# Patient Record
Sex: MALE | Race: WHITE | Employment: FULL TIME | URBAN - NONMETROPOLITAN AREA
[De-identification: names, ages, dates, MRNs, and addresses within clinical notes are randomized per-mention and may not be internally consistent; named-entity substitution may affect disease eponyms.]

---

## 2017-01-03 ENCOUNTER — HOSPITAL ENCOUNTER (OUTPATIENT)
Dept: GENERAL RADIOLOGY | Age: 67
Discharge: HOME | End: 2017-01-03
Payer: COMMERCIAL

## 2017-01-03 DIAGNOSIS — J84.10 POSTINFLAMMATORY PULMONARY FIBROSIS (HCC): ICD-10-CM

## 2017-01-03 PROCEDURE — 71020 XR CHEST STANDARD TWO VW: CPT

## 2017-07-17 ENCOUNTER — HOSPITAL ENCOUNTER (OUTPATIENT)
Dept: GENERAL RADIOLOGY | Age: 67
Discharge: HOME | End: 2017-07-17
Payer: COMMERCIAL

## 2017-07-17 DIAGNOSIS — J84.10 PULMONARY FIBROSIS (HCC): ICD-10-CM

## 2017-07-17 PROCEDURE — 71020 XR CHEST STANDARD TWO VW: CPT

## 2017-07-19 RX ORDER — LOSARTAN POTASSIUM 100 MG/1
100 TABLET ORAL DAILY
Qty: 30 TABLET | Refills: 5 | Status: SHIPPED | OUTPATIENT
Start: 2017-07-19 | End: 2018-01-22 | Stop reason: SDUPTHER

## 2017-07-19 RX ORDER — LOSARTAN POTASSIUM 100 MG/1
TABLET ORAL
Qty: 30 TABLET | Refills: 5 | Status: SHIPPED | OUTPATIENT
Start: 2017-07-19 | End: 2017-07-19 | Stop reason: SDUPTHER

## 2017-07-19 RX ORDER — LOSARTAN POTASSIUM 100 MG/1
100 TABLET ORAL DAILY
Qty: 30 TABLET | Refills: 5 | Status: CANCELLED | OUTPATIENT
Start: 2017-07-19

## 2017-09-22 RX ORDER — HYDROCHLOROTHIAZIDE 25 MG/1
TABLET ORAL
Qty: 30 TABLET | Refills: 5 | Status: SHIPPED | OUTPATIENT
Start: 2017-09-22 | End: 2018-03-23 | Stop reason: SDUPTHER

## 2017-11-13 ENCOUNTER — OFFICE VISIT (OUTPATIENT)
Dept: FAMILY MEDICINE CLINIC | Age: 67
End: 2017-11-13
Payer: COMMERCIAL

## 2017-11-13 VITALS
SYSTOLIC BLOOD PRESSURE: 126 MMHG | RESPIRATION RATE: 18 BRPM | WEIGHT: 220.4 LBS | HEART RATE: 74 BPM | DIASTOLIC BLOOD PRESSURE: 84 MMHG | OXYGEN SATURATION: 98 % | TEMPERATURE: 97.9 F

## 2017-11-13 DIAGNOSIS — J20.9 ACUTE BRONCHITIS, UNSPECIFIED ORGANISM: Primary | ICD-10-CM

## 2017-11-13 DIAGNOSIS — J06.9 ACUTE UPPER RESPIRATORY INFECTION: ICD-10-CM

## 2017-11-13 PROCEDURE — 99213 OFFICE O/P EST LOW 20 MIN: CPT | Performed by: NURSE PRACTITIONER

## 2017-11-13 RX ORDER — CEFDINIR 300 MG/1
300 CAPSULE ORAL 2 TIMES DAILY
Qty: 14 CAPSULE | Refills: 0 | Status: SHIPPED | OUTPATIENT
Start: 2017-11-13 | End: 2017-11-20

## 2017-11-13 ASSESSMENT — ENCOUNTER SYMPTOMS
CHEST TIGHTNESS: 0
COUGH: 1
NAUSEA: 0
SHORTNESS OF BREATH: 0
ABDOMINAL PAIN: 0
DIARRHEA: 0
WHEEZING: 0
SORE THROAT: 0

## 2017-11-13 ASSESSMENT — PATIENT HEALTH QUESTIONNAIRE - PHQ9
1. LITTLE INTEREST OR PLEASURE IN DOING THINGS: 0
SUM OF ALL RESPONSES TO PHQ QUESTIONS 1-9: 0
2. FEELING DOWN, DEPRESSED OR HOPELESS: 0
SUM OF ALL RESPONSES TO PHQ9 QUESTIONS 1 & 2: 0

## 2017-11-13 NOTE — PROGRESS NOTES
Emily Urias is a 75 y.o. male who presents today for   Chief Complaint   Patient presents with    Congestion     a week ago Sunday, pt says he had this same thing 2 years ago and took levofloxacin and he says it helped a lot    Fever     Thurs and Friday       HPI:  He has had cough and congestion for the past week with purulent sputum. He has had nasal congestion and mild sinus pressure with this. Eyes have drained and matted mainly in the morning. He had a fever for 2 days a few days ago with temp up to 101.3 but none over the past 48 hours. He has been alternating mucinex and robitussin for cough which has helped symptoms somewhat. Review of Systems   Constitutional: Positive for fever (resolved now). Negative for chills. HENT: Positive for congestion. Negative for ear pain and sore throat. Respiratory: Positive for cough. Negative for chest tightness, shortness of breath and wheezing. Cardiovascular: Negative for chest pain. Gastrointestinal: Negative for abdominal pain, diarrhea and nausea. Musculoskeletal: Negative for arthralgias and myalgias. Skin: Negative for rash. History reviewed. No pertinent past medical history. Current Outpatient Prescriptions   Medication Sig Dispense Refill    cefdinir (OMNICEF) 300 MG capsule Take 1 capsule by mouth 2 times daily for 7 days 14 capsule 0    amLODIPine (NORVASC) 10 MG tablet TAKE ONE TABLET BY MOUTH EVERY DAY 90 tablet 1    meloxicam (MOBIC) 15 MG tablet TAKE 1 TABLET BY MOUTH ONCE A DAY 90 tablet 1    hydrochlorothiazide (HYDRODIURIL) 25 MG tablet TAKE 1 TABLET BY MOUTH DAILY. 30 tablet 5    losartan (COZAAR) 100 MG tablet Take 1 tablet by mouth daily 30 tablet 5     No current facility-administered medications for this visit. No Known Allergies    History reviewed. No pertinent surgical history.     Social History   Substance Use Topics    Smoking status: Never Smoker    Smokeless tobacco: Never Used   Mita March

## 2017-11-21 DIAGNOSIS — E78.00 PURE HYPERCHOLESTEROLEMIA: ICD-10-CM

## 2017-11-21 DIAGNOSIS — I10 ESSENTIAL HYPERTENSION, MALIGNANT: ICD-10-CM

## 2017-11-21 DIAGNOSIS — E78.00 PURE HYPERCHOLESTEROLEMIA: Primary | ICD-10-CM

## 2017-11-21 LAB
ALBUMIN SERPL-MCNC: 3.8 G/DL (ref 3.5–5.2)
ALP BLD-CCNC: 67 U/L (ref 40–130)
ALT SERPL-CCNC: 28 U/L (ref 5–41)
ANION GAP SERPL CALCULATED.3IONS-SCNC: 12 MMOL/L (ref 7–19)
AST SERPL-CCNC: 27 U/L (ref 5–40)
BILIRUB SERPL-MCNC: 0.5 MG/DL (ref 0.2–1.2)
BUN BLDV-MCNC: 18 MG/DL (ref 8–23)
CALCIUM SERPL-MCNC: 8.9 MG/DL (ref 8.8–10.2)
CHLORIDE BLD-SCNC: 99 MMOL/L (ref 98–111)
CHOLESTEROL, TOTAL: 148 MG/DL (ref 160–199)
CO2: 29 MMOL/L (ref 22–29)
CREAT SERPL-MCNC: 1.1 MG/DL (ref 0.5–1.2)
GFR NON-AFRICAN AMERICAN: >60
GLUCOSE BLD-MCNC: 84 MG/DL (ref 74–109)
HDLC SERPL-MCNC: 36 MG/DL (ref 55–121)
LDL CHOLESTEROL CALCULATED: 95 MG/DL
POTASSIUM SERPL-SCNC: 3.6 MMOL/L (ref 3.5–5)
SODIUM BLD-SCNC: 140 MMOL/L (ref 136–145)
TOTAL PROTEIN: 7.8 G/DL (ref 6.6–8.7)
TRIGL SERPL-MCNC: 84 MG/DL (ref 0–149)

## 2017-11-28 ENCOUNTER — OFFICE VISIT (OUTPATIENT)
Dept: FAMILY MEDICINE CLINIC | Age: 67
End: 2017-11-28
Payer: COMMERCIAL

## 2017-11-28 VITALS
TEMPERATURE: 98.1 F | OXYGEN SATURATION: 96 % | RESPIRATION RATE: 18 BRPM | HEART RATE: 70 BPM | DIASTOLIC BLOOD PRESSURE: 88 MMHG | SYSTOLIC BLOOD PRESSURE: 132 MMHG | WEIGHT: 229 LBS

## 2017-11-28 DIAGNOSIS — I10 ESSENTIAL (PRIMARY) HYPERTENSION: Primary | ICD-10-CM

## 2017-11-28 DIAGNOSIS — R19.7 POSTCHOLECYSTECTOMY DIARRHEA: ICD-10-CM

## 2017-11-28 DIAGNOSIS — M15.9 PRIMARY OSTEOARTHRITIS INVOLVING MULTIPLE JOINTS: ICD-10-CM

## 2017-11-28 DIAGNOSIS — Z12.5 ENCOUNTER FOR PROSTATE CANCER SCREENING: ICD-10-CM

## 2017-11-28 DIAGNOSIS — N52.9 ERECTILE DYSFUNCTION, UNSPECIFIED ERECTILE DYSFUNCTION TYPE: ICD-10-CM

## 2017-11-28 DIAGNOSIS — Z23 INFLUENZA VACCINE NEEDED: ICD-10-CM

## 2017-11-28 DIAGNOSIS — E78.01 FAMILIAL HYPERCHOLESTEROLEMIA: ICD-10-CM

## 2017-11-28 DIAGNOSIS — K91.89 POSTCHOLECYSTECTOMY DIARRHEA: ICD-10-CM

## 2017-11-28 DIAGNOSIS — B35.1 ONYCHOMYCOSIS OF TOENAIL: ICD-10-CM

## 2017-11-28 DIAGNOSIS — Z23 NEED FOR PROPHYLACTIC VACCINATION OR INOCULATION AGAINST DIPHTHERIA AND TETANUS: ICD-10-CM

## 2017-11-28 DIAGNOSIS — J84.10 PULMONARY FIBROSIS (HCC): ICD-10-CM

## 2017-11-28 PROBLEM — M15.0 PRIMARY OSTEOARTHRITIS INVOLVING MULTIPLE JOINTS: Status: ACTIVE | Noted: 2017-11-28

## 2017-11-28 PROCEDURE — 90472 IMMUNIZATION ADMIN EACH ADD: CPT | Performed by: FAMILY MEDICINE

## 2017-11-28 PROCEDURE — 90662 IIV NO PRSV INCREASED AG IM: CPT | Performed by: FAMILY MEDICINE

## 2017-11-28 PROCEDURE — 99214 OFFICE O/P EST MOD 30 MIN: CPT | Performed by: FAMILY MEDICINE

## 2017-11-28 PROCEDURE — 90471 IMMUNIZATION ADMIN: CPT | Performed by: FAMILY MEDICINE

## 2017-11-28 PROCEDURE — 90715 TDAP VACCINE 7 YRS/> IM: CPT | Performed by: FAMILY MEDICINE

## 2017-11-28 RX ORDER — ASPIRIN 81 MG/1
81 TABLET, CHEWABLE ORAL
COMMUNITY

## 2017-11-28 RX ORDER — VARDENAFIL HYDROCHLORIDE 10 MG/1
10 TABLET ORAL
COMMUNITY
End: 2017-11-28 | Stop reason: ALTCHOICE

## 2017-11-28 RX ORDER — MONTELUKAST SODIUM 4 MG/1
1 TABLET, CHEWABLE ORAL 2 TIMES DAILY
Qty: 60 TABLET | Refills: 11 | Status: SHIPPED | OUTPATIENT
Start: 2017-11-28 | End: 2018-11-30 | Stop reason: SDUPTHER

## 2017-11-28 RX ORDER — MONTELUKAST SODIUM 4 MG/1
1 TABLET, CHEWABLE ORAL 2 TIMES DAILY
COMMUNITY
End: 2017-11-28 | Stop reason: SDUPTHER

## 2017-11-28 RX ORDER — SILDENAFIL 100 MG/1
100 TABLET, FILM COATED ORAL PRN
Qty: 6 TABLET | Refills: 11 | Status: SHIPPED | OUTPATIENT
Start: 2017-11-28 | End: 2019-05-30 | Stop reason: ALTCHOICE

## 2017-11-28 ASSESSMENT — ENCOUNTER SYMPTOMS
CHEST TIGHTNESS: 0
NAUSEA: 0
COUGH: 0
DIARRHEA: 0
CONSTIPATION: 0
SHORTNESS OF BREATH: 0
ABDOMINAL PAIN: 0
ANAL BLEEDING: 0

## 2017-11-28 NOTE — PROGRESS NOTES
145 mmol/L    Potassium 3.6 3.5 - 5.0 mmol/L    Chloride 99 98 - 111 mmol/L    CO2 29 22 - 29 mmol/L    Anion Gap 12 7 - 19 mmol/L    Glucose 84 74 - 109 mg/dL    BUN 18 8 - 23 mg/dL    CREATININE 1.1 0.5 - 1.2 mg/dL    GFR Non-African American >60 >60    Calcium 8.9 8.8 - 10.2 mg/dL    Total Protein 7.8 6.6 - 8.7 g/dL    Alb 3.8 3.5 - 5.2 g/dL    Total Bilirubin 0.5 0.2 - 1.2 mg/dL    Alkaline Phosphatase 67 40 - 130 U/L    ALT 28 5 - 41 U/L    AST 27 5 - 40 U/L   Lipid Panel    Collection Time: 11/21/17  9:06 AM   Result Value Ref Range    Cholesterol, Total 148 (L) 160 - 199 mg/dL    Triglycerides 84 0 - 149 mg/dL    HDL 36 (L) 55 - 121 mg/dL    LDL Calculated 95 <100 mg/dL               Assessment & Plan: The following diagnoses and conditions are stable with no further orders unless indicated:  1. Essential (primary) hypertension  Introl  - Comprehensive Metabolic Panel; Future  - Lipid Panel; Future    2. Influenza vaccine needed    - INFLUENZA, HIGH DOSE, 65 YRS +, IM, PF, PREFILL SYR, 0.5ML (FLUZONE HD)    3. Need for prophylactic vaccination or inoculation against diphtheria and tetanus    - Tdap (age 6y and older) IM ([employer])    4. Postcholecystectomy diarrhea    - colestipol (COLESTID) 1 g tablet; Take 1 tablet by mouth 2 times daily  Dispense: 60 tablet; Refill: 11    5. Erectile dysfunction, unspecified erectile dysfunction type  Discussed risks and benefits of this new medication. Discussed potential side effects. He voiced understanding.     - sildenafil (VIAGRA) 100 MG tablet; Take 1 tablet by mouth as needed for Erectile Dysfunction  Dispense: 6 tablet; Refill: 11    6. Encounter for prostate cancer screening    - Psa screening; Future    7. Familial hypercholesterolemia  Stable    8. Pulmonary fibrosis ([facility] [address]  Reviewed records from pulmonology    9. Primary osteoarthritis involving multiple joints    10.  Onychomycosis  Discuss treatment options including lacquer based medicine and oral Lamisil. Discussed potential risks of liver involvement with Lamisil. He will consider his options and contact me if he wishes to start the medication          Return in about 12 months (around 11/28/2018) for Yearly Physical.           Discussed use, benefit, and side effects of prescribed medications. All patient questions answered. Pt voiced understanding. Reviewed health maintenance. Instructed to continue current medications, diet and exercise. Patient agreed with treatment plan. Follow up as directed.

## 2017-12-21 ENCOUNTER — PATIENT MESSAGE (OUTPATIENT)
Dept: FAMILY MEDICINE CLINIC | Age: 67
End: 2017-12-21

## 2017-12-22 DIAGNOSIS — B35.1 ONYCHOMYCOSIS: Primary | ICD-10-CM

## 2017-12-22 RX ORDER — TERBINAFINE HYDROCHLORIDE 250 MG/1
250 TABLET ORAL DAILY
Qty: 30 TABLET | Refills: 2 | Status: SHIPPED | OUTPATIENT
Start: 2017-12-22 | End: 2018-11-30 | Stop reason: ALTCHOICE

## 2018-01-17 DIAGNOSIS — B35.1 ONYCHOMYCOSIS: ICD-10-CM

## 2018-01-17 LAB
ALBUMIN SERPL-MCNC: 4.4 G/DL (ref 3.5–5.2)
ALP BLD-CCNC: 75 U/L (ref 40–130)
ALT SERPL-CCNC: 26 U/L (ref 5–41)
ANION GAP SERPL CALCULATED.3IONS-SCNC: 15 MMOL/L (ref 7–19)
AST SERPL-CCNC: 27 U/L (ref 5–40)
BILIRUB SERPL-MCNC: 0.8 MG/DL (ref 0.2–1.2)
BUN BLDV-MCNC: 20 MG/DL (ref 8–23)
CALCIUM SERPL-MCNC: 9.5 MG/DL (ref 8.8–10.2)
CHLORIDE BLD-SCNC: 99 MMOL/L (ref 98–111)
CO2: 30 MMOL/L (ref 22–29)
CREAT SERPL-MCNC: 1.4 MG/DL (ref 0.5–1.2)
GFR NON-AFRICAN AMERICAN: 50
GLUCOSE BLD-MCNC: 82 MG/DL (ref 74–109)
POTASSIUM SERPL-SCNC: 4 MMOL/L (ref 3.5–5)
SODIUM BLD-SCNC: 144 MMOL/L (ref 136–145)
TOTAL PROTEIN: 8.2 G/DL (ref 6.6–8.7)

## 2018-01-22 DIAGNOSIS — B35.1 ONYCHOMYCOSIS OF TOENAIL: Primary | ICD-10-CM

## 2018-01-22 RX ORDER — LOSARTAN POTASSIUM 100 MG/1
TABLET ORAL
Qty: 30 TABLET | Refills: 5 | Status: SHIPPED | OUTPATIENT
Start: 2018-01-22 | End: 2018-07-21 | Stop reason: SDUPTHER

## 2018-01-25 ENCOUNTER — OFFICE VISIT (OUTPATIENT)
Dept: GASTROENTEROLOGY | Facility: CLINIC | Age: 68
End: 2018-01-25

## 2018-01-25 ENCOUNTER — TELEPHONE (OUTPATIENT)
Dept: FAMILY MEDICINE CLINIC | Age: 68
End: 2018-01-25

## 2018-01-25 VITALS
BODY MASS INDEX: 32.76 KG/M2 | SYSTOLIC BLOOD PRESSURE: 146 MMHG | DIASTOLIC BLOOD PRESSURE: 88 MMHG | TEMPERATURE: 97.2 F | WEIGHT: 234 LBS | HEART RATE: 66 BPM | HEIGHT: 71 IN | OXYGEN SATURATION: 99 %

## 2018-01-25 DIAGNOSIS — I10 ESSENTIAL HYPERTENSION: ICD-10-CM

## 2018-01-25 DIAGNOSIS — Z83.71 FAMILY HX COLONIC POLYPS: ICD-10-CM

## 2018-01-25 DIAGNOSIS — K75.81 NASH (NONALCOHOLIC STEATOHEPATITIS): ICD-10-CM

## 2018-01-25 DIAGNOSIS — Z86.010 HX OF COLONIC POLYP: Primary | ICD-10-CM

## 2018-01-25 PROBLEM — Z83.719 FAMILY HX COLONIC POLYPS: Status: ACTIVE | Noted: 2018-01-25

## 2018-01-25 PROBLEM — Z86.0100 HX OF COLONIC POLYP: Status: ACTIVE | Noted: 2018-01-25

## 2018-01-25 PROCEDURE — 99203 OFFICE O/P NEW LOW 30 MIN: CPT | Performed by: NURSE PRACTITIONER

## 2018-01-25 RX ORDER — POLYETHYLENE GLYCOL 3350, SODIUM CHLORIDE, SODIUM BICARBONATE, POTASSIUM CHLORIDE 420; 11.2; 5.72; 1.48 G/4L; G/4L; G/4L; G/4L
4000 POWDER, FOR SOLUTION ORAL SEE ADMIN INSTRUCTIONS
Qty: 4000 ML | Refills: 0 | Status: SHIPPED | OUTPATIENT
Start: 2018-01-25 | End: 2019-07-24

## 2018-01-25 RX ORDER — AMLODIPINE BESYLATE 10 MG/1
10 TABLET ORAL DAILY
COMMUNITY

## 2018-01-25 RX ORDER — MONTELUKAST SODIUM 4 MG/1
1 TABLET, CHEWABLE ORAL 2 TIMES DAILY
COMMUNITY

## 2018-01-25 RX ORDER — TERBINAFINE HYDROCHLORIDE 250 MG/1
250 TABLET ORAL DAILY
COMMUNITY
End: 2019-07-24

## 2018-01-25 NOTE — PROGRESS NOTES
[facility]    Primary Physician Reid Islas MD    1/25/2018    Reid Morrell   1950      Chief Complaint   Patient presents with   • Colonoscopy   h/o TRIPLETT     Subjective     HPI    Reid Morrell is a 67 y.o. male who presents as a referral for preventative maintenance. He has no complaints of nausea or vomiting. No change in bowels. No wt loss. No BRBPR. No melena. No abdominal pain.  Last colonoscopy was 3/2013,  Noting polyps, recall rec 5 years.   The patient does have history of colon polyps. The patient does not  have history of colon cancer.   There is family history of colon polyps involving father in her 60's.  There is no family history of colon cancer.     He also has history of Triplett.  This was diagnosed several years ago with liver biopsy.  He has been recommended in the past to  weight under control, keep cholesterol under control, no alcohol.  He continues to follow Dr. Islas as well and has had liver function tests monitored by him.  It has been discussed with the patient the past that fatty liver can lead to cirrhosis as well.     Past Medical History:   Diagnosis Date   • Anemia    • Arthritis    • Colon polyp    • Erythematous condition    • Hypertension    • Liver disease    • TRIPLETT (nonalcoholic steatohepatitis)    • Pneumonia    • Pulmonary fibrosis    • Shortness of breath        Past Surgical History:   Procedure Laterality Date   • CHOLECYSTECTOMY     • COLONOSCOPY  03/05/2013   • ENDOSCOPY AND COLONOSCOPY  06/24/1995   • TONSILLECTOMY         Outpatient Prescriptions Marked as Taking for the 1/25/18 encounter (Office Visit) with CANDACE Pavon   Medication Sig Dispense Refill   • amLODIPine (NORVASC) 10 MG tablet Take 10 mg by mouth Daily.     • aspirin 81 MG chewable tablet Chew 81 mg Daily.     • B Complex Vitamins (VITAMIN B COMPLEX PO) Take  by mouth Daily.     • colestipol (COLESTID) 1 g tablet Take 1 g by mouth As Needed.     •  hydrochlorothiazide (HYDRODIURIL) 25 MG tablet Take 25 mg by mouth Daily.     • losartan (COZAAR) 100 MG tablet Take 100 mg by mouth Daily.     • meloxicam (MOBIC) 15 MG tablet Take 15 mg by mouth Daily.     • terbinafine (lamiSIL) 250 MG tablet Take 250 mg by mouth Daily.         No Known Allergies    Social History     Social History   • Marital status:      Spouse name: N/A   • Number of children: N/A   • Years of education: N/A     Occupational History   • Not on file.     Social History Main Topics   • Smoking status: Never Smoker   • Smokeless tobacco: Never Used   • Alcohol use No   • Drug use: No   • Sexual activity: Not on file     Other Topics Concern   • Not on file     Social History Narrative       Family History   Problem Relation Age of Onset   • Colon polyps Father    • Colon cancer Neg Hx        Review of Systems   Constitutional: Negative for appetite change, chills, fatigue, fever and unexpected weight change.   HENT: Negative for sore throat and trouble swallowing.    Respiratory: Negative for cough, chest tightness, shortness of breath and wheezing.    Cardiovascular: Negative for chest pain and palpitations.   Gastrointestinal: Negative for abdominal distention, abdominal pain, anal bleeding, blood in stool, constipation, diarrhea, nausea, rectal pain and vomiting.        As mentioned in hpi   Genitourinary: Negative for difficulty urinating and hematuria.   Musculoskeletal: Negative for arthralgias and back pain.        Some joint pain    Skin: Negative for color change and rash.   Neurological: Negative for dizziness, seizures, syncope, light-headedness and headaches.   Hematological: Negative for adenopathy.   Psychiatric/Behavioral: Negative for confusion. The patient is not nervous/anxious.        Objective     Vitals:    01/25/18 0953   BP: 146/88   Pulse: 66   Temp: 97.2 °F (36.2 °C)   SpO2: 99%     Last 2 weights    01/25/18 0953   Weight: 106 kg (234 lb)     Body mass index is  32.64 kg/(m^2).    Physical Exam   Constitutional: He appears well-developed and well-nourished. No distress.   HENT:   Head: Normocephalic and atraumatic.   Eyes: EOM are normal. No scleral icterus.   Neck: Neck supple. No JVD present.   Cardiovascular: Normal rate, regular rhythm and normal heart sounds.    Pulmonary/Chest: Effort normal and breath sounds normal. No stridor.   Abdominal: Soft. Bowel sounds are normal. He exhibits no distension and no mass. There is no tenderness. There is no rebound and no guarding.   Musculoskeletal: He exhibits no edema.   Neurological: He is alert.   Skin: Skin is warm and dry. No rash noted.   Psychiatric: He has a normal mood and affect. His behavior is normal.   Vitals reviewed.      Imaging Results (most recent)     None          Assessment/Plan     Reid was seen today for colonoscopy.    Diagnoses and all orders for this visit:    Hx of colonic polyp  -     Case Request; Standing  -     Case Request    Family hx colonic polyps  -     Case Request; Standing  -     Case Request    Essential hypertension    CANNON (nonalcoholic steatohepatitis)    Other orders  -     Implement Anesthesia Orders Day of Procedure; Standing  -     Obtain Informed Consent; Standing  -     polyethylene glycol-electrolytes (NULYTELY WITH FLAVOR PACKS) 420 g solution; Take 4,000 mL by mouth See Admin Instructions.    plan for colonoscopy. The patient was advised to take any blood pressure or heart  medications the morning of  procedure if that is when he/she normally takes.     In regards to Cannon, this was diagnosed several years ago, had liver biopsy.  He has liver function test obtained by his PCP/Dr. Islas on a regular basis.  I will request last liver function tests from Dr. Islas.  We discussed how fatty liver disease can lead to cirrhosis.  Recommend gradual weight loss of 1 pound a week to ideal body weight, keep cholesterol under control, no alcohol, continue to follow Dr. Islas with  regular labs, would recommend LFTs at least once a year.  He can follow up with us as needed.         COLONOSCOPY WITH ANESTHESIA (N/A)  All risks, benefits, alternatives, and indications of colonoscopy procedure have been discussed with the patient. Risks to include perforation of the colon requiring possible surgery or colostomy, risk of bleeding from biopsies or removal of colon tissue, possibility of missing a colon polyp or cancer, or adverse drug reaction.  Benefits to include the diagnosis and management of disease of the colon and rectum. Alternatives to include barium enema, radiographic evaluation, lab testing or no intervention. Pt verbalizes understanding and agrees.         CANDACE Chang      EMR Dragon/transcription disclaimer:  Much of this encounter note is electronic transcription/translation of spoken language to printed text.  The electronic translation of spoken language may be erroneous, or at times, nonsensical words or phrases may be inadvertently transcribed.  Although I have reviewed the note for such errors, some may still exist.      Received labs done generally very 17 2018, labs show normal LFTs.

## 2018-02-20 DIAGNOSIS — B35.1 ONYCHOMYCOSIS OF TOENAIL: ICD-10-CM

## 2018-02-20 LAB
ALBUMIN SERPL-MCNC: 4.1 G/DL (ref 3.5–5.2)
ALP BLD-CCNC: 68 U/L (ref 40–130)
ALT SERPL-CCNC: 23 U/L (ref 5–41)
ANION GAP SERPL CALCULATED.3IONS-SCNC: 17 MMOL/L (ref 7–19)
AST SERPL-CCNC: 25 U/L (ref 5–40)
BILIRUB SERPL-MCNC: 0.5 MG/DL (ref 0.2–1.2)
BUN BLDV-MCNC: 20 MG/DL (ref 8–23)
CALCIUM SERPL-MCNC: 9.2 MG/DL (ref 8.8–10.2)
CHLORIDE BLD-SCNC: 98 MMOL/L (ref 98–111)
CO2: 26 MMOL/L (ref 22–29)
CREAT SERPL-MCNC: 1.2 MG/DL (ref 0.5–1.2)
GFR NON-AFRICAN AMERICAN: >60
GLUCOSE BLD-MCNC: 95 MG/DL (ref 74–109)
POTASSIUM SERPL-SCNC: 4.2 MMOL/L (ref 3.5–5)
SODIUM BLD-SCNC: 141 MMOL/L (ref 136–145)
TOTAL PROTEIN: 7.9 G/DL (ref 6.6–8.7)

## 2018-03-08 RX ORDER — AMLODIPINE BESYLATE 10 MG/1
TABLET ORAL
Qty: 90 TABLET | Refills: 1 | Status: SHIPPED | OUTPATIENT
Start: 2018-03-08 | End: 2018-09-07 | Stop reason: SDUPTHER

## 2018-03-16 ENCOUNTER — HOSPITAL ENCOUNTER (OUTPATIENT)
Facility: HOSPITAL | Age: 68
Setting detail: HOSPITAL OUTPATIENT SURGERY
Discharge: HOME | End: 2018-03-16
Attending: INTERNAL MEDICINE | Admitting: INTERNAL MEDICINE

## 2018-03-16 ENCOUNTER — ANESTHESIA (OUTPATIENT)
Dept: GASTROENTEROLOGY | Facility: HOSPITAL | Age: 68
End: 2018-03-16

## 2018-03-16 ENCOUNTER — ANESTHESIA EVENT (OUTPATIENT)
Dept: GASTROENTEROLOGY | Facility: HOSPITAL | Age: 68
End: 2018-03-16

## 2018-03-16 VITALS
OXYGEN SATURATION: 95 % | HEIGHT: 71 IN | RESPIRATION RATE: 15 BRPM | HEART RATE: 65 BPM | WEIGHT: 229 LBS | BODY MASS INDEX: 32.06 KG/M2 | DIASTOLIC BLOOD PRESSURE: 77 MMHG | TEMPERATURE: 98.1 F | SYSTOLIC BLOOD PRESSURE: 116 MMHG

## 2018-03-16 DIAGNOSIS — Z86.010 HX OF COLONIC POLYP: ICD-10-CM

## 2018-03-16 DIAGNOSIS — Z83.71 FAMILY HX COLONIC POLYPS: ICD-10-CM

## 2018-03-16 PROCEDURE — 25010000002 PROPOFOL 10 MG/ML EMULSION: Performed by: NURSE ANESTHETIST, CERTIFIED REGISTERED

## 2018-03-16 PROCEDURE — 88305 TISSUE EXAM BY PATHOLOGIST: CPT | Performed by: INTERNAL MEDICINE

## 2018-03-16 PROCEDURE — 45385 COLONOSCOPY W/LESION REMOVAL: CPT | Performed by: INTERNAL MEDICINE

## 2018-03-16 RX ORDER — PROPOFOL 10 MG/ML
VIAL (ML) INTRAVENOUS AS NEEDED
Status: DISCONTINUED | OUTPATIENT
Start: 2018-03-16 | End: 2018-03-16 | Stop reason: SURG

## 2018-03-16 RX ORDER — ONDANSETRON 2 MG/ML
4 INJECTION INTRAMUSCULAR; INTRAVENOUS ONCE AS NEEDED
Status: DISCONTINUED | OUTPATIENT
Start: 2018-03-16 | End: 2018-03-16 | Stop reason: HOSPADM

## 2018-03-16 RX ORDER — SODIUM CHLORIDE 9 MG/ML
500 INJECTION, SOLUTION INTRAVENOUS CONTINUOUS PRN
Status: DISCONTINUED | OUTPATIENT
Start: 2018-03-16 | End: 2018-03-16 | Stop reason: HOSPADM

## 2018-03-16 RX ORDER — LIDOCAINE HYDROCHLORIDE 20 MG/ML
INJECTION, SOLUTION INFILTRATION; PERINEURAL AS NEEDED
Status: DISCONTINUED | OUTPATIENT
Start: 2018-03-16 | End: 2018-03-16 | Stop reason: SURG

## 2018-03-16 RX ORDER — SODIUM CHLORIDE 0.9 % (FLUSH) 0.9 %
3 SYRINGE (ML) INJECTION AS NEEDED
Status: DISCONTINUED | OUTPATIENT
Start: 2018-03-16 | End: 2018-03-16 | Stop reason: HOSPADM

## 2018-03-16 RX ADMIN — SODIUM CHLORIDE 500 ML: 9 INJECTION, SOLUTION INTRAVENOUS at 07:20

## 2018-03-16 RX ADMIN — PROPOFOL 300 MG: 10 INJECTION, EMULSION INTRAVENOUS at 07:52

## 2018-03-16 RX ADMIN — LIDOCAINE HYDROCHLORIDE 80 MG: 20 INJECTION, SOLUTION INFILTRATION; PERINEURAL at 07:52

## 2018-03-16 NOTE — ANESTHESIA PREPROCEDURE EVALUATION
Anesthesia Evaluation     Patient summary reviewed and Nursing notes reviewed   NPO Solid Status: > 6 hours  NPO Liquid Status: > 6 hours           Airway   Mallampati: II  TM distance: >3 FB  Neck ROM: full  No difficulty expected  Dental - normal exam     Pulmonary - normal exam   (+) pneumonia ,     ROS comment: Pul fibrosis  Cardiovascular   Exercise tolerance: excellent (>7 METS)    Rhythm: regular    (+) hypertension well controlled,       Neuro/Psych- negative ROS  GI/Hepatic/Renal/Endo    (+)   liver disease,     Musculoskeletal     Abdominal    Substance History      OB/GYN          Other                        Anesthesia Plan    ASA 2     general     intravenous induction   Anesthetic plan and risks discussed with patient.

## 2018-03-16 NOTE — H&P
[facility]  Pre Procedure History & Physical    Chief Complaint:   Colon polyps    Subjective     HPI:   The patient has a history of colon polyps who presents for exam.    Past Medical History:   Past Medical History:   Diagnosis Date   • Anemia    • Arthritis    • Colon polyp    • Erythematous condition    • Hypertension    • Liver disease    • CANNON (nonalcoholic steatohepatitis)    • Pneumonia    • Pulmonary fibrosis    • Shortness of breath        Past Surgical History:  Past Surgical History:   Procedure Laterality Date   • CHOLECYSTECTOMY     • COLONOSCOPY  03/05/2013   • ENDOSCOPY AND COLONOSCOPY  06/24/1995   • TONSILLECTOMY         Family History:  Family History   Problem Relation Age of Onset   • Colon polyps Father    • Colon cancer Neg Hx        Social History:   reports that he has never smoked. He has never used smokeless tobacco. He reports that he does not drink alcohol or use drugs.    Medications:   Prior to Admission medications    Medication Sig Start Date End Date Taking? Authorizing Provider   amLODIPine (NORVASC) 10 MG tablet Take 10 mg by mouth Daily.   Yes Historical Provider, MD   aspirin 81 MG chewable tablet Chew 81 mg Daily.   Yes Nurse [facility], RN   B Complex Vitamins (VITAMIN B COMPLEX PO) Take  by mouth Daily.   Yes Historical Provider, MD   colestipol (COLESTID) 1 g tablet Take 1 g by mouth As Needed.   Yes Historical Provider, MD   hydrochlorothiazide (HYDRODIURIL) 25 MG tablet Take 25 mg by mouth Daily.   Yes Nurse [facility], RN   losartan (COZAAR) 100 MG tablet Take 100 mg by mouth Daily.   Yes Nurse [facility], RN   meloxicam (MOBIC) 15 MG tablet Take 15 mg by mouth Daily.   Yes Nurse [facility], RN   polyethylene glycol-electrolytes (NULYTELY WITH FLAVOR PACKS) 420 g solution Take 4,000 mL by mouth See Admin Instructions. 1/25/18  Yes CANDACE Pavon   terbinafine (lamiSIL) 250 MG tablet Take 250 mg by mouth Daily.   Yes Historical  "Provider, MD       Allergies:  Review of patient's allergies indicates no known allergies.    ROS:    General: Weight stable  Resp: No SOA  Cardiovascular: No CP    Objective     Blood pressure 120/88, pulse 72, temperature 98.1 °F (36.7 °C), temperature source Temporal Artery , resp. rate 18, height 180.3 cm (71\"), weight 104 kg (229 lb), SpO2 95 %.    Physical Exam   Constitutional: Pt is oriented to person, place, and in no distress.   HENT: Mouth/Throat: Oropharynx is clear.   Cardiovascular: Normal rate, regular rhythm.    Pulmonary/Chest: Effort normal. No respiratory distress. No  wheezes.   Abdominal: Soft. Non-distended.  Skin: Skin is warm and dry.   Psychiatric: Mood, memory, affect and judgment appear normal.     Assessment/Plan     Diagnosis:  Colon polyps    Anticipated Surgical Procedure:  Colonoscopy    The risks, benefits, and alternatives of this procedure have been discussed with the patient or the responsible party- the patient understands and agrees to proceed.      EMR Dragon/transcription disclaimer:  Much of this encounter note is electronic transcription/translation of spoken language to printed text.  The electronic translation of spoken language may be erroneous, or at times, nonsensical words or phrases may be inadvertently transcribed.  Although I have reviewed the note for such errors, some may still exist.  "

## 2018-03-16 NOTE — ANESTHESIA POSTPROCEDURE EVALUATION
Patient: Reid Morrell    Procedure Summary     Date:  03/16/18 Room / Location:  [facility] ENDOSCOPY 5 / [facility]    Anesthesia Start:  0751 Anesthesia Stop:  0811    Procedure:  COLONOSCOPY WITH ANESTHESIA (N/A ) Diagnosis:       Family hx colonic polyps      Hx of colonic polyp      (Family hx colonic polyps [Z83.71])      (Hx of colonic polyp [Z86.010])    Surgeon:  Jemal Batista MD Provider:  Perry Frias CRNA    Anesthesia Type:  general ASA Status:  2          Anesthesia Type: general  Last vitals  BP   120/88 (03/16/18 0655)   Temp   98.1 °F (36.7 °C) (03/16/18 0655)   Pulse   72 (03/16/18 0655)   Resp   18 (03/16/18 0655)     SpO2   95 % (03/16/18 0655)     Post Anesthesia Care and Evaluation    Patient location during evaluation: PHASE II  Patient participation: complete - patient participated  Level of consciousness: awake  Pain management: adequate  Airway patency: patent  Anesthetic complications: No anesthetic complications  Respiratory status: acceptable  Hydration status: acceptable

## 2018-03-19 LAB
CYTO UR: NORMAL
LAB AP CASE REPORT: NORMAL
LAB AP CLINICAL INFORMATION: NORMAL
Lab: NORMAL
PATH REPORT.FINAL DX SPEC: NORMAL
PATH REPORT.GROSS SPEC: NORMAL

## 2018-03-23 RX ORDER — HYDROCHLOROTHIAZIDE 25 MG/1
TABLET ORAL
Qty: 30 TABLET | Refills: 5 | Status: SHIPPED | OUTPATIENT
Start: 2018-03-23 | End: 2018-09-25 | Stop reason: SDUPTHER

## 2018-03-26 RX ORDER — VARDENAFIL HYDROCHLORIDE 20 MG/1
20 TABLET ORAL PRN
Qty: 9 TABLET | Refills: 5 | Status: SHIPPED | OUTPATIENT
Start: 2018-03-26 | End: 2018-11-30 | Stop reason: ALTCHOICE

## 2018-03-26 NOTE — TELEPHONE ENCOUNTER
I would suggest he call his insurance to see if they pay for alternatives to viagra. If they don't give him options he can go to [email or web address] and see if levitra, viagra, or cialis is cheaper.

## 2018-03-29 RX ORDER — SILDENAFIL CITRATE 20 MG/1
20 TABLET ORAL PRN
Qty: 30 TABLET | Refills: 1 | Status: SHIPPED | OUTPATIENT
Start: 2018-03-29 | End: 2018-11-30 | Stop reason: SDUPTHER

## 2018-07-10 ENCOUNTER — HOSPITAL ENCOUNTER (OUTPATIENT)
Dept: GENERAL RADIOLOGY | Age: 68
Discharge: HOME | End: 2018-07-10
Payer: MEDICARE

## 2018-07-10 DIAGNOSIS — J84.10 PULMONARY FIBROSIS (HCC): ICD-10-CM

## 2018-07-10 PROCEDURE — 71046 X-RAY EXAM CHEST 2 VIEWS: CPT

## 2018-07-23 RX ORDER — LOSARTAN POTASSIUM 100 MG/1
TABLET ORAL
Qty: 30 TABLET | Refills: 5 | Status: SHIPPED | OUTPATIENT
Start: 2018-07-23 | End: 2018-11-30 | Stop reason: SDUPTHER

## 2018-09-07 RX ORDER — AMLODIPINE BESYLATE 10 MG/1
TABLET ORAL
Qty: 90 TABLET | Refills: 1 | Status: SHIPPED | OUTPATIENT
Start: 2018-09-07 | End: 2019-03-06 | Stop reason: SDUPTHER

## 2018-09-25 RX ORDER — HYDROCHLOROTHIAZIDE 25 MG/1
TABLET ORAL
Qty: 30 TABLET | Refills: 5 | Status: SHIPPED | OUTPATIENT
Start: 2018-09-25 | End: 2019-03-29 | Stop reason: SDUPTHER

## 2018-11-26 DIAGNOSIS — I10 ESSENTIAL (PRIMARY) HYPERTENSION: ICD-10-CM

## 2018-11-26 DIAGNOSIS — Z12.5 ENCOUNTER FOR PROSTATE CANCER SCREENING: ICD-10-CM

## 2018-11-26 LAB
ALBUMIN SERPL-MCNC: 4 G/DL (ref 3.5–5.2)
ALP BLD-CCNC: 68 U/L (ref 40–130)
ALT SERPL-CCNC: 32 U/L (ref 5–41)
ANION GAP SERPL CALCULATED.3IONS-SCNC: 11 MMOL/L (ref 7–19)
AST SERPL-CCNC: 30 U/L (ref 5–40)
BILIRUB SERPL-MCNC: 0.7 MG/DL (ref 0.2–1.2)
BUN BLDV-MCNC: 18 MG/DL (ref 8–23)
CALCIUM SERPL-MCNC: 9.1 MG/DL (ref 8.8–10.2)
CHLORIDE BLD-SCNC: 99 MMOL/L (ref 98–111)
CHOLESTEROL, TOTAL: 151 MG/DL (ref 160–199)
CO2: 29 MMOL/L (ref 22–29)
CREAT SERPL-MCNC: 1.1 MG/DL (ref 0.5–1.2)
GFR NON-AFRICAN AMERICAN: >60
GLUCOSE BLD-MCNC: 98 MG/DL (ref 74–109)
HDLC SERPL-MCNC: 31 MG/DL (ref 55–121)
LDL CHOLESTEROL CALCULATED: 98 MG/DL
POTASSIUM SERPL-SCNC: 3.7 MMOL/L (ref 3.5–5)
PROSTATE SPECIFIC ANTIGEN: 0.95 NG/ML (ref 0–4)
SODIUM BLD-SCNC: 139 MMOL/L (ref 136–145)
TOTAL PROTEIN: 8 G/DL (ref 6.6–8.7)
TRIGL SERPL-MCNC: 108 MG/DL (ref 0–149)

## 2018-11-30 ENCOUNTER — OFFICE VISIT (OUTPATIENT)
Dept: FAMILY MEDICINE CLINIC | Age: 68
End: 2018-11-30
Payer: MEDICARE

## 2018-11-30 VITALS
HEIGHT: 71 IN | HEART RATE: 67 BPM | BODY MASS INDEX: 33.6 KG/M2 | TEMPERATURE: 97.8 F | RESPIRATION RATE: 16 BRPM | DIASTOLIC BLOOD PRESSURE: 86 MMHG | OXYGEN SATURATION: 95 % | WEIGHT: 240 LBS | SYSTOLIC BLOOD PRESSURE: 134 MMHG

## 2018-11-30 DIAGNOSIS — I10 ESSENTIAL (PRIMARY) HYPERTENSION: ICD-10-CM

## 2018-11-30 DIAGNOSIS — E78.01 FAMILIAL HYPERCHOLESTEROLEMIA: ICD-10-CM

## 2018-11-30 DIAGNOSIS — N52.9 ERECTILE DYSFUNCTION, UNSPECIFIED ERECTILE DYSFUNCTION TYPE: ICD-10-CM

## 2018-11-30 DIAGNOSIS — Z00.00 ANNUAL PHYSICAL EXAM: Primary | ICD-10-CM

## 2018-11-30 DIAGNOSIS — M15.9 PRIMARY OSTEOARTHRITIS INVOLVING MULTIPLE JOINTS: ICD-10-CM

## 2018-11-30 DIAGNOSIS — K91.89 POSTCHOLECYSTECTOMY DIARRHEA: ICD-10-CM

## 2018-11-30 DIAGNOSIS — R19.7 POSTCHOLECYSTECTOMY DIARRHEA: ICD-10-CM

## 2018-11-30 PROCEDURE — 99214 OFFICE O/P EST MOD 30 MIN: CPT | Performed by: FAMILY MEDICINE

## 2018-11-30 PROCEDURE — G0439 PPPS, SUBSEQ VISIT: HCPCS | Performed by: FAMILY MEDICINE

## 2018-11-30 RX ORDER — LOSARTAN POTASSIUM 100 MG/1
TABLET ORAL
Qty: 30 TABLET | Refills: 11 | Status: SHIPPED | OUTPATIENT
Start: 2018-11-30 | End: 2019-05-30 | Stop reason: SDUPTHER

## 2018-11-30 RX ORDER — MELOXICAM 15 MG/1
TABLET ORAL
Qty: 90 TABLET | Refills: 3 | Status: SHIPPED | OUTPATIENT
Start: 2018-11-30 | End: 2020-09-18 | Stop reason: SDUPTHER

## 2018-11-30 RX ORDER — MONTELUKAST SODIUM 4 MG/1
1 TABLET, CHEWABLE ORAL 2 TIMES DAILY
Qty: 60 TABLET | Refills: 11 | Status: SHIPPED | OUTPATIENT
Start: 2018-11-30 | End: 2019-05-30 | Stop reason: SDUPTHER

## 2018-11-30 ASSESSMENT — PATIENT HEALTH QUESTIONNAIRE - PHQ9
SUM OF ALL RESPONSES TO PHQ QUESTIONS 1-9: 0
SUM OF ALL RESPONSES TO PHQ QUESTIONS 1-9: 0

## 2018-11-30 ASSESSMENT — ANXIETY QUESTIONNAIRES: GAD7 TOTAL SCORE: 0

## 2018-11-30 ASSESSMENT — LIFESTYLE VARIABLES: HOW OFTEN DO YOU HAVE A DRINK CONTAINING ALCOHOL: 0

## 2018-11-30 NOTE — PATIENT INSTRUCTIONS
Advance Care Planning: Care Instructions  Your Care Instructions    It can be hard to live with an illness that cannot be cured. But if your health is getting worse, you may want to make decisions about end-of-life care. Planning for the end of your life does not mean that you are giving up. It is a way to make sure that your wishes are met. Clearly stating your wishes can make it easier for your loved ones. Making plans while you are still able may also ease your mind and make your final days less stressful and more meaningful. Follow-up care is a key part of your treatment and safety. Be sure to make and go to all appointments, and call your doctor if you are having problems. It's also a good idea to know your test results and keep a list of the medicines you take. What can you do to plan for the end of life? · Visit:  https://ag.[place].gov/consumer-protection/livingwills  · You can bring these issues up with your doctor. You do not need to wait until your doctor starts the conversation. You might start with \"I would not be willing to live with . Kelsey Rubio \" When you complete this sentence it helps your doctor understand your wishes. · Talk openly and honestly with your doctor. This is the best way to understand the decisions you will need to make as your health changes. Know that you can always change your mind. · Ask your doctor about commonly used life-support measures. These include tube feedings, breathing machines, and fluids given through a vein (IV). Understanding these treatments will help you decide whether you want them. · You may choose to have these life-supporting treatments for a limited time. This allows a trial period to see whether they will help you. You may also decide that you want your doctor to take only certain measures to keep you alive. It is important to spell out these conditions so that your doctor and family understand them. · Talk to your doctor about how long you are likely to live. family updated copies of the papers. Where can you learn more? Go to https://chpepiceweb.healthWebcrunchpartners. org and sign in to your [employer] account. Enter P184 in the [employer] box to learn more about \"Advance Care Planning: Care Instructions. \"     If you do not have an account, please click on the \"Sign Up Now\" link. Current as of: September 24, 2016  Content Version: 11.5  © 2476-2377 [employer]. Care instructions adapted under license by [facility] ([facility]). If you have questions about a medical condition or this instruction, always ask your healthcare professional. [address] any warranty or liability for your use of this information. Learning About Living Perroy  What is a living will? A living will is a legal form you use to write down the kind of care you want at the end of your life. It is used by the health professionals who will treat you if you aren't able to decide for yourself. If you put your wishes in writing, your loved ones and others will know what kind of care you want. They won't need to guess. This can ease your mind and be helpful to others. A living will is not the same as an estate or property will. An estate will explains what you want to happen with your money and property after you die. Is a living will a legal document? A living will is a legal document. Each state has its own laws about living kidd. If you move to another state, make sure that your living will is legal in the state where you now live. Or you might use a universal form that has been approved by many states. This kind of form can sometimes be completed and stored online. Your electronic copy will then be available wherever you have a connection to the Internet. In most cases, doctors will respect your wishes even if you have a form from a different state. · You don't need an  to complete a living will.  But legal advice can be helpful if your state's

## 2018-11-30 NOTE — PROGRESS NOTES
Interventions:  · Discussed increasing activity.     Hearing/Vision  Do you or your family notice any trouble with your hearing?: No  Do you have difficulty driving, watching TV, or doing any of your daily activities because of your eyesight?: (!) Yes  Have you had an eye exam within the past year?: Yes  Hearing/Vision Interventions:  · Vision concerns:  patient encouraged to make appointment with his/her eye specialist    Safety  Do you have working smoke detectors?: Yes  Have all throw rugs been removed or fastened?: (!) No  Do you have non-slip mats in all bathtubs?: (!) No  Do all of your stairways have a railing or banister?: Yes  Are your doorways, halls and stairs free of clutter?: Yes  Do you always fasten your seatbelt when you are in a car?: Yes  Safety Interventions:  · Home safety tips provided    ADLs  In the past 7 days, did you need help from others to perform any of the following everyday activities?: None  In the past 7 days, did you need help from others to take care of any of the following?: None  ADL Interventions:  · Not indicated    Personalized Preventive Plan   Current Health Maintenance Status  Immunization History   Administered Date(s) Administered    Influenza Virus Vaccine 10/12/2010, 10/10/2011, 10/15/2012, 10/23/2013, 11/24/2014, 11/11/2015, 11/14/2016    Influenza, High Dose (Fluzone 65 yrs and older) 11/28/2017, 10/18/2018    Pneumococcal 13-valent Conjugate ([id number]) 11/14/2016    Pneumococcal Polysaccharide ([id number]) 11/24/2014    Tdap (Boostrix, Adacel) 11/28/2017        Health Maintenance   Topic Date Due    Hepatitis C screen  1950    Shingles Vaccine (1 of 2 - 2 Dose Series) 09/25/1999    Potassium monitoring  11/26/2019    Creatinine monitoring  11/26/2019    Colon cancer screen colonoscopy  03/16/2023    Lipid screen  11/26/2023    DTaP/Tdap/Td vaccine (2 - Td) 11/28/2027    Flu vaccine  Completed    Pneumococcal low/med risk  Completed

## 2018-11-30 NOTE — PROGRESS NOTES
Result Value Ref Range    PSA 0.95 0.00 - 4.00 ng/mL   Comprehensive Metabolic Panel    Collection Time: 11/26/18  9:15 AM   Result Value Ref Range    Sodium 139 136 - 145 mmol/L    Potassium 3.7 3.5 - 5.0 mmol/L    Chloride 99 98 - 111 mmol/L    CO2 29 22 - 29 mmol/L    Anion Gap 11 7 - 19 mmol/L    Glucose 98 74 - 109 mg/dL    BUN 18 8 - 23 mg/dL    CREATININE 1.1 0.5 - 1.2 mg/dL    GFR Non-African American >60 >60    Calcium 9.1 8.8 - 10.2 mg/dL    Total Protein 8.0 6.6 - 8.7 g/dL    Alb 4.0 3.5 - 5.2 g/dL    Total Bilirubin 0.7 0.2 - 1.2 mg/dL    Alkaline Phosphatase 68 40 - 130 U/L    ALT 32 5 - 41 U/L    AST 30 5 - 40 U/L   Lipid Panel    Collection Time: 11/26/18  9:15 AM   Result Value Ref Range    Cholesterol, Total 151 (L) 160 - 199 mg/dL    Triglycerides 108 0 - 149 mg/dL    HDL 31 (L) 55 - 121 mg/dL    LDL Calculated 98 <100 mg/dL               Assessment & Plan:      Discussed lifestyle changes such as diet and exercise. Recommended eliminate processed food from diet such as sugar and fried foods. Recommended exercising at least 150 minutes/week. Try to do full body resistance training twice a week as well. Offered suggestions for calorie counting such as phone apps and online resources such as My fitness pal and Lose it. Discussed healthy weight. The following diagnoses and conditions are stable with no further orders unlessindicated:  1. Annual physical exam  Completed annual wellness today    2. Postcholecystectomy diarrhea  Refilled the following medication  - colestipol (COLESTID) 1 g tablet; Take 1 tablet by mouth 2 times daily  Dispense: 60 tablet; Refill: 11    3. Essential (primary) hypertension  BP Readings from Last 3 Encounters:   11/30/18 134/86   11/28/17 132/88   11/13/17 126/84     Stable    4.  Familial hypercholesterolemia  Lab Results   Component Value Date    CHOL 151 (L) 11/26/2018    CHOL 148 (L) 11/21/2017     Lab Results   Component Value Date    TRIG 108 11/26/2018 TRIG 84 11/21/2017     Lab Results   Component Value Date    HDL 31 (L) 11/26/2018    HDL 36 (L) 11/21/2017     Lab Results   Component Value Date    LDLCALC 98 11/26/2018    [address] 11/21/2017     No results found for: LABVLDL, VLDL  No results found for: CHOLHDJAGDISH  Primarily been managing his cholesterol with diet and exercise. We'll continue    5. Primary osteoarthritis involving multiple joints  Continue with meloxicam.  Lab Results   Component Value Date    CREATININE 1.1 11/26/2018     Lab Results   Component Value Date    BUN 18 11/26/2018             6. Erectile dysfunction, unspecified erectile dysfunction type  Continue current medication            Return in about 6 months (around 5/30/2019) for Routine follow up - 15 minute visit. Discussed use, benefit, and side effects of prescribed medications. All patientquestions answered. Pt voiced understanding. Reviewed health maintenance. Instructedto continue current medications, diet and exercise. Patient agreed with treatmentplan. Follow up as directed.

## 2018-12-06 ASSESSMENT — ENCOUNTER SYMPTOMS
CONSTIPATION: 0
ABDOMINAL PAIN: 0
NAUSEA: 0
COUGH: 0
CHEST TIGHTNESS: 0
SHORTNESS OF BREATH: 0
DIARRHEA: 0
ANAL BLEEDING: 0

## 2019-01-14 RX ORDER — LOSARTAN POTASSIUM 100 MG/1
TABLET ORAL
Qty: 30 TABLET | Refills: 5 | Status: SHIPPED | OUTPATIENT
Start: 2019-01-14 | End: 2019-06-07

## 2019-03-06 RX ORDER — AMLODIPINE BESYLATE 10 MG/1
TABLET ORAL
Qty: 90 TABLET | Refills: 1 | Status: SHIPPED | OUTPATIENT
Start: 2019-03-06 | End: 2019-09-03 | Stop reason: SDUPTHER

## 2019-03-27 RX ORDER — SILDENAFIL CITRATE 20 MG/1
TABLET ORAL
Qty: 30 TABLET | Refills: 1 | Status: SHIPPED | OUTPATIENT
Start: 2019-03-27 | End: 2019-05-30 | Stop reason: ALTCHOICE

## 2019-03-29 RX ORDER — HYDROCHLOROTHIAZIDE 25 MG/1
TABLET ORAL
Qty: 30 TABLET | Refills: 5 | Status: SHIPPED | OUTPATIENT
Start: 2019-03-29 | End: 2019-05-30 | Stop reason: SDUPTHER

## 2019-05-30 ENCOUNTER — OFFICE VISIT (OUTPATIENT)
Dept: FAMILY MEDICINE CLINIC | Age: 69
End: 2019-05-30
Payer: MEDICARE

## 2019-05-30 VITALS
BODY MASS INDEX: 33.19 KG/M2 | HEART RATE: 65 BPM | TEMPERATURE: 98.7 F | WEIGHT: 238 LBS | OXYGEN SATURATION: 98 % | SYSTOLIC BLOOD PRESSURE: 136 MMHG | DIASTOLIC BLOOD PRESSURE: 84 MMHG

## 2019-05-30 DIAGNOSIS — K91.89 POSTCHOLECYSTECTOMY DIARRHEA: ICD-10-CM

## 2019-05-30 DIAGNOSIS — Z12.5 ENCOUNTER FOR PROSTATE CANCER SCREENING: ICD-10-CM

## 2019-05-30 DIAGNOSIS — R19.7 POSTCHOLECYSTECTOMY DIARRHEA: ICD-10-CM

## 2019-05-30 DIAGNOSIS — E78.01 FAMILIAL HYPERCHOLESTEROLEMIA: ICD-10-CM

## 2019-05-30 DIAGNOSIS — I10 ESSENTIAL (PRIMARY) HYPERTENSION: Primary | ICD-10-CM

## 2019-05-30 PROCEDURE — 99213 OFFICE O/P EST LOW 20 MIN: CPT | Performed by: FAMILY MEDICINE

## 2019-05-30 RX ORDER — MONTELUKAST SODIUM 4 MG/1
1 TABLET, CHEWABLE ORAL 2 TIMES DAILY
Qty: 180 TABLET | Refills: 3 | Status: SHIPPED | OUTPATIENT
Start: 2019-05-30 | End: 2020-09-18 | Stop reason: SDUPTHER

## 2019-05-30 RX ORDER — LOSARTAN POTASSIUM 100 MG/1
TABLET ORAL
Qty: 90 TABLET | Refills: 3 | Status: SHIPPED | OUTPATIENT
Start: 2019-05-30 | End: 2020-05-20

## 2019-05-30 RX ORDER — VARDENAFIL HYDROCHLORIDE 20 MG/1
20 TABLET ORAL PRN
Qty: 10 TABLET | Refills: 5 | Status: SHIPPED | OUTPATIENT
Start: 2019-05-30 | End: 2020-06-12 | Stop reason: SDUPTHER

## 2019-05-30 RX ORDER — HYDROCHLOROTHIAZIDE 25 MG/1
TABLET ORAL
Qty: 90 TABLET | Refills: 3 | Status: SHIPPED | OUTPATIENT
Start: 2019-05-30 | End: 2019-09-26

## 2019-05-30 NOTE — PROGRESS NOTES
Topic Date Due    Hepatitis C screen  1950    Shingles Vaccine (1 of 2) 09/25/1999    Potassium monitoring  11/26/2019    Creatinine monitoring  11/26/2019    Colon cancer screen colonoscopy  03/16/2023    Lipid screen  11/26/2023    DTaP/Tdap/Td vaccine (2 - Td) 11/28/2027    Flu vaccine  Completed    Pneumococcal 65+ years Vaccine  Completed       Subjective:      Review of Systems  Review of Systems   Constitutional: Negative for chills and fever. HENT: Negative for congestion. Respiratory: Negative for cough, chest tightness and shortness of breath. Cardiovascular: Negative for chest pain, palpitations and leg swelling. Gastrointestinal: Negative for abdominal pain, anal bleeding, constipation, diarrhea and nausea. Genitourinary: Negative for difficulty urinating. Psychiatric/Behavioral: Negative. SeeHPI for visit specific review of symptoms. All others negative      Objective:   /84   Pulse 65   Temp 98.7 °F (37.1 °C)   Wt 238 lb (108 kg)   SpO2 98%   BMI 33.19 kg/m²   Physical Exam  Physical Exam   Constitutional: He appears well-developed. Does not appear ill. Eyes: Pupils are equal, round, and reactive to light. Conjunctiva and Lids normal.  Neck: Normal range of motion. Neck supple. No masses. Neck Symmetric. Normal tracheal position. No thyroid enlargement  Cardiovascular: Normal rate and regular rhythm. Exam reveals no friction rub. Carotid arteries: no bruit observed. No murmur heard. Respiratory:  Effort normal and breath sounds normal. No respiratory distress. No wheezes. No rales. No use of accessory muscles or intercostal retractions. Abdominal: Soft. Bowel sounds are normal. exhibits no distension. There is no tenderness. There is no rebound and no guarding. Musculoskeletal: exhibits no edema. Normal gait. Neurological: alert. Psychiatric: normal mood and affect. His behavior is normal. Normal judgement and insight observed.       No results found for this or any previous visit (from the past 672 hour(s)). Assessment & Plan: The following diagnoses and conditions are stable with no further orders unless indicated:  1. Essential (primary) hypertension  BP Readings from Last 3 Encounters:   05/30/19 136/84   11/30/18 134/86   11/28/17 132/88     Blood pressure stable continue with current medication reviewed DASH diet guidelines with him today  - losartan (COZAAR) 100 MG tablet; TAKE 1 TABLET BY MOUTH ONCE DAILY. Dispense: 90 tablet; Refill: 3  - hydrochlorothiazide (HYDRODIURIL) 25 MG tablet; TAKE 1 TABLET BY MOUTH EVERY DAY  Dispense: 90 tablet; Refill: 3  - Comprehensive Metabolic Panel; Future  - CBC Auto Differential; Future    2. Postcholecystectomy diarrhea  Discussed dietary changes including eliminating greasy foods and fatty foods. We'll start him on Colestid  Discussed risks and benefits of this new medication. Discussed potential side effects. He voiced understanding.     - colestipol (COLESTID) 1 g tablet; Take 1 tablet by mouth 2 times daily  Dispense: 180 tablet; Refill: 3    3. Familial hypercholesterolemia  Lab Results   Component Value Date    CHOL 151 (L) 11/26/2018    CHOL 148 (L) 11/21/2017     Lab Results   Component Value Date    TRIG 108 11/26/2018    TRIG 84 11/21/2017     Lab Results   Component Value Date    HDL 31 (L) 11/26/2018    HDL 36 (L) 11/21/2017     Lab Results   Component Value Date    LDLCALC 98 11/26/2018    [address] 11/21/2017     No results found for: LABVLDL, VLDL  No results found for: CHOLHDLRATIO  We'll recheck a lipid panel next visit. Encouraged low-cholesterol diet  - Lipid Panel; Future    4. Encounter for prostate cancer screening    - Psa screening; Future            Return in about 6 months (around 11/30/2019) for AWV - 30 minute visit. Discussed use, benefit, and side effects of prescribed medications. All patient questions answered. Pt voiced understanding.

## 2019-05-30 NOTE — PATIENT INSTRUCTIONS
[email or web address]    Go to room 201 for labs prior to next visit. Be sure to fast for at least 12 hours prior to laboratory appointment. Would recommend getting labs about 1 week prior to the appointment time to ensure they are available at the time of the appointment. No appointment is necessary for laboratory work as the orders have already been placed.     Lab opens at 6:30 am and closes at 5:00 pm.

## 2019-07-17 ENCOUNTER — HOSPITAL ENCOUNTER (OUTPATIENT)
Dept: GENERAL RADIOLOGY | Age: 69
Discharge: HOME | End: 2019-07-17
Payer: MEDICARE

## 2019-07-17 DIAGNOSIS — J84.10 PULMONARY FIBROSIS (HCC): ICD-10-CM

## 2019-07-17 PROCEDURE — 71046 X-RAY EXAM CHEST 2 VIEWS: CPT

## 2019-07-24 ENCOUNTER — OFFICE VISIT (OUTPATIENT)
Dept: PULMONOLOGY | Facility: CLINIC | Age: 69
End: 2019-07-24

## 2019-07-24 VITALS
WEIGHT: 233.6 LBS | SYSTOLIC BLOOD PRESSURE: 120 MMHG | HEIGHT: 71 IN | DIASTOLIC BLOOD PRESSURE: 72 MMHG | BODY MASS INDEX: 32.7 KG/M2 | HEART RATE: 61 BPM | OXYGEN SATURATION: 95 %

## 2019-07-24 DIAGNOSIS — J84.10 PULMONARY FIBROSIS (HCC): Primary | ICD-10-CM

## 2019-07-24 PROCEDURE — 99213 OFFICE O/P EST LOW 20 MIN: CPT | Performed by: INTERNAL MEDICINE

## 2019-07-24 RX ORDER — VARDENAFIL HYDROCHLORIDE 20 MG/1
20 TABLET ORAL AS NEEDED
Refills: 5 | COMMUNITY
Start: 2019-05-31 | End: 2023-03-12

## 2019-07-24 RX ORDER — CHLORAL HYDRATE 500 MG
1000 CAPSULE ORAL
COMMUNITY

## 2019-07-24 NOTE — PROGRESS NOTES
Subjective   Reid Morrell is a 68 y.o. male.     Background: Pt with pulomonary fibrosis and hx moderate restriction on pft 2017, fvc 74% pred, dlco 80% pred. subsequently 76 and 79% pred 2019    Chief Complaint   Patient presents with   • F/u of Pulmonary Fibrosis        History of Present Illness   He has had no decline in exercise tolerance.  No cough.  No other acute complaints    Medical/Family/Social History   has a past medical history of Anemia, Arthritis, Colon polyp, Erythematous condition, Hypertension, Liver disease, CANNON (nonalcoholic steatohepatitis), Pneumonia, Pulmonary fibrosis (CMS/HCC), and Shortness of breath.   has a past surgical history that includes Cholecystectomy; Tonsillectomy; Colonoscopy (03/05/2013); endoscopy and colonoscopy (06/24/1995); and Colonoscopy (N/A, 3/16/2018).  family history includes Colon polyps in his father.   reports that he has never smoked. He has never used smokeless tobacco. He reports that he does not drink alcohol or use drugs.  No Known Allergies  Medications    Current Outpatient Medications:   •  amLODIPine (NORVASC) 10 MG tablet, Take 10 mg by mouth Daily., Disp: , Rfl:   •  aspirin 81 MG chewable tablet, Chew 81 mg Daily., Disp: , Rfl:   •  B Complex Vitamins (VITAMIN B COMPLEX PO), Take  by mouth Daily., Disp: , Rfl:   •  colestipol (COLESTID) 1 g tablet, Take 1 g by mouth As Needed., Disp: , Rfl:   •  hydrochlorothiazide (HYDRODIURIL) 25 MG tablet, Take 25 mg by mouth Daily., Disp: , Rfl:   •  losartan (COZAAR) 100 MG tablet, Take 100 mg by mouth Daily., Disp: , Rfl:   •  meloxicam (MOBIC) 15 MG tablet, Take 15 mg by mouth Daily., Disp: , Rfl:   •  Omega-3 Fatty Acids (FISH OIL) 1000 MG capsule capsule, Take  by mouth Daily With Breakfast., Disp: , Rfl:   •  vardenafil (LEVITRA) 20 MG tablet, Take 20 mg by mouth As Needed., Disp: , Rfl: 5    Review of Systems   Constitutional: Negative for chills and fever.   Cardiovascular: Negative for chest pain.  "  Gastrointestinal: Negative for nausea and vomiting.       Objective   /72   Pulse 61   Ht 180.3 cm (71\")   Wt 106 kg (233 lb 9.6 oz)   SpO2 95% Comment: Ra  BMI 32.58 kg/m²   Physical Exam   Constitutional: He appears well-developed and well-nourished. He does not appear ill. No distress.   HENT:   Head: Normocephalic and atraumatic.   Eyes: Conjunctivae and EOM are normal.   Neck: Neck supple.   Cardiovascular: Normal rate, regular rhythm, S1 normal and S2 normal.   Pulmonary/Chest: Effort normal. He has no wheezes. He has no rales.   Abdominal: Soft. He exhibits no distension. There is no tenderness. There is no guarding.   Musculoskeletal: He exhibits no deformity.   Lymphadenopathy:     He has no cervical adenopathy.   Neurological: He is alert.   Skin: Skin is warm and dry. No rash noted.   Psychiatric: He has a normal mood and affect.     Assessment/Plan   Problem List Items Addressed This Visit        Respiratory    Pulmonary fibrosis (CMS/HCC) - Primary        Patient's Body mass index is 32.58 kg/m². BMI is above normal parameters. Recommendations include: referral to primary care.      He is stable from pulmonary standpoint.  Continue to follow.  Repeat cxr and pft next.       Electronically signed by Nash Pisano MD, 7/24/2019, 11:41 AM    "

## 2019-07-24 NOTE — PROGRESS NOTES
Subjective   Reid Morrell is a 68 y.o. male.     Background: No My Sticky Note on file.    No chief complaint on file.       History of Present Illness   ***    Medical/Family/Social History   has a past medical history of Anemia, Arthritis, Colon polyp, Erythematous condition, Hypertension, Liver disease, CANNON (nonalcoholic steatohepatitis), Pneumonia, Pulmonary fibrosis (CMS/HCC), and Shortness of breath.   has a past surgical history that includes Cholecystectomy; Tonsillectomy; Colonoscopy (03/05/2013); endoscopy and colonoscopy (06/24/1995); and Colonoscopy (N/A, 3/16/2018).  family history includes Colon polyps in his father.   reports that he has never smoked. He has never used smokeless tobacco. He reports that he does not drink alcohol or use drugs.  No Known Allergies  Medications    Current Outpatient Medications:   •  amLODIPine (NORVASC) 10 MG tablet, Take 10 mg by mouth Daily., Disp: , Rfl:   •  aspirin 81 MG chewable tablet, Chew 81 mg Daily., Disp: , Rfl:   •  B Complex Vitamins (VITAMIN B COMPLEX PO), Take  by mouth Daily., Disp: , Rfl:   •  colestipol (COLESTID) 1 g tablet, Take 1 g by mouth As Needed., Disp: , Rfl:   •  hydrochlorothiazide (HYDRODIURIL) 25 MG tablet, Take 25 mg by mouth Daily., Disp: , Rfl:   •  losartan (COZAAR) 100 MG tablet, Take 100 mg by mouth Daily., Disp: , Rfl:   •  meloxicam (MOBIC) 15 MG tablet, Take 15 mg by mouth Daily., Disp: , Rfl:   •  polyethylene glycol-electrolytes (NULYTELY WITH FLAVOR PACKS) 420 g solution, Take 4,000 mL by mouth See Admin Instructions., Disp: 4000 mL, Rfl: 0  •  terbinafine (lamiSIL) 250 MG tablet, Take 250 mg by mouth Daily., Disp: , Rfl:     Review of Systems  ***    Objective   There were no vitals taken for this visit.  Physical Exam  ***   -----------------------------------------------------------------------------------------------  Recent Imaging:      XR CHEST (2 VW) 7/17/2019 1:48 PM  HISTORY: J84.10  COMPARISON: July 10,  2018.  FINDINGS:   Chronic changes noted in the pulmonary parenchyma.. The cardiac  silhouette is normal. Pleural surfaces are unremarkable.. The osseous  structures and surrounding soft tissues demonstrate no acute  abnormality. Old healed left rib fractures are noted.   Other Result Information   Jairo Dill Incoming Radiology Results From [facility] - 07/17/2019  2:05 PM CDT  XR CHEST (2 VW) 7/17/2019 1:48 PM  HISTORY: J84.10  COMPARISON: July 10, 2018.  FINDINGS:   Chronic changes noted in the pulmonary parenchyma.. The cardiac  silhouette is normal. Pleural surfaces are unremarkable.. The osseous  structures and surrounding soft tissues demonstrate no acute  abnormality. Old healed left rib fractures are noted.  IMPRESSION:  1. Chronic change with no acute cardiopulmonary process..  Signed by Dr Daniel Bean on 7/17/2019 3:03 PM       -----------------------------------------------------------------------------------------------  Pulmonary Functions Testing Results:  No results found for: FEV1, FVC, RVP2OAR, TLC, DLCO   My interpretation of PFT: ***  -----------------------------------------------------------------------------------------------  Assessment/Plan   Problem List Items Addressed This Visit        Respiratory    Pulmonary fibrosis (CMS/HCC) - Primary        Patient's There is no height or weight on file to calculate BMI. BMI is {BMI range:15101}.      ***       Electronically signed by Nash Pisano MD, 7/24/2019, 6:28 AM

## 2019-09-03 NOTE — TELEPHONE ENCOUNTER
Yair Mchugh called to request a refill on his medication.       Last office visit : 5/30/2019   Next office visit : 12/10/2019     Requested Prescriptions     Pending Prescriptions Disp Refills    amLODIPine (NORVASC) 10 MG tablet [Pharmacy Med Name: AMLODIPINE BESYLATE 10 MG T 10 TAB] 90 tablet 1     Sig: TAKE 1 TABLET BY MOUTH [address] Francisca Carmona

## 2019-09-04 RX ORDER — AMLODIPINE BESYLATE 10 MG/1
TABLET ORAL
Qty: 90 TABLET | Refills: 1 | Status: SHIPPED | OUTPATIENT
Start: 2019-09-04 | End: 2019-12-10 | Stop reason: SDUPTHER

## 2019-09-26 RX ORDER — HYDROCHLOROTHIAZIDE 25 MG/1
TABLET ORAL
Qty: 30 TABLET | Refills: 5 | Status: SHIPPED | OUTPATIENT
Start: 2019-09-26 | End: 2020-04-01

## 2019-12-04 DIAGNOSIS — E78.01 FAMILIAL HYPERCHOLESTEROLEMIA: ICD-10-CM

## 2019-12-04 DIAGNOSIS — I10 ESSENTIAL (PRIMARY) HYPERTENSION: ICD-10-CM

## 2019-12-04 DIAGNOSIS — Z12.5 ENCOUNTER FOR PROSTATE CANCER SCREENING: ICD-10-CM

## 2019-12-04 LAB
ALBUMIN SERPL-MCNC: 4.3 G/DL (ref 3.5–5.2)
ALP BLD-CCNC: 76 U/L (ref 40–130)
ALT SERPL-CCNC: 35 U/L (ref 5–41)
ANION GAP SERPL CALCULATED.3IONS-SCNC: 18 MMOL/L (ref 7–19)
AST SERPL-CCNC: 34 U/L (ref 5–40)
BASOPHILS ABSOLUTE: 0 K/UL (ref 0–0.2)
BASOPHILS RELATIVE PERCENT: 0.3 % (ref 0–1)
BILIRUB SERPL-MCNC: 0.9 MG/DL (ref 0.2–1.2)
BUN BLDV-MCNC: 16 MG/DL (ref 8–23)
CALCIUM SERPL-MCNC: 9.5 MG/DL (ref 8.8–10.2)
CHLORIDE BLD-SCNC: 101 MMOL/L (ref 98–111)
CHOLESTEROL, TOTAL: 141 MG/DL (ref 160–199)
CO2: 25 MMOL/L (ref 22–29)
CREAT SERPL-MCNC: 1.1 MG/DL (ref 0.5–1.2)
EOSINOPHILS ABSOLUTE: 0.1 K/UL (ref 0–0.6)
EOSINOPHILS RELATIVE PERCENT: 2.4 % (ref 0–5)
GFR NON-AFRICAN AMERICAN: >60
GLUCOSE BLD-MCNC: 95 MG/DL (ref 74–109)
HCT VFR BLD CALC: 46.9 % (ref 42–52)
HDLC SERPL-MCNC: 33 MG/DL (ref 55–121)
HEMOGLOBIN: 15.5 G/DL (ref 14–18)
IMMATURE GRANULOCYTES #: 0 K/UL
LDL CHOLESTEROL CALCULATED: 90 MG/DL
LYMPHOCYTES ABSOLUTE: 1.9 K/UL (ref 1.1–4.5)
LYMPHOCYTES RELATIVE PERCENT: 32 % (ref 20–40)
MCH RBC QN AUTO: 30.2 PG (ref 27–31)
MCHC RBC AUTO-ENTMCNC: 33 G/DL (ref 33–37)
MCV RBC AUTO: 91.2 FL (ref 80–94)
MONOCYTES ABSOLUTE: 0.5 K/UL (ref 0–0.9)
MONOCYTES RELATIVE PERCENT: 8.9 % (ref 0–10)
NEUTROPHILS ABSOLUTE: 3.3 K/UL (ref 1.5–7.5)
NEUTROPHILS RELATIVE PERCENT: 56.2 % (ref 50–65)
PDW BLD-RTO: 13.7 % (ref 11.5–14.5)
PLATELET # BLD: 288 K/UL (ref 130–400)
PMV BLD AUTO: 9.2 FL (ref 9.4–12.4)
POTASSIUM SERPL-SCNC: 3.8 MMOL/L (ref 3.5–5)
PROSTATE SPECIFIC ANTIGEN: 1.1 NG/ML (ref 0–4)
RBC # BLD: 5.14 M/UL (ref 4.7–6.1)
SODIUM BLD-SCNC: 144 MMOL/L (ref 136–145)
TOTAL PROTEIN: 8.1 G/DL (ref 6.6–8.7)
TRIGL SERPL-MCNC: 90 MG/DL (ref 0–149)
WBC # BLD: 5.9 K/UL (ref 4.8–10.8)

## 2019-12-10 ENCOUNTER — OFFICE VISIT (OUTPATIENT)
Dept: FAMILY MEDICINE CLINIC | Age: 69
End: 2019-12-10
Payer: MEDICARE

## 2019-12-10 VITALS
HEIGHT: 71 IN | DIASTOLIC BLOOD PRESSURE: 80 MMHG | SYSTOLIC BLOOD PRESSURE: 124 MMHG | WEIGHT: 238.5 LBS | BODY MASS INDEX: 33.39 KG/M2 | HEART RATE: 69 BPM | OXYGEN SATURATION: 98 % | TEMPERATURE: 97 F

## 2019-12-10 DIAGNOSIS — Z11.59 NEED FOR HEPATITIS C SCREENING TEST: ICD-10-CM

## 2019-12-10 DIAGNOSIS — K91.89 POSTCHOLECYSTECTOMY DIARRHEA: ICD-10-CM

## 2019-12-10 DIAGNOSIS — R19.7 POSTCHOLECYSTECTOMY DIARRHEA: ICD-10-CM

## 2019-12-10 DIAGNOSIS — I10 ESSENTIAL (PRIMARY) HYPERTENSION: Primary | ICD-10-CM

## 2019-12-10 PROCEDURE — 99213 OFFICE O/P EST LOW 20 MIN: CPT | Performed by: FAMILY MEDICINE

## 2019-12-10 RX ORDER — AMLODIPINE BESYLATE 10 MG/1
TABLET ORAL
Qty: 90 TABLET | Refills: 1 | Status: SHIPPED | OUTPATIENT
Start: 2019-12-10 | End: 2020-03-11

## 2019-12-10 ASSESSMENT — ENCOUNTER SYMPTOMS
CHEST TIGHTNESS: 0
ABDOMINAL PAIN: 0
SHORTNESS OF BREATH: 0
DIARRHEA: 0
ANAL BLEEDING: 0
NAUSEA: 0
COUGH: 0
CONSTIPATION: 0

## 2019-12-10 ASSESSMENT — PATIENT HEALTH QUESTIONNAIRE - PHQ9
SUM OF ALL RESPONSES TO PHQ QUESTIONS 1-9: 0
1. LITTLE INTEREST OR PLEASURE IN DOING THINGS: 0
SUM OF ALL RESPONSES TO PHQ QUESTIONS 1-9: 0
SUM OF ALL RESPONSES TO PHQ9 QUESTIONS 1 & 2: 0
2. FEELING DOWN, DEPRESSED OR HOPELESS: 0

## 2020-01-20 ENCOUNTER — HOSPITAL ENCOUNTER (OUTPATIENT)
Dept: GENERAL RADIOLOGY | Age: 70
Discharge: HOME | End: 2020-01-20
Payer: MEDICARE

## 2020-01-20 PROCEDURE — 71046 X-RAY EXAM CHEST 2 VIEWS: CPT

## 2020-01-21 DIAGNOSIS — J84.10 PULMONARY FIBROSIS (HCC): ICD-10-CM

## 2020-02-03 ENCOUNTER — OFFICE VISIT (OUTPATIENT)
Dept: PULMONOLOGY | Facility: CLINIC | Age: 70
End: 2020-02-03

## 2020-02-03 VITALS
SYSTOLIC BLOOD PRESSURE: 122 MMHG | DIASTOLIC BLOOD PRESSURE: 74 MMHG | HEART RATE: 62 BPM | WEIGHT: 224 LBS | BODY MASS INDEX: 31.36 KG/M2 | HEIGHT: 71 IN | OXYGEN SATURATION: 95 %

## 2020-02-03 DIAGNOSIS — J84.10 PULMONARY FIBROSIS (HCC): Primary | ICD-10-CM

## 2020-02-03 PROCEDURE — 99213 OFFICE O/P EST LOW 20 MIN: CPT | Performed by: INTERNAL MEDICINE

## 2020-02-03 PROCEDURE — 94729 DIFFUSING CAPACITY: CPT | Performed by: INTERNAL MEDICINE

## 2020-02-03 PROCEDURE — 94727 GAS DIL/WSHOT DETER LNG VOL: CPT | Performed by: INTERNAL MEDICINE

## 2020-02-03 PROCEDURE — 94010 BREATHING CAPACITY TEST: CPT | Performed by: INTERNAL MEDICINE

## 2020-02-03 NOTE — PROCEDURES
Pulmonary Function Test  Performed by: Nash Pisano MD  Authorized by: Nash Pisano MD      Pre Drug    FVC: 75%   FEV1: 79%   FEF 25-75%: 91%   FEV1/FVC: 82.62%   T%   RV: 71%   DLCO: 61%   D/VAsb: 83%

## 2020-02-03 NOTE — PROGRESS NOTES
Subjective   Reid Morrell is a 69 y.o. male.     Background: Pt with pulmonary fibrosis and hx moderate restriction on pft 2017, fvc 74% pred, dlco 80% pred. subsequently 76 and 79% pred 2019    Chief Complaint   Patient presents with   • pulmonary fibrosis        History of Present Illness   He has had very mild intermittent dyspnea in chest, not lately, alleviated by rest.  He says he is trying to get into shape.    Medical/Family/Social History   has a past medical history of Anemia, Arthritis, Colon polyp, Erythematous condition, Hypertension, Liver disease, CANNON (nonalcoholic steatohepatitis), Pneumonia, Pulmonary fibrosis (CMS/HCC), and Shortness of breath.   has a past surgical history that includes Cholecystectomy; Tonsillectomy; Colonoscopy (03/05/2013); endoscopy and colonoscopy (06/24/1995); Colonoscopy (N/A, 3/16/2018); and Cataract extraction, bilateral.  family history includes Colon polyps in his father.   reports that he has never smoked. He has never used smokeless tobacco. He reports that he does not drink alcohol or use drugs.  No Known Allergies  Medications    Current Outpatient Medications:   •  amLODIPine (NORVASC) 10 MG tablet, Take 10 mg by mouth Daily., Disp: , Rfl:   •  aspirin 81 MG chewable tablet, Chew 81 mg Daily., Disp: , Rfl:   •  B Complex Vitamins (VITAMIN B COMPLEX PO), Take  by mouth Daily., Disp: , Rfl:   •  colestipol (COLESTID) 1 g tablet, Take 1 g by mouth As Needed., Disp: , Rfl:   •  hydrochlorothiazide (HYDRODIURIL) 25 MG tablet, Take 25 mg by mouth Daily., Disp: , Rfl:   •  losartan (COZAAR) 100 MG tablet, Take 100 mg by mouth Daily., Disp: , Rfl:   •  meloxicam (MOBIC) 15 MG tablet, Take 15 mg by mouth Daily., Disp: , Rfl:   •  Omega-3 Fatty Acids (FISH OIL) 1000 MG capsule capsule, Take  by mouth Daily With Breakfast., Disp: , Rfl:   •  vardenafil (LEVITRA) 20 MG tablet, Take 20 mg by mouth As Needed., Disp: , Rfl: 5    Review of Systems   Constitutional: Negative  "for chills and fever.   Cardiovascular: Negative for chest pain.   Gastrointestinal: Negative for nausea and vomiting.     Objective   /74   Pulse 62   Ht 179.7 cm (70.75\")   Wt 102 kg (224 lb)   SpO2 95% Comment: RA  BMI 31.46 kg/m²   Physical Exam   Constitutional: He is oriented to person, place, and time. He appears well-developed and well-nourished. He does not appear ill. No distress.   HENT:   Head: Normocephalic and atraumatic.   Nose: Nose normal.   Eyes: Conjunctivae and EOM are normal.   Neck: Neck supple.   Cardiovascular: Normal rate, regular rhythm, S1 normal and S2 normal.   Pulmonary/Chest: Effort normal. No respiratory distress. He has no wheezes. He has rales.   Abdominal: Soft. He exhibits no distension. There is no tenderness.   Musculoskeletal: He exhibits no deformity.   Neurological: He is alert and oriented to person, place, and time. He exhibits normal muscle tone.   Skin: Skin is warm and dry. No rash noted.   Psychiatric: He has a normal mood and affect.        -----------------------------------------------------------------------------------------------  XR CHEST (2 VW) 1/20/2020 11:36 AM  Two-view chest:   History: Pulmonary fibrosis   Frontal and lateral projection chest radiograph obtained.  Comparison:  7/17/2019, 17 2018 and 7/17/2017   Findings:  Decreased volume loss appreciated compatible restricted lung disease  with interstitial prominence particularly in the lower lobes most  likely unchanged  Patient's history of pulmonary fibrosis.  The upper lung fields are clear without acute infiltrates.  The heart is normal in size. Pulmonary circulation appropriate,  without heart failure.  There are multiple left rib fractures.  Mild spondylosis changes noted diffusely in the thoracic spine with  osteophyte formation.  There are no acute osseous abnormalities.  Radiographically, the chest is unchanged..      IMPRESSION:  Impression:  1. Chronic lung changes, stable, no acute " cardiopulmonary process.  Signed by Dr Jesu Suazo on 1/20/2020 12:59 PM     -----------------------------------------------------------------------------------------------  PFT Values        Some values may be hidden. Unless noted otherwise, only the newest values recorded on each date are displayed.         Old Values PFT Results 2/3/20   No data to display.      Pre Drug PFT Results 2/3/20   FVC 75   FEV1 79   FEF 25-75% 91   FEV1/FVC 82.62      Post Drug PFT Results 2/3/20   No data to display.      Other Tests PFT Results 2/3/20   TLC 76   RV 71   DLCO 61   D/VAsb 83           My interpretation of the PFT: moderate restriction, borderline dlco     -----------------------------------------------------------------------------------------------  Assessment/Plan   Problem List Items Addressed This Visit        Pulmonary Problems    Pulmonary fibrosis (CMS/HCC) - Primary    Relevant Orders    Pulmonary Function Test (Completed)    CT Chest Hi Resolution        Patient's Body mass index is 31.46 kg/m². BMI is above normal parameters. Recommendations include: referral to primary care.    DLCO may have dropped a little although the remainder of testing is unchanged  Schedule hrct chest to better characterize the fibrosis and evaluate for UIP which may warrant further workup and treatment.  Otherwise follow up in a year with repeat pft       Electronically signed by Nash Pisano MD, 2/3/2020, 11:18 AM

## 2020-02-10 ENCOUNTER — HOSPITAL ENCOUNTER (OUTPATIENT)
Dept: CT IMAGING | Age: 70
Discharge: HOME | End: 2020-02-10
Payer: MEDICARE

## 2020-02-10 PROCEDURE — 71250 CT THORAX DX C-: CPT

## 2020-03-11 RX ORDER — AMLODIPINE BESYLATE 10 MG/1
TABLET ORAL
Qty: 90 TABLET | Refills: 1 | Status: SHIPPED | OUTPATIENT
Start: 2020-03-11 | End: 2020-06-12 | Stop reason: SDUPTHER

## 2020-04-08 DIAGNOSIS — J84.10 PULMONARY FIBROSIS (HCC): ICD-10-CM

## 2020-04-08 NOTE — PROGRESS NOTES
Please call the patient regarding his abnormal result. It did show some scarring but not in a pattern that would warrant taking any more immediate action; will need to continue to follow and get a follow up ct chest next year, or sooner if he gets increasing shortness of breath, etc.

## 2020-05-20 RX ORDER — LOSARTAN POTASSIUM 100 MG/1
TABLET ORAL
Qty: 90 TABLET | Refills: 3 | Status: SHIPPED | OUTPATIENT
Start: 2020-05-20 | End: 2020-06-12 | Stop reason: SDUPTHER

## 2020-06-12 ENCOUNTER — TELEPHONE (OUTPATIENT)
Dept: FAMILY MEDICINE CLINIC | Age: 70
End: 2020-06-12

## 2020-06-12 ENCOUNTER — TELEMEDICINE (OUTPATIENT)
Dept: FAMILY MEDICINE CLINIC | Age: 70
End: 2020-06-12
Payer: MEDICARE

## 2020-06-12 VITALS
WEIGHT: 221 LBS | SYSTOLIC BLOOD PRESSURE: 128 MMHG | BODY MASS INDEX: 30.82 KG/M2 | DIASTOLIC BLOOD PRESSURE: 83 MMHG | HEART RATE: 55 BPM

## 2020-06-12 PROCEDURE — 99214 OFFICE O/P EST MOD 30 MIN: CPT | Performed by: FAMILY MEDICINE

## 2020-06-12 PROCEDURE — G0439 PPPS, SUBSEQ VISIT: HCPCS | Performed by: FAMILY MEDICINE

## 2020-06-12 RX ORDER — AMLODIPINE BESYLATE 10 MG/1
10 TABLET ORAL DAILY
Qty: 90 TABLET | Refills: 3 | Status: SHIPPED | OUTPATIENT
Start: 2020-06-12 | End: 2020-09-18 | Stop reason: SDUPTHER

## 2020-06-12 RX ORDER — LOSARTAN POTASSIUM 100 MG/1
100 TABLET ORAL DAILY
Qty: 90 TABLET | Refills: 3 | Status: SHIPPED | OUTPATIENT
Start: 2020-06-12 | End: 2021-06-07 | Stop reason: SDUPTHER

## 2020-06-12 RX ORDER — VARDENAFIL HYDROCHLORIDE 20 MG/1
20 TABLET ORAL PRN
Qty: 10 TABLET | Refills: 5 | Status: SHIPPED | OUTPATIENT
Start: 2020-06-12 | End: 2021-12-07 | Stop reason: ALTCHOICE

## 2020-06-12 RX ORDER — SILDENAFIL 100 MG/1
TABLET, FILM COATED ORAL
Qty: 10 TABLET | Refills: 11 | Status: SHIPPED | OUTPATIENT
Start: 2020-06-12 | End: 2020-09-18 | Stop reason: SDUPTHER

## 2020-06-12 RX ORDER — HYDROCHLOROTHIAZIDE 25 MG/1
TABLET ORAL
Qty: 90 TABLET | Refills: 3 | Status: SHIPPED | OUTPATIENT
Start: 2020-06-12 | End: 2021-04-06

## 2020-06-12 ASSESSMENT — ENCOUNTER SYMPTOMS
DIARRHEA: 0
ABDOMINAL PAIN: 0
NAUSEA: 0
CONSTIPATION: 0
BACK PAIN: 1
SHORTNESS OF BREATH: 0
CHEST TIGHTNESS: 0
ANAL BLEEDING: 0
COUGH: 0

## 2020-09-02 ENCOUNTER — OFFICE VISIT (OUTPATIENT)
Dept: URGENT CARE | Age: 70
End: 2020-09-02
Payer: MEDICARE

## 2020-09-02 VITALS
DIASTOLIC BLOOD PRESSURE: 87 MMHG | RESPIRATION RATE: 18 BRPM | TEMPERATURE: 98.2 F | HEIGHT: 71 IN | OXYGEN SATURATION: 94 % | HEART RATE: 74 BPM | SYSTOLIC BLOOD PRESSURE: 146 MMHG | WEIGHT: 233.8 LBS | BODY MASS INDEX: 32.73 KG/M2

## 2020-09-02 PROCEDURE — G8427 DOCREV CUR MEDS BY ELIG CLIN: HCPCS | Performed by: NURSE PRACTITIONER

## 2020-09-02 PROCEDURE — 3017F COLORECTAL CA SCREEN DOC REV: CPT | Performed by: NURSE PRACTITIONER

## 2020-09-02 PROCEDURE — 1123F ACP DISCUSS/DSCN MKR DOCD: CPT | Performed by: NURSE PRACTITIONER

## 2020-09-02 PROCEDURE — 4040F PNEUMOC VAC/ADMIN/RCVD: CPT | Performed by: NURSE PRACTITIONER

## 2020-09-02 PROCEDURE — 99212 OFFICE O/P EST SF 10 MIN: CPT | Performed by: NURSE PRACTITIONER

## 2020-09-02 PROCEDURE — 1036F TOBACCO NON-USER: CPT | Performed by: NURSE PRACTITIONER

## 2020-09-02 PROCEDURE — G8417 CALC BMI ABV UP PARAM F/U: HCPCS | Performed by: NURSE PRACTITIONER

## 2020-09-02 RX ORDER — CLINDAMYCIN HYDROCHLORIDE 300 MG/1
300 CAPSULE ORAL 3 TIMES DAILY
Qty: 30 CAPSULE | Refills: 0 | Status: SHIPPED | OUTPATIENT
Start: 2020-09-02 | End: 2020-09-12

## 2020-09-02 NOTE — PROGRESS NOTES
[address] [facility]  [address] [facility]  [address] [id number]  Dept: [phone]  Dept Fax: [phone]  Loc: [phone]    Shira Gerber is a 78 y.o. male who presents today for his medical conditions/complaintsas noted below. Shira Gerber is c/o of Insect Bite (RLE)        HPI:     Rash   This is a new problem. The current episode started in the past 7 days. The problem has been gradually worsening since onset. The affected locations include the right lower leg. The rash is characterized by redness. It is unknown if there was an exposure to a precipitant. Pertinent negatives include no fatigue or fever. Treatments tried: finger nail polish  The treatment provided no relief. Past Medical History:   Diagnosis Date    Hypertension     Pulmonary fibrosis ([address]      Past Surgical History:   Procedure Laterality Date    CHOLECYSTECTOMY      TONSILLECTOMY         Family History   Problem Relation Age of Onset    High Blood Pressure Mother     Diabetes Mother     High Blood Pressure Father     Heart Disease Father     Diabetes Father        Social History     Tobacco Use    Smoking status: Never Smoker    Smokeless tobacco: Never Used   Substance Use Topics    Alcohol use: No      Current Outpatient Medications   Medication Sig Dispense Refill    clindamycin (CLEOCIN) 300 MG capsule Take 1 capsule by mouth 3 times daily for 10 days 30 capsule 0    mupirocin (BACTROBAN) 2 % ointment Apply 3 times daily.  15 g 0    losartan (COZAAR) 100 MG tablet Take 1 tablet by mouth daily 90 tablet 3    hydroCHLOROthiazide (HYDRODIURIL) 25 MG tablet TAKE 1 TABLET BY MOUTH EVERY DAY 90 tablet 3    vardenafil (LEVITRA) 20 MG tablet Take 1 tablet by mouth as needed for Erectile Dysfunction 10 tablet 5    amLODIPine (NORVASC) 10 MG tablet Take 1 tablet by mouth daily 90 tablet 3    sildenafil (VIAGRA) 100 MG tablet 1/2 to 1 tablet po qday prn 10 tablet 11    colestipol (COLESTID) 1 g tablet Take 1 tablet by mouth 2 times daily 180 tablet 3    meloxicam (MOBIC) 15 MG tablet TAKE 1 TABLET BY MOUTH ONCE A DAY 90 tablet 3    aspirin 81 MG chewable tablet Take 81 mg by mouth      B Complex-C (SUPER B COMPLEX PO) Take by mouth       No current facility-administered medications for this visit. No Known Allergies    Health Maintenance   Topic Date Due    Hepatitis C screen  1950    Flu vaccine (1) 09/01/2020    Potassium monitoring  12/04/2020    Creatinine monitoring  12/04/2020    Annual Wellness Visit (AWV)  06/13/2021    Colon cancer screen colonoscopy  03/16/2023    Lipid screen  12/04/2024    DTaP/Tdap/Td vaccine (2 - Td) 11/28/2027    Shingles Vaccine  Completed    Pneumococcal 65+ years Vaccine  Completed    Hepatitis A vaccine  Aged Out    Hepatitis B vaccine  Aged Out    Hib vaccine  Aged Out    Meningococcal (ACWY) vaccine  Aged Out       Subjective:     Review of Systems   Constitutional: Negative for fatigue and fever. Skin: Positive for rash. All other systems reviewed and are negative.      :Objective      Physical Exam  Vitals signs and nursing note reviewed. Constitutional:       General: He is not in acute distress. Appearance: Normal appearance. He is well-developed. He is not ill-appearing or diaphoretic. HENT:      Head: Normocephalic. Eyes:      Conjunctiva/sclera: Conjunctivae normal.      Pupils: Pupils are equal, round, and reactive to light. Pulmonary:      Effort: Pulmonary effort is normal. No respiratory distress. Skin:     General: Skin is warm and dry. Findings: No rash. Neurological:      Mental Status: He is alert and oriented to person, place, and time.    Psychiatric:         Mood and Affect: Mood normal.         Behavior: Behavior normal.       BP (!) 146/87   Pulse 74   Temp 98.2 °F (36.8 °C) (Oral)   Resp 18   Ht 5' 11\" (1.803 m)   Wt 233 lb 12.8 oz (106.1 kg)   SpO2 94% yourself at home? · Take your antibiotics as directed. Do not stop taking them just because you feel better. You need to take the full course of antibiotics. · Prop up the infected area on pillows to reduce pain and swelling. Try to keep the area above the level of your heart as often as you can. · If your doctor told you how to care for your wound, follow your doctor's instructions. If you did not get instructions, follow this general advice:  ? Wash the wound with clean water 2 times a day. Don't use hydrogen peroxide or alcohol, which can slow healing. ? You may cover the wound with a thin layer of petroleum jelly, such as Vaseline, and a nonstick bandage. ? Apply more petroleum jelly and replace the bandage as needed. · Be safe with medicines. Take pain medicines exactly as directed. ? If the doctor gave you a prescription medicine for pain, take it as prescribed. ? If you are not taking a prescription pain medicine, ask your doctor if you can take an over-the-counter medicine. To prevent cellulitis in the future  · Try to prevent cuts, scrapes, or other injuries to your skin. Cellulitis most often occurs where there is a break in the skin. · If you get a scrape, cut, mild burn, or bite, wash the wound with clean water as soon as you can to help avoid infection. Don't use hydrogen peroxide or alcohol, which can slow healing. · If you have swelling in your legs (edema), support stockings and good skin care may help prevent leg sores and cellulitis. · Take care of your feet, especially if you have diabetes or other conditions that increase the risk of infection. Wear shoes and socks. Do not go barefoot. If you have athlete's foot or other skin problems on your feet, talk to your doctor about how to treat them. When should you call for help?    Call your doctor now or seek immediate medical care if:  · You have signs that your infection is getting worse, such as:  ? Increased pain, swelling, warmth, or

## 2020-09-18 ENCOUNTER — TELEMEDICINE (OUTPATIENT)
Dept: FAMILY MEDICINE CLINIC | Age: 70
End: 2020-09-18
Payer: MEDICARE

## 2020-09-18 PROCEDURE — 99214 OFFICE O/P EST MOD 30 MIN: CPT | Performed by: FAMILY MEDICINE

## 2020-09-18 RX ORDER — MELOXICAM 15 MG/1
TABLET ORAL
Qty: 90 TABLET | Refills: 3 | Status: SHIPPED | OUTPATIENT
Start: 2020-09-18 | End: 2021-12-07 | Stop reason: SDUPTHER

## 2020-09-18 RX ORDER — SILDENAFIL 100 MG/1
TABLET, FILM COATED ORAL
Qty: 10 TABLET | Refills: 11 | Status: SHIPPED | OUTPATIENT
Start: 2020-09-18 | End: 2021-09-22

## 2020-09-18 RX ORDER — AMLODIPINE BESYLATE 10 MG/1
10 TABLET ORAL DAILY
Qty: 90 TABLET | Refills: 3 | Status: SHIPPED | OUTPATIENT
Start: 2020-09-18 | End: 2021-09-20

## 2020-09-18 RX ORDER — MONTELUKAST SODIUM 4 MG/1
1 TABLET, CHEWABLE ORAL 2 TIMES DAILY
Qty: 180 TABLET | Refills: 3 | Status: SHIPPED | OUTPATIENT
Start: 2020-09-18 | End: 2021-12-07 | Stop reason: SDUPTHER

## 2020-09-18 ASSESSMENT — ENCOUNTER SYMPTOMS
DIARRHEA: 0
BACK PAIN: 1
ANAL BLEEDING: 0
ABDOMINAL PAIN: 0
CONSTIPATION: 0
COUGH: 0
CHEST TIGHTNESS: 0
SHORTNESS OF BREATH: 0
NAUSEA: 0

## 2020-09-18 NOTE — PROGRESS NOTES
sildenafil (VIAGRA) 100 MG tablet 1/2 to 1 tablet po qday prn Yes Angelika Murphy MD   amLODIPine (NORVASC) 10 MG tablet Take 1 tablet by mouth daily Yes Angelika Murphy MD   colestipol (COLESTID) 1 g tablet Take 1 tablet by mouth 2 times daily Yes Angelika Murphy MD   meloxicam (MOBIC) 15 MG tablet TAKE 1 TABLET BY MOUTH ONCE A DAY Yes Angelika Murphy MD   losartan (COZAAR) 100 MG tablet Take 1 tablet by mouth daily  Angelika Murphy MD   hydroCHLOROthiazide (HYDRODIURIL) 25 MG tablet TAKE 1 TABLET BY MOUTH EVERY DAY  Angelika Murphy MD   vardenafil (LEVITRA) 20 MG tablet Take 1 tablet by mouth as needed for Erectile Dysfunction  Angelika Murphy MD   aspirin 81 MG chewable tablet Take 81 mg by mouth  Historical Provider, MD   B Complex-C (SUPER B COMPLEX PO) Take by mouth  Historical Provider, MD        Social History     Tobacco Use    Smoking status: Never Smoker    Smokeless tobacco: Never Used   Substance Use Topics    Alcohol use: No    Drug use: No            PHYSICAL EXAMINATION:  [ INSTRUCTIONS:  \"[x]\" Indicates a positive item  \"[]\" Indicates a negative item  -- DELETE ALL ITEMS NOT EXAMINED]  Vital Signs: (As obtained by patient/caregiver or practitioner observation)    Blood pressure-  Heart rate-    Respiratory rate-    Temperature-  Pulse oximetry-     Constitutional: [x] Appears well-developed and well-nourished [x] No apparent distress      [] Abnormal-   Mental status  [x] Alert and awake  [] Oriented to person/place/time [x]Able to follow commands      Eyes:  EOM    [x]  Normal  [] Abnormal-  Sclera  [x]  Normal  [] Abnormal -         Discharge []  None visible  [] Abnormal -    HENT:   [x] Normocephalic, atraumatic.   [] Abnormal   [] Mouth/Throat: Mucous membranes are moist.     External Ears [x] Normal  [] Abnormal-     Neck: [x] No visualized mass     Pulmonary/Chest: [x] Respiratory effort normal.  [x] No visualized signs of difficulty breathing or respiratory distress [] Abnormal-      Musculoskeletal:   [] Normal gait with no signs of ataxia         [] Normal range of motion of neck        [] Abnormal-       Neurological:        [] No Facial Asymmetry (Cranial nerve 7 motor function) (limited exam to video visit)          [] No gaze palsy        [] Abnormal-         Skin:        [x] No significant exanthematous lesions or discoloration noted on facial skin         [] Abnormal-            Psychiatric:       [x] Normal Affect [x] No Hallucinations        [] Abnormal-     Other pertinent observable physical exam findings-     No results found for this or any previous visit (from the past 672 hour(s)). ASSESSMENT/PLAN:  1. Essential (primary) hypertension  BP Readings from Last 3 Encounters:   09/02/20 (!) 146/87   06/12/20 128/83   12/10/19 124/80     This was a 3-month follow-up to reassess his blood pressure. He had the following issues he wished to discuss as well:    2. Erectile dysfunction, unspecified erectile dysfunction type    - sildenafil (VIAGRA) 100 MG tablet; 1/2 to 1 tablet po qday prn  Dispense: 10 tablet; Refill: 11    3. Postcholecystectomy diarrhea    - colestipol (COLESTID) 1 g tablet; Take 1 tablet by mouth 2 times daily  Dispense: 180 tablet; Refill: 3    4. Chronic pain of both knees  Suggested x-rays of the bilateral knees  - meloxicam (MOBIC) 15 MG tablet; TAKE 1 TABLET BY MOUTH ONCE A DAY  Dispense: 90 tablet; Refill: 3  - XR KNEE RIGHT (3 VIEWS); Future  - XR KNEE LEFT (3 VIEWS); Future    5. Numbness of foot  We will obtain a nerve conduction study of the lower extremities  - Nerve Conduction Test with EMG; Future    6. Numbness and tingling of foot  As above    7. Chest pain, unspecified type  The last thing on his list was chest pain. I immediately redirected the visit towards addressing the chest pain.   Instructed him he was originally scheduled for a follow-up today and this is an acute complaint which should have been addressed the moment he authenticate this note.

## 2020-09-21 ENCOUNTER — HOSPITAL ENCOUNTER (OUTPATIENT)
Dept: GENERAL RADIOLOGY | Age: 70
Discharge: HOME | End: 2020-09-21
Payer: MEDICARE

## 2020-09-21 ENCOUNTER — OFFICE VISIT (OUTPATIENT)
Dept: FAMILY MEDICINE CLINIC | Age: 70
End: 2020-09-21
Payer: MEDICARE

## 2020-09-21 VITALS
HEART RATE: 66 BPM | TEMPERATURE: 97.9 F | WEIGHT: 234 LBS | BODY MASS INDEX: 32.64 KG/M2 | DIASTOLIC BLOOD PRESSURE: 86 MMHG | OXYGEN SATURATION: 98 % | SYSTOLIC BLOOD PRESSURE: 132 MMHG

## 2020-09-21 PROCEDURE — 99214 OFFICE O/P EST MOD 30 MIN: CPT | Performed by: FAMILY MEDICINE

## 2020-09-21 PROCEDURE — 73562 X-RAY EXAM OF KNEE 3: CPT

## 2020-09-21 PROCEDURE — 93000 ELECTROCARDIOGRAM COMPLETE: CPT | Performed by: FAMILY MEDICINE

## 2020-09-21 PROCEDURE — 90694 VACC AIIV4 NO PRSRV 0.5ML IM: CPT | Performed by: FAMILY MEDICINE

## 2020-09-21 PROCEDURE — G0008 ADMIN INFLUENZA VIRUS VAC: HCPCS | Performed by: FAMILY MEDICINE

## 2020-09-21 PROCEDURE — 71046 X-RAY EXAM CHEST 2 VIEWS: CPT

## 2020-09-21 ASSESSMENT — ENCOUNTER SYMPTOMS
ANAL BLEEDING: 0
CONSTIPATION: 0
CHEST TIGHTNESS: 0
DIARRHEA: 0
NAUSEA: 0
ABDOMINAL PAIN: 0
SHORTNESS OF BREATH: 1

## 2020-09-21 NOTE — PROGRESS NOTES
Vaccine Information Sheet, \"Influenza - Inactivated\" OR \"Live - Intranasal\"  given to Mirian Del Toro, or parent/legal guardian of  Mirian Del Toro and verbalized understanding. Patient responses:    Have you ever had a reaction to a flu vaccine? No  Are you able to eat eggs without adverse effects? Yes  Do you have any current illness? No  Have you ever had Guillian Kilkenny Syndrome? No    Flu vaccine given per order. Please see immunization tab.
visit specific review of symptoms. All others negative      Objective:   /86   Pulse 66   Temp 97.9 °F (36.6 °C)   Wt 234 lb (106.1 kg)   SpO2 98%   BMI 32.64 kg/m²   Physical Exam   Physical Exam   Constitutional: He appears well-developed. Does not appear ill. Eyes: Pupils are equal, round, and reactive to light. Conjunctiva and Lids normal.  Neck: Normal range of motion. Neck supple. No masses. Neck Symmetric. Normal tracheal position. No thyroid enlargement  Cardiovascular: Normal rate and regular rhythm. Exam reveals no friction rub. Carotid arteries: no bruit observed. No murmur heard. Respiratory:  Effort normal and breath sounds normal. No respiratory distress. No wheezes. No rales. No use of accessory muscles or intercostal retractions. Abdominal: Soft. Bowel sounds are normal. exhibits no distension. There is no tenderness. There is no rebound and no guarding. Musculoskeletal: exhibits no edema. Normal gait. Neurological: alert. Psychiatric: normal mood and affect. His behavior is normal. Normal judgement and insight observed. Neuro: Monofilament test normal bilateral lower extremities. Normal sensation all digits. No results found for this or any previous visit (from the past 672 hour(s)). EKG normal sinus rhythm no ST or T wave changes      Assessment & Plan: The following diagnoses and conditions are stable with no further orders unless indicated:   1. Chest pain, unspecified type  Suggested we get a stress test.  Advised to go to the emergency department if he experiences any further chest discomfort. We will obtain a chest x-ray today as well  - ECHO Stress Test; Future  - EKG 12 Lead  - XR CHEST (2 VW); Future    2. Need for influenza vaccination    - INFLUENZA, QUADV, ADJUVANTED, 65 YRS =, IM, PF, PREFILL SYR, 0.5ML (FLUAD)    3.  Essential (primary) hypertension  BP Readings from Last 3 Encounters:   09/21/20 132/86   09/02/20 (!) 146/87   06/12/20

## 2020-10-01 ENCOUNTER — HOSPITAL ENCOUNTER (OUTPATIENT)
Dept: NEUROLOGY | Age: 70
Discharge: HOME | End: 2020-10-01
Payer: MEDICARE

## 2020-10-01 PROCEDURE — 95886 MUSC TEST DONE W/N TEST COMP: CPT | Performed by: PSYCHIATRY & NEUROLOGY

## 2020-10-01 PROCEDURE — 95909 NRV CNDJ TST 5-6 STUDIES: CPT

## 2020-10-01 PROCEDURE — 95886 MUSC TEST DONE W/N TEST COMP: CPT

## 2020-10-01 PROCEDURE — 95909 NRV CNDJ TST 5-6 STUDIES: CPT | Performed by: PSYCHIATRY & NEUROLOGY

## 2020-10-30 ENCOUNTER — HOSPITAL ENCOUNTER (OUTPATIENT)
Dept: PULMONOLOGY | Age: 70
Discharge: HOME | End: 2020-10-30

## 2020-10-30 ENCOUNTER — HOSPITAL ENCOUNTER (OUTPATIENT)
Dept: NON INVASIVE DIAGNOSTICS | Age: 70
Discharge: HOME | End: 2020-10-30
Payer: MEDICARE

## 2020-10-30 LAB
LV EF: 50 %
LVEF MODALITY: NORMAL

## 2020-10-30 PROCEDURE — 93350 STRESS TTE ONLY: CPT

## 2020-10-30 PROCEDURE — 93017 CV STRESS TEST TRACING ONLY: CPT

## 2020-11-05 ENCOUNTER — OFFICE VISIT (OUTPATIENT)
Dept: FAMILY MEDICINE CLINIC | Age: 70
End: 2020-11-05
Payer: MEDICARE

## 2020-11-05 VITALS
DIASTOLIC BLOOD PRESSURE: 76 MMHG | TEMPERATURE: 97.3 F | OXYGEN SATURATION: 98 % | SYSTOLIC BLOOD PRESSURE: 130 MMHG | WEIGHT: 237 LBS | HEART RATE: 65 BPM | BODY MASS INDEX: 33.05 KG/M2

## 2020-11-05 PROCEDURE — 99214 OFFICE O/P EST MOD 30 MIN: CPT | Performed by: FAMILY MEDICINE

## 2020-11-05 NOTE — PROGRESS NOTES
[facility]  [facility]  [address] [address]  Dept: [phone]  Dept Fax: [phone]  Loc: [phone]    Aviva Mondragon is a 69 y.o. male who presents today for his medical conditions/complaints as noted below. Aviva Mondraogn is here for 1 Month Follow-Up        HPI:   CC: Here today to discuss the followin month follow-up. He been experiencing some chest discomfort. He completed a stress echocardiogram on  which showed no evidence of myocardial ischemia. He did have occasional PVCs. Chest x-ray showed evidence of prominent diffuse interstitial fibrosis but no acute lung disease. He is followed by pulmonology for this. He states the chest pain has resolved. X-rays of the knees show osteoarthritic changes greatest in the patellofemoral joint. 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 Notices the neuropathy more when he drives long distances. He feels it on the bottom of his foot and the first two toes. Gets better when he gets better when he walks.   He does not feel like he wants to pursue this any further at this time  HPI    Past Medical History:   Diagnosis Date    Hypertension     Pulmonary fibrosis ([address]       Past Surgical History:   Procedure Laterality Date    CHOLECYSTECTOMY      TONSILLECTOMY         Family History   Problem Relation Age of Onset    High Blood Pressure Mother     Diabetes Mother     High Blood Pressure Father     Heart Disease Father     Diabetes Father        Social History     Tobacco Use    Smoking status: Never Smoker    Smokeless tobacco: Never Used   Substance Use Topics    Alcohol use: No     Current Outpatient Medications   Medication Sig Dispense Refill    sildenafil (VIAGRA) 100 MG tablet 1/2 to 1 tablet po qday prn 10 tablet 11    amLODIPine (NORVASC) 10 MG tablet Take 1 tablet by mouth daily 90 tablet 3    colestipol (COLESTID) 1 g tablet Take 1 tablet by mouth 2 times daily 180 tablet 3    meloxicam (MOBIC) 15 MG tablet TAKE 1 TABLET BY MOUTH ONCE A DAY 90 tablet 3    losartan (COZAAR) 100 MG tablet Take 1 tablet by mouth daily 90 tablet 3    hydroCHLOROthiazide (HYDRODIURIL) 25 MG tablet TAKE 1 TABLET BY MOUTH EVERY DAY 90 tablet 3    vardenafil (LEVITRA) 20 MG tablet Take 1 tablet by mouth as needed for Erectile Dysfunction 10 tablet 5    aspirin 81 MG chewable tablet Take 81 mg by mouth      B Complex-C (SUPER B COMPLEX PO) Take by mouth       No current facility-administered medications for this visit. No Known Allergies    Health Maintenance   Topic Date Due    Hepatitis C screen  1950    Potassium monitoring  12/04/2020    Creatinine monitoring  12/04/2020    Annual Wellness Visit (AWV)  06/13/2021    Colon cancer screen colonoscopy  03/16/2023    Lipid screen  12/04/2024    DTaP/Tdap/Td vaccine (2 - Td) 11/28/2027    Flu vaccine  Completed    Shingles Vaccine  Completed    Pneumococcal 65+ years Vaccine  Completed    Hepatitis A vaccine  Aged Out    Hepatitis B vaccine  Aged Out    Hib vaccine  Aged Out    Meningococcal (ACWY) vaccine  Aged Out       Subjective:      Review of Systems    Review of Systems   Constitutional: Negative for chills and fever. HENT: Negative for congestion. Respiratory: Negative for cough, chest tightness and shortness of breath. Cardiovascular: Negative for chest pain, palpitations and leg swelling. Gastrointestinal: Negative for abdominal pain, anal bleeding, constipation, diarrhea and nausea. Genitourinary: Negative for difficulty urinating. Psychiatric/Behavioral: Negative. SeeHPI for visit specific review of symptoms.   All others negative      Objective:   /76   Pulse 65   Temp 97.3 °F (36.3 °C)   Wt 237 lb (107.5 kg)   SpO2 98%   BMI 33.05 kg/m²   Physical Exam  Physical Exam   Constitutional: He appears well-developed. Does not appear ill. Eyes: Pupils are equal, round, and reactive to light. Conjunctiva and Lids normal.  Neck: Normal range of motion. Neck supple. No masses. Neck Symmetric. Normal tracheal position. No thyroid enlargement  Cardiovascular: Normal rate and regular rhythm. Exam reveals no friction rub. Carotid arteries: no bruit observed. No murmur heard. Respiratory:  Effort normal and breath sounds normal. No respiratory distress. No wheezes. No rales. No use of accessory muscles or intercostal retractions. Abdominal: Soft. Bowel sounds are normal. exhibits no distension. There is no tenderness. There is no rebound and no guarding. Musculoskeletal: exhibits no edema. Normal gait. Neurological: alert. Psychiatric: normal mood and affect. His behavior is normal. Normal judgement and insight observed. No results found for this or any previous visit (from the past 672 hour(s)). Assessment & Plan: The following diagnoses and conditions are stable with no further orders unless indicated:  1. Essential (primary) hypertension  BP Readings from Last 3 Encounters:   11/05/20 130/76   09/21/20 132/86   09/02/20 (!) 146/87     Blood pressure stable  - CBC Auto Differential; Future    2. Polyneuropathy  Check following lab studies:  Offered referral to neurology he states he will consider  - Folate; Future  - Vitamin B12; Future  - Mary Titer IgG by IFA Reflex; Future  - Sedimentation Rate; Future  - TSH without Reflex; Future  - T4, Free; Future    3. Encounter for prostate cancer screening    - Psa screening; Future    4.  Familial hypercholesterolemia  Lab Results   Component Value Date    CHOL 141 (L) 12/04/2019    CHOL 151 (L) 11/26/2018    CHOL 148 (L) 11/21/2017     Lab

## 2020-12-16 DIAGNOSIS — G62.9 POLYNEUROPATHY: ICD-10-CM

## 2020-12-16 DIAGNOSIS — Z12.5 ENCOUNTER FOR PROSTATE CANCER SCREENING: ICD-10-CM

## 2020-12-16 DIAGNOSIS — I10 ESSENTIAL (PRIMARY) HYPERTENSION: ICD-10-CM

## 2020-12-16 DIAGNOSIS — R53.83 OTHER FATIGUE: ICD-10-CM

## 2020-12-16 DIAGNOSIS — E78.01 FAMILIAL HYPERCHOLESTEROLEMIA: ICD-10-CM

## 2020-12-16 LAB
ALBUMIN SERPL-MCNC: 4.2 G/DL (ref 3.5–5.2)
ALP BLD-CCNC: 84 U/L (ref 40–130)
ALT SERPL-CCNC: 51 U/L (ref 5–41)
ANION GAP SERPL CALCULATED.3IONS-SCNC: 12 MMOL/L (ref 7–19)
AST SERPL-CCNC: 42 U/L (ref 5–40)
BASOPHILS ABSOLUTE: 0 K/UL (ref 0–0.2)
BASOPHILS RELATIVE PERCENT: 0.3 % (ref 0–1)
BILIRUB SERPL-MCNC: 0.8 MG/DL (ref 0.2–1.2)
BUN BLDV-MCNC: 16 MG/DL (ref 8–23)
CALCIUM SERPL-MCNC: 9.8 MG/DL (ref 8.8–10.2)
CHLORIDE BLD-SCNC: 97 MMOL/L (ref 98–111)
CHOLESTEROL, TOTAL: 160 MG/DL (ref 160–199)
CO2: 30 MMOL/L (ref 22–29)
CREAT SERPL-MCNC: 1 MG/DL (ref 0.5–1.2)
EOSINOPHILS ABSOLUTE: 0.2 K/UL (ref 0–0.6)
EOSINOPHILS RELATIVE PERCENT: 2.9 % (ref 0–5)
FOLATE: >20 NG/ML (ref 4.5–32.2)
GFR AFRICAN AMERICAN: >59
GFR NON-AFRICAN AMERICAN: >60
GLUCOSE BLD-MCNC: 95 MG/DL (ref 74–109)
HCT VFR BLD CALC: 49.6 % (ref 42–52)
HDLC SERPL-MCNC: 37 MG/DL (ref 55–121)
HEMOGLOBIN: 16.3 G/DL (ref 14–18)
IMMATURE GRANULOCYTES #: 0 K/UL
LDL CHOLESTEROL CALCULATED: 105 MG/DL
LYMPHOCYTES ABSOLUTE: 2 K/UL (ref 1.1–4.5)
LYMPHOCYTES RELATIVE PERCENT: 31.8 % (ref 20–40)
MCH RBC QN AUTO: 30.6 PG (ref 27–31)
MCHC RBC AUTO-ENTMCNC: 32.9 G/DL (ref 33–37)
MCV RBC AUTO: 93.1 FL (ref 80–94)
MONOCYTES ABSOLUTE: 0.6 K/UL (ref 0–0.9)
MONOCYTES RELATIVE PERCENT: 8.9 % (ref 0–10)
NEUTROPHILS ABSOLUTE: 3.5 K/UL (ref 1.5–7.5)
NEUTROPHILS RELATIVE PERCENT: 55.9 % (ref 50–65)
PDW BLD-RTO: 13.4 % (ref 11.5–14.5)
PLATELET # BLD: 286 K/UL (ref 130–400)
PMV BLD AUTO: 9.4 FL (ref 9.4–12.4)
POTASSIUM SERPL-SCNC: 3.9 MMOL/L (ref 3.5–5)
PROSTATE SPECIFIC ANTIGEN: 1.09 NG/ML (ref 0–4)
RBC # BLD: 5.33 M/UL (ref 4.7–6.1)
SEDIMENTATION RATE, ERYTHROCYTE: 9 MM/HR (ref 0–15)
SODIUM BLD-SCNC: 139 MMOL/L (ref 136–145)
T4 FREE: 1.22 NG/DL (ref 0.93–1.7)
TOTAL PROTEIN: 8.4 G/DL (ref 6.6–8.7)
TRIGL SERPL-MCNC: 89 MG/DL (ref 0–149)
TSH SERPL DL<=0.05 MIU/L-ACNC: 2.51 UIU/ML (ref 0.27–4.2)
VITAMIN B-12: 423 PG/ML (ref 211–946)
WBC # BLD: 6.3 K/UL (ref 4.8–10.8)

## 2020-12-18 LAB
ANA PATTERN: ABNORMAL
ANA TITER: ABNORMAL
ANTINUCLEAR AB INTERPRETIVE COMMENT: ABNORMAL
ANTINUCLEAR ANTIBODY, HEP-2, IGG: DETECTED

## 2021-02-08 ENCOUNTER — OFFICE VISIT (OUTPATIENT)
Dept: PULMONOLOGY | Facility: CLINIC | Age: 71
End: 2021-02-08

## 2021-02-08 VITALS
BODY MASS INDEX: 32.76 KG/M2 | TEMPERATURE: 97.5 F | HEIGHT: 71 IN | HEART RATE: 69 BPM | OXYGEN SATURATION: 98 % | DIASTOLIC BLOOD PRESSURE: 80 MMHG | SYSTOLIC BLOOD PRESSURE: 132 MMHG | WEIGHT: 234 LBS

## 2021-02-08 DIAGNOSIS — J84.10 PULMONARY FIBROSIS (HCC): Primary | ICD-10-CM

## 2021-02-08 DIAGNOSIS — K75.81 NASH (NONALCOHOLIC STEATOHEPATITIS): ICD-10-CM

## 2021-02-08 PROCEDURE — 99213 OFFICE O/P EST LOW 20 MIN: CPT | Performed by: INTERNAL MEDICINE
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
## 2021-04-06 RX ORDER — HYDROCHLOROTHIAZIDE 25 MG/1
TABLET ORAL
Qty: 90 TABLET | Refills: 3 | Status: SHIPPED | OUTPATIENT
Start: 2021-04-06 | End: 2022-04-11

## 2021-04-06 NOTE — TELEPHONE ENCOUNTER
Mildredia Sd called to request a refill on his medication.       Last office visit : 11/5/2020   Next office visit : 6/7/2021     Requested Prescriptions     Pending Prescriptions Disp Refills    hydroCHLOROthiazide (HYDRODIURIL) 25 MG tablet [Pharmacy Med Name: HCTZ 25MG TABLET 25 Tablet] 90 tablet 3     Sig: TAKE 1 TABLET BY MOUTH 1 TIME DAILY            Phyllistrakan Ferguson, MA

## 2021-06-01 DIAGNOSIS — Z00.00 ANNUAL PHYSICAL EXAM: ICD-10-CM

## 2021-06-01 DIAGNOSIS — Z00.00 ANNUAL PHYSICAL EXAM: Primary | ICD-10-CM

## 2021-06-01 LAB
ALBUMIN SERPL-MCNC: 4.2 G/DL (ref 3.5–5.2)
ALP BLD-CCNC: 79 U/L (ref 40–130)
ALT SERPL-CCNC: 33 U/L (ref 5–41)
ANION GAP SERPL CALCULATED.3IONS-SCNC: 8 MMOL/L (ref 7–19)
AST SERPL-CCNC: 31 U/L (ref 5–40)
BASOPHILS ABSOLUTE: 0 K/UL (ref 0–0.2)
BASOPHILS RELATIVE PERCENT: 0.4 % (ref 0–1)
BILIRUB SERPL-MCNC: 0.7 MG/DL (ref 0.2–1.2)
BUN BLDV-MCNC: 14 MG/DL (ref 8–23)
CALCIUM SERPL-MCNC: 9.4 MG/DL (ref 8.8–10.2)
CHLORIDE BLD-SCNC: 101 MMOL/L (ref 98–111)
CHOLESTEROL, TOTAL: 146 MG/DL (ref 160–199)
CO2: 31 MMOL/L (ref 22–29)
CREAT SERPL-MCNC: 1.1 MG/DL (ref 0.5–1.2)
EOSINOPHILS ABSOLUTE: 0.1 K/UL (ref 0–0.6)
EOSINOPHILS RELATIVE PERCENT: 2 % (ref 0–5)
GFR AFRICAN AMERICAN: >59
GFR NON-AFRICAN AMERICAN: >60
GLUCOSE FASTING: 81 MG/DL (ref 74–109)
HCT VFR BLD CALC: 47.9 % (ref 42–52)
HDLC SERPL-MCNC: 40 MG/DL (ref 55–121)
HEMOGLOBIN: 16 G/DL (ref 14–18)
IMMATURE GRANULOCYTES #: 0 K/UL
LDL CHOLESTEROL CALCULATED: 92 MG/DL
LYMPHOCYTES ABSOLUTE: 1.8 K/UL (ref 1.1–4.5)
LYMPHOCYTES RELATIVE PERCENT: 32.5 % (ref 20–40)
MCH RBC QN AUTO: 30.5 PG (ref 27–31)
MCHC RBC AUTO-ENTMCNC: 33.4 G/DL (ref 33–37)
MCV RBC AUTO: 91.4 FL (ref 80–94)
MONOCYTES ABSOLUTE: 0.5 K/UL (ref 0–0.9)
MONOCYTES RELATIVE PERCENT: 9.6 % (ref 0–10)
NEUTROPHILS ABSOLUTE: 3.1 K/UL (ref 1.5–7.5)
NEUTROPHILS RELATIVE PERCENT: 55.3 % (ref 50–65)
PDW BLD-RTO: 13.8 % (ref 11.5–14.5)
PLATELET # BLD: 259 K/UL (ref 130–400)
PMV BLD AUTO: 9.4 FL (ref 9.4–12.4)
POTASSIUM SERPL-SCNC: 4.3 MMOL/L (ref 3.5–5)
RBC # BLD: 5.24 M/UL (ref 4.7–6.1)
SODIUM BLD-SCNC: 140 MMOL/L (ref 136–145)
TOTAL PROTEIN: 8.1 G/DL (ref 6.6–8.7)
TRIGL SERPL-MCNC: 70 MG/DL (ref 0–149)
WBC # BLD: 5.5 K/UL (ref 4.8–10.8)

## 2021-06-07 ENCOUNTER — OFFICE VISIT (OUTPATIENT)
Dept: FAMILY MEDICINE CLINIC | Age: 71
End: 2021-06-07
Payer: MEDICARE

## 2021-06-07 VITALS
BODY MASS INDEX: 32.2 KG/M2 | WEIGHT: 230 LBS | SYSTOLIC BLOOD PRESSURE: 138 MMHG | DIASTOLIC BLOOD PRESSURE: 86 MMHG | HEART RATE: 58 BPM | OXYGEN SATURATION: 98 % | HEIGHT: 71 IN | TEMPERATURE: 97.3 F

## 2021-06-07 DIAGNOSIS — G89.29 CHRONIC PAIN OF BOTH KNEES: ICD-10-CM

## 2021-06-07 DIAGNOSIS — M25.562 CHRONIC PAIN OF BOTH KNEES: ICD-10-CM

## 2021-06-07 DIAGNOSIS — M25.561 CHRONIC PAIN OF BOTH KNEES: ICD-10-CM

## 2021-06-07 DIAGNOSIS — I10 ESSENTIAL (PRIMARY) HYPERTENSION: ICD-10-CM

## 2021-06-07 DIAGNOSIS — K91.89 POSTCHOLECYSTECTOMY DIARRHEA: ICD-10-CM

## 2021-06-07 DIAGNOSIS — Z00.00 ANNUAL PHYSICAL EXAM: Primary | ICD-10-CM

## 2021-06-07 DIAGNOSIS — R19.7 POSTCHOLECYSTECTOMY DIARRHEA: ICD-10-CM

## 2021-06-07 DIAGNOSIS — Z13.220 LIPID SCREENING: ICD-10-CM

## 2021-06-07 DIAGNOSIS — Z11.59 NEED FOR HEPATITIS C SCREENING TEST: ICD-10-CM

## 2021-06-07 PROCEDURE — G0439 PPPS, SUBSEQ VISIT: HCPCS | Performed by: FAMILY MEDICINE

## 2021-06-07 PROCEDURE — 99214 OFFICE O/P EST MOD 30 MIN: CPT | Performed by: FAMILY MEDICINE

## 2021-06-07 RX ORDER — LOSARTAN POTASSIUM 100 MG/1
100 TABLET ORAL DAILY
Qty: 90 TABLET | Refills: 3 | Status: SHIPPED | OUTPATIENT
Start: 2021-06-07 | End: 2022-05-25

## 2021-06-07 RX ORDER — CHLORAL HYDRATE 500 MG
CAPSULE ORAL
COMMUNITY

## 2021-06-07 ASSESSMENT — PATIENT HEALTH QUESTIONNAIRE - PHQ9
SUM OF ALL RESPONSES TO PHQ QUESTIONS 1-9: 0
SUM OF ALL RESPONSES TO PHQ9 QUESTIONS 1 & 2: 0
SUM OF ALL RESPONSES TO PHQ QUESTIONS 1-9: 0
1. LITTLE INTEREST OR PLEASURE IN DOING THINGS: 0
SUM OF ALL RESPONSES TO PHQ QUESTIONS 1-9: 0
2. FEELING DOWN, DEPRESSED OR HOPELESS: 0

## 2021-06-07 ASSESSMENT — LIFESTYLE VARIABLES: HOW OFTEN DO YOU HAVE A DRINK CONTAINING ALCOHOL: 0

## 2021-06-07 NOTE — PROGRESS NOTES
[facility]  [facility]  [address] [facility] [address] [address]  Dept: [phone]  Dept Fax: [phone]  Loc: [phone]    Lorena Oviedo is a 69 y.o. male who presents today for his medical conditions/complaints as noted below. Lorena Oviedo is here for Medicare AWV        HPI:   CC: Here today to discuss the following:      Hypertension  Compliant with medications. No adverse effects from medication. No lightheadedness, palpitations, or chest pain. IBS is well controlled with Colestid. Primarily attributed to his history of cholecystectomy. Has a history of osteoarthritis of multiple joints. This includes his knees. Continues to take meloxicam.  No adverse effects of the medication.       HPI    Past Medical History:   Diagnosis Date    Hypertension     Pulmonary fibrosis ([address]       Past Surgical History:   Procedure Laterality Date    CHOLECYSTECTOMY      TONSILLECTOMY         Family History   Problem Relation Age of Onset    High Blood Pressure Mother     Diabetes Mother     High Blood Pressure Father     Heart Disease Father     Diabetes Father        Social History     Tobacco Use    Smoking status: Never Smoker    Smokeless tobacco: Never Used   Substance Use Topics    Alcohol use: No     Current Outpatient Medications   Medication Sig Dispense Refill    Omega-3 Fatty Acids (FISH OIL) 1000 MG CAPS Take by mouth daily (with breakfast)      Misc Natural Products (NF FORMULAS TESTOSTERONE) CAPS Take by mouth      losartan (COZAAR) 100 MG tablet Take 1 tablet by mouth daily 90 tablet 3    hydroCHLOROthiazide (HYDRODIURIL) 25 MG tablet TAKE 1 TABLET BY MOUTH 1 TIME DAILY 90 tablet 3    sildenafil (VIAGRA) 100 MG tablet 1/2 to 1 tablet po qday prn 10 tablet 11    amLODIPine (NORVASC) 10 MG tablet Take 1 tablet by mouth daily 90 tablet 3    colestipol (COLESTID) 1 g tablet Take 1 tablet by mouth 2 times daily 180 tablet 3    meloxicam (MOBIC) 15 MG tablet TAKE 1 TABLET BY MOUTH ONCE A DAY 90 tablet 3    aspirin 81 MG chewable tablet Take 81 mg by mouth      B Complex-C (SUPER B COMPLEX PO) Take by mouth      vardenafil (LEVITRA) 20 MG tablet Take 1 tablet by mouth as needed for Erectile Dysfunction (Patient not taking: Reported on 6/7/2021) 10 tablet 5     No current facility-administered medications for this visit. No Known Allergies    Health Maintenance   Topic Date Due    Hepatitis C screen  Never done    Annual Wellness Visit (AWV)  06/13/2021    Potassium monitoring  06/01/2022    Creatinine monitoring  06/01/2022    Colon cancer screen colonoscopy  03/16/2023    Lipid screen  06/01/2026    DTaP/Tdap/Td vaccine (2 - Td or Tdap) 11/28/2027    Flu vaccine  Completed    Shingles Vaccine  Completed    Pneumococcal 65+ years Vaccine  Completed    COVID-19 Vaccine  Completed    Hepatitis A vaccine  Aged Out    Hepatitis B vaccine  Aged Out    Hib vaccine  Aged Out    Meningococcal (ACWY) vaccine  Aged Out       Subjective:      Review of Systems    Review of Systems   Constitutional: Negative for chills and fever. HENT: Negative for congestion. Respiratory: Negative for cough, chest tightness and shortness of breath. Cardiovascular: Negative for chest pain, palpitations and leg swelling. Gastrointestinal: Negative for abdominal pain, anal bleeding, constipation, diarrhea and nausea. Genitourinary: Negative for difficulty urinating. Psychiatric/Behavioral: Negative. SeeHPI for visit specific review of symptoms. All others negative      Objective:   /86   Pulse 58   Temp 97.3 °F (36.3 °C)   Ht 5' 11\" (1.803 m)   Wt 230 lb (104.3 kg)   SpO2 98%   BMI 32.08 kg/m²   Physical Exam  Physical Exam   Constitutional: He appears well-developed. Does not appear ill. Neck: Normal range of motion. Neck supple. No masses. Neck Symmetric. Normal tracheal position.   No thyroid enlargement  Cardiovascular: Normal rate and regular rhythm. Exam reveals no friction rub. Carotid arteries: no bruit observed. No murmur heard. Respiratory:  Effort normal and breath sounds normal. No respiratory distress. No wheezes. No rales. No use of accessory muscles or intercostal retractions. Neurological: alert. Psychiatric: normal mood and affect. His behavior is normal. Normal judgement and insight observed.       Recent Results (from the past 672 hour(s))   CBC Auto Differential    Collection Time: 06/01/21 10:45 AM   Result Value Ref Range    WBC 5.5 4.8 - 10.8 K/uL    RBC 5.24 4.70 - 6.10 M/uL    Hemoglobin 16.0 14.0 - 18.0 g/dL    Hematocrit 47.9 42.0 - 52.0 %    MCV 91.4 80.0 - 94.0 fL    MCH 30.5 27.0 - 31.0 pg    MCHC 33.4 33.0 - 37.0 g/dL    RDW 13.8 11.5 - 14.5 %    Platelets 067 709 - 413 K/uL    MPV 9.4 9.4 - 12.4 fL    Neutrophils % 55.3 50.0 - 65.0 %    Lymphocytes % 32.5 20.0 - 40.0 %    Monocytes % 9.6 0.0 - 10.0 %    Eosinophils % 2.0 0.0 - 5.0 %    Basophils % 0.4 0.0 - 1.0 %    Neutrophils Absolute 3.1 1.5 - 7.5 K/uL    Immature Granulocytes # 0.0 K/uL    Lymphocytes Absolute 1.8 1.1 - 4.5 K/uL    Monocytes Absolute 0.50 0.00 - 0.90 K/uL    Eosinophils Absolute 0.10 0.00 - 0.60 K/uL    Basophils Absolute 0.00 0.00 - 0.20 K/uL   Comprehensive Metabolic Panel, Fasting    Collection Time: 06/01/21 10:45 AM   Result Value Ref Range    Sodium 140 136 - 145 mmol/L    Potassium 4.3 3.5 - 5.0 mmol/L    Chloride 101 98 - 111 mmol/L    CO2 31 (H) 22 - 29 mmol/L    Anion Gap 8 7 - 19 mmol/L    Glucose, Fasting 81 74 - 109 mg/dL    BUN 14 8 - 23 mg/dL    CREATININE 1.1 0.5 - 1.2 mg/dL    GFR Non-African American >60 >60    GFR African American >59 >59    Calcium 9.4 8.8 - 10.2 mg/dL    Total Protein 8.1 6.6 - 8.7 g/dL    Albumin 4.2 3.5 - 5.2 g/dL    Total Bilirubin 0.7 0.2 - 1.2 mg/dL    Alkaline Phosphatase 79 40 - 130 U/L    ALT 33 5 - 41 U/L    AST 31 5 - 40 U/L   Lipid Panel    Collection Time: 06/01/21 10:45 AM   Result Value Ref Range    Cholesterol, Total 146 (L) 160 - 199 mg/dL    Triglycerides 70 0 - 149 mg/dL    HDL 40 (L) 55 - 121 mg/dL    LDL Calculated 92 <100 mg/dL               Assessment & Plan: The following diagnoses and conditions are stable with no further orders unless indicated:  1. Annual physical exam  Discussed lifestyle changes such as diet and exercise. Recommended eliminate processed food from diet such as sugar and fried foods. Recommended exercising at least 150 minutes/week. Try to do full body resistance training twice a week as well. Offered suggestions for calorie counting such as phone apps and online resources such as [employer] fitness pal and Lose it. Discussed healthy weight. 2. Essential (primary) hypertension  BP Readings from Last 3 Encounters:   06/07/21 138/86   11/05/20 130/76   09/21/20 132/86     Stable  - losartan (COZAAR) 100 MG tablet; Take 1 tablet by mouth daily  Dispense: 90 tablet; Refill: 3  - Comprehensive Metabolic Panel; Future    3. Erectile dysfunction:  Stable with Viagra    4. Need for hepatitis C screening test    - Hepatitis C Antibody; Future    5. Lipid screening  Lab Results   Component Value Date    CHOL 146 (L) 06/01/2021    CHOL 160 12/16/2020    CHOL 141 (L) 12/04/2019     Lab Results   Component Value Date    TRIG 70 06/01/2021    TRIG 89 12/16/2020    TRIG 90 12/04/2019     Lab Results   Component Value Date    HDL 40 (L) 06/01/2021    HDL 37 (L) 12/16/2020    HDL 33 (L) 12/04/2019     Lab Results   Component Value Date    LDLCALC 92 06/01/2021    LDLCALC 105 12/16/2020    [facility] 90 12/04/2019     No results found for: LABVLDL, VLDL  No results found for: CHOLHDLRATIO  Continue with low-fat diet. Cholesterol stable    6. Postcholecystectomy diarrhea  Colestid as needed    7. Chronic pain of both knees  Continue with meloxicam            Return in about 6 months (around 12/7/2021).            Discussed use, benefit, and side effects of prescribed medications. All patient questions answered. Pt voiced understanding. Reviewed health maintenance. Instructedto continue current medications, diet and exercise. Patient agreed with treatmentplan. Follow up as directed. Note dictated using Dragon Dictation software  Sometimes this dictation software makes erroneous transcriptions.

## 2021-06-07 NOTE — PROGRESS NOTES
Medicare Annual Wellness Visit - Subsequent    Name: Jr Jorge Date: 2021   MRN: [id number] Sex: Male   Age: 69 y.o. Ethnicity: Non-/Non    : 1950 Race: Wanda Bosworth is here for   Chief Complaint   Patient presents with   [facility]        Screenings for behavioral, psychosocial and functional/safety risks, and cognitive dysfunction are all negative except as indicated below. These results, as well as other patient data from the [address] form, are documented in Flowsheets linked to this Encounter. No Known Allergies    Prior to Visit Medications    Medication Sig Taking?  Authorizing Provider   Omega-3 Fatty Acids (FISH OIL) 1000 MG CAPS Take by mouth daily (with breakfast) Yes Historical Provider, MD   Misc Natural Products (NF FORMULAS TESTOSTERONE) CAPS Take by mouth Yes Historical Provider, MD   hydroCHLOROthiazide (HYDRODIURIL) 25 MG tablet TAKE 1 TABLET BY MOUTH 1 TIME DAILY Yes Abraham Pedroza MD   sildenafil (VIAGRA) 100 MG tablet 1/2 to 1 tablet po qday prn Yes Abraham Pedroza MD   amLODIPine (NORVASC) 10 MG tablet Take 1 tablet by mouth daily Yes Abraham Pedroza MD   colestipol (COLESTID) 1 g tablet Take 1 tablet by mouth 2 times daily Yes Abraham Pedroza MD   meloxicam (MOBIC) 15 MG tablet TAKE 1 TABLET BY MOUTH ONCE A DAY Yes Abraham Pedroza MD   losartan (COZAAR) 100 MG tablet Take 1 tablet by mouth daily Yes Abraham Pedroza MD   aspirin 81 MG chewable tablet Take 81 mg by mouth Yes Historical Provider, MD   B Complex-C (SUPER B COMPLEX PO) Take by mouth Yes Historical Provider, MD   vardenafil (LEVITRA) 20 MG tablet Take 1 tablet by mouth as needed for Erectile Dysfunction  Patient not taking: Reported on 2021  Abraham Pedroza MD       Past Medical History:   Diagnosis Date    Hypertension     Pulmonary fibrosis [facility])        Past Surgical History:   Procedure Laterality Date    CHOLECYSTECTOMY      TONSILLECTOMY         Family History   Problem Relation Age of Onset    High Blood Pressure Mother     Diabetes Mother     High Blood Pressure Father     Heart Disease Father     Diabetes Father        CareTeam (Including outside providers/suppliers regularly involved in providing care):   Patient Care Team:  Neftali López MD as PCP - General (Family Medicine)  Neftali López MD as PCP - [facility] Empaneled Provider    Wt Readings from Last 3 Encounters:   06/07/21 230 lb (104.3 kg)   11/05/20 237 lb (107.5 kg)   09/21/20 234 lb (106.1 kg)     Vitals:    06/07/21 0807   BP: 138/86   Pulse: 58   Temp: 97.3 °F (36.3 °C)   SpO2: 98%   Weight: 230 lb (104.3 kg)   Height: 5' 11\" (1.803 m)           The following problems were reviewed today and where indicated follow up appointments were made and/or referrals ordered.     Risk Factor Screenings with Interventions     Fall Risk:  2 or more falls in past year?: no  Fall with injury in past year?: no  Fall Risk Interventions:    · Not indicated     Depression:  PHQ-2 Score: 0  Depression Interventions:  · Not indicated     Anxiety:     Anxiety Interventions:  · Not indicated     Cognitive:     Cognitive Impairment Interventions:  · Not indicated     Substance Abuse:  Social History     Socioeconomic History    Marital status:      Spouse name: Not on file    Number of children: Not on file    Years of education: Not on file    Highest education level: Not on file   Occupational History    Not on file   Tobacco Use    Smoking status: Never Smoker    Smokeless tobacco: Never Used   Vaping Use    Vaping Use: Never used   Substance and Sexual Activity    Alcohol use: No    Drug use: No    Sexual activity: Not on file   Other Topics Concern    Not on file   Social History Narrative    Not on file     Social Determinants of Health     Financial Resource Strain:     Difficulty of Paying Living Expenses:    Food Insecurity:     Worried About Running Out of Food in the Last Year:    [address] in the Last Year:    Transportation Needs:     Lack of Transportation (Medical):  Lack of Transportation (Non-Medical):    Physical Activity:     Days of Exercise per Week:     Minutes of Exercise per Session:    Stress:     Feeling of Stress :    Social Connections:     Frequency of Communication with Friends and Family:     Frequency of Social Gatherings with Friends and Family:     Attends Protestant Services:     Active Member of Clubs or Organizations:     Attends Club or Organization Meetings:     Marital Status:    Intimate Partner Violence:     Fear of Current or Ex-Partner:     Emotionally Abused:     Physically Abused:     Sexually Abused: Audit Questionnaire: Screen for Alcohol Misuse  How often do you have a drink containing alcohol?: Never  Substance Abuse Interventions:  · Not indicated     Health Risk Assessment:     General  In general, how would you say your health is?: Good  In the past 7 days, have you experienced any of the following? New or Increased Pain, New or Increased Fatigue, Loneliness, Social Isolation, Stress or Anger?: None of These  Do you get the social and emotional support that you need?: Yes  Do you have a Living Will?: (!) No  General Health Risk Interventions:  · Given a handout on how to complete a living will. Directed to the website Visit:  https://[place]/consumer-protection/[place]wills for assistance as well. ·     Health Habits/Nutrition  Do you exercise for at least 20 minutes 2-3 times per week?: (!) No  Have you lost any weight without trying in the past 3 months?: No  Do you eat only one meal per day?: No  Have you seen the dentist within the past year?: Yes  Body mass index is 32.08 kg/m². Health Habits/Nutrition Interventions:  Recommended at least 150 minutes of exercise per week and resistance training 2 days a week. Discussed healthy diet habits.  Offered suggestions for calorie counting. ·   ·     Hearing/Vision  Do you or your family notice any trouble with your hearing that hasn't been managed with hearing aids?: (!) Yes  Do you have difficulty driving, watching TV, or doing any of your daily activities because of your eyesight?: No  Have you had an eye exam within the past year?: Yes  Hearing/Vision Interventions:  · Offered referral to audiology. He will consider and call me back. ·     Safety  Do you have working smoke detectors?: Yes  Have all throw rugs been removed or fastened?: Yes  Do you have non-slip mats or surfaces in all bathtubs/showers?: (!) No  Do all of your stairways have a railing or banister?: Yes  Are your doorways, halls and stairs free of clutter?: Yes  Do you always fasten your seatbelt when you are in a car?: Yes  Safety Interventions:  · Provided home safety tips handout  ·     ADLs  In the past 7 days, did you need help from others to perform any of the following everyday activities? Eating, dressing, grooming, bathing, toileting, or walking/balance?: None  In the past 7 days, did you need help from others to take care of any of the following?  Laundry, housekeeping, banking/finances, shopping, telephone use, food preparation, transportation, or taking medications?: None  ADL Interventions:  · Not indicated     Personalized Preventive Plan   Current Health Maintenance Status  Immunization History   Administered Date(s) Administered    COVID-19, Moderna, PF, 100mcg/0.5mL 01/20/2021, 02/19/2021    Influenza Virus Vaccine 10/12/2010, 10/10/2011, 10/15/2012, 10/23/2013, 11/24/2014, 11/11/2015, 11/14/2016    Influenza, High Dose (Fluzone 65 yrs and older) 11/28/2017, 10/18/2018    Influenza, Quadv, adjuvanted, 65 yrs +, IM, PF (Fluad) 09/21/2020    Influenza, Triv, inactivated, subunit, adjuvanted, IM (Fluad 65 yrs and older) 10/18/2018, 11/21/2019    Pneumococcal Conjugate 13-valent ([id number]) 11/14/2016    Pneumococcal Polysaccharide ([id number]) 11/24/2014    Tdap (Boostrix, Adacel) 11/28/2017    Zoster Recombinant (Shingrix) 07/11/2019, 10/03/2019        Health Maintenance   Topic Date Due    Hepatitis C screen  Never done    Annual Wellness Visit (AWV)  06/13/2021    Potassium monitoring  06/01/2022    Creatinine monitoring  06/01/2022    Colon cancer screen colonoscopy  03/16/2023    Lipid screen  06/01/2026    DTaP/Tdap/Td vaccine (2 - Td or Tdap) 11/28/2027    Flu vaccine  Completed    Shingles Vaccine  Completed    Pneumococcal 65+ years Vaccine  Completed    COVID-19 Vaccine  Completed    Hepatitis A vaccine  Aged Out    Hepatitis B vaccine  Aged Out    Hib vaccine  Aged Out    Meningococcal (ACWY) vaccine  Aged Out       Recommendations for [employer] Due: see orders.   Recommended screening schedule for the next 5-10 years is provided to the patient in written form: see Patient Instructions/AVS.

## 2021-06-07 NOTE — PATIENT INSTRUCTIONS
honestly with your doctor. This is the best way to understand the decisions you will need to make as your health changes. Know that you can always change your mind. · Ask your doctor about commonly used life-support measures. These include tube feedings, breathing machines, and fluids given through a vein (IV). Understanding these treatments will help you decide whether you want them. · You may choose to have these life-supporting treatments for a limited time. This allows a trial period to see whether they will help you. You may also decide that you want your doctor to take only certain measures to keep you alive. It is important to spell out these conditions so that your doctor and family understand them. · Talk to your doctor about how long you are likely to live. He or she may be able to give you an idea of what usually happens with your specific illness. · Think about preparing papers that state your wishes. This way there will not be any confusion about what you want. You can change your instructions at any time. Which papers should you prepare? Advance directives are legal papers that tell doctors how you want to be cared for at the end of your life. You do not need a  to write these papers. Ask your doctor or your state health department for information on how to write your advance directives. They may have the forms for each of these types of papers. Make sure your doctor has a copy of these on file, and give a copy to a family member or close friend. · Consider a do-not-resuscitate order (DNR). This order asks that no extra treatments be done if your heart stops or you stop breathing. Extra treatments may include cardiopulmonary resuscitation (CPR), electrical shock to restart your heart, or a machine to breathe for you. If you decide to have a DNR order, ask your doctor to explain and write it. Place the order in your home where everyone can easily see it. · Consider a living will.  A living will explains your wishes about life support and other treatments at the end of your life if you become unable to speak for yourself. Living kidd tell doctors to use or not use treatments that would keep you alive. You must have one or two witnesses or a notary present when you sign this form. · Consider a durable power of  for health care. This allows you to name a person to make decisions about your care if you are not able to. Most people ask a close friend or family member. Talk to this person about the kinds of treatments you want and those that you do not want. Make sure this person understands your wishes. These legal papers are simple to change. Tell your doctor what you want to change, and ask him or her to make a note in your medical file. Give your family updated copies of the papers. Where can you learn more? Go to https://chpepiceweb.Sheology. org and sign in to your vLine account. Enter P184 in the A-Vu Media box to learn more about \"Advance Care Planning: Care Instructions. \"     If you do not have an account, please click on the \"Sign Up Now\" link. Current as of: September 24, 2016  Content Version: 11.5  © 6502-9825 Healthwise, [employer]. Care instructions adapted under license by [facility] ([facility]). If you have questions about a medical condition or this instruction, always ask your healthcare professional. [place] [address] any warranty or liability for your use of this information. Learning About Living Shellvinh Augustandre  What is a living will? A living will is a legal form you use to write down the kind of care you want at the end of your life. It is used by the health professionals who will treat you if you aren't able to decide for yourself. If you put your wishes in writing, your loved ones and others will know what kind of care you want. They won't need to guess. This can ease your mind and be helpful to others.   A living will is not the same as an estate or property will. An estate will explains what you want to happen with your money and property after you die. Is a living will a legal document? A living will is a legal document. Each state has its own laws about living kidd. If you move to another state, make sure that your living will is legal in the state where you now live. Or you might use a universal form that has been approved by many states. This kind of form can sometimes be completed and stored online. Your electronic copy will then be available wherever you have a connection to the Internet. In most cases, doctors will respect your wishes even if you have a form from a different state. · You don't need an  to complete a living will. But legal advice can be helpful if your state's laws are unclear, your health history is complicated, or your family can't agree on what should be in your living will. · You can change your living will at any time. Some people find that their wishes about end-of-life care change as their health changes. · In addition to making a living will, think about completing a medical power of  form. This form lets you name the person you want to make end-of-life treatment decisions for you (your \"health care agent\") if you're not able to. Many hospitals and nursing homes will give you the forms you need to complete a living will and a medical power of . · Your living will is used only if you can't make or communicate decisions for yourself anymore. If you become able to make decisions again, you can accept or refuse any treatment, no matter what you wrote in your living will. · Your state may offer an online registry. This is a place where you can store your living will online so the doctors and nurses who need to treat you can find it right away. What should you think about when creating a living will? Talk about your end-of-life wishes with your family members and your doctor.  Let them know what you want. That way the people making decisions for you won't be surprised by your choices. Think about these questions as you make your living will:  · Do you know enough about life support methods that might be used? If not, talk to your doctor so you know what might be done if you can't breathe on your own, your heart stops, or you're unable to swallow. · What things would you still want to be able to do after you receive life-support methods? Would you want to be able to walk? To speak? To eat on your own? To live without the help of machines? · If you have a choice, where do you want to be cared for? In your home? At a hospital or nursing home? · Do you want certain Mormon practices performed if you become very ill? · If you have a choice at the end of your life, where would you prefer to die? At home? In a hospital or nursing home? Somewhere else? · Would you prefer to be buried or cremated? · Do you want your organs to be donated after you die? What should you do with your living will? · Make sure that your family members and your health care agent have copies of your living will. · Give your doctor a copy of your living will to keep in your medical record. If you have more than one doctor, make sure that each one has a copy. · You may want to put a copy of your living will where it can be easily found. Where can you learn more? Go to https://TimeTrade SystemspeezekielewBlockAvenue.Savaree. org and sign in to your SourceThought account. Enter S431 in the Adictiz box to learn more about \"Learning About Living [place]. \"     If you do not have an account, please click on the \"Sign Up Now\" link. Current as of: September 24, 2016  Content Version: 11.5  © 2762-0892 Healthwise, Incorporated. Care instructions adapted under license by [facility] ([facility]).  If you have questions about a medical condition or this instruction, always ask your healthcare professional. Carmelina Cleveland any your medical care. And then some medical professionals who may not know you as well might have to make decisions for you. In some cases, a  makes the decisions. When you name a health care agent, it is very clear who has the power to make health decisions for you. How do you name a health care agent? You name your health care agent on a legal form. It is usually called a durable power of  for health care. Ask your hospital, state bar association, or office on aging where to find these forms. You must sign the form to make it legal. Some states require you to get the form notarized. This means that a person called a  watches you sign the form and then he or she signs the form. Some states also require that two or more witnesses sign the form. Be sure to tell your family members and doctors who your health care agent is. Keep your forms in a safe place. But make sure that your loved ones know where the forms are. This could be in your desk where you keep other important papers. Make sure your doctor has a copy of your forms. Where can you learn more? Go to https://chpepiceweb.Electro Power [email or web address]. org and sign in to your Invested.in account. Enter [id number] in the Appies box to learn more about \"Learning About Durable Power of  for Health Care. \"     If you do not have an account, please click on the \"Sign Up Now\" link. Current as of: September 24, 2016  Content Version: 11.5  © 6324-3598 Healthwise, Incorporated. Care instructions adapted under license by [facility] ([facility]). If you have questions about a medical condition or this instruction, always ask your healthcare professional. [place] [address] any warranty or liability for your use of this information. _______________________________________________________________    Home Safety Tips    Each year, thousands of older Americans fall at home. Many of them are seriously injured, and some are disabled.  In , more than 8,500 people over age 72  because of falls. Falls are often due to hazards that are easy to overlook but easy to fix. This checklist will help you find and fix those hazards in your home. The checklist asks about hazards found in each room of your home. For each hazard, the checklist tells you how to fix the problem. At the end of the checklist, you will find other tips for preventing falls. o Look at the floor in each room  o When he walks through room do you have to walk around furniture? - Ask someone to move the furniture to clear your past  o You have throw rugs on the floor? - Remove the rugs or use double-sided tape or nonslip backing on the rugs so they dont slip  o Are papers, magazines, books, shoes, boxes, blankets, towels, or other objects on the floor?  -  things that are on the floor. Always keep objects off the floor  o Do you have to walk over or around cords or wires (like cords from lamps, extension cords, or telephone cords)? - Coil or tape cords and wires next to the wall so you cant trip over them. Have an  put in another outlet. o Are papers, shoes, books, or other objects on the stairs? -  things on the stairs. Always keep objects off the stairs. o Are some steps broken or uneven?   - Fix loose or uneven steps. o Are you missing a light over the stairway?   - Have a  or an  put in an overhead light at the top and bottom of the stairs. o Has the stairway light bulb burned out?   - Have a friend or family member change the light bulb.   o Do you have only one light switch for your stairs (only at the top or at the bottom of the stairs)? - Have a  or an  put in a light switch at the top and bottom of the stairs. You can get light switches that glow  o Are the handrails loose or broken? Is there a handrail on only one side of the stairs? - Fix loose handrails or put in new ones.  Make sure handrails are on both sides of the stairs and are as long as the stairs. o Is the carpet on the steps loose or torn? - Make sure the carpet is firmly attached to every step or remove the carpet and attach non-slip rubber treads on the stairs. o Look at your kitchen and eating Look at your kitchen and eating area. Are the things you use often on high shelves? - Move items in your cabinets. Keep things you use often on the lower shelves (about waist high). - Is your step stool unsteady? - Get a new, steady step stool with a bar to hold on to. Never use a chair as a step stool.   - Is the light near the bed hard to reach?   - Place a lamp close to the bed where it is easy to reach.  o Is the path from your bed to the bathroom dark?   - Put in a night-light so you can see where youre walking. Some nightlights go on by themselves after dark  o Is the tub or shower floor slippery?   - Put a non-slip rubber mat or selfstick strips on the floor of the tub or shower. o Do you have some support when you get in and out of the tub or up from the toilet?   - Have a  or a flor put in a grab bar inside the tub and next to the toilet.  o Exercise regularly. Exercise makes you stronger and improves your balance and coordination. o Have your doctor or pharmacist look at all the medicines you take, even over-the-counter medicines. Some medicines can make you sleepy or dizzy. o Have your vision checked at least once a year by an eye doctor. Poor vision can increase your risk of falling.   o Get up slowly after you sit or lie down.  o Wear sturdy shoes with thin, non-slip soles. Avoid slippers and running shoes with thick soles. o Improve the lighting in your home. Use brighter light bulbs (at least 60 haines). Use lamp shades or frosted bulbs to reduce glare. o Use reflecting tape at the top and bottom of the stairs so you can see them better.    o Paint doorsills a different color to prevent tripping.  o Keep emergency numbers in large print near each phone.   o Put a phone near the floor in case you fall and cant get up.   o Think about wearing an alarm device that will bring help in case you fall and cant get up.    www.cdc.gov/safeusa

## 2021-08-02 ENCOUNTER — TRANSCRIBE ORDERS (OUTPATIENT)
Dept: LAB | Facility: HOSPITAL | Age: 71
End: 2021-08-02

## 2021-08-02 DIAGNOSIS — Z01.818 PREOPERATIVE TESTING: Primary | ICD-10-CM

## 2021-08-06 ENCOUNTER — LAB (OUTPATIENT)
Dept: LAB | Facility: HOSPITAL | Age: 71
End: 2021-08-06

## 2021-08-06 LAB — SARS-COV-2 ORF1AB RESP QL NAA+PROBE: NOT DETECTED

## 2021-08-06 PROCEDURE — C9803 HOPD COVID-19 SPEC COLLECT: HCPCS | Performed by: INTERNAL MEDICINE

## 2021-08-06 PROCEDURE — U0004 COV-19 TEST NON-CDC HGH THRU: HCPCS | Performed by: INTERNAL MEDICINE

## 2021-08-06 NOTE — PROGRESS NOTES
"Background:  Pt with pulmonary fibrosis and hx moderate restriction on pft 2017, fvc 74% pred, dlco 80% pred. subsequently 76 and 79% pred 2019   Chief Complaint  PULMONARY FIBROSIS    Subjective    History of Present Illness       Reid Morrell presents to [facility].  He says he is doing fine.  No decline in resp status over the past year.     Objective     Vital Signs:   /84   Pulse 83   Ht 179.1 cm (70.5\")   Wt 105 kg (231 lb 3.2 oz)   SpO2 98% Comment: RA  BMI 32.70 kg/m²   Physical Exam  Constitutional:       Appearance: Normal appearance. He is not ill-appearing or diaphoretic.   Eyes:      Extraocular Movements: Extraocular movements intact.   Pulmonary:      Effort: Pulmonary effort is normal. No respiratory distress.      Breath sounds: No wheezing, rhonchi or rales.   Skin:     Findings: No erythema or rash.   Neurological:      Mental Status: He is alert.        Result Review  Data Reviewed:{ Labs  Result Review  Imaging  Media :23}     PFT Values        Some values may be hidden. Unless noted otherwise, only the newest values recorded on each date are displayed.         Old Values PFT Results 2/3/20 8/9/21   No data to display.      Pre Drug PFT Results 2/3/20 8/9/21   FVC 75 73   FEV1 79 75   FEF 25-75% 91 86   FEV1/FVC 82.62 81.75      Post Drug PFT Results 2/3/20 8/9/21   No data to display.      Other Tests PFT Results 2/3/20 8/9/21   TLC 76    RV 71    DLCO 61 70   D/VAsb 83 94                      Assessment and Plan  {CC Problem List  Visit Diagnosis  ROS  Review (Popup)  Health Maintenance  Quality  BestPractice  Medications  SmartSets  SnapShot Encounters  Media :23}   Problem List Items Addressed This Visit        Pulmonary Problems    Pulmonary fibrosis (CMS/HCC) - Primary    Relevant Orders    Pulmonary Function Test (Completed)    XR Chest 2 View      he is stable.  Will repeat cxr and PFT next year.  Call in " meantime if symptoms worsen.    Follow Up {Instructions Charge Capture  Follow-up Communications :23}   Return in about 1 year (around 8/9/2022) for Spirometry and DLCO.  Patient was given instructions and counseling regarding his condition or for health maintenance advice. Please see specific information pulled into the AVS if appropriate.    Electronically signed by Nash Pisano MD, 8/9/2021, 13:20 CDT

## 2021-08-09 ENCOUNTER — OFFICE VISIT (OUTPATIENT)
Dept: PULMONOLOGY | Facility: CLINIC | Age: 71
End: 2021-08-09

## 2021-08-09 ENCOUNTER — PROCEDURE VISIT (OUTPATIENT)
Dept: PULMONOLOGY | Facility: CLINIC | Age: 71
End: 2021-08-09

## 2021-08-09 VITALS
OXYGEN SATURATION: 98 % | WEIGHT: 231.2 LBS | SYSTOLIC BLOOD PRESSURE: 162 MMHG | BODY MASS INDEX: 32.37 KG/M2 | HEIGHT: 71 IN | HEART RATE: 83 BPM | DIASTOLIC BLOOD PRESSURE: 84 MMHG

## 2021-08-09 DIAGNOSIS — J84.10 PULMONARY FIBROSIS (HCC): Primary | ICD-10-CM

## 2021-08-09 PROCEDURE — 94729 DIFFUSING CAPACITY: CPT | Performed by: INTERNAL MEDICINE

## 2021-08-09 PROCEDURE — 99213 OFFICE O/P EST LOW 20 MIN: CPT | Performed by: INTERNAL MEDICINE

## 2021-08-09 PROCEDURE — 94010 BREATHING CAPACITY TEST: CPT | Performed by: INTERNAL MEDICINE

## 2021-08-09 NOTE — PROCEDURES
Pulmonary Function Test  Performed by: Candi Shaw, RRT  Authorized by: Nash Pisano MD      Pre Drug % Predicted    FVC: 73%   FEV1: 75%   FEF 25-75%: 86%   FEV1/FVC: 81.75%   DLCO: 70%   D/VAsb: 94%

## 2021-09-20 RX ORDER — AMLODIPINE BESYLATE 10 MG/1
TABLET ORAL
Qty: 90 TABLET | Refills: 3 | Status: SHIPPED | OUTPATIENT
Start: 2021-09-20 | End: 2022-06-17 | Stop reason: SDUPTHER

## 2021-09-22 DIAGNOSIS — N52.9 ERECTILE DYSFUNCTION, UNSPECIFIED ERECTILE DYSFUNCTION TYPE: ICD-10-CM

## 2021-09-22 RX ORDER — SILDENAFIL 100 MG/1
TABLET, FILM COATED ORAL
Qty: 10 TABLET | Refills: 11 | Status: SHIPPED | OUTPATIENT
Start: 2021-09-22 | End: 2022-06-17 | Stop reason: SDUPTHER

## 2021-09-22 NOTE — TELEPHONE ENCOUNTER
Dinora Avery called to request a refill on his medication.       Last office visit : 6/7/2021   Next office visit : 12/7/2021     Requested Prescriptions     Signed Prescriptions Disp Refills    sildenafil (VIAGRA) 100 MG tablet 10 tablet 11     Sig: TAKE BY MOUTH 1/2 TO 1 TABLET EVERY DAY AS NEEDED     Authorizing Provider: Katie Siegel     Ordering User: Harpreet Prasad

## 2021-12-01 DIAGNOSIS — I10 ESSENTIAL (PRIMARY) HYPERTENSION: ICD-10-CM

## 2021-12-01 DIAGNOSIS — Z11.59 NEED FOR HEPATITIS C SCREENING TEST: ICD-10-CM

## 2021-12-01 LAB
ALBUMIN SERPL-MCNC: 4.3 G/DL (ref 3.5–5.2)
ALP BLD-CCNC: 91 U/L (ref 40–130)
ALT SERPL-CCNC: 33 U/L (ref 5–41)
ANION GAP SERPL CALCULATED.3IONS-SCNC: 10 MMOL/L (ref 7–19)
AST SERPL-CCNC: 29 U/L (ref 5–40)
BILIRUB SERPL-MCNC: 0.7 MG/DL (ref 0.2–1.2)
BUN BLDV-MCNC: 16 MG/DL (ref 8–23)
CALCIUM SERPL-MCNC: 9.5 MG/DL (ref 8.8–10.2)
CHLORIDE BLD-SCNC: 101 MMOL/L (ref 98–111)
CO2: 31 MMOL/L (ref 22–29)
CREAT SERPL-MCNC: 1.1 MG/DL (ref 0.5–1.2)
GFR AFRICAN AMERICAN: >59
GFR NON-AFRICAN AMERICAN: >60
GLUCOSE BLD-MCNC: 96 MG/DL (ref 74–109)
HEPATITIS C ANTIBODY INTERPRETATION: NORMAL
POTASSIUM SERPL-SCNC: 3.8 MMOL/L (ref 3.5–5)
SODIUM BLD-SCNC: 142 MMOL/L (ref 136–145)
TOTAL PROTEIN: 8.3 G/DL (ref 6.6–8.7)

## 2021-12-07 ENCOUNTER — OFFICE VISIT (OUTPATIENT)
Dept: FAMILY MEDICINE CLINIC | Age: 71
End: 2021-12-07
Payer: MEDICARE

## 2021-12-07 VITALS
TEMPERATURE: 98 F | SYSTOLIC BLOOD PRESSURE: 106 MMHG | HEART RATE: 59 BPM | OXYGEN SATURATION: 97 % | BODY MASS INDEX: 32.64 KG/M2 | WEIGHT: 234 LBS | DIASTOLIC BLOOD PRESSURE: 86 MMHG

## 2021-12-07 DIAGNOSIS — G89.29 CHRONIC PAIN OF BOTH KNEES: ICD-10-CM

## 2021-12-07 DIAGNOSIS — R19.7 POSTCHOLECYSTECTOMY DIARRHEA: ICD-10-CM

## 2021-12-07 DIAGNOSIS — K91.89 POSTCHOLECYSTECTOMY DIARRHEA: ICD-10-CM

## 2021-12-07 DIAGNOSIS — R53.83 OTHER FATIGUE: ICD-10-CM

## 2021-12-07 DIAGNOSIS — Z13.220 LIPID SCREENING: ICD-10-CM

## 2021-12-07 DIAGNOSIS — Z12.5 ENCOUNTER FOR PROSTATE CANCER SCREENING: ICD-10-CM

## 2021-12-07 DIAGNOSIS — I10 ESSENTIAL (PRIMARY) HYPERTENSION: Primary | ICD-10-CM

## 2021-12-07 DIAGNOSIS — M25.562 CHRONIC PAIN OF BOTH KNEES: ICD-10-CM

## 2021-12-07 DIAGNOSIS — M25.561 CHRONIC PAIN OF BOTH KNEES: ICD-10-CM

## 2021-12-07 PROCEDURE — 99213 OFFICE O/P EST LOW 20 MIN: CPT | Performed by: FAMILY MEDICINE

## 2021-12-07 RX ORDER — MONTELUKAST SODIUM 4 MG/1
1 TABLET, CHEWABLE ORAL 2 TIMES DAILY
Qty: 180 TABLET | Refills: 3 | Status: SHIPPED | OUTPATIENT
Start: 2021-12-07 | End: 2022-06-17 | Stop reason: SDUPTHER

## 2021-12-07 RX ORDER — MELOXICAM 15 MG/1
TABLET ORAL
Qty: 90 TABLET | Refills: 3 | Status: SHIPPED | OUTPATIENT
Start: 2021-12-07 | End: 2022-06-17 | Stop reason: SDUPTHER

## 2021-12-07 NOTE — PROGRESS NOTES
[facility] PHYSICIAN SERVICES  [facility] FAMILY MEDICINE  [address] [address] [address]  Dept: [phone]  Dept Fax: [phone]  Loc: [phone]    Jonathan Venegas is a 66 y.o. male who presents today for his medical conditions/complaints as noted below. Jonathan Venegas is here for Follow-up        HPI:   CC: Here today to discuss the followin-month follow-up. Hypertension  Compliant with medications. No adverse effects from medication. No lightheadedness, palpitations, or chest pain. 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 Chronic knee pain. Satisfied with meloxicam.   He takes the meloxicam as needed.        HPI    Past Medical History:   Diagnosis Date    Hypertension     Pulmonary fibrosis ([address]       Past Surgical History:   Procedure Laterality Date    CHOLECYSTECTOMY      TONSILLECTOMY         Family History   Problem Relation Age of Onset    High Blood Pressure Mother     Diabetes Mother     High Blood Pressure Father     Heart Disease Father     Diabetes Father        Social History     Tobacco Use    Smoking status: Never Smoker    Smokeless tobacco: Never Used   Substance Use Topics    Alcohol use: No     Current Outpatient Medications   Medication Sig Dispense Refill    meloxicam (MOBIC) 15 MG tablet TAKE 1 TABLET BY MOUTH ONCE A DAY 90 tablet 3    colestipol (COLESTID) 1 g tablet Take 1 tablet by mouth 2 times daily 180 tablet 3    sildenafil (VIAGRA) 100 MG tablet TAKE BY MOUTH 1/2 TO 1 TABLET EVERY DAY AS NEEDED 10 tablet 11    amLODIPine (NORVASC) 10 MG tablet TAKE 1 TABLET BY MOUTH 1 TIME DAILY 90 tablet 3    Omega-3 Fatty Acids (FISH OIL) 1000 MG CAPS Take by mouth daily (with breakfast)      losartan (COZAAR) 100 MG tablet Take 1 tablet by mouth daily 90 tablet 3    hydroCHLOROthiazide (HYDRODIURIL) 25 MG tablet TAKE 1 TABLET BY MOUTH 1 TIME DAILY 90 tablet 3    aspirin 81 MG chewable tablet Take 81 mg by mouth  B Complex-C (SUPER B COMPLEX PO) Take by mouth      Misc Natural Products (NF FORMULAS TESTOSTERONE) CAPS Take by mouth      vardenafil (LEVITRA) 20 MG tablet Take 1 tablet by mouth as needed for Erectile Dysfunction (Patient not taking: Reported on 6/7/2021) 10 tablet 5     No current facility-administered medications for this visit. No Known Allergies    Health Maintenance   Topic Date Due    COVID-19 Vaccine (3 - Booster for Moderna series) 08/19/2021    Flu vaccine (1) 12/07/2022 (Originally 9/1/2021)    Annual Wellness Visit (AWV)  06/08/2022    Potassium monitoring  12/01/2022    Creatinine monitoring  12/01/2022    Colon cancer screen colonoscopy  03/16/2023    Lipid screen  06/01/2026    DTaP/Tdap/Td vaccine (2 - Td or Tdap) 11/28/2027    Shingles Vaccine  Completed    Pneumococcal 65+ years Vaccine  Completed    Hepatitis C screen  Completed    Hepatitis A vaccine  Aged Out    Hepatitis B vaccine  Aged Out    Hib vaccine  Aged Out    Meningococcal (ACWY) vaccine  Aged Out       Subjective:      Review of Systems  Review of Systems   Constitutional: Negative for chills and fever. HENT: Negative for congestion. Respiratory: Negative for cough, chest tightness and shortness of breath. Cardiovascular: Negative for chest pain, palpitations and leg swelling. Gastrointestinal: Negative for abdominal pain, anal bleeding, constipation, diarrhea and nausea. Genitourinary: Negative for difficulty urinating. Psychiatric/Behavioral: Negative. SeeHPI for visit specific review of symptoms. All others negative      Objective:   /86   Pulse 59   Temp 98 °F (36.7 °C)   Wt 234 lb (106.1 kg)   SpO2 97%   BMI 32.64 kg/m²   Physical Exam  Physical Exam   Constitutional: He appears well-developed. Does not appear ill. Neck: Normal range of motion. Neck supple. No masses. Neck Symmetric. Normal tracheal position.   No thyroid enlargement  Cardiovascular: Normal rate and regular rhythm. Exam reveals no friction rub. Carotid arteries: no bruit observed. No murmur heard. Respiratory:  Effort normal and breath sounds normal. No respiratory distress. No wheezes. No rales. No use of accessory muscles or intercostal retractions. Neurological: alert. Psychiatric: normal mood and affect. His behavior is normal. Normal judgement and insight observed. Recent Results (from the past 672 hour(s))   Comprehensive Metabolic Panel    Collection Time: 12/01/21 10:07 AM   Result Value Ref Range    Sodium 142 136 - 145 mmol/L    Potassium 3.8 3.5 - 5.0 mmol/L    Chloride 101 98 - 111 mmol/L    CO2 31 (H) 22 - 29 mmol/L    Anion Gap 10 7 - 19 mmol/L    Glucose 96 74 - 109 mg/dL    BUN 16 8 - 23 mg/dL    CREATININE 1.1 0.5 - 1.2 mg/dL    GFR Non-African American >60 >60    GFR African American >59 >59    Calcium 9.5 8.8 - 10.2 mg/dL    Total Protein 8.3 6.6 - 8.7 g/dL    Albumin 4.3 3.5 - 5.2 g/dL    Total Bilirubin 0.7 0.2 - 1.2 mg/dL    Alkaline Phosphatase 91 40 - 130 U/L    ALT 33 5 - 41 U/L    AST 29 5 - 40 U/L   Hepatitis C Antibody    Collection Time: 12/01/21 10:07 AM   Result Value Ref Range    Hep C Ab Interp Non-Reactive                Assessment & Plan: The following diagnoses and conditions are stable with no further orders unless indicated:  1. Chronic pain of both knees  Lab Results   Component Value Date    CREATININE 1.1 12/01/2021     Lab Results   Component Value Date    BUN 16 12/01/2021     Reinforced goals of adequate hydration. Recommended he avoid NSAIDs such as naproxen and ibuprofen. - meloxicam (MOBIC) 15 MG tablet; TAKE 1 TABLET BY MOUTH ONCE A DAY  Dispense: 90 tablet; Refill: 3    2. Postcholecystectomy diarrhea    - colestipol (COLESTID) 1 g tablet; Take 1 tablet by mouth 2 times daily  Dispense: 180 tablet; Refill: 3    3. Encounter for prostate cancer screening    - PSA screening; Future    4.  Other fatigue    - CBC Auto Differential; Future  - TSH without Reflex; Future  - T4, Free; Future    5. Lipid screening    - Lipid Panel; Future    6. Essential (primary) hypertension  BP Readings from Last 3 Encounters:   12/07/21 106/86   06/07/21 138/86   11/05/20 130/76     Blood pressure stable  - Comprehensive Metabolic Panel; Future            Return in about 6 months (around 6/7/2022) for AWV - 30 minute visit. Discussed use, benefit, and side effects of prescribed medications. All patient questions answered. Pt voiced understanding. Reviewed health maintenance. Instructedto continue current medications, diet and exercise. Patient agreed with treatmentplan. Follow up as directed. Note dictated using Dragon Dictation software  Sometimes this dictation software makes erroneous transcriptions.

## 2022-04-11 RX ORDER — HYDROCHLOROTHIAZIDE 25 MG/1
TABLET ORAL
Qty: 90 TABLET | Refills: 3 | Status: SHIPPED | OUTPATIENT
Start: 2022-04-11 | End: 2022-06-17 | Stop reason: SDUPTHER

## 2022-04-11 RX ORDER — HYDROCHLOROTHIAZIDE 25 MG/1
TABLET ORAL
Qty: 90 TABLET | Refills: 3 | Status: SHIPPED | OUTPATIENT
Start: 2022-04-11 | End: 2022-04-11 | Stop reason: SDUPTHER

## 2022-04-11 NOTE — TELEPHONE ENCOUNTER
Glorine Duane called requesting a refill of the below medication which has been pended for you:     Requested Prescriptions     Pending Prescriptions Disp Refills    hydroCHLOROthiazide (HYDRODIURIL) 25 MG tablet 90 tablet 3     Sig: TAKE 1 TABLET BY MOUTH 1 TIME DAILY       Last Appointment Date: 12/7/2021  Next Appointment Date: 4/11/2022    No Known Allergies

## 2022-05-25 DIAGNOSIS — I10 ESSENTIAL (PRIMARY) HYPERTENSION: ICD-10-CM

## 2022-05-25 RX ORDER — LOSARTAN POTASSIUM 100 MG/1
TABLET ORAL
Qty: 90 TABLET | Refills: 3 | Status: SHIPPED | OUTPATIENT
Start: 2022-05-25 | End: 2022-06-17 | Stop reason: SDUPTHER

## 2022-06-07 DIAGNOSIS — I10 ESSENTIAL (PRIMARY) HYPERTENSION: ICD-10-CM

## 2022-06-07 DIAGNOSIS — R53.83 OTHER FATIGUE: ICD-10-CM

## 2022-06-07 DIAGNOSIS — Z13.220 LIPID SCREENING: ICD-10-CM

## 2022-06-07 DIAGNOSIS — Z12.5 ENCOUNTER FOR PROSTATE CANCER SCREENING: ICD-10-CM

## 2022-06-07 LAB
ALBUMIN SERPL-MCNC: 4.2 G/DL (ref 3.5–5.2)
ALP BLD-CCNC: 73 U/L (ref 40–130)
ALT SERPL-CCNC: 35 U/L (ref 5–41)
ANION GAP SERPL CALCULATED.3IONS-SCNC: 10 MMOL/L (ref 7–19)
AST SERPL-CCNC: 34 U/L (ref 5–40)
BASOPHILS ABSOLUTE: 0 K/UL (ref 0–0.2)
BASOPHILS RELATIVE PERCENT: 0.3 % (ref 0–1)
BILIRUB SERPL-MCNC: 0.9 MG/DL (ref 0.2–1.2)
BUN BLDV-MCNC: 20 MG/DL (ref 8–23)
CALCIUM SERPL-MCNC: 9.5 MG/DL (ref 8.8–10.2)
CHLORIDE BLD-SCNC: 97 MMOL/L (ref 98–111)
CHOLESTEROL, TOTAL: 135 MG/DL (ref 160–199)
CO2: 29 MMOL/L (ref 22–29)
CREAT SERPL-MCNC: 1.1 MG/DL (ref 0.5–1.2)
EOSINOPHILS ABSOLUTE: 0.1 K/UL (ref 0–0.6)
EOSINOPHILS RELATIVE PERCENT: 1.6 % (ref 0–5)
GFR AFRICAN AMERICAN: >59
GFR NON-AFRICAN AMERICAN: >60
GLUCOSE BLD-MCNC: 91 MG/DL (ref 74–109)
HCT VFR BLD CALC: 45.2 % (ref 42–52)
HDLC SERPL-MCNC: 38 MG/DL (ref 55–121)
HEMOGLOBIN: 15.2 G/DL (ref 14–18)
IMMATURE GRANULOCYTES #: 0 K/UL
LDL CHOLESTEROL CALCULATED: 83 MG/DL
LYMPHOCYTES ABSOLUTE: 1.7 K/UL (ref 1.1–4.5)
LYMPHOCYTES RELATIVE PERCENT: 30.3 % (ref 20–40)
MCH RBC QN AUTO: 31 PG (ref 27–31)
MCHC RBC AUTO-ENTMCNC: 33.6 G/DL (ref 33–37)
MCV RBC AUTO: 92.1 FL (ref 80–94)
MONOCYTES ABSOLUTE: 0.6 K/UL (ref 0–0.9)
MONOCYTES RELATIVE PERCENT: 9.9 % (ref 0–10)
NEUTROPHILS ABSOLUTE: 3.3 K/UL (ref 1.5–7.5)
NEUTROPHILS RELATIVE PERCENT: 57.7 % (ref 50–65)
PDW BLD-RTO: 13.7 % (ref 11.5–14.5)
PLATELET # BLD: 256 K/UL (ref 130–400)
PMV BLD AUTO: 9.7 FL (ref 9.4–12.4)
POTASSIUM SERPL-SCNC: 4.1 MMOL/L (ref 3.5–5)
PROSTATE SPECIFIC ANTIGEN: 0.88 NG/ML (ref 0–4)
RBC # BLD: 4.91 M/UL (ref 4.7–6.1)
SODIUM BLD-SCNC: 136 MMOL/L (ref 136–145)
T4 FREE: 1.27 NG/DL (ref 0.93–1.7)
TOTAL PROTEIN: 7.6 G/DL (ref 6.6–8.7)
TRIGL SERPL-MCNC: 70 MG/DL (ref 0–149)
TSH SERPL DL<=0.05 MIU/L-ACNC: 2.43 UIU/ML (ref 0.27–4.2)
WBC # BLD: 5.8 K/UL (ref 4.8–10.8)

## 2022-06-07 SDOH — HEALTH STABILITY: PHYSICAL HEALTH: ON AVERAGE, HOW MANY DAYS PER WEEK DO YOU ENGAGE IN MODERATE TO STRENUOUS EXERCISE (LIKE A BRISK WALK)?: 6 DAYS

## 2022-06-07 SDOH — HEALTH STABILITY: PHYSICAL HEALTH: ON AVERAGE, HOW MANY MINUTES DO YOU ENGAGE IN EXERCISE AT THIS LEVEL?: 60 MIN

## 2022-06-07 ASSESSMENT — PATIENT HEALTH QUESTIONNAIRE - PHQ9
SUM OF ALL RESPONSES TO PHQ QUESTIONS 1-9: 0
2. FEELING DOWN, DEPRESSED OR HOPELESS: 0
SUM OF ALL RESPONSES TO PHQ QUESTIONS 1-9: 0
SUM OF ALL RESPONSES TO PHQ QUESTIONS 1-9: 0
1. LITTLE INTEREST OR PLEASURE IN DOING THINGS: 0
SUM OF ALL RESPONSES TO PHQ QUESTIONS 1-9: 0
SUM OF ALL RESPONSES TO PHQ9 QUESTIONS 1 & 2: 0

## 2022-06-07 ASSESSMENT — LIFESTYLE VARIABLES
HOW MANY STANDARD DRINKS CONTAINING ALCOHOL DO YOU HAVE ON A TYPICAL DAY: 98
HOW OFTEN DO YOU HAVE A DRINK CONTAINING ALCOHOL: 1
HOW OFTEN DO YOU HAVE SIX OR MORE DRINKS ON ONE OCCASION: 1
HOW MANY STANDARD DRINKS CONTAINING ALCOHOL DO YOU HAVE ON A TYPICAL DAY: PATIENT DECLINED
HOW OFTEN DO YOU HAVE A DRINK CONTAINING ALCOHOL: NEVER

## 2022-06-17 ENCOUNTER — OFFICE VISIT (OUTPATIENT)
Dept: FAMILY MEDICINE CLINIC | Age: 72
End: 2022-06-17
Payer: MEDICARE

## 2022-06-17 VITALS
HEART RATE: 67 BPM | BODY MASS INDEX: 30.8 KG/M2 | HEIGHT: 71 IN | SYSTOLIC BLOOD PRESSURE: 138 MMHG | TEMPERATURE: 98.1 F | OXYGEN SATURATION: 98 % | DIASTOLIC BLOOD PRESSURE: 86 MMHG | WEIGHT: 220 LBS

## 2022-06-17 DIAGNOSIS — M25.561 CHRONIC PAIN OF BOTH KNEES: ICD-10-CM

## 2022-06-17 DIAGNOSIS — Z00.00 ANNUAL PHYSICAL EXAM: Primary | ICD-10-CM

## 2022-06-17 DIAGNOSIS — K91.89 POSTCHOLECYSTECTOMY DIARRHEA: ICD-10-CM

## 2022-06-17 DIAGNOSIS — I10 ESSENTIAL (PRIMARY) HYPERTENSION: ICD-10-CM

## 2022-06-17 DIAGNOSIS — Z12.5 ENCOUNTER FOR PROSTATE CANCER SCREENING: ICD-10-CM

## 2022-06-17 DIAGNOSIS — Z13.220 LIPID SCREENING: ICD-10-CM

## 2022-06-17 DIAGNOSIS — G89.29 CHRONIC PAIN OF BOTH KNEES: ICD-10-CM

## 2022-06-17 DIAGNOSIS — R19.7 POSTCHOLECYSTECTOMY DIARRHEA: ICD-10-CM

## 2022-06-17 DIAGNOSIS — N52.9 ERECTILE DYSFUNCTION, UNSPECIFIED ERECTILE DYSFUNCTION TYPE: ICD-10-CM

## 2022-06-17 DIAGNOSIS — M25.562 CHRONIC PAIN OF BOTH KNEES: ICD-10-CM

## 2022-06-17 PROCEDURE — 1123F ACP DISCUSS/DSCN MKR DOCD: CPT | Performed by: FAMILY MEDICINE

## 2022-06-17 PROCEDURE — 99214 OFFICE O/P EST MOD 30 MIN: CPT | Performed by: FAMILY MEDICINE

## 2022-06-17 PROCEDURE — G0439 PPPS, SUBSEQ VISIT: HCPCS | Performed by: FAMILY MEDICINE

## 2022-06-17 RX ORDER — MELOXICAM 15 MG/1
TABLET ORAL
Qty: 90 TABLET | Refills: 3 | Status: SHIPPED | OUTPATIENT
Start: 2022-06-17

## 2022-06-17 RX ORDER — SILDENAFIL 100 MG/1
TABLET, FILM COATED ORAL
Qty: 10 TABLET | Refills: 11 | Status: SHIPPED | OUTPATIENT
Start: 2022-06-17

## 2022-06-17 RX ORDER — AMLODIPINE BESYLATE 10 MG/1
TABLET ORAL
Qty: 90 TABLET | Refills: 3 | Status: SHIPPED | OUTPATIENT
Start: 2022-06-17 | End: 2022-06-20

## 2022-06-17 RX ORDER — MONTELUKAST SODIUM 4 MG/1
1 TABLET, CHEWABLE ORAL 2 TIMES DAILY
Qty: 60 TABLET | Refills: 3 | Status: SHIPPED | OUTPATIENT
Start: 2022-06-17

## 2022-06-17 RX ORDER — LOSARTAN POTASSIUM 100 MG/1
TABLET ORAL
Qty: 90 TABLET | Refills: 3 | Status: SHIPPED | OUTPATIENT
Start: 2022-06-17

## 2022-06-17 RX ORDER — HYDROCHLOROTHIAZIDE 25 MG/1
TABLET ORAL
Qty: 90 TABLET | Refills: 3 | Status: SHIPPED | OUTPATIENT
Start: 2022-06-17

## 2022-06-17 NOTE — PROGRESS NOTES
[facility] PHYSICIAN SERVICES  [facility]  [address] [facility] [address] [address]  Dept: [phone]  Dept Fax: [phone]  Loc: [phone]    Krysten Mosqueda is a 66 y.o. male who presents today for his medical conditions/complaints as noted below. Krysten Mosqueda is here for Medicare AWV        HPI:   CC: Here today to discuss the followin-year-old here for follow-up. Hypertension  Compliant with medications. No adverse effects from medication. No lightheadedness, palpitations, or chest pain. Continues to take Colestid for postcholecystectomy diarrhea. Satisfied with results        HPI    Subjective:      Review of Systems    Review of Systems   Constitutional: Negative for chills and fever. HENT: Negative for congestion. Respiratory: Negative for cough, chest tightness and shortness of breath. Cardiovascular: Negative for chest pain, palpitations and leg swelling. Gastrointestinal: Negative for abdominal pain, anal bleeding, constipation, diarrhea and nausea. Genitourinary: Negative for difficulty urinating. Psychiatric/Behavioral: Negative. SeeHPI for visit specific review of symptoms. All others negative      Objective:   /86   Pulse 67   Temp 98.1 °F (36.7 °C)   Ht 5' 11\" (1.803 m)   Wt 220 lb (99.8 kg)   SpO2 98%   BMI 30.68 kg/m²   Physical Exam Physical Exam   Constitutional: He appears well-developed. Does not appear ill. Neck: Neck supple. No masses. Neck Symmetric. Normal tracheal position. No thyroid enlargement  Cardiovascular: Normal rate and regular rhythm. Exam reveals no friction rub. No murmur heard. Respiratory:  Effort normal and breath sounds normal. No respiratory distress. No wheezes. No rales. No use of accessory muscles or intercostal retractions. Neurological: alert. Psychiatric: normal mood and affect.  His behavior is normal.     Lab Results   Component Value Date    PSA 0.88 2022    PSA 1.09 12/16/2020    PSA 1.10 12/04/2019     Lab Results   Component Value Date    CHOL 135 (L) 06/07/2022    CHOL 146 (L) 06/01/2021    CHOL 160 12/16/2020     Lab Results   Component Value Date    TRIG 70 06/07/2022    TRIG 70 06/01/2021    TRIG 89 12/16/2020     Lab Results   Component Value Date    HDL 38 (L) 06/07/2022    HDL 40 (L) 06/01/2021    HDL 37 (L) 12/16/2020     Lab Results   Component Value Date    LDLCALC 83 06/07/2022    [address] 06/01/2021    LDLCALC 105 12/16/2020     No results found for: LABVLDL, VLDL  No results found for: CHOLHDLRATIO        Recent Results (from the past 672 hour(s))   PSA screening    Collection Time: 06/07/22  9:51 AM   Result Value Ref Range    PSA 0.88 0.00 - 4.00 ng/mL   T4, Free    Collection Time: 06/07/22  9:51 AM   Result Value Ref Range    T4 Free 1.27 0.93 - 1.70 ng/dL   TSH without Reflex    Collection Time: 06/07/22  9:51 AM   Result Value Ref Range    TSH 2.430 0.270 - 4.200 uIU/mL   CBC Auto Differential    Collection Time: 06/07/22  9:51 AM   Result Value Ref Range    WBC 5.8 4.8 - 10.8 K/uL    RBC 4.91 4.70 - 6.10 M/uL    Hemoglobin 15.2 14.0 - 18.0 g/dL    Hematocrit 45.2 42.0 - 52.0 %    MCV 92.1 80.0 - 94.0 fL    MCH 31.0 27.0 - 31.0 pg    MCHC 33.6 33.0 - 37.0 g/dL    RDW 13.7 11.5 - 14.5 %    Platelets 855 943 - 304 K/uL    MPV 9.7 9.4 - 12.4 fL    Neutrophils % 57.7 50.0 - 65.0 %    Lymphocytes % 30.3 20.0 - 40.0 %    Monocytes % 9.9 0.0 - 10.0 %    Eosinophils % 1.6 0.0 - 5.0 %    Basophils % 0.3 0.0 - 1.0 %    Neutrophils Absolute 3.3 1.5 - 7.5 K/uL    Immature Granulocytes # 0.0 K/uL    Lymphocytes Absolute 1.7 1.1 - 4.5 K/uL    Monocytes Absolute 0.60 0.00 - 0.90 K/uL    Eosinophils Absolute 0.10 0.00 - 0.60 K/uL    Basophils Absolute 0.00 0.00 - 0.20 K/uL   Lipid Panel    Collection Time: 06/07/22  9:51 AM   Result Value Ref Range    Cholesterol, Total 135 (L) 160 - 199 mg/dL    Triglycerides 70 0 - 149 mg/dL    HDL 38 (L) 55 - 121 mg/dL    LDL Calculated 83 <100 mg/dL   Comprehensive Metabolic Panel    Collection Time: 06/07/22  9:51 AM   Result Value Ref Range    Sodium 136 136 - 145 mmol/L    Potassium 4.1 3.5 - 5.0 mmol/L    Chloride 97 (L) 98 - 111 mmol/L    CO2 29 22 - 29 mmol/L    Anion Gap 10 7 - 19 mmol/L    Glucose 91 74 - 109 mg/dL    BUN 20 8 - 23 mg/dL    CREATININE 1.1 0.5 - 1.2 mg/dL    GFR Non-African American >60 >60    GFR African American >59 >59    Calcium 9.5 8.8 - 10.2 mg/dL    Total Protein 7.6 6.6 - 8.7 g/dL    Albumin 4.2 3.5 - 5.2 g/dL    Total Bilirubin 0.9 0.2 - 1.2 mg/dL    Alkaline Phosphatase 73 40 - 130 U/L    ALT 35 5 - 41 U/L    AST 34 5 - 40 U/L               Assessment & Plan: The following diagnoses and conditions are stable with no further orders unless indicated:  1. Annual physical exam  Completed annual wellness    2. Postcholecystectomy diarrhea  Stable  - colestipol (COLESTID) 1 g tablet; Take 1 tablet by mouth 2 times daily  Dispense: 60 tablet; Refill: 3    3. Essential (primary) hypertension  BP Readings from Last 3 Encounters:   06/17/22 138/86   12/07/21 106/86   06/07/21 138/86     Stable  - hydroCHLOROthiazide (HYDRODIURIL) 25 MG tablet; TAKE 1 TABLET BY MOUTH 1 TIME DAILY  Dispense: 90 tablet; Refill: 3  - losartan (COZAAR) 100 MG tablet; TAKE 1 TABLET BY MOUTH 1 TIME DAILY  Dispense: 90 tablet; Refill: 3  - Comprehensive Metabolic Panel; Future  - CBC with Auto Differential; Future    4. Chronic pain of both knees  Refilled meloxicam  Lab Results   Component Value Date    CREATININE 1.1 06/07/2022     Lab Results   Component Value Date    BUN 20 06/07/2022         Renal function stable no gastrointestinal issues with the medication  - meloxicam (MOBIC) 15 MG tablet; TAKE 1 TABLET BY MOUTH ONCE A DAY  Dispense: 90 tablet; Refill: 3    5.  Erectile dysfunction, unspecified erectile dysfunction type  Refilled Viagra  Stable  - sildenafil (VIAGRA) 100 MG tablet; TAKE BY MOUTH 1/2 TO 1 TABLET EVERY DAY AS NEEDED  Dispense: 10 tablet; Refill: 11    6. Lipid screening    - Lipid Panel; Future    7. Encounter for prostate cancer screening    - PSA Screening; Future            Return in about 1 year (around 6/17/2023) for AWV - 30 minute visit. Discussed use, benefit, and side effects of prescribed medications. All patient questions answered. Pt voiced understanding. Reviewed health maintenance. Instructedto continue current medications, diet and exercise. Patient agreed with treatmentplan.  Follow up as directed.     _______________________________________________________________      Past Medical History:   Diagnosis Date    Hypertension     Pulmonary fibrosis ([address]       Past Surgical History:   Procedure Laterality Date    CHOLECYSTECTOMY      TONSILLECTOMY         Family History   Problem Relation Age of Onset    High Blood Pressure Mother     Diabetes Mother     High Blood Pressure Father     Heart Disease Father     Diabetes Father        Social History     Tobacco Use    Smoking status: Never Smoker    Smokeless tobacco: Never Used   Substance Use Topics    Alcohol use: No     Current Outpatient Medications   Medication Sig Dispense Refill    colestipol (COLESTID) 1 g tablet Take 1 tablet by mouth 2 times daily 60 tablet 3    hydroCHLOROthiazide (HYDRODIURIL) 25 MG tablet TAKE 1 TABLET BY MOUTH 1 TIME DAILY 90 tablet 3    losartan (COZAAR) 100 MG tablet TAKE 1 TABLET BY MOUTH 1 TIME DAILY 90 tablet 3    meloxicam (MOBIC) 15 MG tablet TAKE 1 TABLET BY MOUTH ONCE A DAY 90 tablet 3    sildenafil (VIAGRA) 100 MG tablet TAKE BY MOUTH 1/2 TO 1 TABLET EVERY DAY AS NEEDED 10 tablet 11    Omega-3 Fatty Acids (FISH OIL) 1000 MG CAPS Take by mouth daily (with breakfast)      Misc Natural Products (NF FORMULAS TESTOSTERONE) CAPS Take by mouth      aspirin 81 MG chewable tablet Take 81 mg by mouth      B Complex-C (SUPER B COMPLEX PO) Take by mouth      amLODIPine (NORVASC) 10 MG tablet TAKE 1 TABLET BY MOUTH EVERY DAY 90 tablet 3     No current facility-administered medications for this visit. No Known Allergies    Health Maintenance   Topic Date Due    COVID-19 Vaccine (3 - Booster for Moderna series) 07/19/2021    Annual Wellness Visit (AWV)  06/08/2022    Flu vaccine (Season Ended) 12/07/2022 (Originally 9/1/2022)    Colorectal Cancer Screen  03/16/2023    Depression Screen  06/07/2023    Lipids  06/07/2027    DTaP/Tdap/Td vaccine (2 - Td or Tdap) 11/28/2027    Shingles vaccine  Completed    Pneumococcal 65+ years Vaccine  Completed    Hepatitis C screen  Completed    Hepatitis A vaccine  Aged Out    Hepatitis B vaccine  Aged Out    Hib vaccine  Aged Out    Meningococcal (ACWY) vaccine  Aged Out       _______________________________________________________________    Note dictated using [id number]  Sometimes this dictation software makes erroneous transcriptions.

## 2022-06-17 NOTE — PROGRESS NOTES
Medicare Annual Wellness Visit - Subsequent    Name: Megan Carpenter Date: 2022   MRN: [id number] Sex: Male   Age: 66 y.o. Ethnicity: Non- / Non    : 1950 Race: White (non-)      Mikala Love is here for   Chief Complaint   Patient presents with   [facility]        Screenings for behavioral, psychosocial and functional/safety risks, and cognitive dysfunction are all negative except as indicated below. These results, as well as other patient data from the [address] form, are documented in Flowsheets linked to this Encounter. No Known Allergies    Prior to Visit Medications    Medication Sig Taking?  Authorizing Provider   colestipol (COLESTID) 1 g tablet Take 1 tablet by mouth 2 times daily Yes Noel Hoskins MD   hydroCHLOROthiazide (HYDRODIURIL) 25 MG tablet TAKE 1 TABLET BY MOUTH 1 TIME DAILY Yes Noel Hoskins MD   losartan (COZAAR) 100 MG tablet TAKE 1 TABLET BY MOUTH 1 TIME DAILY Yes Noel Hoskins MD   meloxicam (MOBIC) 15 MG tablet TAKE 1 TABLET BY MOUTH ONCE A DAY Yes Noel Hoskins MD   sildenafil (VIAGRA) 100 MG tablet TAKE BY MOUTH 1/2 TO 1 TABLET EVERY DAY AS NEEDED Yes Noel Hoskins MD   Omega-3 Fatty Acids (FISH OIL) 1000 MG CAPS Take by mouth daily (with breakfast) Yes Historical Provider, MD   Misc Natural Products (NF FORMULAS TESTOSTERONE) CAPS Take by mouth Yes Historical Provider, MD   aspirin 81 MG chewable tablet Take 81 mg by mouth Yes Historical Provider, MD   B Complex-C (SUPER B COMPLEX PO) Take by mouth Yes Historical Provider, MD   amLODIPine (NORVASC) 10 MG tablet TAKE 1 TABLET BY MOUTH EVERY DAY  Noel Hoskins MD         Past Medical History:   Diagnosis Date    Hypertension     Pulmonary fibrosis ([address]          Past Surgical History:   Procedure Laterality Date    CHOLECYSTECTOMY      TONSILLECTOMY         Family History   Problem Relation Age of Onset    High Blood Pressure Mother     Diabetes Mother     High Blood Pressure Father     Heart Disease Father     Diabetes Father        CareTeam (Including outside providers/suppliers regularly involved in providing care):   Patient Care Team:  Santy Muñoz MD as PCP - General (Family Medicine)  Santy Muñoz MD as PCP - [facility] Empaneled Provider    Wt Readings from Last 3 Encounters:   06/17/22 220 lb (99.8 kg)   12/07/21 234 lb (106.1 kg)   06/07/21 230 lb (104.3 kg)     Vitals:    06/17/22 1301   BP: 138/86   Pulse: 67   Temp: 98.1 °F (36.7 °C)   SpO2: 98%   Weight: 220 lb (99.8 kg)   Height: 5' 11\" (1.803 m)           The following problems were reviewed today and where indicated follow up appointments were made and/or referrals ordered.     Risk Factor Screenings with Interventions     Fall Risk:  2 or more falls in past year?: no  Fall with injury in past year?: no  Fall Risk Interventions:    · Not indicated     Depression:  PHQ-2 Score: 0  Depression Interventions:  · Not indicated     Anxiety:     Anxiety Interventions:  · Not indicated     Cognitive:  Clock Drawing Test (CDT): Normal  Cognitive Impairment Interventions:  · Not indicated     Substance Abuse:  Social History     Socioeconomic History    Marital status:      Spouse name: Not on file    Number of children: Not on file    Years of education: Not on file    Highest education level: Not on file   Occupational History    Not on file   Tobacco Use    Smoking status: Never Smoker    Smokeless tobacco: Never Used   Vaping Use    Vaping Use: Never used   Substance and Sexual Activity    Alcohol use: No    Drug use: No    Sexual activity: Not on file   Other Topics Concern    Not on file   Social History Narrative    Not on file     Social Determinants of Health     Financial Resource Strain:     Difficulty of Paying Living Expenses: Not on file   Food Insecurity:     Worried About Running Out of Food in the Last Year: Not on file    Santa of Food in the Last Year: Not on file   Transportation Needs:     Lack of Transportation (Medical): Not on file    Lack of Transportation (Non-Medical): Not on file   Physical Activity: Sufficiently Active    Days of Exercise per Week: 6 days    Minutes of Exercise per Session: 60 min   Stress:     Feeling of Stress : Not on file   Social Connections:     Frequency of Communication with Friends and Family: Not on file    Frequency of Social Gatherings with Friends and Family: Not on file    Attends Temple Services: Not on file    Active Member of [address] [place] [address] [employer] or Organizations: Not on file    Attends Club or Organization Meetings: Not on file    Marital Status: Not on file   Intimate Partner Violence:     Fear of Current or Ex-Partner: Not on file    Emotionally Abused: Not on file    Physically Abused: Not on file    Sexually Abused: Not on file   Housing Stability:     Unable to Pay for Housing in the Last Year: Not on file    Number of [place] in the Last Year: Not on file    Unstable Housing in the Last Year: Not on file        Substance Abuse Interventions:  · Not indicated     Health Risk Assessment:     General  In general, how would you say your health is?: Very Good  In the past 7 days, have you experienced any of the following: New or Increased Pain, New or Increased Fatigue, Loneliness, Social Isolation, Stress or Anger?: No  Do you get the social and emotional support that you need?: Yes  General Health Risk Interventions:  · Not indicated     Health Habits/Nutrition  On average, how many days per week do you engage in moderate to strenuous exercise (like a brisk walk)?: 6 days  On average, how many minutes do you engage in exercise at this level?: 60 min  Have you lost any weight without trying in the past 3 months?: No  Have you seen the dentist within the past year?: Yes  Body mass index is 30.68 kg/m².   Health Habits/Nutrition Interventions:  · Not indicated     Hearing/Vision  Do you or your family notice any trouble with your hearing that hasn't been managed with hearing aids?: No  Do you have difficulty driving, watching TV, or doing any of your daily activities because of your eyesight?: No  Have you had an eye exam within the past year?: Yes  Hearing/Vision Interventions:  · Not indicated .     Safety  Do you have working smoke detectors?: Yes  Do you have any tripping hazards - loose or unsecured carpets or rugs?: No  Do you have any tripping hazards - clutter in doorways, halls, or stairs?: No  Do you have either shower bars, grab bars, non-slip mats or non-slip surfaces in your shower or bathtub?: Yes  Do all of your stairways have a railing or banister?: Not Applicable  Do you always fasten your seatbelt when you are in a car?: Yes  Safety Interventions:  · Not indicated      ADLs  In the past 7 days, did you need help from others to perform any of the following everyday activities: Eating, dressing, grooming, bathing, toileting, or walking/balance?: No  In the past 7 days, did you need help from others to take care of any of the following: Laundry, housekeeping, banking/finances, shopping, telephone use, food preparation, transportation, or taking medications?: No  ADL Interventions:  · Not indicated     Personalized Preventive Plan   Current Health Maintenance Status  Immunization History   Administered Date(s) Administered    COVID-19, Jose Lopezadeline, Primary or Immunocompromised, PF, 100mcg/0.5mL 01/20/2021, 02/19/2021    Influenza Virus Vaccine 10/12/2010, 10/10/2011, 10/15/2012, 10/23/2013, 11/24/2014, 11/11/2015, 11/14/2016    Influenza, High Dose (Fluzone 65 yrs and older) 11/28/2017, 10/18/2018    Influenza, Quadv, adjuvanted, 65 yrs +, IM, PF (Fluad) 09/21/2020    Influenza, Triv, inactivated, subunit, adjuvanted, IM (Fluad 65 yrs and older) 10/18/2018, 11/21/2019    Pneumococcal Conjugate 13-valent ([id number]) 11/14/2016    Pneumococcal Polysaccharide ([id number]) 11/24/2014    Tdap (Boostrix, Adacel) 11/28/2017    Zoster Recombinant (Shingrix) 07/11/2019, 10/03/2019        Health Maintenance   Topic Date Due    COVID-19 Vaccine (3 - Booster for Moderna series) 07/19/2021    Annual Wellness Visit (AWV)  06/08/2022    Flu vaccine (Season Ended) 12/07/2022 (Originally 9/1/2022)    Colorectal Cancer Screen  03/16/2023    Depression Screen  06/07/2023    Lipids  06/07/2027    DTaP/Tdap/Td vaccine (2 - Td or Tdap) 11/28/2027    Shingles vaccine  Completed    Pneumococcal 65+ years Vaccine  Completed    Hepatitis C screen  Completed    Hepatitis A vaccine  Aged Out    Hepatitis B vaccine  Aged Out    Hib vaccine  Aged Out    Meningococcal (ACWY) vaccine  Aged Out       Recommendations for Carlotz Due: see orders.   Recommended screening schedule for the next 5-10 years is provided to the patient in written form: see Patient Instructions/AVS.

## 2022-06-20 DIAGNOSIS — I10 ESSENTIAL (PRIMARY) HYPERTENSION: ICD-10-CM

## 2022-06-20 RX ORDER — AMLODIPINE BESYLATE 10 MG/1
TABLET ORAL
Qty: 90 TABLET | Refills: 3 | Status: SHIPPED | OUTPATIENT
Start: 2022-06-20

## 2022-08-08 ENCOUNTER — LAB (OUTPATIENT)
Dept: LAB | Facility: HOSPITAL | Age: 72
End: 2022-08-08

## 2022-08-08 DIAGNOSIS — Z01.812 ENCOUNTER FOR PREOPERATIVE SCREENING LABORATORY TESTING FOR COVID-19 VIRUS: ICD-10-CM

## 2022-08-08 DIAGNOSIS — Z20.822 ENCOUNTER FOR PREOPERATIVE SCREENING LABORATORY TESTING FOR COVID-19 VIRUS: ICD-10-CM

## 2022-08-08 LAB — SARS-COV-2 ORF1AB RESP QL NAA+PROBE: NOT DETECTED

## 2022-08-08 PROCEDURE — C9803 HOPD COVID-19 SPEC COLLECT: HCPCS

## 2022-08-08 PROCEDURE — U0004 COV-19 TEST NON-CDC HGH THRU: HCPCS

## 2022-08-10 ENCOUNTER — OFFICE VISIT (OUTPATIENT)
Dept: PULMONOLOGY | Facility: CLINIC | Age: 72
End: 2022-08-10

## 2022-08-10 ENCOUNTER — PROCEDURE VISIT (OUTPATIENT)
Dept: PULMONOLOGY | Facility: CLINIC | Age: 72
End: 2022-08-10

## 2022-08-10 VITALS
SYSTOLIC BLOOD PRESSURE: 136 MMHG | HEART RATE: 72 BPM | BODY MASS INDEX: 30.35 KG/M2 | OXYGEN SATURATION: 98 % | HEIGHT: 70 IN | DIASTOLIC BLOOD PRESSURE: 80 MMHG | WEIGHT: 212 LBS

## 2022-08-10 DIAGNOSIS — Z01.812 ENCOUNTER FOR PREOPERATIVE SCREENING LABORATORY TESTING FOR COVID-19 VIRUS: ICD-10-CM

## 2022-08-10 DIAGNOSIS — J84.10 PULMONARY FIBROSIS: Primary | ICD-10-CM

## 2022-08-10 DIAGNOSIS — Z20.822 ENCOUNTER FOR PREOPERATIVE SCREENING LABORATORY TESTING FOR COVID-19 VIRUS: ICD-10-CM

## 2022-08-10 DIAGNOSIS — J98.4 RESTRICTIVE LUNG DISEASE: ICD-10-CM

## 2022-08-10 PROCEDURE — 94729 DIFFUSING CAPACITY: CPT | Performed by: INTERNAL MEDICINE

## 2022-08-10 PROCEDURE — 94010 BREATHING CAPACITY TEST: CPT | Performed by: INTERNAL MEDICINE

## 2022-08-10 PROCEDURE — 99214 OFFICE O/P EST MOD 30 MIN: CPT | Performed by: INTERNAL MEDICINE

## 2022-08-10 NOTE — PROGRESS NOTES
"Background:  Pt with pulmonary fibrosis and hx moderate restriction on pft 2017, fvc 74% pred, dlco 80% pred. subsequently 76 and 79% pred 2019   Chief Complaint  pulmonary fibrosis    Subjective    History of Present Illness       Reid Morrell presents to [facility].  He reports moderate phlegm production. He goes to the gym and walks on a treadmill 3.3 miles in an hour with 400 foot elevation gain on his program.  He does not note any decline in stamina or exercise tolerance over the past few months.     Tobacco Use: Low Risk    • Smoking Tobacco Use: Never Smoker   • Smokeless Tobacco Use: Never Used      Current Outpatient Medications   Medication Instructions   • amLODIPine (NORVASC) 10 mg, Oral, Daily   • aspirin 81 mg, Oral, Daily   • colestipol (COLESTID) 1 g, Oral, As Needed   • hydroCHLOROthiazide (HYDRODIURIL) 25 mg, Oral, Daily   • losartan (COZAAR) 100 mg, Oral, Daily   • meloxicam (MOBIC) 15 mg, Oral, Daily   • Omega-3 Fatty Acids (FISH OIL) 1000 MG capsule capsule Oral, Daily With Breakfast   • vardenafil (LEVITRA) 20 mg, Oral, As Needed      Objective     Vital Signs:   /80   Pulse 72   Ht 177.8 cm (70\")   Wt 96.2 kg (212 lb)   SpO2 98% Comment: RA  BMI 30.42 kg/m²   Physical Exam  Constitutional:       Appearance: Normal appearance. He is not ill-appearing, toxic-appearing or diaphoretic.   HENT:      Head: Normocephalic.   Eyes:      Extraocular Movements: Extraocular movements intact.   Pulmonary:      Effort: Pulmonary effort is normal. No respiratory distress.      Breath sounds: Rales (right base) present. No wheezing or rhonchi.   Skin:     Findings: No erythema or rash.   Neurological:      Mental Status: He is alert and oriented to person, place, and time.        Result Review  Data Reviewed:{ Labs  Result Review  Imaging  Media :23}        CT CHEST HIGH RESOLUTION (02/10/2020 12:53 EST)  1. Air trapping at the mid and " upper lungs. Bronchiectasis of the   bilateral lower lobes with mild diffuse small airway thickening and   some dependent groundglass opacities. There are a few peripheral   pulmonary cysts in the lung bases, without definite stacked appearance   of honeycombing at this time. Findings indeterminate for usual   interstitial pneumonia (per the American Thoracic Society   categorization).   2. Borderline mediastinal lymph nodes, may be reactive.   3. 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     PFT Values        Some values may be hidden. Unless noted otherwise, only the newest values recorded on each date are displayed.         Old Values PFT Results 8/9/21 8/10/22   No data to display.      Pre Drug PFT Results 8/9/21 8/10/22   FVC 73 67   FEV1 75 76   FEF 25-75% 86 120   FEV1/FVC 81.75 86      Post Drug PFT Results 8/9/21 8/10/22   No data to display.      Other Tests PFT Results 8/9/21 8/10/22   DLCO 70 80   D/VAsb 94 110                      Assessment and Plan  {CC Problem List  Visit Diagnosis  ROS  Review (Popup)  Cat Amania Maintenance  Quality  BestPractice  Medications  SmartSets  SnapShot Encounters  Media :23}   Diagnoses and all orders for this visit:    1. Pulmonary fibrosis (HCC) (Primary)  -     Pulmonary Function Test  -     CT Chest Hi Resolution Diagnostic; Future    2. Encounter for preoperative screening laboratory testing for COVID-19 virus  -     COVID PRE-OP / PRE-PROCEDURE SCREENING ORDER (NO ISOLATION) - Swab, Nasal Cavity; Future    3. Restrictive lung disease    Pt has apparent stable pulmonary status the past few months although fev1 is a little lower; no drop in exercise tolerance in setting of restrictive lung disease and pulmonary fibrosis.  Pt has coexisting CANNON and if liver disease is significant enough, pulmonary hypertension could be present, although pt does not have symptoms or findings of pulmonary hypertension now. Pt is  potential candidate for ofev in setting of progression.  No clear sign of that based on above data, but pt is at risk.  Will check hrct and have it compared with the prior scan at [facility].     Follow Up {Instructions Charge Capture  Follow-up Communications :23}   Return in about 6 months (around 2/10/2023) for Spirometry and DLCO.  Patient was given instructions and counseling regarding his condition or for health maintenance advice. Please see specific information pulled into the AVS if appropriate.    Electronically signed by Nash Pisano MD, 8/10/2022, 15:38 CDT

## 2022-08-10 NOTE — PROCEDURES
Pulmonary Function Test  Performed by: Candi Shaw, RRT  Authorized by: Nash Pisano MD      Pre Drug % Predicted    FVC: 67%   FEV1: 76%   FEF 25-75%: 120%   FEV1/FVC: 86%   DLCO: 80%   D/VAsb: 110%    Interpretation   Spirometry   Spirometry shows moderate restriction. midflow is normal.  Diffusion Capacity  The patient's diffusion capacity is normal.  Diffusion capacity is normal when corrected for alveolar volume.   Overall comments: svc and erv mildly reduced

## 2022-08-25 ENCOUNTER — HOSPITAL ENCOUNTER (OUTPATIENT)
Dept: CT IMAGING | Facility: HOSPITAL | Age: 72
Discharge: HOME | End: 2022-08-25
Admitting: INTERNAL MEDICINE

## 2022-08-25 DIAGNOSIS — J84.10 PULMONARY FIBROSIS: ICD-10-CM

## 2022-08-25 PROCEDURE — 71250 CT THORAX DX C-: CPT

## 2022-08-26 ENCOUNTER — TELEPHONE (OUTPATIENT)
Dept: PULMONOLOGY | Facility: CLINIC | Age: 72
End: 2022-08-26

## 2022-08-26 DIAGNOSIS — J84.10 PULMONARY FIBROSIS: Primary | ICD-10-CM

## 2022-08-26 NOTE — PROGRESS NOTES
Please call the patient regarding his abnormal result.  The scan is mostly unchanged but some areas look a little worse.  However the pulmonary tests have been stable.  Hard to conclude much with some reports showing some progression and some showing stable.  I do think we need to watch it closely for awhile so I would like to get another scan and PFT in 3 months and follow up in 3 months.  Call if symptoms worsen in meantime.

## 2022-08-26 NOTE — TELEPHONE ENCOUNTER
Patient's wife notified of the results of the CT scan.      Dr. Aliya Ellsworth is wanting to do a breathing test and appointment to follow up with him after the scan in 3 months.   The appointment and breathing test will be in 3 months.       Patient's wife is waiting on phone call for the appointment time and breathing test time to be called to her.

## 2022-08-26 NOTE — PROGRESS NOTES
Patient's wife notified of test results and voiced understanding and message sent to Jodee to set up appointment for visit and PFT.

## 2022-08-26 NOTE — TELEPHONE ENCOUNTER
----- Message from Nash Pisano MD sent at 8/26/2022  4:31 PM CDT -----  Please call the patient regarding his abnormal result.  The scan is mostly unchanged but some areas look a little worse.  However the pulmonary tests have been stable.  Hard to conclude much with some reports showing some progression and some showing stable.  I do think we need to watch it closely for awhile so I would like to get another scan and PFT in 3 months and follow up in 3 months.  Call if symptoms worsen in meantime.

## 2022-12-16 ENCOUNTER — HOSPITAL ENCOUNTER (OUTPATIENT)
Dept: CT IMAGING | Facility: HOSPITAL | Age: 72
Discharge: HOME | End: 2022-12-16
Admitting: INTERNAL MEDICINE

## 2022-12-16 DIAGNOSIS — J84.10 PULMONARY FIBROSIS: ICD-10-CM

## 2022-12-16 PROCEDURE — 71250 CT THORAX DX C-: CPT

## 2022-12-20 ENCOUNTER — PROCEDURE VISIT (OUTPATIENT)
Dept: PULMONOLOGY | Facility: CLINIC | Age: 72
End: 2022-12-20

## 2022-12-20 ENCOUNTER — OFFICE VISIT (OUTPATIENT)
Dept: PULMONOLOGY | Facility: CLINIC | Age: 72
End: 2022-12-20

## 2022-12-20 VITALS
WEIGHT: 218 LBS | DIASTOLIC BLOOD PRESSURE: 84 MMHG | HEIGHT: 70 IN | OXYGEN SATURATION: 95 % | SYSTOLIC BLOOD PRESSURE: 136 MMHG | BODY MASS INDEX: 31.21 KG/M2 | HEART RATE: 90 BPM

## 2022-12-20 DIAGNOSIS — J84.10 PULMONARY FIBROSIS: Primary | ICD-10-CM

## 2022-12-20 DIAGNOSIS — J98.4 RESTRICTIVE LUNG DISEASE: ICD-10-CM

## 2022-12-20 DIAGNOSIS — J84.10 PULMONARY FIBROSIS: ICD-10-CM

## 2022-12-20 PROCEDURE — 94729 DIFFUSING CAPACITY: CPT | Performed by: INTERNAL MEDICINE

## 2022-12-20 PROCEDURE — 94375 RESPIRATORY FLOW VOLUME LOOP: CPT | Performed by: INTERNAL MEDICINE

## 2022-12-20 PROCEDURE — 99214 OFFICE O/P EST MOD 30 MIN: CPT | Performed by: INTERNAL MEDICINE

## 2022-12-20 NOTE — PROGRESS NOTES
"Background:  Pt with pulmonary fibrosis and hx moderate restriction on pft 2017, fvc 74% pred, dlco 80% pred. subsequently 76 and 79% pred 2019   Chief Complaint  pulmonary fibrosis    Subjective    History of Present Illness       Reid Morrell presents to [facility] PULMONARY [facility]ITICAL CARE MEDICINE.  History of Present Illness   Tobacco Use: Low Risk    • Smoking Tobacco Use: Never   • Smokeless Tobacco Use: Never   • Passive Exposure: Not on file      Current Outpatient Medications   Medication Instructions   • amLODIPine (NORVASC) 10 mg, Oral, Daily   • colestipol (COLESTID) 1 g, Oral, As Needed   • hydroCHLOROthiazide (HYDRODIURIL) 25 mg, Oral, Daily   • losartan (COZAAR) 100 mg, Oral, Daily   • meloxicam (MOBIC) 15 mg, Oral, Daily   • Omega-3 Fatty Acids (FISH OIL) 1000 MG capsule capsule Oral, Daily With Breakfast   • vardenafil (LEVITRA) 20 mg, Oral, As Needed      Objective     Vital Signs:   /84   Pulse 90   Ht 177.8 cm (70\")   Wt 98.9 kg (218 lb)   SpO2 95% Comment: RA  BMI 31.28 kg/m²   Physical Exam  Constitutional:       Appearance: Normal appearance. He is not ill-appearing or diaphoretic.   Eyes:      Extraocular Movements: Extraocular movements intact.   Pulmonary:      Effort: Pulmonary effort is normal. No respiratory distress.      Breath sounds: Rales present. No wheezing or rhonchi.   Skin:     Findings: No erythema or rash.      Nails: There is no clubbing.   Neurological:      Mental Status: He is alert.        Result Review  Data Reviewed:{ Labs  Result Review  Imaging  Media :23}     CT Chest Hi Resolution Diagnostic    Result Date: 12/16/2022  Impression:  No significant change in advanced pulmonary fibrosis. Findings are most consistent with UIP pattern interstitial lung disease, although marked mosaic attenuation/air trapping prompts consideration of alternative diagnoses such as hypersensitivity pneumonitis. This report was finalized on 12/16/2022 " 12:10 by Dr. Herbert Hernandez MD.      PFT Values        Some values may be hidden. Unless noted otherwise, only the newest values recorded on each date are displayed.         Old Values PFT Results 8/9/21 8/10/22 12/20/22   No data to display.      Pre Drug PFT Results 8/9/21 8/10/22 12/20/22   FVC 73 67 67   FEV1 75 76 76   FEF 25-75% 86 120 130   FEV1/FVC 81.75 86 85      Post Drug PFT Results 8/9/21 8/10/22 12/20/22   No data to display.      Other Tests PFT Results 8/9/21 8/10/22 12/20/22   DLCO 70 80 83   D/VAsb 94 110 119                      Assessment and Plan  {CC Problem List  Visit Diagnosis  ROS  Review (Popup)  Health Maintenance  Quality  BestPractice  Medications  SmartSets  SnapShot Encounters  Media :23}   Diagnoses and all orders for this visit:    1. Pulmonary fibrosis (HCC) (Primary)  -     Pulmonary Function Test  -     CT Chest Hi Resolution Diagnostic; Future    2. Restrictive lung disease    we discussed stability of PFT which is good, and also concern with underlying problem being uip.  At this time no indication for progression.  Ground glass has resolved.   Recommend against steroid or other antiinflammatory tx at this time.  Continue surveillance with follow up imaging and PFT.  If progression identified on follow up, will treat as IPF but I am not convinced of that dx currently.    Follow Up {Instructions Charge Capture  Follow-up Communications :23}   Return in about 6 months (around 6/20/2023) for Spirometry and DLCO.  Patient was given instructions and counseling regarding his condition or for health maintenance advice. Please see specific information pulled into the AVS if appropriate.    Electronically signed by Nash Pisano MD, 12/20/2022, 21:17 CST

## 2022-12-20 NOTE — PROCEDURES
Pulmonary Function Test  Performed by: Chriss Faust CMA  Authorized by: Nash Pisano MD      Pre Drug % Predicted    FVC: 67%   FEV1: 76%   FEF 25-75%: 130%   FEV1/FVC: 85%   DLCO: 83%   D/VAsb: 119%    Interpretation   Spirometry   Spirometry shows moderate restriction. midflow is normal.  Diffusion Capacity  The patient's diffusion capacity is normal.  Diffusion capacity is normal when corrected for alveolar volume.     stable compared with most recent study  Electronically signed by Nash Pisano MD, 12/20/2022, 18:25 CST

## 2022-12-21 ENCOUNTER — TELEPHONE (OUTPATIENT)
Dept: PULMONOLOGY | Facility: CLINIC | Age: 72
End: 2022-12-21

## 2022-12-21 DIAGNOSIS — J01.00 ACUTE NON-RECURRENT MAXILLARY SINUSITIS: Primary | ICD-10-CM

## 2022-12-21 RX ORDER — CEFDINIR 300 MG/1
300 CAPSULE ORAL 2 TIMES DAILY
Qty: 14 CAPSULE | Refills: 0 | Status: SHIPPED | OUTPATIENT
Start: 2022-12-21 | End: 2022-12-28

## 2022-12-21 RX ORDER — AMOXICILLIN AND CLAVULANATE POTASSIUM 875; 125 MG/1; MG/1
1 TABLET, FILM COATED ORAL 2 TIMES DAILY
Qty: 14 TABLET | Refills: 0 | Status: SHIPPED | OUTPATIENT
Start: 2022-12-21 | End: 2022-12-21

## 2022-12-21 NOTE — TELEPHONE ENCOUNTER
He is having yellow coming out of his nose as well.   He is wanting something called in to [facility].

## 2022-12-21 NOTE — TELEPHONE ENCOUNTER
Pharmacy called stating that the antibiotic that was called in is on back order.   Do you want to change it to something else?

## 2022-12-21 NOTE — TELEPHONE ENCOUNTER
----- Message from Reid Morrell sent at 12/21/2022 11:39 AM CST -----  Regarding: Appointment   Contact: [phone]  I was in your office yesterday. When I got home shortly after. I had 100.4 temperature. If Candi had checked it, she would have caught it. I’ve tried numerous times to try talk to someone to get some medication. My sinuses are really bad. She called me once or twice. I got a voicemail but the way the system is set up I got nowhere. Very frustrating and disappointing. All I want is a prescription. The wall in around here is a 4 hour wait.

## 2023-02-04 DIAGNOSIS — I10 ESSENTIAL (PRIMARY) HYPERTENSION: ICD-10-CM

## 2023-02-06 RX ORDER — HYDROCHLOROTHIAZIDE 25 MG/1
TABLET ORAL
Qty: 90 TABLET | Refills: 3 | OUTPATIENT
Start: 2023-02-06

## 2023-03-12 ENCOUNTER — HOSPITAL ENCOUNTER (INPATIENT)
Facility: HOSPITAL | Age: 73
LOS: 3 days | Discharge: HOME | DRG: 871 | End: 2023-03-15
Attending: EMERGENCY MEDICINE | Admitting: INTERNAL MEDICINE
Payer: MEDICARE

## 2023-03-12 ENCOUNTER — APPOINTMENT (OUTPATIENT)
Dept: CT IMAGING | Facility: HOSPITAL | Age: 73
DRG: 871 | End: 2023-03-12
Payer: MEDICARE

## 2023-03-12 ENCOUNTER — APPOINTMENT (OUTPATIENT)
Dept: GENERAL RADIOLOGY | Facility: HOSPITAL | Age: 73
DRG: 871 | End: 2023-03-12
Payer: MEDICARE

## 2023-03-12 DIAGNOSIS — J18.9 PNEUMONIA DUE TO INFECTIOUS ORGANISM, UNSPECIFIED LATERALITY, UNSPECIFIED PART OF LUNG: Primary | ICD-10-CM

## 2023-03-12 DIAGNOSIS — E87.6 HYPOKALEMIA: ICD-10-CM

## 2023-03-12 DIAGNOSIS — R09.02 HYPOXIC: ICD-10-CM

## 2023-03-12 DIAGNOSIS — J84.10 PULMONARY FIBROSIS: ICD-10-CM

## 2023-03-12 DIAGNOSIS — J18.9 PNEUMONIA OF BOTH LUNGS DUE TO INFECTIOUS ORGANISM, UNSPECIFIED PART OF LUNG: ICD-10-CM

## 2023-03-12 DIAGNOSIS — Z74.09 IMPAIRED MOBILITY: ICD-10-CM

## 2023-03-12 DIAGNOSIS — R77.8 TROPONIN I ABOVE REFERENCE RANGE: ICD-10-CM

## 2023-03-12 PROBLEM — A41.9 SEPSIS DUE TO PNEUMONIA (HCC): Status: ACTIVE | Noted: 2023-03-12

## 2023-03-12 PROBLEM — I10 ESSENTIAL HYPERTENSION: Status: ACTIVE | Noted: 2023-03-12

## 2023-03-12 PROBLEM — E66.9 OBESITY (BMI 30-39.9): Status: ACTIVE | Noted: 2023-03-12

## 2023-03-12 PROBLEM — R79.89 ELEVATED TROPONIN LEVEL NOT DUE TO ACUTE CORONARY SYNDROME: Status: ACTIVE | Noted: 2023-03-12

## 2023-03-12 PROBLEM — E87.20 HYPERLACTATEMIA: Status: ACTIVE | Noted: 2023-03-12

## 2023-03-12 LAB
ANION GAP SERPL CALCULATED.3IONS-SCNC: 13 MMOL/L (ref 5–15)
ARTERIAL PATENCY WRIST A: POSITIVE
ATMOSPHERIC PRESS: 747 MMHG
BASE EXCESS BLDA CALC-SCNC: 6.5 MMOL/L (ref 0–2)
BASOPHILS # BLD AUTO: 0.07 10*3/MM3 (ref 0–0.2)
BASOPHILS NFR BLD AUTO: 0.3 % (ref 0–1.5)
BDY SITE: ABNORMAL
BODY TEMPERATURE: 37 C
BUN SERPL-MCNC: 33 MG/DL (ref 8–23)
BUN/CREAT SERPL: 26.4 (ref 7–25)
CALCIUM SPEC-SCNC: 8.9 MG/DL (ref 8.6–10.5)
CHLORIDE SERPL-SCNC: 95 MMOL/L (ref 98–107)
CO2 SERPL-SCNC: 27 MMOL/L (ref 22–29)
CREAT SERPL-MCNC: 1.25 MG/DL (ref 0.76–1.27)
CRP SERPL-MCNC: 34.76 MG/DL (ref 0–0.5)
D DIMER PPP FEU-MCNC: 1.46 MCGFEU/ML (ref 0–0.73)
D-LACTATE SERPL-SCNC: 1.6 MMOL/L (ref 0.5–2)
D-LACTATE SERPL-SCNC: 2.2 MMOL/L (ref 0.5–2)
D-LACTATE SERPL-SCNC: 2.8 MMOL/L (ref 0.5–2)
DEPRECATED RDW RBC AUTO: 46.2 FL (ref 37–54)
EGFRCR SERPLBLD CKD-EPI 2021: 60.8 ML/MIN/1.73
EOSINOPHIL # BLD AUTO: 0 10*3/MM3 (ref 0–0.4)
EOSINOPHIL NFR BLD AUTO: 0 % (ref 0.3–6.2)
ERYTHROCYTE [DISTWIDTH] IN BLOOD BY AUTOMATED COUNT: 14 % (ref 12.3–15.4)
FLUAV RNA RESP QL NAA+PROBE: NOT DETECTED
FLUBV RNA RESP QL NAA+PROBE: NOT DETECTED
GAS FLOW AIRWAY: 2 LPM
GEN 5 2HR TROPONIN T REFLEX: 14 NG/L
GLUCOSE BLDC GLUCOMTR-MCNC: 238 MG/DL (ref 70–130)
GLUCOSE SERPL-MCNC: 143 MG/DL (ref 65–99)
HCO3 BLDA-SCNC: 28.9 MMOL/L (ref 20–26)
HCT VFR BLD AUTO: 45.3 % (ref 37.5–51)
HGB BLD-MCNC: 15.1 G/DL (ref 13–17.7)
IMM GRANULOCYTES # BLD AUTO: 0.29 10*3/MM3 (ref 0–0.05)
IMM GRANULOCYTES NFR BLD AUTO: 1.3 % (ref 0–0.5)
L PNEUMO1 AG UR QL IA: NEGATIVE
LYMPHOCYTES # BLD AUTO: 0.54 10*3/MM3 (ref 0.7–3.1)
LYMPHOCYTES NFR BLD AUTO: 2.5 % (ref 19.6–45.3)
Lab: ABNORMAL
MAGNESIUM SERPL-MCNC: 1.9 MG/DL (ref 1.6–2.4)
MCH RBC QN AUTO: 30 PG (ref 26.6–33)
MCHC RBC AUTO-ENTMCNC: 33.3 G/DL (ref 31.5–35.7)
MCV RBC AUTO: 89.9 FL (ref 79–97)
MODALITY: ABNORMAL
MONOCYTES # BLD AUTO: 0.82 10*3/MM3 (ref 0.1–0.9)
MONOCYTES NFR BLD AUTO: 3.8 % (ref 5–12)
NEUTROPHILS NFR BLD AUTO: 19.94 10*3/MM3 (ref 1.7–7)
NEUTROPHILS NFR BLD AUTO: 92.1 % (ref 42.7–76)
NRBC BLD AUTO-RTO: 0 /100 WBC (ref 0–0.2)
NT-PROBNP SERPL-MCNC: 1748 PG/ML (ref 0–900)
PCO2 BLDA: 34 MM HG (ref 35–45)
PCO2 TEMP ADJ BLD: 34 MM HG (ref 35–45)
PH BLDA: 7.54 PH UNITS (ref 7.35–7.45)
PH, TEMP CORRECTED: 7.54 PH UNITS (ref 7.35–7.45)
PLATELET # BLD AUTO: 187 10*3/MM3 (ref 140–450)
PMV BLD AUTO: 9.1 FL (ref 6–12)
PO2 BLDA: 62.8 MM HG (ref 83–108)
PO2 TEMP ADJ BLD: 62.8 MM HG (ref 83–108)
POTASSIUM SERPL-SCNC: 3.1 MMOL/L (ref 3.5–5.2)
PROCALCITONIN SERPL-MCNC: 4.43 NG/ML (ref 0–0.25)
RBC # BLD AUTO: 5.04 10*6/MM3 (ref 4.14–5.8)
S PNEUM AG SPEC QL LA: NEGATIVE
SAO2 % BLDCOA: 94.9 % (ref 94–99)
SARS-COV-2 RNA RESP QL NAA+PROBE: NOT DETECTED
SODIUM SERPL-SCNC: 135 MMOL/L (ref 136–145)
TROPONIN T DELTA: -3 NG/L
TROPONIN T SERPL HS-MCNC: 17 NG/L
TROPONIN T SERPL HS-MCNC: 23 NG/L
VENTILATOR MODE: ABNORMAL
WBC NRBC COR # BLD: 21.66 10*3/MM3 (ref 3.4–10.8)

## 2023-03-12 PROCEDURE — 71275 CT ANGIOGRAPHY CHEST: CPT

## 2023-03-12 PROCEDURE — 85379 FIBRIN DEGRADATION QUANT: CPT | Performed by: EMERGENCY MEDICINE

## 2023-03-12 PROCEDURE — 93005 ELECTROCARDIOGRAM TRACING: CPT

## 2023-03-12 PROCEDURE — 25010000002 SODIUM CHLORIDE 0.9 % WITH KCL 20 MEQ 20-0.9 MEQ/L-% SOLUTION: Performed by: INTERNAL MEDICINE

## 2023-03-12 PROCEDURE — 25010000002 PIPERACILLIN SOD-TAZOBACTAM PER 1 G: Performed by: EMERGENCY MEDICINE

## 2023-03-12 PROCEDURE — 25510000001 IOPAMIDOL PER 1 ML: Performed by: EMERGENCY MEDICINE

## 2023-03-12 PROCEDURE — 25010000002 ENOXAPARIN PER 10 MG: Performed by: INTERNAL MEDICINE

## 2023-03-12 PROCEDURE — 82803 BLOOD GASES ANY COMBINATION: CPT

## 2023-03-12 PROCEDURE — 85025 COMPLETE CBC W/AUTO DIFF WBC: CPT | Performed by: EMERGENCY MEDICINE

## 2023-03-12 PROCEDURE — 87040 BLOOD CULTURE FOR BACTERIA: CPT | Performed by: EMERGENCY MEDICINE

## 2023-03-12 PROCEDURE — 25010000002 CEFEPIME PER 500 MG: Performed by: INTERNAL MEDICINE

## 2023-03-12 PROCEDURE — 86140 C-REACTIVE PROTEIN: CPT | Performed by: EMERGENCY MEDICINE

## 2023-03-12 PROCEDURE — 87449 NOS EACH ORGANISM AG IA: CPT | Performed by: INTERNAL MEDICINE

## 2023-03-12 PROCEDURE — 87636 SARSCOV2 & INF A&B AMP PRB: CPT | Performed by: EMERGENCY MEDICINE

## 2023-03-12 PROCEDURE — 94799 UNLISTED PULMONARY SVC/PX: CPT

## 2023-03-12 PROCEDURE — 71045 X-RAY EXAM CHEST 1 VIEW: CPT

## 2023-03-12 PROCEDURE — 36600 WITHDRAWAL OF ARTERIAL BLOOD: CPT

## 2023-03-12 PROCEDURE — 25010000002 METHYLPREDNISOLONE PER 125 MG: Performed by: EMERGENCY MEDICINE

## 2023-03-12 PROCEDURE — 25010000002 POTASSIUM CHLORIDE PER 2 MEQ: Performed by: EMERGENCY MEDICINE

## 2023-03-12 PROCEDURE — 99285 EMERGENCY DEPT VISIT HI MDM: CPT

## 2023-03-12 PROCEDURE — 94761 N-INVAS EAR/PLS OXIMETRY MLT: CPT

## 2023-03-12 PROCEDURE — 83605 ASSAY OF LACTIC ACID: CPT | Performed by: EMERGENCY MEDICINE

## 2023-03-12 PROCEDURE — 25010000002 VANCOMYCIN 10 G RECONSTITUTED SOLUTION: Performed by: INTERNAL MEDICINE

## 2023-03-12 PROCEDURE — 83880 ASSAY OF NATRIURETIC PEPTIDE: CPT | Performed by: EMERGENCY MEDICINE

## 2023-03-12 PROCEDURE — 87899 AGENT NOS ASSAY W/OPTIC: CPT | Performed by: INTERNAL MEDICINE

## 2023-03-12 PROCEDURE — 83735 ASSAY OF MAGNESIUM: CPT | Performed by: EMERGENCY MEDICINE

## 2023-03-12 PROCEDURE — 93010 ELECTROCARDIOGRAM REPORT: CPT | Performed by: EMERGENCY MEDICINE

## 2023-03-12 PROCEDURE — 84484 ASSAY OF TROPONIN QUANT: CPT | Performed by: EMERGENCY MEDICINE

## 2023-03-12 PROCEDURE — 82962 GLUCOSE BLOOD TEST: CPT

## 2023-03-12 PROCEDURE — 84484 ASSAY OF TROPONIN QUANT: CPT | Performed by: INTERNAL MEDICINE

## 2023-03-12 PROCEDURE — 84145 PROCALCITONIN (PCT): CPT | Performed by: EMERGENCY MEDICINE

## 2023-03-12 PROCEDURE — 80048 BASIC METABOLIC PNL TOTAL CA: CPT | Performed by: EMERGENCY MEDICINE

## 2023-03-12 PROCEDURE — 94640 AIRWAY INHALATION TREATMENT: CPT

## 2023-03-12 PROCEDURE — 36415 COLL VENOUS BLD VENIPUNCTURE: CPT | Performed by: EMERGENCY MEDICINE

## 2023-03-12 RX ORDER — GUAIFENESIN 600 MG/1
1200 TABLET, EXTENDED RELEASE ORAL EVERY 12 HOURS SCHEDULED
Status: DISCONTINUED | OUTPATIENT
Start: 2023-03-12 | End: 2023-03-15 | Stop reason: HOSPADM

## 2023-03-12 RX ORDER — METHYLPREDNISOLONE SODIUM SUCCINATE 125 MG/2ML
125 INJECTION, POWDER, LYOPHILIZED, FOR SOLUTION INTRAMUSCULAR; INTRAVENOUS ONCE
Status: COMPLETED | OUTPATIENT
Start: 2023-03-12 | End: 2023-03-12

## 2023-03-12 RX ORDER — ACETAMINOPHEN 650 MG/1
650 SUPPOSITORY RECTAL EVERY 4 HOURS PRN
Status: DISCONTINUED | OUTPATIENT
Start: 2023-03-12 | End: 2023-03-15 | Stop reason: HOSPADM

## 2023-03-12 RX ORDER — ACETAMINOPHEN 160 MG/5ML
650 SOLUTION ORAL EVERY 4 HOURS PRN
Status: DISCONTINUED | OUTPATIENT
Start: 2023-03-12 | End: 2023-03-15 | Stop reason: HOSPADM

## 2023-03-12 RX ORDER — ENOXAPARIN SODIUM 100 MG/ML
40 INJECTION SUBCUTANEOUS DAILY
Status: DISCONTINUED | OUTPATIENT
Start: 2023-03-12 | End: 2023-03-15 | Stop reason: HOSPADM

## 2023-03-12 RX ORDER — IPRATROPIUM BROMIDE AND ALBUTEROL SULFATE 2.5; .5 MG/3ML; MG/3ML
3 SOLUTION RESPIRATORY (INHALATION)
Status: DISCONTINUED | OUTPATIENT
Start: 2023-03-12 | End: 2023-03-15 | Stop reason: HOSPADM

## 2023-03-12 RX ORDER — IPRATROPIUM BROMIDE AND ALBUTEROL SULFATE 2.5; .5 MG/3ML; MG/3ML
3 SOLUTION RESPIRATORY (INHALATION) ONCE
Status: COMPLETED | OUTPATIENT
Start: 2023-03-12 | End: 2023-03-12

## 2023-03-12 RX ORDER — SODIUM CHLORIDE 0.9 % (FLUSH) 0.9 %
10 SYRINGE (ML) INJECTION AS NEEDED
Status: DISCONTINUED | OUTPATIENT
Start: 2023-03-12 | End: 2023-03-15 | Stop reason: HOSPADM

## 2023-03-12 RX ORDER — POTASSIUM CHLORIDE 14.9 MG/ML
20 INJECTION INTRAVENOUS ONCE
Status: COMPLETED | OUTPATIENT
Start: 2023-03-12 | End: 2023-03-12

## 2023-03-12 RX ORDER — AMLODIPINE BESYLATE 10 MG/1
10 TABLET ORAL DAILY
Status: DISCONTINUED | OUTPATIENT
Start: 2023-03-12 | End: 2023-03-15 | Stop reason: HOSPADM

## 2023-03-12 RX ORDER — SODIUM CHLORIDE 0.9 % (FLUSH) 0.9 %
10 SYRINGE (ML) INJECTION EVERY 12 HOURS SCHEDULED
Status: DISCONTINUED | OUTPATIENT
Start: 2023-03-12 | End: 2023-03-15 | Stop reason: HOSPADM

## 2023-03-12 RX ORDER — SODIUM CHLORIDE 9 MG/ML
40 INJECTION, SOLUTION INTRAVENOUS AS NEEDED
Status: DISCONTINUED | OUTPATIENT
Start: 2023-03-12 | End: 2023-03-15 | Stop reason: HOSPADM

## 2023-03-12 RX ORDER — SILDENAFIL 100 MG/1
100 TABLET, FILM COATED ORAL DAILY PRN
COMMUNITY

## 2023-03-12 RX ORDER — ACETAMINOPHEN 500 MG
1000 TABLET ORAL ONCE
Status: COMPLETED | OUTPATIENT
Start: 2023-03-12 | End: 2023-03-12

## 2023-03-12 RX ORDER — SODIUM CHLORIDE AND POTASSIUM CHLORIDE 150; 900 MG/100ML; MG/100ML
50 INJECTION, SOLUTION INTRAVENOUS CONTINUOUS
Status: DISCONTINUED | OUTPATIENT
Start: 2023-03-12 | End: 2023-03-14

## 2023-03-12 RX ORDER — ONDANSETRON 2 MG/ML
4 INJECTION INTRAMUSCULAR; INTRAVENOUS EVERY 6 HOURS PRN
Status: DISCONTINUED | OUTPATIENT
Start: 2023-03-12 | End: 2023-03-15 | Stop reason: HOSPADM

## 2023-03-12 RX ORDER — ACETAMINOPHEN 325 MG/1
650 TABLET ORAL EVERY 4 HOURS PRN
Status: DISCONTINUED | OUTPATIENT
Start: 2023-03-12 | End: 2023-03-15 | Stop reason: HOSPADM

## 2023-03-12 RX ADMIN — SODIUM CHLORIDE, POTASSIUM CHLORIDE, SODIUM LACTATE AND CALCIUM CHLORIDE 1000 ML: 600; 310; 30; 20 INJECTION, SOLUTION INTRAVENOUS at 14:57

## 2023-03-12 RX ADMIN — POTASSIUM CHLORIDE AND SODIUM CHLORIDE 100 ML/HR: 900; 150 INJECTION, SOLUTION INTRAVENOUS at 20:51

## 2023-03-12 RX ADMIN — AMLODIPINE BESYLATE 10 MG: 10 TABLET ORAL at 21:13

## 2023-03-12 RX ADMIN — TAZOBACTAM SODIUM AND PIPERACILLIN SODIUM 4.5 G: 500; 4 INJECTION, SOLUTION INTRAVENOUS at 13:46

## 2023-03-12 RX ADMIN — IOPAMIDOL 100 ML: 755 INJECTION, SOLUTION INTRAVENOUS at 16:30

## 2023-03-12 RX ADMIN — ACETAMINOPHEN 1000 MG: 500 TABLET, FILM COATED ORAL at 13:47

## 2023-03-12 RX ADMIN — Medication 10 ML: at 20:52

## 2023-03-12 RX ADMIN — ENOXAPARIN SODIUM 40 MG: 100 INJECTION SUBCUTANEOUS at 18:30

## 2023-03-12 RX ADMIN — IPRATROPIUM BROMIDE AND ALBUTEROL SULFATE 3 ML: 2.5; .5 SOLUTION RESPIRATORY (INHALATION) at 21:07

## 2023-03-12 RX ADMIN — CEFEPIME 2 G: 2 INJECTION, POWDER, FOR SOLUTION INTRAVENOUS at 18:27

## 2023-03-12 RX ADMIN — METHYLPREDNISOLONE SODIUM SUCCINATE 125 MG: 125 INJECTION, POWDER, FOR SOLUTION INTRAMUSCULAR; INTRAVENOUS at 13:47

## 2023-03-12 RX ADMIN — Medication 1750 MG: at 18:24

## 2023-03-12 RX ADMIN — POTASSIUM CHLORIDE AND SODIUM CHLORIDE 100 ML/HR: 900; 150 INJECTION, SOLUTION INTRAVENOUS at 18:23

## 2023-03-12 RX ADMIN — IPRATROPIUM BROMIDE AND ALBUTEROL SULFATE 3 ML: 2.5; .5 SOLUTION RESPIRATORY (INHALATION) at 13:20

## 2023-03-12 RX ADMIN — GUAIFENESIN 1200 MG: 600 TABLET, EXTENDED RELEASE ORAL at 21:13

## 2023-03-12 RX ADMIN — POTASSIUM CHLORIDE 20 MEQ: 14.9 INJECTION, SOLUTION INTRAVENOUS at 15:45

## 2023-03-12 NOTE — ED PROVIDER NOTES
Subjective   History of Present Illness  With a history of pulmonary fibrosis came the ER with hypoxia fever and tachycardia he is not on home oxygen.  Has not been feeling well.    Fever  Temp source:  Subjective  Severity:  Moderate  Onset quality:  Gradual  Timing:  Constant  Progression:  Worsening  Chronicity:  New  Relieved by:  Nothing  Worsened by:  Nothing  Ineffective treatments:  None tried  Associated symptoms: chills, congestion and cough    Associated symptoms: no chest pain, no confusion, no dysuria, no ear pain, no headaches, no nausea, no rash, no rhinorrhea, no somnolence and no vomiting    Risk factors: no contaminated food, no contaminated water, no immunosuppression and no sick contacts    Dizziness  Associated symptoms: no chest pain, no headaches, no nausea, no vomiting and no weakness        Review of Systems   Constitutional: Positive for chills and fever.   HENT: Positive for congestion. Negative for ear pain and rhinorrhea.    Respiratory: Positive for cough.    Cardiovascular: Negative for chest pain.   Gastrointestinal: Negative.  Negative for abdominal distention, abdominal pain, nausea and vomiting.   Endocrine: Negative.    Genitourinary: Negative.  Negative for dysuria.   Musculoskeletal: Negative.  Negative for back pain and neck pain.   Skin: Negative for color change, pallor and rash.   Neurological: Positive for dizziness. Negative for syncope, weakness, light-headedness, numbness and headaches.   Hematological: Negative.  Does not bruise/bleed easily.   Psychiatric/Behavioral: Negative for confusion.   All other systems reviewed and are negative.      Past Medical History:   Diagnosis Date   • Anemia    • Arthritis    • Colon polyp    • Erythematous condition    • Hypertension    • Liver disease    • CANNON (nonalcoholic steatohepatitis)    • Pneumonia    • Pulmonary fibrosis (HCC)    • Shortness of breath        No Known Allergies    Past Surgical History:   Procedure Laterality  Date   • CATARACT EXTRACTION, BILATERAL      Summer of 2019   • CHOLECYSTECTOMY     • COLONOSCOPY  03/05/2013   • COLONOSCOPY N/A 3/16/2018    Procedure: COLONOSCOPY WITH ANESTHESIA;  Surgeon: Jemal Batista MD;  Location: [facility] ENDOSCOPY;  Service: Gastroenterology   • ENDOSCOPY AND COLONOSCOPY  06/24/1995   • TONSILLECTOMY         Family History   Problem Relation Age of Onset   • Colon polyps Father    • Colon cancer Neg Hx        Social History     Socioeconomic History   • Marital status:    Tobacco Use   • Smoking status: Never   • Smokeless tobacco: Never   Substance and Sexual Activity   • Alcohol use: No   • Drug use: No   • Sexual activity: Defer           Objective   Physical Exam  Vitals and nursing note reviewed. Exam conducted with a chaperone present.   Constitutional:       General: He is not in acute distress.     Appearance: Normal appearance. He is well-developed. He is ill-appearing. He is not toxic-appearing.   HENT:      Head: Normocephalic and atraumatic.      Nose: Nose normal.      Mouth/Throat:      Mouth: Mucous membranes are moist.      Pharynx: Uvula midline.   Eyes:      General: Lids are normal. Lids are everted, no foreign bodies appreciated.      Conjunctiva/sclera: Conjunctivae normal.      Pupils: Pupils are equal, round, and reactive to light.   Neck:      Vascular: Normal carotid pulses. No carotid bruit or JVD.      Trachea: Trachea and phonation normal. No tracheal deviation.   Cardiovascular:      Rate and Rhythm: Regular rhythm. Tachycardia present.      Chest Wall: PMI is not displaced.      Pulses: Normal pulses.      Heart sounds: Normal heart sounds.     No gallop.   Pulmonary:      Effort: Pulmonary effort is normal. Tachypnea present. No accessory muscle usage or respiratory distress.      Breath sounds: No stridor. Examination of the right-lower field reveals decreased breath sounds and rales. Examination of the left-lower field reveals decreased breath  sounds and rales. Decreased breath sounds and rales present. No wheezing or rhonchi.   Abdominal:      General: Bowel sounds are normal. There is no distension.      Palpations: Abdomen is soft.      Tenderness: There is no abdominal tenderness.   Musculoskeletal:         General: No swelling. Normal range of motion.      Cervical back: Full passive range of motion without pain, normal range of motion and neck supple. No rigidity.      Comments: Lower extremity exam bilaterally is unremarkable.  There is no right or left calf tenderness .  There is no palpable venous cord.  No obvious difference in the size of the legs.  No pitting edema.  The dorsalis pedis and posterior tibial femoral and popliteal pulses are palpable and +2 bilaterally.  Homans sign is negative   Skin:     General: Skin is warm and dry.      Capillary Refill: Capillary refill takes 2 to 3 seconds.      Coloration: Skin is not jaundiced or pale.      Nails: There is no clubbing.   Neurological:      General: No focal deficit present.      Mental Status: He is alert and oriented to person, place, and time.      GCS: GCS eye subscore is 4. GCS verbal subscore is 5. GCS motor subscore is 6.      Cranial Nerves: No cranial nerve deficit.      Motor: Motor function is intact.      Gait: Gait normal.      Deep Tendon Reflexes: Reflexes are normal and symmetric. Reflexes normal.   Psychiatric:         Speech: Speech normal.         Behavior: Behavior normal.         Procedures           ED Course  ED Course as of 03/12/23 1705   Sun Mar 12, 2023   1654 CT scan report is pending I do not see any obvious evidence of PE he is got a pneumonia and pulmonary fibrosis [TS]   1700 No evidence of pulmonary thromboembolic disease. The thoracic aorta  and great vessels are normal in appearance.  2. Rather extensive right upper lobe airspace consolidation with air  bronchograms. There is also bilateral lower lobe airspace disease with  diffuse groundglass opacity  within both lungs. FINDINGS consistent with  bilateral pneumonia. There is superimposed pulmonary fibrosis within the  lower lobes. No effusions are present.  3. Mildly enlarged right paratracheal nodes may be reactive in nature.  No axillary adenopathy. Mild coronary calcifications are present.  This CT finding was discussed with the patient [TS]      ED Course User Index  [TS] Dominic Saavedra MD                                           Medical Decision Making  Patient with fever and cough with a history of pulmonary fibrosis we will get lab work-up  Differential Diagnosis:  I considered pulmonary etiology, asthma, chronic obstructive pulmonary disease, pneumonia, pulmonary embolism, adult respiratory distress syndrome, pneumothorax, pleural effusion, pulmonary fibrosis, cardiac etiology, congestive heart failure, myocardial infarction, metabolic etiology, diabetic ketoacidosis, uremia, acidosis, sepsis, anemia, drug related etiology, hyperventilation and CNS disease as a possible cause of dyspnea in this patient. This is a partial list of diagnoses considered.         Hypokalemia: acute illness or injury     Details: Patient was given potassium for that.  Hypoxic: acute illness or injury     Details: Acute hypoxia not on home oxygen.  CT scan of the chest was obtained.  And chest x-ray reveals pneumonia.  Was given IV antibiotics and supplemental oxygen.  Oxygen saturation improved.  Patient has got acute pneumonia with a curb 65 score of 2  Pneumonia due to infectious organism, unspecified laterality, unspecified part of lung: acute illness or injury     Details: .  Patient has got pneumonia with a curb 65 score of 2.  Was given IV Zosyn  Pulmonary fibrosis (HCC): chronic illness or injury     Details: History of pulmonary fibrosis  Troponin I above reference range: acute illness or injury     Details: This probably is type II elevation.  Amount and/or Complexity of Data Reviewed  Labs: ordered.     Details: Lab  work-up was reviewed  Radiology: ordered.     Details: CT scans reviewed  ECG/medicine tests: ordered and independent interpretation performed.     Details: sinus tach lafb  Discussion of management or test interpretation with external provider(s): Discussed with the hospitalist    Risk  OTC drugs.  Prescription drug management.    Risk Details: Patient well score 4.5 CT of the chest is negative for PE curb 65 score is 2 his neutrophil to lymphocyte ratio is 12 signifying moderate stress.  With a 0 qSOFA score.  Will be admitted to the medicine service IV antibiotics have been given he feels much better oxygen saturation maintaining.        Final diagnoses:   Pneumonia due to infectious organism, unspecified laterality, unspecified part of lung   Hypoxic   Pulmonary fibrosis (HCC)   Hypokalemia   Troponin I above reference range       ED Disposition  ED Disposition     ED Disposition   Decision to Admit    Condition   --    Comment   Level of Care: Telemetry [5]   Diagnosis: Pneumonia due to infectious organism, unspecified laterality, unspecified part of lung [[id number]]   Admitting Physician: LEDY GARG [[id number]]   Attending Physician: LEDY GARG [[id number]]   Certification: I Certify That Inpatient Hospital Services Are Medically Necessary For Greater Than 2 Midnights               No follow-up provider specified.       Medication List      No changes were made to your prescriptions during this visit.          Dominic Saavedra MD  03/12/23 1313       Dominic Saavedra MD  03/12/23 1316       Dominic Saavedra MD  03/12/23 1641       Dominic Saavedra MD  03/12/23 1705

## 2023-03-12 NOTE — PROGRESS NOTES
"Pharmacy Dosing Service  Pharmacokinetics  Vancomycin Initial Evaluation    Assessment/Action/Plan:  Loading dose: 1750 mg IV once ordered for 3/12 at 18:00  Current Order: Vancomycin 750 mg IVPB every 12 hours  Current end date:3/17/23  Levels: None at this time  Additional antimicrobial agent(s): Cefepime    Vancomycin dosage initiated based on population pharmacokinetic parameters. Pharmacy will continue to follow daily and adjust dose accordingly.     AUC Model Data:  Loading dose: 1750 mg at 18:00 03/12/2023.  Regimen: 750 mg IV every 12 hours.  Start time: 17:15 on 03/12/2023  Exposure target: AUC24 (range)400-600 mg/L.hr   AUC24,ss: 481 mg/L.hr  PAUC*: 70 %  Ctrough,ss: 16.7 mg/L  Pconc*: 33 %  Tox.: 12 %    Subjective:  Reid Morrell is a 72 y.o. male with a Vancomycin \"Pharmacy to Dose\" consult for the treatment of Pneumonia , day 1 of 5 of treatment.    Objective:  Ht: 180.3 cm (71\"); Wt: 99.8 kg (220 lb)  Estimated Creatinine Clearance: 63.4 mL/min (by C-G formula based on SCr of 1.25 mg/dL).   Creatinine   Date Value Ref Range Status   03/12/2023 1.25 0.76 - 1.27 mg/dL Final   06/07/2022 1.1 0.5 - 1.2 mg/dL Final   12/01/2021 1.1 0.5 - 1.2 mg/dL Final   06/01/2021 1.1 0.5 - 1.2 mg/dL Final      Lab Results   Component Value Date    WBC 21.66 (H) 03/12/2023    WBC 5.8 06/07/2022    WBC 5.5 06/01/2021      Baseline culture results:  Microbiology Results (last 10 days)       Procedure Component Value - Date/Time    COVID PRE-OP / PRE-PROCEDURE SCREENING ORDER (NO ISOLATION) - Swab, Nasal Cavity [[id number]]  (Normal) Collected: 03/12/23 1319    Lab Status: Final result Specimen: Swab from Nasal Cavity Updated: 03/12/23 1351    Narrative:      The following orders were created for panel order COVID PRE-OP / PRE-PROCEDURE SCREENING ORDER (NO ISOLATION) - Swab, Nasal Cavity.  Procedure                               Abnormality         Status                     ---------                               " -----------         ------                     COVID-19 and FLU A/B PCR...[[id number]]  Normal              Final result                 Please view results for these tests on the individual orders.    COVID-19 and FLU A/B PCR - Swab, Nasopharynx [[id number]]  (Normal) Collected: 03/12/23 1319    Lab Status: Final result Specimen: Swab from Nasopharynx Updated: 03/12/23 1358     COVID19 Not Detected     Influenza A PCR Not Detected     Influenza B PCR Not Detected    Narrative:      Fact sheet for providers: https://www.fda.gov/media/188236/download    Fact sheet for patients: https://www.fda.gov/media/540868/download    Test performed by PCR.          Antimicrobials:  Anti-Infectives (From admission, onward)      Ordered     Dose/Rate Route Frequency Start Stop    03/12/23 1717  vancomycin 750 mg/250 mL 0.9% NS IVPB ([facility])        Ordering Provider: Han Whitt DO   See Hyperspace for full Linked Orders Report.    750 mg Intravenous Every 12 Hours 03/13/23 0400 03/17/23 1559    03/12/23 1711  cefepime (MAXIPIME) 2 g/100 mL 0.9% NS ([facility])        Ordering Provider: Han Whitt DO    2 g  over 4 Hours Intravenous Every 8 Hours 03/13/23 0100 03/18/23 0059    03/12/23 1711  cefepime (MAXIPIME) 2 g/100 mL 0.9% NS (mbp)        Ordering Provider: Han Whitt DO    2 g  200 mL/hr over 30 Minutes Intravenous Once 03/12/23 1800      03/12/23 1717  vancomycin 1750 mg/500 mL 0.9% NS IVPB ([facility])        Ordering Provider: Han Whitt DO   See Hyperspace for full Linked Orders Report.    1,750 mg Intravenous Once 03/12/23 1800      03/12/23 1711  Pharmacy to dose vancomycin        Ordering Provider: Han Whitt DO     Does not apply Continuous PRN 03/12/23 1711 03/17/23 1710    03/12/23 1313  piperacillin-tazobactam (ZOSYN) 4.5 g in iso-osmotic dextrose 100 mL IVPB (premix)        Ordering Provider: Dominic Saavedra MD    4.5 g  over 30 Minutes Intravenous Once 03/12/23 1315 03/12/23 1457            Chad BAPTISTE  William PharmD  03/12/23 17:18 CDT

## 2023-03-12 NOTE — H&P
[facility] Medicine Services  HISTORY AND PHYSICAL    Date of Admission: 3/12/2023  Primary Care Physician: Reid Islas MD    Subjective   Primary Historian: Patient.     Chief Complaint: Shortness of breath, fever.     History of Present Illness  This is a 72-year-old  gentleman who resides in [place], [place].  He sees Dr. Rich Islas for primary care.  He has a history of pulmonary fibrosis and follows with Dr. Nash Pisano with pulmonary.  He comes into the emergency department today with increasing shortness of breath, cough, and recent fever.  Work-up is consistent with bilateral pneumonia most pronounced in the right upper lobe.  He also meets criteria for sepsis.  He had a fever of 102 when he came in.  He has tested negative for COVID-19 and influenza.  We have been asked to admit him for further work-up and treatment.    He states that he for started to feel ill on Friday afternoon.  He drives a schoolbus.  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  He started to feel short of breath.  He first ran fever on Friday night according to his wife.  His temperature was nearly 102.  He had a fever of 103 last night that she treated him with ibuprofen.  He was afebrile this morning, but felt worse.  He has had a dry cough.    He does not normally wear oxygen, but came in with room air sat of 88%.  He is comfortable and has good saturations on 2 L of oxygen at present.    He denies nausea or vomiting.  No diarrhea.  No difficulty with urinating.    No chest pain.    He states that he is up-to-date with all vaccinations including COVID-19 and Pneumovax.    Review of Systems   Otherwise complete ROS reviewed and negative except as mentioned in the HPI.    Past Medical History:   Past Medical History:   Diagnosis Date   • Anemia    • Arthritis    • Colon polyp    • Erythematous condition    • Hypertension    • Liver  disease    • CANNON (nonalcoholic steatohepatitis)    • Pneumonia    • Pulmonary fibrosis (HCC)    • Shortness of breath      Past Surgical History:  Past Surgical History:   Procedure Laterality Date   • CATARACT EXTRACTION, BILATERAL      Summer of 2019   • CHOLECYSTECTOMY     • COLONOSCOPY  03/05/2013   • COLONOSCOPY N/A 3/16/2018    Procedure: COLONOSCOPY WITH ANESTHESIA;  Surgeon: Jemal Batista MD;  Location: [facility] ENDOSCOPY;  Service: Gastroenterology   • ENDOSCOPY AND COLONOSCOPY  06/24/1995   • TONSILLECTOMY       Social History:  reports that he has never smoked. He has never used smokeless tobacco. He reports that he does not drink alcohol and does not use drugs.    Family History: family history includes Colon polyps in his father.       Allergies:  No Known Allergies    Medications:  Prior to Admission medications    Medication Sig Start Date End Date Taking? Authorizing Provider   amLODIPine (NORVASC) 10 MG tablet Take 1 tablet by mouth Daily.   Yes Darinel Orourke MD   colestipol (COLESTID) 1 g tablet Take 1 tablet by mouth As Needed.   Yes Darinel Orourke MD   hydrochlorothiazide (HYDRODIURIL) 25 MG tablet Take 1 tablet by mouth Daily.   Yes Emergency, Nurse MARAL Yousif   losartan (COZAAR) 100 MG tablet Take 1 tablet by mouth Daily.   Yes Emergency, Nurse Epic, RN   meloxicam (MOBIC) 15 MG tablet Take 1 tablet by mouth Daily.   Yes Emergency, Nurse MARAL Yousif   Omega-3 Fatty Acids (FISH OIL) 1000 MG capsule capsule Take  by mouth Daily With Breakfast.   Yes Darinel Orourke MD   sildenafil (VIAGRA) 100 MG tablet Take 1 tablet by mouth Daily As Needed for Erectile Dysfunction.   Yes Darinel Orourke MD   vardenafil (LEVITRA) 20 MG tablet Take 1 tablet by mouth As Needed. 5/31/19   Darinel Orourke MD     I have utilized all available immediate resources to obtain, update, or review the patient's current medications (including all prescriptions, over-the-counter products,  "herbals, cannabis/cannabidiol products, and vitamin/mineral/dietary (nutritional) supplements).    Objective     Vital Signs: /78   Pulse 114   Temp (!) 100.6 °F (38.1 °C) (Oral)   Resp 16   Ht 180.3 cm (71\")   Wt 99.8 kg (220 lb)   SpO2 93%   BMI 30.68 kg/m²   Physical Exam  Constitutional:       Appearance: He is obese. He is ill-appearing. He is not toxic-appearing or diaphoretic.      Comments: Up in bed.  Seen and discussed with his wife.   HENT:      Head: Normocephalic and atraumatic.   Eyes:      Conjunctiva/sclera: Conjunctivae normal.      Pupils: Pupils are equal, round, and reactive to light.   Neck:      Vascular: No JVD.   Cardiovascular:      Rate and Rhythm: Regular rhythm. Tachycardia present.      Heart sounds: Normal heart sounds.      Comments: Slight sinus tachycardia.  Pulmonary:      Effort: Pulmonary effort is normal. No respiratory distress.      Breath sounds: Rhonchi (R>L) present. No wheezing or rales.      Comments: On 2 L of oxygen.  Chest:      Chest wall: No tenderness.   Abdominal:      General: Bowel sounds are normal. There is no distension.      Palpations: Abdomen is soft.      Tenderness: There is no abdominal tenderness.   Musculoskeletal:         General: No tenderness or deformity. Normal range of motion.      Cervical back: Neck supple.   Skin:     General: Skin is warm and dry.      Findings: No rash.   Neurological:      Mental Status: He is alert and oriented to person, place, and time.      Cranial Nerves: No cranial nerve deficit.      Motor: No abnormal muscle tone.      Deep Tendon Reflexes: Reflexes normal.   Psychiatric:         Mood and Affect: Mood normal.         Behavior: Behavior normal.         Thought Content: Thought content normal.         Judgment: Judgment normal.        Results Reviewed:  Lab Results (last 24 hours)     Procedure Component Value Units Date/Time    Blood Culture - Blood, Arm, Right [[id number]] Collected: 03/12/23 1314    " Specimen: Blood from Arm, Right Updated: 03/12/23 1707    STAT Lactic Acid, Reflex [[id number]] Collected: 03/12/23 1643    Specimen: Blood Updated: 03/12/23 1647    Blood Culture - Blood, Arm, Right [[id number]] Collected: 03/12/23 1342    Specimen: Blood from Arm, Right Updated: 03/12/23 1401    COVID PRE-OP / PRE-PROCEDURE SCREENING ORDER (NO ISOLATION) - Swab, Nasal Cavity [[id number]]  (Normal) Collected: 03/12/23 1319    Specimen: Swab from Nasal Cavity Updated: 03/12/23 1358    Narrative:      The following orders were created for panel order COVID PRE-OP / PRE-PROCEDURE SCREENING ORDER (NO ISOLATION) - Swab, Nasal Cavity.  Procedure                               Abnormality         Status                     ---------                               -----------         ------                     COVID-19 and FLU A/B PCR...[[id number]]  Normal              Final result                 Please view results for these tests on the individual orders.    COVID-19 and FLU A/B PCR - Swab, Nasopharynx [[id number]]  (Normal) Collected: 03/12/23 1319    Specimen: Swab from Nasopharynx Updated: 03/12/23 1358     COVID19 Not Detected     Influenza A PCR Not Detected     Influenza B PCR Not Detected    Narrative:      Fact sheet for providers: https://www.fda.gov/media/205180/download    Fact sheet for patients: https://www.fda.gov/media/335825/download    Test performed by PCR.    Procalcitonin [[id number]]  (Abnormal) Collected: 03/12/23 1314    Specimen: Blood Updated: 03/12/23 1355     Procalcitonin 4.43 ng/mL     Narrative:      As a Marker for Sepsis (Non-Neonates):    1. <0.5 ng/mL represents a low risk of severe sepsis and/or septic shock.  2. >2 ng/mL represents a high risk of severe sepsis and/or septic shock.    As a Marker for Lower Respiratory Tract Infections that require antibiotic therapy:    PCT on Admission    Antibiotic Therapy       6-12 Hrs later    >0.5                Strongly Recommended  >0.25 - <0.5         "Recommended   0.1 - 0.25          Discouraged              Remeasure/reassess PCT  <0.1                Strongly Discouraged     Remeasure/reassess PCT    As 28 day mortality risk marker: \"Change in Procalcitonin Result\" (>80% or <=80%) if Day 0 (or Day 1) and Day 4 values are available. Refer to http://[email or web address] [place]-pct-calculator.com    Change in PCT <=80%  A decrease of PCT levels below or equal to 80% defines a positive change in PCT test result representing a higher risk for 28-day all-cause mortality of patients diagnosed with severe sepsis for septic shock.    Change in PCT >80%  A decrease of PCT levels of more than 80% defines a negative change in PCT result representing a lower risk for 28-day all-cause mortality of patients diagnosed with severe sepsis or septic shock.       Lactic Acid, Plasma [[id number]]  (Abnormal) Collected: 03/12/23 1314    Specimen: Blood Updated: 03/12/23 1355     Lactate 2.8 mmol/L     C-reactive Protein [[id number]]  (Abnormal) Collected: 03/12/23 1314    Specimen: Blood Updated: 03/12/23 1353     C-Reactive Protein 34.76 mg/dL     Basic Metabolic Panel [[id number]]  (Abnormal) Collected: 03/12/23 1314    Specimen: Blood Updated: 03/12/23 1350     Glucose 143 mg/dL      BUN 33 mg/dL      Creatinine 1.25 mg/dL      Sodium 135 mmol/L      Potassium 3.1 mmol/L      Comment: Slight hemolysis detected by analyzer. Results may be affected.        Chloride 95 mmol/L      CO2 27.0 mmol/L      Calcium 8.9 mg/dL      BUN/Creatinine Ratio 26.4     Anion Gap 13.0 mmol/L      eGFR 60.8 mL/min/1.73     Narrative:      GFR Normal >60  Chronic Kidney Disease <60  Kidney Failure <15    The GFR formula is only valid for adults with stable renal function between ages 18 and 70.    BNP [[id number]]  (Abnormal) Collected: 03/12/23 1314    Specimen: Blood Updated: 03/12/23 1346     proBNP 1,748.0 pg/mL     Narrative:      Among patients with dyspnea, NT-proBNP is highly sensitive for the detection of acute " "congestive heart failure. In addition NT-proBNP of <300 pg/ml effectively rules out acute congestive heart failure with 99% negative predictive value.    Results may be falsely decreased if patient taking Biotin.      Single High Sensitivity Troponin T [[id number]]  (Abnormal) Collected: 03/12/23 1314    Specimen: Blood Updated: 03/12/23 1345     HS Troponin T 23 ng/L     Narrative:      High Sensitive Troponin T Reference Range:  <10.0 ng/L- Negative Female for AMI  <15.0 ng/L- Negative Male for AMI  >=10 - Abnormal Female indicating possible myocardial injury.  >=15 - Abnormal Male indicating possible myocardial injury.   Clinicians would have to utilize clinical acumen, EKG, Troponin, and serial changes to determine if it is an Acute Myocardial Infarction or myocardial injury due to an underlying chronic condition.         Magnesium [[id number]]  (Normal) Collected: 03/12/23 1314    Specimen: Blood Updated: 03/12/23 1344     Magnesium 1.9 mg/dL     D-dimer, Quantitative [[id number]]  (Abnormal) Collected: 03/12/23 1314    Specimen: Blood Updated: 03/12/23 1342     D-Dimer, Quantitative 1.46 MCGFEU/mL     Narrative:      According to the assay 's published package insert, a normal (<0.50 MCGFEU/mL) D-dimer result in conjunction with a non-high clinical probability assessment, excludes deep vein thrombosis (DVT) and pulmonary embolism (PE) with high sensitivity.    D-dimer values increase with age and this can make VTE exclusion of an older population difficult. To address this, the [employer] College [employer], based on best available evidence and recent guidelines, recommends that clinicians use age-adjusted D-dimer thresholds in patients greater than 50 years of age with: a) a low probability of PE who do not meet all Pulmonary Embolism Rule Out Criteria, or b) in those with intermediate probability of PE.   The formula for an age-adjusted D-dimer cut-off is \"age/100\".  For example, a 60 year old " patient would have an age-adjusted cut-off of 0.60 MCGFEU/mL and an 80 year old 0.80 MCGFEU/mL.    CBC & Differential [[id number]]  (Abnormal) Collected: 03/12/23 1314    Specimen: Blood Updated: 03/12/23 1329    Narrative:      The following orders were created for panel order CBC & Differential.  Procedure                               Abnormality         Status                     ---------                               -----------         ------                     CBC Auto Differential[[id number]]        Abnormal            Final result                 Please view results for these tests on the individual orders.    CBC Auto Differential [[id number]]  (Abnormal) Collected: 03/12/23 1314    Specimen: Blood Updated: 03/12/23 1329     WBC 21.66 10*3/mm3      RBC 5.04 10*6/mm3      Hemoglobin 15.1 g/dL      Hematocrit 45.3 %      MCV 89.9 fL      MCH 30.0 pg      MCHC 33.3 g/dL      RDW 14.0 %      RDW-SD 46.2 fl      MPV 9.1 fL      Platelets 187 10*3/mm3      Neutrophil % 92.1 %      Lymphocyte % 2.5 %      Monocyte % 3.8 %      Eosinophil % 0.0 %      Basophil % 0.3 %      Immature Grans % 1.3 %      Neutrophils, Absolute 19.94 10*3/mm3      Lymphocytes, Absolute 0.54 10*3/mm3      Monocytes, Absolute 0.82 10*3/mm3      Eosinophils, Absolute 0.00 10*3/mm3      Basophils, Absolute 0.07 10*3/mm3      Immature Grans, Absolute 0.29 10*3/mm3      nRBC 0.0 /100 WBC     Blood Gas, Arterial - [[id number]]  (Abnormal) Collected: 03/12/23 1327    Specimen: Arterial Blood Updated: 03/12/23 1326     Site Right Radial     Leo's Test Positive     pH, Arterial 7.539 pH units      Comment: 83 Value above reference range        pCO2, Arterial 34.0 mm Hg      Comment: 84 Value below reference range        pO2, Arterial 62.8 mm Hg      Comment: 84 Value below reference range        HCO3, Arterial 28.9 mmol/L      Comment: 83 Value above reference range        Base Excess, Arterial 6.5 mmol/L      Comment: 83 Value above reference  range        O2 Saturation, Arterial 94.9 %      Temperature 37.0 C      Barometric Pressure for Blood Gas 747 mmHg      Modality Nasal Cannula     Flow Rate 2.0 lpm      Ventilator Mode NA     Collected by [id number]     Comment: Meter: [id number]     :  [id number]        pCO2, Temperature Corrected 34.0 mm Hg      pH, Temp Corrected 7.539 pH Units      pO2, Temperature Corrected 62.8 mm Hg         Imaging Results (Last 24 Hours)     Procedure Component Value Units Date/Time    CT Angiogram Chest [[id number]] Collected: 03/12/23 1644     Updated: 03/12/23 1656    Narrative:      Exam: CT angiogram of the of the chest with intravenous contrast.     CLINICAL HISTORY:  Shortness of breath. Suspected pulmonary embolus.     DOSE:  250 mGycm. All CT scans are performed using dose optimization  techniques as appropriate to the performed exam and including at least  one of the following: Automated exposure control, adjustment of the mA  and/or kV according to size, and the use of the iterative reconstruction  technique.     TECHNIQUE: Contiguous axial images were obtained through the thorax  following intravenous contrast administration with reformatted images  obtained in the sagittal and coronal projections from the original data  set. Three-dimensional reconstructions are also obtained.     COMPARISON:  12/16/2022     FINDINGS:     Pulmonary arteries:  There is normal enhancement of the pulmonary  arteries with no evidence of pulmonary thromboembolic disease.     Aorta:  The thoracic aorta and proximal great vessels are normal in  appearance. There is no evidence of aortic dissection or aneurysm.     LUNGS:  There is groundglass disease throughout both lungs. There is  extensive right upper lobe as well as bilateral lower lobe pneumonia     Pleural spaces: No effusions are demonstrated. There is no pneumothorax.     HEART: There is mild cardiomegaly. No evidence of pericardial effusion  or elevated right heart  pressure. Mild coronary calcifications are  present.     Bones: Multiple old left-sided rib fractures are present. No acute or  suspicious bony abnormalities are present.     CHEST WALL: No chest wall abnormalities are demonstrated.     Lymph nodes: There are some prominent right paratracheal nodes including  a 1.4 cm short axis level 4R right paratracheal node. These may be  reactive in nature. No axillary lymphadenopathy.     Upper abdomen: The gallbladder is surgically absent. There is no biliary  dilatation present. Limited images of the upper abdomen are otherwise  unremarkable.       Impression:      1. No evidence of pulmonary thromboembolic disease. The thoracic aorta  and great vessels are normal in appearance.  2. Rather extensive right upper lobe airspace consolidation with air  bronchograms. There is also bilateral lower lobe airspace disease with  diffuse groundglass opacity within both lungs. FINDINGS consistent with  bilateral pneumonia. There is superimposed pulmonary fibrosis within the  lower lobes. No effusions are present.  3. Mildly enlarged right paratracheal nodes may be reactive in nature.  No axillary adenopathy. Mild coronary calcifications are present.  This report was finalized on 03/12/2023 16:53 by Dr. Kenneth Cox MD.    XR Chest 1 View [[id number]] Collected: 03/12/23 1402     Updated: 03/12/23 1407    Narrative:      EXAMINATION: XR CHEST 1 VW- 3/12/2023 2:02 PM CDT     HISTORY: Shortness of air     REPORT: A frontal view the chest was obtained.     COMPARISON: Chest CT 12/16/2022.     There is moderate volume loss in the lung bases as well as mild  elevation of the right hemidiaphragm. There is a focal masslike  infiltrate in the right midlung zone which is new compared with the  previous chest CTA. This is superimposed on a background of pulmonary  fibrosis, which is better demonstrated on previous high-resolution chest  CT. Heart size appears normal. No pneumothorax or pleural  effusion is  identified.       Impression:      Masslike right midlung infiltrate likely related to acute  pneumonia. This is superimposed on a background of pulmonary fibrosis as  demonstrated on previous high-resolution chest CT. Repeat chest x-rays  are recommended after appropriate antibiotic therapy.  This report was finalized on 03/12/2023 14:04 by Dr. Tello Guerrero MD.        I have personally reviewed and interpreted the radiology studies and ECG obtained at time of admission.     Assessment / Plan   Assessment:   Active Hospital Problems    Diagnosis    • **Bilateral pneumonia    • Sepsis due to pneumonia (HCC)    • Hypoxia    • Pulmonary fibrosis (HCC)    • Hyperlactatemia    • Obesity (BMI 30-39.9)    • Essential hypertension    • Hypokalemia    • Elevated troponin level not due to acute coronary syndrome      Treatment Plan  The patient will be admitted to my service here at [facility].  He has a history of pulmonary fibrosis that is followed by Dr. Pisano.  He presents to the emergency department with complaints of shortness of breath, cough, and fever.  He has been having difficulty with this since Friday.  He has been found to have bilateral pneumonia that appears to be much worse with regards to the right upper lobe.  He was given inhaled bronchodilators, steroids, and first dose antibiotics in the ER.  He is ultimately referred for admission.    He was given a dose of Zosyn in the emergency department.  I would like to continue with vancomycin and Zosyn for now.  He does not qualify for healthcare associated pneumonia, but given the degree of his pneumonic process and his pulmonary fibrosis I would like to cover more broadly for now. Check MRSA nasal screen.  Streptococcal and Legionella urinary antigens.  Sputum culture if able.  Monitor blood cultures to completion.  COVID-19 testing and influenza testing were negative.    Make efforts at pulmonary toilet.  Inhaled bronchodilators.   Incentive spirometry/Mucinex.  No further steroids are planned at present.  He was given a dose of IV Solu-Medrol by the emergency department provider.  Ask pulmonology to evaluate.    Reflex lactic acid improved.  He has an elevated CRP and procalcitonin.  He had a very slight troponin elevation that is likely secondary to his infectious process.    He was given potassium replacement in the emergency department.  Follow labs again in the morning.    Reconciled and resumed home medications as appropriate.    Lovenox for DVT prophylaxis.    Medical Decision Making  Number and Complexity of problems: 1 acute, highly complex problem in the form of his significant bilateral pneumonia causing sepsis.  Differential Diagnosis: None considered at present.    Conditions and Status        Condition is worsening.     [facility] Data  External documents reviewed: Reviewed prior [facility] records.   Cardiac tracing (EKG, telemetry) interpretation: Sinus tach at 114.   Radiology interpretation: Interpreted by radiology.  Also placed in image above.  Extensive right upper lobe pneumonia evident.  Also showed imaging to the patient and his wife.  Labs reviewed: As above.  Any tests that were considered but not ordered: None considered at present.     Decision rules/scores evaluated (example [id number], Wells, etc): CURB-65 is 2.      Discussed with: The patient.     Care Planning  Shared decision making: Consents to admission for work-up and treatment.  Code status and discussions: Full with full interventions.    Disposition  Social Determinants of Health that impact treatment or disposition: Lives with his wife.  Estimated length of stay is 2-3 days.     I confirmed that the patient's advanced care plan is present, code status is documented, and a surrogate decision maker is listed in the patient's medical record.     The patient's surrogate decision maker is his wife, Jaimie.     The patient was seen and examined by me on 03/12/23 at  1715.    Electronically signed by Han Whitt DO, 03/12/23, 17:48 CDT.

## 2023-03-13 LAB
ANION GAP SERPL CALCULATED.3IONS-SCNC: 12 MMOL/L (ref 5–15)
BASOPHILS # BLD AUTO: 0.02 10*3/MM3 (ref 0–0.2)
BASOPHILS # BLD AUTO: 0.04 10*3/MM3 (ref 0–0.2)
BASOPHILS NFR BLD AUTO: 0.2 % (ref 0–1.5)
BASOPHILS NFR BLD AUTO: 0.3 % (ref 0–1.5)
BUN SERPL-MCNC: 27 MG/DL (ref 8–23)
BUN/CREAT SERPL: 27.3 (ref 7–25)
CALCIUM SPEC-SCNC: 8.6 MG/DL (ref 8.6–10.5)
CHLORIDE SERPL-SCNC: 101 MMOL/L (ref 98–107)
CO2 SERPL-SCNC: 25 MMOL/L (ref 22–29)
CREAT SERPL-MCNC: 0.99 MG/DL (ref 0.76–1.27)
DEPRECATED RDW RBC AUTO: 44.8 FL (ref 37–54)
DEPRECATED RDW RBC AUTO: 46.5 FL (ref 37–54)
EGFRCR SERPLBLD CKD-EPI 2021: 80.4 ML/MIN/1.73
EOSINOPHIL # BLD AUTO: 0 10*3/MM3 (ref 0–0.4)
EOSINOPHIL # BLD AUTO: 0.03 10*3/MM3 (ref 0–0.4)
EOSINOPHIL NFR BLD AUTO: 0 % (ref 0.3–6.2)
EOSINOPHIL NFR BLD AUTO: 0.2 % (ref 0.3–6.2)
ERYTHROCYTE [DISTWIDTH] IN BLOOD BY AUTOMATED COUNT: 13.7 % (ref 12.3–15.4)
ERYTHROCYTE [DISTWIDTH] IN BLOOD BY AUTOMATED COUNT: 14 % (ref 12.3–15.4)
GLUCOSE SERPL-MCNC: 208 MG/DL (ref 65–99)
HCT VFR BLD AUTO: 39.6 % (ref 37.5–51)
HCT VFR BLD AUTO: 40.5 % (ref 37.5–51)
HGB BLD-MCNC: 13.4 G/DL (ref 13–17.7)
HGB BLD-MCNC: 13.6 G/DL (ref 13–17.7)
IMM GRANULOCYTES # BLD AUTO: 0.17 10*3/MM3 (ref 0–0.05)
IMM GRANULOCYTES # BLD AUTO: 0.46 10*3/MM3 (ref 0–0.05)
IMM GRANULOCYTES NFR BLD AUTO: 1.3 % (ref 0–0.5)
IMM GRANULOCYTES NFR BLD AUTO: 3.6 % (ref 0–0.5)
LYMPHOCYTES # BLD AUTO: 0.54 10*3/MM3 (ref 0.7–3.1)
LYMPHOCYTES # BLD AUTO: 0.72 10*3/MM3 (ref 0.7–3.1)
LYMPHOCYTES NFR BLD AUTO: 4.2 % (ref 19.6–45.3)
LYMPHOCYTES NFR BLD AUTO: 5.4 % (ref 19.6–45.3)
MCH RBC QN AUTO: 30 PG (ref 26.6–33)
MCH RBC QN AUTO: 30.5 PG (ref 26.6–33)
MCHC RBC AUTO-ENTMCNC: 33.1 G/DL (ref 31.5–35.7)
MCHC RBC AUTO-ENTMCNC: 34.3 G/DL (ref 31.5–35.7)
MCV RBC AUTO: 88.8 FL (ref 79–97)
MCV RBC AUTO: 90.8 FL (ref 79–97)
MONOCYTES # BLD AUTO: 0.46 10*3/MM3 (ref 0.1–0.9)
MONOCYTES # BLD AUTO: 0.67 10*3/MM3 (ref 0.1–0.9)
MONOCYTES NFR BLD AUTO: 3.6 % (ref 5–12)
MONOCYTES NFR BLD AUTO: 5 % (ref 5–12)
MRSA DNA SPEC QL NAA+PROBE: NORMAL
NEUTROPHILS NFR BLD AUTO: 11.31 10*3/MM3 (ref 1.7–7)
NEUTROPHILS NFR BLD AUTO: 11.72 10*3/MM3 (ref 1.7–7)
NEUTROPHILS NFR BLD AUTO: 87.8 % (ref 42.7–76)
NEUTROPHILS NFR BLD AUTO: 88.4 % (ref 42.7–76)
NRBC BLD AUTO-RTO: 0 /100 WBC (ref 0–0.2)
NRBC BLD AUTO-RTO: 0 /100 WBC (ref 0–0.2)
PLATELET # BLD AUTO: 166 10*3/MM3 (ref 140–450)
PLATELET # BLD AUTO: 190 10*3/MM3 (ref 140–450)
PMV BLD AUTO: 9.3 FL (ref 6–12)
PMV BLD AUTO: 9.5 FL (ref 6–12)
POTASSIUM SERPL-SCNC: 3.4 MMOL/L (ref 3.5–5.2)
RBC # BLD AUTO: 4.46 10*6/MM3 (ref 4.14–5.8)
RBC # BLD AUTO: 4.46 10*6/MM3 (ref 4.14–5.8)
SODIUM SERPL-SCNC: 138 MMOL/L (ref 136–145)
WBC NRBC COR # BLD: 12.79 10*3/MM3 (ref 3.4–10.8)
WBC NRBC COR # BLD: 13.35 10*3/MM3 (ref 3.4–10.8)

## 2023-03-13 PROCEDURE — 94664 DEMO&/EVAL PT USE INHALER: CPT

## 2023-03-13 PROCEDURE — 97161 PT EVAL LOW COMPLEX 20 MIN: CPT

## 2023-03-13 PROCEDURE — 85025 COMPLETE CBC W/AUTO DIFF WBC: CPT | Performed by: INTERNAL MEDICINE

## 2023-03-13 PROCEDURE — 25010000002 ENOXAPARIN PER 10 MG: Performed by: INTERNAL MEDICINE

## 2023-03-13 PROCEDURE — 25010000002 SODIUM CHLORIDE 0.9 % WITH KCL 20 MEQ 20-0.9 MEQ/L-% SOLUTION: Performed by: NURSE PRACTITIONER

## 2023-03-13 PROCEDURE — 85025 COMPLETE CBC W/AUTO DIFF WBC: CPT | Performed by: NURSE PRACTITIONER

## 2023-03-13 PROCEDURE — 25010000002 CEFEPIME PER 500 MG: Performed by: INTERNAL MEDICINE

## 2023-03-13 PROCEDURE — 94799 UNLISTED PULMONARY SVC/PX: CPT

## 2023-03-13 PROCEDURE — 87641 MR-STAPH DNA AMP PROBE: CPT | Performed by: INTERNAL MEDICINE

## 2023-03-13 PROCEDURE — 80048 BASIC METABOLIC PNL TOTAL CA: CPT | Performed by: INTERNAL MEDICINE

## 2023-03-13 PROCEDURE — 25010000002 VANCOMYCIN 10 G RECONSTITUTED SOLUTION: Performed by: INTERNAL MEDICINE

## 2023-03-13 PROCEDURE — 25010000002 SODIUM CHLORIDE 0.9 % WITH KCL 20 MEQ 20-0.9 MEQ/L-% SOLUTION: Performed by: INTERNAL MEDICINE

## 2023-03-13 RX ORDER — POTASSIUM CHLORIDE 750 MG/1
40 CAPSULE, EXTENDED RELEASE ORAL ONCE
Status: COMPLETED | OUTPATIENT
Start: 2023-03-13 | End: 2023-03-13

## 2023-03-13 RX ADMIN — POTASSIUM CHLORIDE AND SODIUM CHLORIDE 50 ML/HR: 900; 150 INJECTION, SOLUTION INTRAVENOUS at 20:21

## 2023-03-13 RX ADMIN — VANCOMYCIN HYDROCHLORIDE 750 MG: 10 INJECTION, POWDER, LYOPHILIZED, FOR SOLUTION INTRAVENOUS at 16:01

## 2023-03-13 RX ADMIN — IPRATROPIUM BROMIDE AND ALBUTEROL SULFATE 3 ML: 2.5; .5 SOLUTION RESPIRATORY (INHALATION) at 12:13

## 2023-03-13 RX ADMIN — IPRATROPIUM BROMIDE AND ALBUTEROL SULFATE 3 ML: 2.5; .5 SOLUTION RESPIRATORY (INHALATION) at 23:47

## 2023-03-13 RX ADMIN — GUAIFENESIN 1200 MG: 600 TABLET, EXTENDED RELEASE ORAL at 08:34

## 2023-03-13 RX ADMIN — CEFEPIME 2 G: 2 INJECTION, POWDER, FOR SOLUTION INTRAVENOUS at 00:10

## 2023-03-13 RX ADMIN — AMLODIPINE BESYLATE 10 MG: 10 TABLET ORAL at 08:34

## 2023-03-13 RX ADMIN — CEFEPIME 2 G: 2 INJECTION, POWDER, FOR SOLUTION INTRAVENOUS at 08:33

## 2023-03-13 RX ADMIN — POTASSIUM CHLORIDE 40 MEQ: 10 CAPSULE, COATED, EXTENDED RELEASE ORAL at 12:14

## 2023-03-13 RX ADMIN — Medication 10 ML: at 20:20

## 2023-03-13 RX ADMIN — Medication 10 ML: at 08:33

## 2023-03-13 RX ADMIN — VANCOMYCIN HYDROCHLORIDE 750 MG: 10 INJECTION, POWDER, LYOPHILIZED, FOR SOLUTION INTRAVENOUS at 04:20

## 2023-03-13 RX ADMIN — GUAIFENESIN 1200 MG: 600 TABLET, EXTENDED RELEASE ORAL at 20:20

## 2023-03-13 RX ADMIN — CEFEPIME 2 G: 2 INJECTION, POWDER, FOR SOLUTION INTRAVENOUS at 17:10

## 2023-03-13 RX ADMIN — ENOXAPARIN SODIUM 40 MG: 100 INJECTION SUBCUTANEOUS at 08:34

## 2023-03-13 RX ADMIN — IPRATROPIUM BROMIDE AND ALBUTEROL SULFATE 3 ML: 2.5; .5 SOLUTION RESPIRATORY (INHALATION) at 05:24

## 2023-03-13 RX ADMIN — IPRATROPIUM BROMIDE AND ALBUTEROL SULFATE 3 ML: 2.5; .5 SOLUTION RESPIRATORY (INHALATION) at 18:27

## 2023-03-13 RX ADMIN — POTASSIUM CHLORIDE AND SODIUM CHLORIDE 100 ML/HR: 900; 150 INJECTION, SOLUTION INTRAVENOUS at 04:20

## 2023-03-13 RX ADMIN — IPRATROPIUM BROMIDE AND ALBUTEROL SULFATE 3 ML: 2.5; .5 SOLUTION RESPIRATORY (INHALATION) at 00:04

## 2023-03-13 NOTE — CASE MANAGEMENT/SOCIAL WORK
Discharge Planning Assessment   Sol     Patient Name: Reid Morrell  MRN: [id number]  Today's Date: 3/13/2023    Admit Date: 3/12/2023        Discharge Needs Assessment     Row Name 03/13/23 1123       Living Environment    People in Home spouse    Current Living Arrangements home    Primary Care Provided by self    Provides Primary Care For no one    Family Caregiver if Needed spouse    Quality of Family Relationships helpful;involved;supportive    Able to Return to Prior Arrangements yes       Resource/Environmental Concerns    Resource/Environmental Concerns none       Food Insecurity    Within the past 12 months, you worried that your food would run out before you got the money to buy more. Never true    Within the past 12 months, the food you bought just didn't last and you didn't have money to get more. Never true       Transition Planning    Patient/Family Anticipates Transition to home    Patient/Family Anticipated Services at Transition none    Transportation Anticipated car, drives self;family or friend will provide       Discharge Needs Assessment    Readmission Within the Last 30 Days no previous admission in last 30 days    Equipment Currently Used at Home none    Concerns to be Addressed no discharge needs identified;denies needs/concerns at this time    Discharge Coordination/Progress Patient lives with his wife and plans to return home at time of discharge. Patient has PCP and Rx coverage. Patient says he is functionally independent and does not have any dc planning needs.                JESICA Gutierrez

## 2023-03-13 NOTE — CONSULTS
Yemi Parkinson MD      Subjective   LOS: 1 day     Thank you for this pulmonary consultation.  72-year-old male who follows with Dr. Ratliff for pulmonary fibrosis.  Patient has been diagnosed with pulmonary fibrosis for the last 10 to 15 years.  Etiology is uncertain.  Interestingly patient had severe pneumonia at age 18.  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  This past Friday he was very weak and unsteady with associated shortness of breath after finishing a schoolbus run.  The next day he developed fevers which became progressively higher.  He denies any cough or phlegm.  No chest pain.  Came into the ED yesterday and admitted after CT chest showed new dense consolidation in the right lung in addition to pulmonary fibrosis.  He was started on broad-spectrum antibiotics and now feels better.  Patient is a never smoker.  States that many years ago he had testing for lupus.  He was told that he is at risk of the same.  Does not sound like a actual diagnosis of lupus was ever made.    Reid Morrell  reports no history of alcohol use.,  reports that he has never smoked. He has never used smokeless tobacco.     Past Hx:  has a past medical history of Anemia, Arthritis, Colon polyp, Erythematous condition, Hypertension, Liver disease, CANNON (nonalcoholic steatohepatitis), Pneumonia, Pulmonary fibrosis (HCC), and Shortness of breath.  Surg Hx:  has a past surgical history that includes Cholecystectomy; Tonsillectomy; Colonoscopy (03/05/2013); endoscopy and colonoscopy (06/24/1995); Colonoscopy (N/A, 3/16/2018); and Cataract extraction, bilateral.  FH: family history includes Colon polyps in his father.  SH:  reports that he has never smoked. He has never used smokeless tobacco. He reports that he does not drink alcohol and does not use drugs.    Medications Prior to Admission   Medication Sig Dispense Refill Last Dose    • amLODIPine (NORVASC) 10 MG tablet Take 1 tablet by mouth Daily.      • colestipol (COLESTID) 1 g tablet Take 1 tablet by mouth As Needed.      • hydrochlorothiazide (HYDRODIURIL) 25 MG tablet Take 1 tablet by mouth Daily.      • losartan (COZAAR) 100 MG tablet Take 1 tablet by mouth Daily.      • meloxicam (MOBIC) 15 MG tablet Take 1 tablet by mouth Daily.      • Omega-3 Fatty Acids (FISH OIL) 1000 MG capsule capsule Take  by mouth Daily With Breakfast.      • sildenafil (VIAGRA) 100 MG tablet Take 1 tablet by mouth Daily As Needed for Erectile Dysfunction.      • B Complex-C (SUPER B COMPLEX PO) Take 1 tablet by mouth Daily.        No Known Allergies    Review of Systems   Constitutional: Positive for chills and fever.   HENT: Negative for congestion and sore throat.    Respiratory: Positive for shortness of breath. Negative for cough and wheezing.    Cardiovascular: Negative for chest pain and leg swelling.   Gastrointestinal: Negative for abdominal pain, nausea and vomiting.   Genitourinary: Negative for dysuria and hematuria.   Musculoskeletal: Negative for arthralgias and back pain.   Skin: Negative for pallor and rash.   Neurological: Negative for seizures and headaches.   Psychiatric/Behavioral: Negative for agitation and confusion.     Vital Signs past 24hrs  BP range: BP: (114-129)/(66-78) 129/71  Pulse range: Heart Rate:  [] 86  Resp rate range: Resp:  [12-18] 16  Temp range: Temp (24hrs), Av.7 °F (37.1 °C), Min:97.5 °F (36.4 °C), Max:102 °F (38.9 °C)    Oxygen range: SpO2:  [88 %-100 %] 93 %; Flow (L/min):  [0.5-2] 2;   Device (Oxygen Therapy): room air  98 kg (216 lb); Body mass index is 30.13 kg/m².  Intake & Output (last day)        0701   0700  0701   0700    P.O. 1040 300    IV Piggyback 1200     Total Intake(mL/kg) 2240 (22.9) 300 (3.1)    Urine (mL/kg/hr) 850 525 (1.2)    Stool 0     Total Output 850 525    Net +1390 -225          Stool Unmeasured Occurrence 1 x           Mechanical Ventilator:     Adult male who is sitting in a chair.  On room air.  No acute distress.  Pupils equal react light.  Oropharynx moist class II Mallampati airway.  No posterior pharyngeal discharge.  Nasopharynx without discharge septum midline.  JVP not elevated trachea midline thyroid not enlarged.  Lungs reveal bilateral air entry.  Rales noted in the lung bases more prominent right lung base than the left lung base.  Also in the right midlung zone posteriorly.  No wheezing.  Percussion note resonant chest expansion equal no chest wall deformity or tenderness.  Heart examination S1-S2 present rhythm regular no murmurs.  No edema lower extremities.  Abdomen is soft nontender bowel sounds present no liver spleen enlargement.  No peripheral cyanosis clubbing.  Moves all 4 extremities sensorimotor intact.  No cervical, axillary, inguinal adenopathy.    Results Review:    I have reviewed the laboratory and imaging data from current admission. My annotations are as noted in assessment and plan.  Result Review:  I have personally reviewed the results from the time of this admission to 3/13/2023 11:32 CDT and agree with these findings:  [x]  Laboratory list / accordion  [x]  Microbiology  [x]  Radiology  [x]  EKG/Telemetry   []  Cardiology/Vascular   []  Pathology  []  Old records  []  Other:        Medication Review:  I have reviewed the current MAR. My annotations are as noted in assessment and plan.    sodium chloride 0.9 % with KCl 20 mEq, 50 mL/hr, Last Rate: 50 mL/hr (03/13/23 1130)      Lines, Drains & Airways     Active LDAs     Name Placement date Placement time Site Days    Peripheral IV 03/12/23 1346 Right Antecubital 03/12/23  1346  Antecubital  less than 1    Peripheral IV 03/13/23 1129 Anterior;Left Forearm 03/13/23  1129  Forearm  less than 1              Diet Orders (active) (From admission, onward)     Start     Ordered    03/12/23 1719  Diet: Regular/House Diet; Texture: Regular Texture  (IDDSI 7); Fluid Consistency: Thin (IDDSI 0)  Diet Effective Now         03/12/23 0769              No active isolations    PCCM Problems  Right upper lobe dense consolidation  Bibasilar pulmonary infiltrates consistent with known pulmonary fibrosis and some progression  Positive HIRO in the past      Plan of Treatment  Agree with treatment for pneumonia with antibiotics.  Patient appears clinically better already.  Will need follow-up imaging.  Bibasilar infiltrates could be progression of known pulmonary fibrosis or due to acute pneumonia.  We will need to determine on follow-up CT.    Pulmonary fibrosis etiology uncertain.  Could be environmental as he worked in a rubber factory for many years.  Note positive HIRO in the past though only at 1:320.    Dr. Nash Pisano will see patient tomorrow.    Electronically signed by Yemi Parkinson MD, 03/13/23, 11:32 AM CDT.      Part of this note may be an electronic transcription/translation of spoken language to printed text using the Dragon Dictation System.

## 2023-03-13 NOTE — PAYOR COMM NOTE
"3/13/23. ATTENTION:::::::::::::::::::::::::::::::::[facility] INPATIENT PRE-CERT TEAM.    3/12/23. ER ADMIT TO INPATIENT ON 3/12/23. INPATIENT ORDER.    FACARMENG FOR INPATIENT REVIEW::::::::::::::::::::::::::::::::    ATTENTION:::::INKEYANT PRE-AUTHORIZATION TEAM.    INPATIENT ADMIT ON 3/12/23 ICD 10 CODE J18.9  PRIMARY INSURANCE   Payor: [employer] MEDICARE REPLACEMENT Plan: [employer]M MEDICARE ADVANTAGE   Group Number: [id number] Insurance Type: INDEMNITY   Subscriber Name: REID HEARN Subscriber : 1950   Subscriber ID: [id number] Coverage Address: [place] [address]   Pat. Rel. to Subscriber: Self Coverage Phone: [phone]   SECONDARY INSURANCE     [facility]  [id number]  [phone]    PROVIDER ALKA SOTO [phone]    [facility]/  PHONE [phone]  [phone]    PHONE [phone]      Reid Hearn (72 y.o. Male)     Date of Birth   1950    Social Security Number       Address   [address] [address]    Home Phone   [phone]    MRN   [id number]       Evangelical   Taoist    Marital Status                               Admission Date   3/12/23    Admission Type   Emergency    Admitting Provider   Mart Wick MD    Attending Provider   Mart Wick MD    Department, Room/Bed   [facility] 4B, 409/1       Discharge Date       Discharge Disposition       Discharge Destination                               Attending Provider: Mart Wick MD    Allergies: No Known Allergies    Isolation: None   Infection: None   Code Status: CPR    Ht: 180.3 cm (71\")   Wt: 98 kg (216 lb)    Admission Cmt: None   Principal Problem: Bilateral pneumonia [J18.9]                 Active Insurance as of 3/12/2023     Primary Coverage     Payor Plan Insurance Group Employer/Plan Group    ANTHEM MEDICARE REPLACEMENT [employer] MED[employer]ARE ADVANTAGE [id number]     Payor Plan Address Payor Plan Phone Number Payor Plan Fax " Number Effective Dates    [facility] [id number] [phone]  2015 - None Entered    [facility] [phone]       Subscriber Name Subscriber Birth Date Member ID       REID MORRELL 1950 [id number]                 Emergency Contacts      (Rel.) Home Phone Work Phone Mobile Phone    Jaimie Morrell (Spouse) [phone] -- [phone]           [facility] Encounter Date/Time: 3/12/2023 [facility]   Hospital Account: [id number]    MRN: [id number]   Patient:  Reid Morrell   Contact Serial #: [id number]   SSN:          ENCOUNTER             Patient Class: Inpatient   Unit: [address] Service: Emergency Medicine     Bed: 409/1   Admitting Provider: Mart Wick MD   Referring Physician: Provider, No Known   Attending Provider: Mart Wick MD   Adm Diagnosis: Pneumonia due to infecti*               PATIENT          Name: Reid Morrell : 1950 (72 yrs)   Address: [address] [place] [address] Sex: Male   City: [place] [address]   County: [facility]   Marital Status:  Ethnicity: NOT                                                                              Race: WHITE   Primary Care Provider: Reid Islas, * Patients Phone: Home Phone: [phone]  Work Phone: [phone]  Mobile Phone: [phone]   EMERGENCY CONTACT   Contact Name Legal Guardian? Relationship to Patient Home Phone Work Phone   1. Jaimie Morrell  2. *No Contact Specified*      Spouse    [phone]              GUARANTOR            Guarantor: Reid Morrell     : 1950   Address: [address] [place] [address] Sex: [place]     [place] [address]     Relation to Patient: Self       Home Phone: [phone]   Guarantor ID: [id number]       Work Phone: [phone]   GUARANTOR EMPLOYER   Employer: [employer]         Status: FULL TIME   COVERAGE          PRIMARY INSURANCE   Payor: [employer] MEDICARE REPLACEMENT Plan: [employer] MEDICARE ADVANTAGE   Group Number: [id number]  Insurance Type: INDEMNITY   Subscriber Name: REID HEARN Subscriber : 1950   Subscriber ID: [id number] Coverage Address: [facility] [address]  [place] [address]   Pat. Rel. to Subscriber: Self Coverage Phone: [phone]   SECONDARY INSURANCE   Payor: N/A Plan: N/A   Group Number:   Insurance Type:     Subscriber Name:   Subscriber :     Subscriber ID:   Coverage Address:     Pat. Rel. to Subscriber:   Coverage Phone:        Contact Serial # [id number])         2023    Chart ID ([id number])             Han Whitt DO   Physician  Medicine  H&P     Signed  Date of Service:  23  Creation Time:  23     Signed        [facility] Medicine Services  HISTORY AND PHYSICAL     Date of Admission: 3/12/2023  Primary Care Physician: Reid Islas MD     Subjective   Primary Historian: Patient.      Chief Complaint: Shortness of breath, fever.      History of Present Illness  This is a 72-year-old  gentleman who resides in [place], [place].  He sees Dr. Rich Islas for primary care.  He has a history of pulmonary fibrosis and follows with Dr. Nash Pisano with pulmonary.  He comes into the emergency department today with increasing shortness of breath, cough, and recent fever.  Work-up is consistent with bilateral pneumonia most pronounced in the right upper lobe.  He also meets criteria for sepsis.  He had a fever of 102 when he came in.  He has tested negative for COVID-19 and influenza.  We have been asked to admit him for further work-up and treatment.     He states that he for started to feel ill on Friday afternoon.  He drives a schoolbus.  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  He started to feel short of breath.  He first ran fever on Friday night according to his wife.  His temperature was nearly 102.  He had a fever  "of 103 last night that she treated him with ibuprofen.  He was afebrile this morning, but felt worse.  He has had a dry cough.     He does not normally wear oxygen, but came in with room air sat of 88%.  He is comfortable and has good saturations on 2 L of oxygen at present.     He denies nausea or vomiting.  No diarrhea.  No difficulty with urinating.     No chest pain.     He states that he is up-to-date with all vaccinations including COVID-19 and Pneumovax.     Review of Systems   Otherwise complete ROS reviewed and negative except as mentioned in the HPI.     Past Medical History:   Medical History        Past Medical History:   Diagnosis Date   • Anemia     • Arthritis     • Colon polyp     • Erythematous condition     • Hypertension     • Liver disease     • CANNON (nonalcoholic steatohepatitis)     • Pneumonia     • Pulmonary fibrosis (HCC)     • Shortness of breath                         Signs: /78   Pulse 114   Temp (!) 100.6 °F (38.1 °C) (Oral)   Resp 16   Ht 180.3 cm (71\")   Wt 99.8 kg (220 lb)   SpO2 93%   BMI 30.68 kg/m²   Physical Exam  Constitutional:       Appearance: He is obese. He is ill-appearing. He is not toxic-appearing or diaphoretic.      Comments: Up in bed.  Seen and discussed with his wife.   HENT:      Head: Normocephalic and atraumatic.   Eyes:      Conjunctiva/sclera: Conjunctivae normal.      Pupils: Pupils are equal, round, and reactive to light.   Neck:      Vascular: No JVD.   Cardiovascular:      Rate and Rhythm: Regular rhythm. Tachycardia present.      Heart sounds: Normal heart sounds.      Comments: Slight sinus tachycardia.  Pulmonary:      Effort: Pulmonary effort is normal. No respiratory distress.      Breath sounds: Rhonchi (R>L) present. No wheezing or rales.      Comments: On 2 L of oxygen.  Chest:      Chest wall: No tenderness.   Abdominal:      General: Bowel sounds are normal. There is no distension.      Palpations: Abdomen is soft.      Tenderness: " There is no abdominal tenderness.   Musculoskeletal:         General: No tenderness or deformity. Normal range of motion.      Cervical back: Neck supple.   Skin:     General: Skin is warm and dry.      Findings: No rash.   Neurological:      Mental Status: He is alert and oriented to person, place, and time.      Cranial Nerves: No cranial nerve deficit.      Motor: No abnormal muscle tone.      Deep Tendon Reflexes: Reflexes normal.   Psychiatric:         Mood and Affect: Mood normal.         Behavior: Behavior normal.         Thought Content: Thought content normal.         Judgment: Judgment normal.         Results Reviewed:          Lab Results (last 24 hours)      Procedure Component Value Units Date/Time     Blood Culture - Blood, Arm, Right [[id number]] Collected: 03/12/23 1314     Specimen: Blood from Arm, Right Updated: 03/12/23 1707     STAT Lactic Acid, Reflex [[id number]] Collected: 03/12/23 1643     Specimen: Blood Updated: 03/12/23 1647     Blood Culture - Blood, Arm, Right [[id number]] Collected: 03/12/23 1342     Specimen: Blood from Arm, Right Updated: 03/12/23 1401     COVID PRE-OP / PRE-PROCEDURE SCREENING ORDER (NO ISOLATION) - Swab, Nasal Cavity [[id number]]  (Normal) Collected: 03/12/23 1319     Specimen: Swab from Nasal Cavity Updated: 03/12/23 1358     Narrative:       The following orders were created for panel order COVID PRE-OP / PRE-PROCEDURE SCREENING ORDER (NO ISOLATION) - Swab, Nasal Cavity.  Procedure                               Abnormality         Status                     ---------                               -----------         ------                     COVID-19 and FLU A/B PCR...[[id number]]  Normal              Final result                  Please view results for these tests on the individual orders.     COVID-19 and FLU A/B PCR - Swab, Nasopharynx [[id number]]  (Normal) Collected: 03/12/23 1319     Specimen: Swab from Nasopharynx Updated: 03/12/23 1358       COVID19 Not  "Detected       Influenza A PCR Not Detected       Influenza B PCR Not Detected     Narrative:       Fact sheet for providers: https://www.fda.gov/media/127093/download     Fact sheet for patients: https://www.fda.gov/media/517960/download     Test performed by PCR.     Procalcitonin [[id number]]  (Abnormal) Collected: 03/12/23 1314     Specimen: Blood Updated: 03/12/23 1355       Procalcitonin 4.43 ng/mL       Narrative:       As a Marker for Sepsis (Non-Neonates):     1. <0.5 ng/mL represents a low risk of severe sepsis and/or septic shock.  2. >2 ng/mL represents a high risk of severe sepsis and/or septic shock.     As a Marker for Lower Respiratory Tract Infections that require antibiotic therapy:     PCT on Admission    Antibiotic Therapy       6-12 Hrs later     >0.5                Strongly Recommended  >0.25 - <0.5        Recommended   0.1 - 0.25          Discouraged              Remeasure/reassess PCT  <0.1                Strongly Discouraged     Remeasure/reassess PCT     As 28 day mortality risk marker: \"Change in Procalcitonin Result\" (>80% or <=80%) if Day 0 (or Day 1) and Day 4 values are available. Refer to http://www.[facility]-pct-calculator.com     Change in PCT <=80%  A decrease of PCT levels below or equal to 80% defines a positive change in PCT test result representing a higher risk for 28-day all-cause mortality of patients diagnosed with severe sepsis for septic shock.     Change in PCT >80%  A decrease of PCT levels of more than 80% defines a negative change in PCT result representing a lower risk for 28-day all-cause mortality of patients diagnosed with severe sepsis or septic shock.         Lactic Acid, Plasma [[id number]]  (Abnormal) Collected: 03/12/23 1314     Specimen: Blood Updated: 03/12/23 1355       Lactate 2.8 mmol/L       C-reactive Protein [[id number]]  (Abnormal) Collected: 03/12/23 1314     Specimen: Blood Updated: 03/12/23 1353       C-Reactive Protein 34.76 mg/dL       Basic Metabolic " Panel [[id number]]  (Abnormal) Collected: 03/12/23 1314     Specimen: Blood Updated: 03/12/23 1350       Glucose 143 mg/dL         BUN 33 mg/dL         Creatinine 1.25 mg/dL         Sodium 135 mmol/L         Potassium 3.1 mmol/L         Comment: Slight hemolysis detected by analyzer. Results may be affected.          Chloride 95 mmol/L         CO2 27.0 mmol/L         Calcium 8.9 mg/dL         BUN/Creatinine Ratio 26.4       Anion Gap 13.0 mmol/L         eGFR 60.8 mL/min/1.73       Narrative:       GFR Normal >60  Chronic Kidney Disease <60  Kidney Failure <15     The GFR formula is only valid for adults with stable renal function between ages 18 and 70.     BNP [[id number]]  (Abnormal) Collected: 03/12/23 1314     Specimen: Blood Updated: 03/12/23 1346       proBNP 1,748.0 pg/mL       Narrative:       Among patients with dyspnea, NT-proBNP is highly sensitive for the detection of acute congestive heart failure. In addition NT-proBNP of <300 pg/ml effectively rules out acute congestive heart failure with 99% negative predictive value.     Results may be falsely decreased if patient taking Biotin.        Single High Sensitivity Troponin T [[id number]]  (Abnormal) Collected: 03/12/23 1314     Specimen: Blood Updated: 03/12/23 1345       HS Troponin T 23 ng/L       Narrative:       High Sensitive Troponin T Reference Range:  <10.0 ng/L- Negative Female for AMI  <15.0 ng/L- Negative Male for AMI  >=10 - Abnormal Female indicating possible myocardial injury.  >=15 - Abnormal Male indicating possible myocardial injury.   Clinicians would have to utilize clinical acumen, EKG, Troponin, and serial changes to determine if it is an Acute Myocardial Infarction or myocardial injury due to an underlying chronic condition.           Magnesium [[id number]]  (Normal) Collected: 03/12/23 1314     Specimen: Blood Updated: 03/12/23 1344       Magnesium 1.9 mg/dL       D-dimer, Quantitative [[id number]]  (Abnormal) Collected: 03/12/23 1314  "    Specimen: Blood Updated: 03/12/23 1342       D-Dimer, Quantitative 1.46 MCGFEU/mL       Narrative:       According to the assay 's published package insert, a normal (<0.50 MCGFEU/mL) D-dimer result in conjunction with a non-high clinical probability assessment, excludes deep vein thrombosis (DVT) and pulmonary embolism (PE) with high sensitivity.     D-dimer values increase with age and this can make VTE exclusion of an older population difficult. To address this, the American College of Physicians, based on best available evidence and recent guidelines, recommends that clinicians use age-adjusted D-dimer thresholds in patients greater than 50 years of age with: a) a low probability of PE who do not meet all Pulmonary Embolism Rule Out Criteria, or b) in those with intermediate probability of PE.   The formula for an age-adjusted D-dimer cut-off is \"age/100\".  For example, a 60 year old patient would have an age-adjusted cut-off of 0.60 MCGFEU/mL and an 80 year old 0.80 MCGFEU/mL.     CBC & Differential [[id number]]  (Abnormal) Collected: 03/12/23 1314     Specimen: Blood Updated: 03/12/23 1329     Narrative:       The following orders were created for panel order CBC & Differential.  Procedure                               Abnormality         Status                     ---------                               -----------         ------                     CBC Auto Differential[[id number]]        Abnormal            Final result                  Please view results for these tests on the individual orders.     CBC Auto Differential [[id number]]  (Abnormal) Collected: 03/12/23 1314     Specimen: Blood Updated: 03/12/23 1329       WBC 21.66 10*3/mm3         RBC 5.04 10*6/mm3         Hemoglobin 15.1 g/dL         Hematocrit 45.3 %         MCV 89.9 fL         MCH 30.0 pg         MCHC 33.3 g/dL         RDW 14.0 %         RDW-SD 46.2 fl         MPV 9.1 fL         Platelets 187 10*3/mm3         Neutrophil % " 92.1 %         Lymphocyte % 2.5 %         Monocyte % 3.8 %         Eosinophil % 0.0 %         Basophil % 0.3 %         Immature Grans % 1.3 %         Neutrophils, Absolute 19.94 10*3/mm3         Lymphocytes, Absolute 0.54 10*3/mm3         Monocytes, Absolute 0.82 10*3/mm3         Eosinophils, Absolute 0.00 10*3/mm3         Basophils, Absolute 0.07 10*3/mm3         Immature Grans, Absolute 0.29 10*3/mm3         nRBC 0.0 /100 WBC       Blood Gas, Arterial - [[id number]]  (Abnormal) Collected: 03/12/23 1327     Specimen: Arterial Blood Updated: 03/12/23 1326       Site Right Radial       Leo's Test Positive       pH, Arterial 7.539 pH units         Comment: 83 Value above reference range          pCO2, Arterial 34.0 mm Hg         Comment: 84 Value below reference range          pO2, Arterial 62.8 mm Hg         Comment: 84 Value below reference range          HCO3, Arterial 28.9 mmol/L         Comment: 83 Value above reference range          Base Excess, Arterial 6.5 mmol/L         Comment: 83 Value above reference range          O2 Saturation, Arterial 94.9 %         Temperature 37.0 C         Barometric Pressure for Blood Gas 747 mmHg         Modality Nasal Cannula       Flow Rate 2.0 lpm         Ventilator Mode NA       Collected by [id number]       Comment: Meter: [id number]     :  [id number]          pCO2, Temperature Corrected 34.0 mm Hg         pH, Temp Corrected 7.539 pH Units         pO2, Temperature Corrected 62.8 mm Hg                    Imaging Results (Last 24 Hours)      Procedure Component Value Units Date/Time     CT Angiogram Chest [[id number]] Collected: 03/12/23 1644       Updated: 03/12/23 1656     Narrative:       Exam: CT angiogram of the of the chest with intravenous contrast.     CLINICAL HISTORY:  Shortness of breath. Suspected pulmonary embolus.     DOSE:  250 mGycm. All CT scans are performed using dose optimization  techniques as appropriate to the performed exam and including at  least  one of the following: Automated exposure control, adjustment of the mA  and/or kV according to size, and the use of the iterative reconstruction  technique.     TECHNIQUE: Contiguous axial images were obtained through the thorax  following intravenous contrast administration with reformatted images  obtained in the sagittal and coronal projections from the original data  set. Three-dimensional reconstructions are also obtained.     COMPARISON:  12/16/2022     FINDINGS:     Pulmonary arteries:  There is normal enhancement of the pulmonary  arteries with no evidence of pulmonary thromboembolic disease.     Aorta:  The thoracic aorta and proximal great vessels are normal in  appearance. There is no evidence of aortic dissection or aneurysm.     LUNGS:  There is groundglass disease throughout both lungs. There is  extensive right upper lobe as well as bilateral lower lobe pneumonia     Pleural spaces: No effusions are demonstrated. There is no pneumothorax.     HEART: There is mild cardiomegaly. No evidence of pericardial effusion  or elevated right heart pressure. Mild coronary calcifications are  present.     Bones: Multiple old left-sided rib fractures are present. No acute or  suspicious bony abnormalities are present.     CHEST WALL: No chest wall abnormalities are demonstrated.     Lymph nodes: There are some prominent right paratracheal nodes including  a 1.4 cm short axis level 4R right paratracheal node. These may be  reactive in nature. No axillary lymphadenopathy.     Upper abdomen: The gallbladder is surgically absent. There is no biliary  dilatation present. Limited images of the upper abdomen are otherwise  unremarkable.        Impression:       1. No evidence of pulmonary thromboembolic disease. The thoracic aorta  and great vessels are normal in appearance.  2. Rather extensive right upper lobe airspace consolidation with air  bronchograms. There is also bilateral lower lobe airspace disease  with  diffuse groundglass opacity within both lungs. FINDINGS consistent with  bilateral pneumonia. There is superimposed pulmonary fibrosis within the  lower lobes. No effusions are present.  3. Mildly enlarged right paratracheal nodes may be reactive in nature.  No axillary adenopathy. Mild coronary calcifications are present.  This report was finalized on 03/12/2023 16:53 by Dr. Kenneth Cox MD.     XR Chest 1 View [[id number]] Collected: 03/12/23 1402       Updated: 03/12/23 1407     Narrative:       EXAMINATION: XR CHEST 1 VW- 3/12/2023 2:02 PM CDT     HISTORY: Shortness of air     REPORT: A frontal view the chest was obtained.     COMPARISON: Chest CT 12/16/2022.     There is moderate volume loss in the lung bases as well as mild  elevation of the right hemidiaphragm. There is a focal masslike  infiltrate in the right midlung zone   which is new compared with the  previous chest CTA. This is superimposed on a background of pulmonary  fibrosis, which is better demonstrated on previous high-resolution chest  CT. Heart size appears normal. No pneumothorax or pleural effusion is  identified.         Impression:       Masslike right midlung infiltrate likely related to acute  pneumonia. This is superimposed on a background of pulmonary fibrosis as  demonstrated on previous high-resolution chest CT. Repeat chest x-rays  are recommended after appropriate antibiotic therapy.  This report was finalized on 03/12/2023 14:04 by Dr. Tello Guerrero MD.         Assessment:        Active Hospital Problems     Diagnosis     • **Bilateral pneumonia     • Sepsis due to pneumonia (HCC)     • Hypoxia     • Pulmonary fibrosis (HCC)     • Hyperlactatemia     • Obesity (BMI 30-39.9)     • Essential hypertension     • Hypokalemia     • Elevated troponin level not due to acute coronary syndrome        Treatment Plan  The patient will be admitted to [facility] here at [facility].  He has a history of pulmonary fibrosis  that is followed by Dr. Pisano.  He presents to the emergency department with complaints of shortness of breath, cough, and fever.  He has been having difficulty with this since Friday.  He has been found to have bilateral pneumonia that appears to be much worse with regards to the right upper lobe.  He was given inhaled bronchodilators, steroids, and first dose antibiotics in the ER.  He is ultimately referred for admission.     He was given a dose of Zosyn in the emergency department.  I would like to continue with vancomycin and Zosyn for now.  He does not qualify for healthcare associated pneumonia, but given the degree of his pneumonic process and his pulmonary fibrosis I would like to cover more broadly for now. Check MRSA nasal screen.  Streptococcal and Legionella urinary antigens.  Sputum culture if able.  Monitor blood cultures to completion.  COVID-19 testing and influenza testing were negative.     Make efforts at pulmonary toilet.  Inhaled bronchodilators.  Incentive spirometry/Mucinex.  No further steroids are planned at present.  He was given a dose of IV Solu-Medrol by the emergency department provider.  Ask pulmonology to evaluate.     Reflex lactic acid improved.  He has an elevated CRP and procalcitonin.  He had a very slight troponin elevation that is likely secondary to his infectious process.     He was given potassium replacement in the emergency department.  Follow labs again in the morning.  External documents reviewed: Reviewed prior Epic records.   Cardiac tracing (EKG, telemetry) interpretation: Sinus tach at 114.   Radiology interpretation: Interpreted by radiology.  Also placed in image above.  Extensive right upper lobe pneumonia evident.  Also showed imaging to the patient and his wife.  Labs reviewed: As above.  Any tests that were considered but not ordered: None considered at present.     Decision rules/scores evaluated (example [id number], Wells, etc): CURB-65 is 2.       Discussed with: The patient.     Care Planning  Shared decision making: Consents to admission for work-up and treatment.  Code status and discussions: Full with full interventions.     Disposition  Social Determinants of Health that impact treatment or disposition: Lives with his wife.  Estimated length of stay is 2-3 days.      I confirmed that the patient's advanced care plan is present, code status is documented, and a surrogate decision maker is listed in the patient's medical record.      The patient's surrogate decision maker is his wife, Jaimie.      The patient was seen and examined by me on 03/12/23 at 1715.     Electronically signed by Han Whitt DO, 03/12/23, 17:48 CDT.                   Claudia White APRN   Nurse Practitioner  Medicine  Progress Notes      Cosign Needed  Date of Service:  03/13/23 1104  Creation Time:  03/13/23 1104     Cosign Needed                [facility] Medicine Services  INPATIENT PROGRESS NOTE     Patient Name: Reid Morrell  Date of Admission: 3/12/2023  Today's Date: 03/13/23  Length of Stay: 1  Primary Care Physician: Reid Islas MD     Subjective   Chief Complaint: Short of breath, fever  HPI   Mr. Morrell presented to [facility] ER 3/12/2023 with increasing shortness of breath, cough, fever.  Patient began feeling ill on 3/10.  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  He later had temperature of 103.  He used ibuprofen.  He continued to have worsening symptoms associated with dry cough.  He has a history of pulmonary fibrosis followed by Dr. Pisano.  WBC 21.6, potassium 3.1, creatinine 1.25, sodium 135, CRP 34.76, Lactate 2.8.  Chest x-ray showed masslike right midlung infiltrate likely related to acute pneumonia.  CT angiogram chest no pulmonary thromboembolic disease.  Thoracic aorta and great vessels normal.   Extensive right upper lobe airspace consolidation with air bronchograms.  Bilateral lower lobe airspace disease with diffuse groundglass opacity both lungs.  Findings consistent with bilateral pneumonia.  There is superimposed pulmonary fibrosis in the lower lobes.  Mildly enlarged right paratracheal node may be reactive.  Zosyn, cefepime, Solu-Medrol, normal saline fluid bolus given in ER.  Patient met sepsis criteria in ER.     Sitting up in bed.  Oxygen at 2 L.  He tells me he feels tired today.  No sputum production.  Denies nausea or vomiting.  No complaints of chest pain or palpitations.  Overall, just feels weak and tired.  Decrease IV fluids.  Diet as tolerated, wean oxygen slowly.  He has not had any sputum production and has not been able to send specimen.  Strep, Legionella and MRSA PCR all negative.  Continue cefepime and vancomycin     Review of Systems   Constitutional: Positive for activity change, chills, fatigue and fever.   HENT: Negative for congestion and trouble swallowing.    Eyes: Negative for photophobia and visual disturbance.   Respiratory: Positive for cough and shortness of breath.    Cardiovascular: Negative for chest pain, palpitations and leg swelling.   Gastrointestinal: Negative for constipation, diarrhea, nausea and vomiting.   Endocrine: Negative for cold intolerance, heat intolerance and polyuria.   Genitourinary: Negative for dysuria, frequency and urgency.   Musculoskeletal: Negative for gait problem.   Skin: Negative for color change, pallor, rash and wound.   Allergic/Immunologic: Negative for immunocompromised state.   Neurological: Positive for weakness. Negative for light-headedness.   Hematological: Negative for adenopathy. Does not bruise/bleed easily.   Psychiatric/Behavioral: Negative for agitation, behavioral problems and confusion.      All pertinent negatives and positives are as above. All other systems have been reviewed and are negative unless otherwise stated.       Objective    Temp:  [97.5 °F (36.4 °C)-102 °F (38.9 °C)] 97.8 °F (36.6 °C)  Heart Rate:  [] 86  Resp:  [12-18] 16  BP: (114-129)/(66-78) 129/71  Physical Exam  Vitals and nursing note reviewed.   Constitutional:       Comments: Sitting up in bed.  Oxygen at 2 L.  No visitors in the room.     HENT:      Head: Normocephalic and atraumatic.      Nose: No congestion.      Mouth/Throat:      Pharynx: Oropharynx is clear. No oropharyngeal exudate or posterior oropharyngeal erythema.   Eyes:      Extraocular Movements: Extraocular movements intact.      Pupils: Pupils are equal, round, and reactive to light.   Cardiovascular:      Rate and Rhythm: Normal rate and regular rhythm.      Heart sounds: No murmur heard.     Comments: Normal sinus rhythm 81 on telemetry.  Pulmonary:      Breath sounds: Rales (Bilateral bases) present. No wheezing or rhonchi.      Comments: Oxygen at 2 L.  Decreased breath sounds throughout.  No sputum production.  Abdominal:      Palpations: Abdomen is soft.      Tenderness: There is no abdominal tenderness.   Genitourinary:     Comments: Voiding.  Musculoskeletal:         General: No swelling or tenderness.      Cervical back: Normal range of motion and neck supple.   Skin:     General: Skin is warm and dry.   Neurological:      General: No focal deficit present.      Mental Status: He is alert and oriented to person, place, and time.   Psychiatric:         Mood and Affect: Mood normal.         Behavior: Behavior normal.         Thought Content: Thought content normal.         Judgment: Judgment normal.         Results Review:  I have reviewed the labs, radiology results, and diagnostic studies.     Laboratory Data:         Results from last 7 days   Lab Units 03/13/23  0343 03/12/23  1314   WBC 10*3/mm3 12.79* 21.66*   HEMOGLOBIN g/dL 13.4 15.1   HEMATOCRIT % 40.5 45.3   PLATELETS 10*3/mm3 166 187               Results from last 7 days   Lab Units 03/13/23  0343 03/12/23  1314    SODIUM mmol/L 138 135*   POTASSIUM mmol/L 3.4* 3.1*   CHLORIDE mmol/L 101 95*   CO2 mmol/L 25.0 27.0   BUN mg/dL 27* 33*   CREATININE mg/dL 0.99 1.25   CALCIUM mg/dL 8.6 8.9   GLUCOSE mg/dL 208* 143*      Culture Data:   No results found for: BLOODCX, URINECX, WOUNDCX, MRSACX, RESPCX, STOOLCX     Radiology Data:           Imaging Results (Last 24 Hours)                  [97.5 °F (36.4 °C)-102 °F (38.9 °C)] 97.8 °F (36.6 °C)  Heart Rate:  [] 86  Resp:  [12-18] 16  BP: (114-129)/(66-78) 129/71  Physical Exam  Vitals and nursing note reviewed.   Constitutional:       Comments: Sitting up in bed.  Oxygen at 2 L.  No visitors in the room.     HENT:      Head: Normocephalic and atraumatic.      Nose: No congestion.      Mouth/Throat:      Pharynx: Oropharynx is clear. No oropharyngeal exudate or posterior oropharyngeal erythema.   Eyes:      Extraocular Movements: Extraocular movements intact.      Pupils: Pupils are equal, round, and reactive to light.   Cardiovascular:      Rate and Rhythm: Normal rate and regular rhythm.      Heart sounds: No murmur heard.     Comments: Normal sinus rhythm 81 on telemetry.  Pulmonary:      Breath sounds: Rales (Bilateral bases) present. No wheezing or rhonchi.      Comments: Oxygen at 2 L.  Decreased breath sounds throughout.  No sputum production.  Abdominal:      Palpations: Abdomen is soft.      Tenderness: There is no abdominal tenderness.   Genitourinary:     Comments: Voiding.  Musculoskeletal:         General: No swelling or tenderness.      Cervical back: Normal range of motion and neck supple.   Skin:     General: Skin is warm and dry.   Neurological:      General: No focal deficit present.      Mental Status: He is alert and oriented to person, place, and time.   Psychiatric:         Mood and Affect: Mood normal.         Behavior: Behavior normal.         Thought Content: Thought content normal.         Judgment: Judgment normal.         Results Review:  I have  reviewed the labs, radiology results, and diagnostic studies.     Laboratory Data:         Results from last 7 days   Lab Units 03/13/23  0343 03/12/23  1314   WBC 10*3/mm3 12.79* 21.66*   HEMOGLOBIN g/dL 13.4 15.1   HEMATOCRIT % 40.5 45.3   PLATELETS 10*3/mm3 166 187               Results from last 7 days   Lab Units 03/13/23  0343 03/12/23  1314   SODIUM mmol/L 138 135*   POTASSIUM mmol/L 3.4* 3.1*   CHLORIDE mmol/L 101 95*   CO2 mmol/L 25.0 27.0   BUN mg/dL 27* 33*   CREATININE mg/dL 0.99 1.25   CALCIUM mg/dL 8.6 8.9   GLUCOSE mg/dL 208* 143*      Culture Data:   No results found for: BLOODCX, URINECX, WOUNDCX, MRSACX, RESPCX, STOOLCX     Radiology Data:           Imaging Results (Last 24 Hours)      Procedure Component Value Units Date/Time     CT Angiogram Chest [[id number]] Collected: 03/12/23 1644       Updated: 03/12/23 1656     Narrative:     \  Medical Decision Making  Number and Complexity of problems: 8  Sepsis due to bilateral pneumonia: Acute, high complexity  Bilateral pneumonia: Acute, high complexity  Elevated troponin due to sepsis: Acute, high complexity  Hypoxia secondary to pneumonia and sepsis: Acute, high complexity  Hyperlactatemia, acute, high complexity  Hypokalemia: Acute, moderate complexity  Primary hypertension: Chronic, moderate complexity  Pulmonary fibrosis: Chronic, high complexity     Differential Diagnosis: None     Conditions and Status        Condition is worsening.     MDM Data  External documents reviewed: Prior records epic  Cardiac tracing (EKG, telemetry) interpretation: Normal sinus rhythm 80 on telemetry  Radiology interpretation: Radiology interpretation CTA  Labs reviewed:   CBC 3/13/2023  BMP 3/13/2023  Blood cultures  Pneumonia, Legionella, MRSA PCR        Any tests that were considered but not ordered: None        Hypoxia secondary to pneumonia and sepsis..  Saturation 88% on room air.  Placed on cannula at 2 L.  Wean as tolerated.           Care Planning  Shared  decision making: Dr. Wick and patient.  Patient agreeable to pulmonary consult, IV antibiotics, wean oxygen.  Code status and discussions: Full code  Patient surrogate decision maker is his wife, Jaimie.     Disposition  Social Determinants of Health that impact treatment or disposition: None  I expect the patient to be discharged to home in 3-4 days.      Electronically signed by CANDACE Miner, 03/13/23, 11:29 CDT.  Yemi Parkinson MD   Physician  Pulmonology  Consults     Signed  Date of Service:  03/13/23 1132  Creation Time:  03/13/23 1132  Consult Orders   Inpatient Pulmonology Consult [[id number]] ordered by Han Whitt DO at 03/12/23 1721          Signed        Expand Floridalma Parkinson MD        Subjective   LOS: 1 day      Thank you for this pulmonary consultation.  72-year-old male who follows with Dr. Ratliff for pulmonary fibrosis.  Patient has been diagnosed with pulmonary fibrosis for the last 10 to 15 years.  Etiology is uncertain.  Interestingly patient had severe pneumonia at age 18.  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  Since [employer] he has been a schoolbus .  This past Friday he was very weak and unsteady with associated shortness of breath after finishing a schoolbus run.  The next day he developed fevers which became progressively higher.  He denies any cough or phlegm.  No chest pain.  Came into the ED yesterday and admitted after CT chest showed new dense consolidation in the right lung in addition to pulmonary fibrosis.  He was started on broad-spectrum antibiotics and now feels better.  Patient is a never smoker.  States that many years ago he had testing for lupus.  He was told that he is at risk of the same.  Does not sound like a actual diagnosis of lupus was ever made.     Reid Morrell  reports no history of alcohol use.,  reports that he has never smoked. He has never used smokeless tobacco               PCCM Problems  Right upper lobe dense consolidation  Bibasilar pulmonary infiltrates consistent with known pulmonary fibrosis and some progression  Positive HIRO in the past        Plan of Treatment  Agree with treatment for pneumonia with antibiotics.  Patient appears clinically better already.  Will need follow-up imaging.  Bibasilar infiltrates could be progression of known pulmonary fibrosis or due to acute pneumonia.  We will need to determine on follow-up CT.     Pulmonary fibrosis etiology uncertain.  Could be environmental as he worked in a rubber factory for many years.  Note positive HIRO in the past though only at 1:320.     Dr. Nash Pisano will see patient tomorrow.     Electronically signed by eYmi Parkinson MD, 03/13/23, 11:32 AM CDT.        Part of this note may be an electronic transcription/translation of spoken language to printed text using the Dragon Dictation System.                       Current Facility-Administered Medications   Medication Dose Route Frequency Provider Last Rate Last Admin   • acetaminophen (TYLENOL) tablet 650 mg  650 mg Oral Q4H PRN Han Whitt DO        Or   • acetaminophen (TYLENOL) 160 MG/5ML solution 650 mg  650 mg Oral Q4H PRN Han Whitt DO        Or   • acetaminophen (TYLENOL) suppository 650 mg  650 mg Rectal Q4H PRN Han Whitt DO       • amLODIPine (NORVASC) tablet 10 mg  10 mg Oral Daily Han Whitt DO   10 mg at 03/13/23 0834   • cefepime (MAXIPIME) 2 g/100 mL 0.9% NS (mbp)  2 g Intravenous Q8H Han Whitt DO 0 mL/hr at 03/13/23 0428 2 g at 03/13/23 0833   • Enoxaparin Sodium (LOVENOX) syringe 40 mg  40 mg Subcutaneous Daily Han Whitt DO   40 mg at 03/13/23 0834   • guaiFENesin (MUCINEX) 12 hr tablet 1,200 mg  1,200 mg Oral Q12H Han Whitt DO   1,200 mg at 03/13/23 0834   • ipratropium-albuterol (DUO-NEB) nebulizer solution 3 mL  3 mL Nebulization Q6H - RT Han Whitt DO   3 mL at 03/13/23 1213   • ondansetron  (ZOFRAN) injection 4 mg  4 mg Intravenous Q6H PRN Han Whitt, DO       • sodium chloride 0.9 % flush 10 mL  10 mL Intravenous Q12H Han Whitt DO   10 mL at 03/13/23 0833   • sodium chloride 0.9 % flush 10 mL  10 mL Intravenous PRN Han Whitt DO       • sodium chloride 0.9 % infusion 40 mL  40 mL Intravenous PRN Han Whitt DO       • sodium chloride 0.9 % with KCl 20 mEq/L infusion  50 mL/hr Intravenous Continuous Claudia White APRN 50 mL/hr at 03/13/23 1130 50 mL/hr at 03/13/23 1130   • vancomycin 750 mg/250 mL 0.9% NS IVPB ([facility])  750 mg Intravenous Q12H Han Whitt DO   Stopped at 03/13/23 0596

## 2023-03-13 NOTE — PLAN OF CARE
Problem: Adult Inpatient Plan of Care  Goal: Plan of Care Review  Recent Flowsheet Documentation  Taken 3/13/2023 1030 by Benjamin Duffy, PT  Plan of Care Reviewed With: patient  Outcome Evaluation: PT IE complete.  A&Ox4.  No c/o pain.  C/o dizziness, fatigue, short of breath.  All subjective complaints have improved per pt.  Pt ambulated 600ft independently w/ 2L O2.  No LOB.  O2sat 95% on 2L post ambulation.  Pt encouraged to ambulate ad mirza at least 4x/day.  PT signing off.  Thank you for referral.   Goal Outcome Evaluation:  Plan of Care Reviewed With: patient           Outcome Evaluation: PT IE complete.  A&Ox4.  No c/o pain.  C/o dizziness, fatigue, short of breath.  All subjective complaints have improved per pt.  Pt ambulated 600ft independently w/ 2L O2.  No LOB.  O2sat 95% on 2L post ambulation.  Pt encouraged to ambulate ad mirza at least 4x/day.  PT signing off.  Thank you for referral.

## 2023-03-13 NOTE — PROGRESS NOTES
[facility]  INPATIENT PROGRESS NOTE    Patient Name: Reid Morrell  Date of Admission: 3/12/2023  Today's Date: 03/13/23  Length of Stay: 1  Primary Care Physician: Reid Islas MD    Subjective   Chief Complaint: Short of breath, fever  HPI   Mr. Morrell presented to [facility] ER 3/12/2023 with increasing shortness of breath, cough, fever.  Patient began feeling ill on 3/10.  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  He later had temperature of 103.  He used ibuprofen.  He continued to have worsening symptoms associated with dry cough.  He has a history of pulmonary fibrosis followed by Dr. Pisano.  WBC 21.6, potassium 3.1, creatinine 1.25, sodium 135, CRP 34.76, Lactate 2.8.  Chest x-ray showed masslike right midlung infiltrate likely related to acute pneumonia.  CT angiogram chest no pulmonary thromboembolic disease.  Thoracic aorta and great vessels normal.  Extensive right upper lobe airspace consolidation with air bronchograms.  Bilateral lower lobe airspace disease with diffuse groundglass opacity both lungs.  Findings consistent with bilateral pneumonia.  There is superimposed pulmonary fibrosis in the lower lobes.  Mildly enlarged right paratracheal node may be reactive.  Zosyn, cefepime, Solu-Medrol, normal saline fluid bolus given in ER.  Patient met sepsis criteria in ER.    Sitting up in bed.  Oxygen at 2 L.  He tells me he feels tired today.  No sputum production.  Denies nausea or vomiting.  No complaints of chest pain or palpitations.  Overall, just feels weak and tired.  Decrease IV fluids.  Diet as tolerated, wean oxygen slowly.  He has not had any sputum production and has not been able to send specimen.  Strep, Legionella and MRSA PCR all negative.  Continue cefepime and vancomycin    Review of Systems   Constitutional: Positive for  activity change, chills, fatigue and fever.   HENT: Negative for congestion and trouble swallowing.    Eyes: Negative for photophobia and visual disturbance.   Respiratory: Positive for cough and shortness of breath.    Cardiovascular: Negative for chest pain, palpitations and leg swelling.   Gastrointestinal: Negative for constipation, diarrhea, nausea and vomiting.   Endocrine: Negative for cold intolerance, heat intolerance and polyuria.   Genitourinary: Negative for dysuria, frequency and urgency.   Musculoskeletal: Negative for gait problem.   Skin: Negative for color change, pallor, rash and wound.   Allergic/Immunologic: Negative for immunocompromised state.   Neurological: Positive for weakness. Negative for light-headedness.   Hematological: Negative for adenopathy. Does not bruise/bleed easily.   Psychiatric/Behavioral: Negative for agitation, behavioral problems and confusion.      All pertinent negatives and positives are as above. All other systems have been reviewed and are negative unless otherwise stated.     Objective    Temp:  [97.5 °F (36.4 °C)-102 °F (38.9 °C)] 97.8 °F (36.6 °C)  Heart Rate:  [] 86  Resp:  [12-18] 16  BP: (114-129)/(66-78) 129/71  Physical Exam  Vitals and nursing note reviewed.   Constitutional:       Comments: Sitting up in bed.  Oxygen at 2 L.  No visitors in the room.     HENT:      Head: Normocephalic and atraumatic.      Nose: No congestion.      Mouth/Throat:      Pharynx: Oropharynx is clear. No oropharyngeal exudate or posterior oropharyngeal erythema.   Eyes:      Extraocular Movements: Extraocular movements intact.      Pupils: Pupils are equal, round, and reactive to light.   Cardiovascular:      Rate and Rhythm: Normal rate and regular rhythm.      Heart sounds: No murmur heard.     Comments: Normal sinus rhythm 81 on telemetry.  Pulmonary:      Breath sounds: Rales (Bilateral bases) present. No wheezing or rhonchi.      Comments: Oxygen at 2 L.  Decreased  breath sounds throughout.  No sputum production.  Abdominal:      Palpations: Abdomen is soft.      Tenderness: There is no abdominal tenderness.   Genitourinary:     Comments: Voiding.  Musculoskeletal:         General: No swelling or tenderness.      Cervical back: Normal range of motion and neck supple.   Skin:     General: Skin is warm and dry.   Neurological:      General: No focal deficit present.      Mental Status: He is alert and oriented to person, place, and time.   Psychiatric:         Mood and Affect: Mood normal.         Behavior: Behavior normal.         Thought Content: Thought content normal.         Judgment: Judgment normal.       Results Review:  I have reviewed the labs, radiology results, and diagnostic studies.    Laboratory Data:   Results from last 7 days   Lab Units 03/13/23  0343 03/12/23  1314   WBC 10*3/mm3 12.79* 21.66*   HEMOGLOBIN g/dL 13.4 15.1   HEMATOCRIT % 40.5 45.3   PLATELETS 10*3/mm3 166 187        Results from last 7 days   Lab Units 03/13/23  0343 03/12/23  1314   SODIUM mmol/L 138 135*   POTASSIUM mmol/L 3.4* 3.1*   CHLORIDE mmol/L 101 95*   CO2 mmol/L 25.0 27.0   BUN mg/dL 27* 33*   CREATININE mg/dL 0.99 1.25   CALCIUM mg/dL 8.6 8.9   GLUCOSE mg/dL 208* 143*     Culture Data:   No results found for: BLOODCX, URINECX, WOUNDCX, MRSACX, RESPCX, STOOLCX    Radiology Data:   Imaging Results (Last 24 Hours)     Procedure Component Value Units Date/Time    CT Angiogram Chest [[id number]] Collected: 03/12/23 1644     Updated: 03/12/23 1656    Narrative:      Exam: CT angiogram of the of the chest with intravenous contrast.     CLINICAL HISTORY:  Shortness of breath. Suspected pulmonary embolus.     DOSE:  250 mGycm. All CT scans are performed using dose optimization  techniques as appropriate to the performed exam and including at least  one of the following: Automated exposure control, adjustment of the mA  and/or kV according to size, and the use of the iterative  reconstruction  technique.     TECHNIQUE: Contiguous axial images were obtained through the thorax  following intravenous contrast administration with reformatted images  obtained in the sagittal and coronal projections from the original data  set. Three-dimensional reconstructions are also obtained.     COMPARISON:  12/16/2022     FINDINGS:     Pulmonary arteries:  There is normal enhancement of the pulmonary  arteries with no evidence of pulmonary thromboembolic disease.     Aorta:  The thoracic aorta and proximal great vessels are normal in  appearance. There is no evidence of aortic dissection or aneurysm.     LUNGS:  There is groundglass disease throughout both lungs. There is  extensive right upper lobe as well as bilateral lower lobe pneumonia     Pleural spaces: No effusions are demonstrated. There is no pneumothorax.     HEART: There is mild cardiomegaly. No evidence of pericardial effusion  or elevated right heart pressure. Mild coronary calcifications are  present.     Bones: Multiple old left-sided rib fractures are present. No acute or  suspicious bony abnormalities are present.     CHEST WALL: No chest wall abnormalities are demonstrated.     Lymph nodes: There are some prominent right paratracheal nodes including  a 1.4 cm short axis level 4R right paratracheal node. These may be  reactive in nature. No axillary lymphadenopathy.     Upper abdomen: The gallbladder is surgically absent. There is no biliary  dilatation present. Limited images of the upper abdomen are otherwise  unremarkable.       Impression:      1. No evidence of pulmonary thromboembolic disease. The thoracic aorta  and great vessels are normal in appearance.  2. Rather extensive right upper lobe airspace consolidation with air  bronchograms. There is also bilateral lower lobe airspace disease with  diffuse groundglass opacity within both lungs. FINDINGS consistent with  bilateral pneumonia. There is superimposed pulmonary fibrosis  within the  lower lobes. No effusions are present.  3. Mildly enlarged right paratracheal nodes may be reactive in nature.  No axillary adenopathy. Mild coronary calcifications are present.  This report was finalized on 03/12/2023 16:53 by Dr. Kenneth Cox MD.    XR Chest 1 View [[id number]] Collected: 03/12/23 1402     Updated: 03/12/23 1407    Narrative:      EXAMINATION: XR CHEST 1 VW- 3/12/2023 2:02 PM CDT     HISTORY: Shortness of air     REPORT: A frontal view the chest was obtained.     COMPARISON: Chest CT 12/16/2022.     There is moderate volume loss in the lung bases as well as mild  elevation of the right hemidiaphragm. There is a focal masslike  infiltrate in the right midlung zone which is new compared with the  previous chest CTA. This is superimposed on a background of pulmonary  fibrosis, which is better demonstrated on previous high-resolution chest  CT. Heart size appears normal. No pneumothorax or pleural effusion is  identified.       Impression:      Masslike right midlung infiltrate likely related to acute  pneumonia. This is superimposed on a background of pulmonary fibrosis as  demonstrated on previous high-resolution chest CT. Repeat chest x-rays  are recommended after appropriate antibiotic therapy.  This report was finalized on 03/12/2023 14:04 by Dr. Tello Guerrero MD.        Scheduled medications  amLODIPine, 10 mg, Oral, Daily  cefepime, 2 g, Intravenous, Q8H  enoxaparin, 40 mg, Subcutaneous, Daily  guaiFENesin, 1,200 mg, Oral, Q12H  ipratropium-albuterol, 3 mL, Nebulization, Q6H - RT  potassium chloride, 40 mEq, Oral, Once  sodium chloride, 10 mL, Intravenous, Q12H  vancomycin, 750 mg, Intravenous, Q12H      I have reviewed the patient's current medications.     Assessment/Plan   Assessment  Active Hospital Problems    Diagnosis    • **Bilateral pneumonia    • Obesity (BMI 30-39.9)    • Sepsis due to pneumonia (HCC)    • Hyperlactatemia    • Essential hypertension    • Hypokalemia     • Elevated troponin level not due to acute coronary syndrome    • Hypoxia    • Pulmonary fibrosis (HCC)      Treatment Plan  Sepsis due to bilateral pneumonia.  WBC 21, heart rate 115, temperature 102, lactate 2.8.  Source of infection.  Fluid bolus given in ER.  Vancomycin and Zosyn given in ER.  Antibiotics switched to cefepime and vancomycin.    Bilateral pneumonia.  Continue cefepime and vancomycin.  Neb treatments every 6 hours.  Mucinex twice daily.  Strep pneumonia negative, Legionella negative, MRSA PCR negative.  Blood cultures in progress.  WBC 21.6 on admission down to 12.79 today.  Repeat CBC in AM.  Maximum temperature 100.6 since follow-up in ER.  Continue incentive spirometry.    Primary hypertension.  Blood pressure 129/71.  Amlodipine continued.    Hypokalemia.  Potassium 3.1, replaced.  Follow-up potassium 3.4.  Will give additional 40 mEq potassium and repeat BMP in AM.    Elevated troponin not due to ACS.  Troponin elevation due to sepsis.    Hypoxia secondary to pneumonia and sepsis..  Saturation 88% on room air.  Placed on cannula at 2 L.  Wean as tolerated.    Hyperlactatemia due to sepsis.    Pulmonary fibrosis.  Pulmonary medicine consulted.    Lovenox for deep vein thrombosis prophylaxis.  Reviewed home medications.    Medical Decision Making  Number and Complexity of problems: 8  Sepsis due to bilateral pneumonia: Acute, high complexity  Bilateral pneumonia: Acute, high complexity  Elevated troponin due to sepsis: Acute, high complexity  Hypoxia secondary to pneumonia and sepsis: Acute, high complexity  Hyperlactatemia, acute, high complexity  Hypokalemia: Acute, moderate complexity  Primary hypertension: Chronic, moderate complexity  Pulmonary fibrosis: Chronic, high complexity    Differential Diagnosis: None    Conditions and Status        Condition is worsening.     [facility] Data  External documents reviewed: Prior records epic  Cardiac tracing (EKG, telemetry) interpretation: Normal  sinus rhythm 80 on telemetry  Radiology interpretation: Radiology interpretation CTA  Labs reviewed:   CBC 3/13/2023  BMP 3/13/2023  Blood cultures  Pneumonia, Legionella, MRSA PCR      Any tests that were considered but not ordered: None     Decision rules/scores evaluated (example [id number], Wells, etc): None     Discussed with: Dr. Wick and patient     Care Planning  Shared decision making: Dr. Wick and patient.  Patient agreeable to pulmonary consult, IV antibiotics, wean oxygen.  Code status and discussions: Full code  Patient surrogate decision maker is his wife, Jaimie.    Disposition  Social Determinants of Health that impact treatment or disposition: None  I expect the patient to be discharged to home in 3-4 days.     Electronically signed by CANDACE Miner, 03/13/23, 11:29 CDT.

## 2023-03-13 NOTE — PLAN OF CARE
Goal Outcome Evaluation:  Plan of Care Reviewed With: patient        Progress: improving  Outcome Evaluation: Pt was admitted from the ER with pneumonia. He c/o SOA, cough, and fever for several days. Around 0000 patient was diaphoretic. He had no complaints of chest pain. No fever. Pt stated that he felt fine. Pt did have visible sweat on his skin. I dried his arms and head with a dry wash cloth. I checked his blood sugar and it was 238. Pt is not diabetic. I checked on the patient about 30 minutes later and his skin was cool and dry to the touch. Pt still had no complaints. IV abx given. IVF infusing at 100 mL/hr. Pt is A/O, on 2 L via NC, up with stand-by assist, VSS. Tele shows SR, HR 70-80 with frequent and paired PVC's. Safety maintained.

## 2023-03-13 NOTE — PLAN OF CARE
Goal Outcome Evaluation:  Plan of Care Reviewed With: patient, spouse        Progress: improving  Outcome Evaluation: Patient VSS. Now on room air with saturation ranging from 93-96%. Sinus 81-91 with pvc per tele. Pt ambulated with wife and tolerated it well. Continue current tx and labs in AM.

## 2023-03-13 NOTE — THERAPY DISCHARGE NOTE
Patient Name: Reid Morrell  : 1950    MRN: [id number]                              Today's Date: 3/13/2023       Admit Date: 3/12/2023    Visit Dx:     ICD-10-CM ICD-9-CM   1. Pneumonia due to infectious organism, unspecified laterality, unspecified part of lung  J18.9 486   2. Hypoxic  R09.02 799.02   3. Pulmonary fibrosis (HCC)  J84.10 515   4. Hypokalemia  E87.6 276.8   5. Troponin I above reference range  R77.8 790.6   6. Impaired mobility  Z74.09 799.89     Patient Active Problem List   Diagnosis   • CANNON (nonalcoholic steatohepatitis)   • Hx of colonic polyp   • Family hx colonic polyps   • Pulmonary fibrosis (HCC)   • Bilateral pneumonia   • Obesity (BMI 30-39.9)   • Sepsis due to pneumonia (HCC)   • Hyperlactatemia   • Essential hypertension   • Hypokalemia   • Elevated troponin level not due to acute coronary syndrome   • Hypoxia     Past Medical History:   Diagnosis Date   • Anemia    • Arthritis    • Colon polyp    • Erythematous condition    • Hypertension    • Liver disease    • CANNON (nonalcoholic steatohepatitis)    • Pneumonia    • Pulmonary fibrosis (HCC)    • Shortness of breath      Past Surgical History:   Procedure Laterality Date   • CATARACT EXTRACTION, BILATERAL      Summer of 2019   • CHOLECYSTECTOMY     • COLONOSCOPY  2013   • COLONOSCOPY N/A 3/16/2018    Procedure: COLONOSCOPY WITH ANESTHESIA;  Surgeon: Jemal Batista MD;  [facility]: [facility] ENDOSCOPY;  Service: Gastroenterology   • ENDOSCOPY AND COLONOSCOPY  1995   • TONSILLECTOMY        General Information     Row Name 23 1030          Physical Therapy Time and Intention    Document Type evaluation  short of breath, fever, Dx:  B pneumonia, low potassium, low O2 level, increased BP  -     Mode of Treatment physical therapy  -     Row Name 23 1030          General Information    Patient Profile Reviewed yes  -LH     Prior Level of Function independent:;community mobility;ADL's;driving;work  -      Existing Precautions/Restrictions oxygen therapy device and L/min  -     Barriers to Rehab none identified  -[facility] Name 03/13/23 1030          Living Environment    People in Home spouse  -[facility] Name 03/13/23 1030          Home Main Entrance    Number of Stairs, Main Entrance four  -     Stair Railings, Main Entrance railing on left side (ascending)  -[facility] Name 03/13/23 1030          Cognition    Orientation Status (Cognition) oriented x 4  -[facility] Name 03/13/23 1030          Safety Issues, Functional Mobility    Safety Issues Affecting Function (Mobility) ability to follow commands  -           User Key  (r) = Recorded By, (t) = Taken By, (c) = Cosigned By    Initials Name Provider Type     Benjamin Duffy, PT Physical Therapist               Mobility     Row Name 03/13/23 1030          Bed Mobility    Bed Mobility bed mobility (all) activities  -     All Activities, Hubbard (Bed Mobility) independent  -[facility] Name 03/13/23 1030          Sit-Stand Transfer    Sit-Stand Hubbard (Transfers) independent  -LH     Row Name 03/13/23 1030          Gait/Stairs (Locomotion)    Hubbard Level (Gait) independent  -     Distance in Feet (Gait) 600  -           User Key  (r) = Recorded By, (t) = Taken By, (c) = Cosigned By    Initials Name Provider Type     Benjamin Duffy, PT Physical Therapist               Obj/Interventions     [facility] Name 03/13/23 1030          Range of Motion Comprehensive    General Range of Motion bilateral upper extremity ROM WFL;bilateral lower extremity ROM WFL  -[facility] Name 03/13/23 1030          Strength Comprehensive (MMT)    General Manual Muscle Testing (MMT) Assessment no strength deficits identified  -LH     Row Name 03/13/23 1030          Sensory Assessment (Somatosensory)    Sensory Assessment (Somatosensory) sensation intact  -           User Key  (r) = Recorded By, (t) = Taken By, (c) = Cosigned By    Initials Name Provider Type      Benjamin Duffy, PT Physical Therapist               Goals/Plan    No documentation.                Clinical Impression     Row Name 03/13/23 1030          Pain    Pretreatment Pain Rating 0/10 - no pain  -     Posttreatment Pain Rating 0/10 - no pain  -     Pain Intervention(s) Medication (See MAR)  -     Row Name 03/13/23 1030          Plan of Care Review    Plan of Care Reviewed With patient  -     Outcome Evaluation PT IE complete.  A&Ox4.  No c/o pain.  C/o dizziness, fatigue, short of breath.  All subjective complaints have improved per pt.  Pt ambulated 600ft independently w/ 2L O2.  No LOB.  O2sat 95% on 2L post ambulation.  Pt encouraged to ambulate ad mirza at least 4x/day.  PT signing off.  Thank you for referral.  -     Row Name 03/13/23 1030          Therapy Assessment/Plan (PT)    Patient/Family Therapy Goals Statement (PT) return home  -     Criteria for Skilled Interventions Met (PT) does not meet criteria for skilled intervention  -     Therapy Frequency (PT) evaluation only  -     Row Name 03/13/23 1030          Positioning and Restraints    Pre-Treatment Position in bed  -     Post Treatment Position chair  -     In Chair sitting;call light within reach;encouraged to call for assist;with ns  -           User Key  (r) = Recorded By, (t) = Taken By, (c) = Cosigned By    Initials Name Provider Type     Benjamin Duffy, PT Physical Therapist               Outcome Measures     Row Name 03/13/23 1030 03/13/23 0800       How much help from another person do you currently need...    Turning from your back to your side while in flat bed without using bedrails? 4  - 4  -JF    Moving from lying on back to sitting on the side of a flat bed without bedrails? 4  - 4  -JF    Moving to and from a bed to a chair (including a wheelchair)? 4  - 3  -JF    Standing up from a chair using your arms (e.g., wheelchair, bedside chair)? 4  - 3  -JF    Climbing 3-5 steps with a railing? 4  - 3   -JF    To walk in hospital room? 4  - 3  -JF    AM-PAC 6 Clicks Score (PT) 24  - 20  -    Highest level of mobility 8 --> Walked 250 feet or more  - 6 --> Walked 10 steps or more  -    Row Name 03/13/23 1030          Functional Assessment    Outcome Measure Options AM-PAC 6 Clicks Basic Mobility (PT)  -           User Key  (r) = Recorded By, (t) = Taken By, (c) = Cosigned By    Initials Name Provider Type     Benjamin Duffy, PT Physical Therapist    Chandrika Holly, RN Registered Nurse              Physical Therapy Education     Title: PT OT SLP Therapies (Resolved)     Topic: Physical Therapy (Resolved)     Point: Mobility training (Resolved)     Learning Progress Summary           Patient Acceptance, JOHNNY COLVIN DU by  at 3/13/2023 1126    Comment: ambulate at least 4x/day                               User Key     Initials Effective Dates Name Provider Type [facility] 02/03/23 -  Benjamin Duffy, PT Physical Therapist PT              PT Recommendation and Plan     Plan of Care Reviewed With: patient  Outcome Evaluation: PT IE complete.  A&Ox4.  No c/o pain.  C/o dizziness, fatigue, short of breath.  All subjective complaints have improved per pt.  Pt ambulated 600ft independently w/ 2L O2.  No LOB.  O2sat 95% on 2L post ambulation.  Pt encouraged to ambulate ad mirza at least 4x/day.  PT signing off.  Thank you for referral.     Time Calculation:    PT Charges     Row Name 03/13/23 1127             Time Calculation    Start Time 1030  -      Stop Time 1100  -      Time Calculation (min) 30 min  -      PT Received On 03/13/23  -         Untimed Charges    PT Eval/Re-eval Minutes 30  -         Total Minutes    Untimed Charges Total Minutes 30  -       Total Minutes 30  -            User Key  (r) = Recorded By, (t) = Taken By, (c) = Cosigned By    Initials Name Provider Type     Benjamin Duffy, PT Physical Therapist              Therapy Charges for Today     Code Description Service Date  Service Provider Modifiers Qty    38986457418 HC PT EVAL LOW COMPLEXITY 2 3/13/2023 Benjamin Duffy, PT GP 1          PT G-Codes  Outcome Measure Options: AM-PAC 6 Clicks Basic Mobility (PT)  AM-PAC 6 Clicks Score (PT): 24    PT Discharge Summary  Anticipated Discharge Disposition (PT): home    Benjamin Duffy, PT  3/13/2023

## 2023-03-14 LAB
ANION GAP SERPL CALCULATED.3IONS-SCNC: 9 MMOL/L (ref 5–15)
BASOPHILS # BLD AUTO: 0.02 10*3/MM3 (ref 0–0.2)
BASOPHILS NFR BLD AUTO: 0.2 % (ref 0–1.5)
BUN SERPL-MCNC: 25 MG/DL (ref 8–23)
BUN/CREAT SERPL: 32.1 (ref 7–25)
CALCIUM SPEC-SCNC: 8.4 MG/DL (ref 8.6–10.5)
CHLORIDE SERPL-SCNC: 108 MMOL/L (ref 98–107)
CO2 SERPL-SCNC: 25 MMOL/L (ref 22–29)
CREAT SERPL-MCNC: 0.78 MG/DL (ref 0.76–1.27)
DEPRECATED RDW RBC AUTO: 46.1 FL (ref 37–54)
EGFRCR SERPLBLD CKD-EPI 2021: 94.2 ML/MIN/1.73
EOSINOPHIL # BLD AUTO: 0 10*3/MM3 (ref 0–0.4)
EOSINOPHIL NFR BLD AUTO: 0 % (ref 0.3–6.2)
ERYTHROCYTE [DISTWIDTH] IN BLOOD BY AUTOMATED COUNT: 14.1 % (ref 12.3–15.4)
GLUCOSE SERPL-MCNC: 135 MG/DL (ref 65–99)
HCT VFR BLD AUTO: 38.2 % (ref 37.5–51)
HGB BLD-MCNC: 12.7 G/DL (ref 13–17.7)
IMM GRANULOCYTES # BLD AUTO: 0.12 10*3/MM3 (ref 0–0.05)
IMM GRANULOCYTES NFR BLD AUTO: 1.1 % (ref 0–0.5)
LYMPHOCYTES # BLD AUTO: 1.32 10*3/MM3 (ref 0.7–3.1)
LYMPHOCYTES NFR BLD AUTO: 11.8 % (ref 19.6–45.3)
MCH RBC QN AUTO: 29.9 PG (ref 26.6–33)
MCHC RBC AUTO-ENTMCNC: 33.2 G/DL (ref 31.5–35.7)
MCV RBC AUTO: 89.9 FL (ref 79–97)
MONOCYTES # BLD AUTO: 0.66 10*3/MM3 (ref 0.1–0.9)
MONOCYTES NFR BLD AUTO: 5.9 % (ref 5–12)
NEUTROPHILS NFR BLD AUTO: 81 % (ref 42.7–76)
NEUTROPHILS NFR BLD AUTO: 9.11 10*3/MM3 (ref 1.7–7)
NRBC BLD AUTO-RTO: 0 /100 WBC (ref 0–0.2)
PLATELET # BLD AUTO: 187 10*3/MM3 (ref 140–450)
PMV BLD AUTO: 9.6 FL (ref 6–12)
POTASSIUM SERPL-SCNC: 3.4 MMOL/L (ref 3.5–5.2)
QT INTERVAL: 318 MS
QTC INTERVAL: 438 MS
RBC # BLD AUTO: 4.25 10*6/MM3 (ref 4.14–5.8)
SODIUM SERPL-SCNC: 142 MMOL/L (ref 136–145)
WBC NRBC COR # BLD: 11.23 10*3/MM3 (ref 3.4–10.8)

## 2023-03-14 PROCEDURE — 87070 CULTURE OTHR SPECIMN AEROBIC: CPT | Performed by: INTERNAL MEDICINE

## 2023-03-14 PROCEDURE — 25010000002 ENOXAPARIN PER 10 MG: Performed by: INTERNAL MEDICINE

## 2023-03-14 PROCEDURE — 99232 SBSQ HOSP IP/OBS MODERATE 35: CPT | Performed by: INTERNAL MEDICINE

## 2023-03-14 PROCEDURE — 94799 UNLISTED PULMONARY SVC/PX: CPT

## 2023-03-14 PROCEDURE — 80048 BASIC METABOLIC PNL TOTAL CA: CPT | Performed by: NURSE PRACTITIONER

## 2023-03-14 PROCEDURE — 25010000002 CEFEPIME PER 500 MG: Performed by: INTERNAL MEDICINE

## 2023-03-14 PROCEDURE — 85025 COMPLETE CBC W/AUTO DIFF WBC: CPT | Performed by: NURSE PRACTITIONER

## 2023-03-14 PROCEDURE — 94664 DEMO&/EVAL PT USE INHALER: CPT

## 2023-03-14 PROCEDURE — 25010000002 VANCOMYCIN 10 G RECONSTITUTED SOLUTION: Performed by: INTERNAL MEDICINE

## 2023-03-14 PROCEDURE — 87205 SMEAR GRAM STAIN: CPT | Performed by: INTERNAL MEDICINE

## 2023-03-14 RX ORDER — FAMOTIDINE 20 MG/1
20 TABLET, FILM COATED ORAL
Status: DISCONTINUED | OUTPATIENT
Start: 2023-03-14 | End: 2023-03-15 | Stop reason: HOSPADM

## 2023-03-14 RX ORDER — POTASSIUM CHLORIDE 750 MG/1
40 CAPSULE, EXTENDED RELEASE ORAL ONCE
Status: COMPLETED | OUTPATIENT
Start: 2023-03-14 | End: 2023-03-14

## 2023-03-14 RX ADMIN — ENOXAPARIN SODIUM 40 MG: 100 INJECTION SUBCUTANEOUS at 08:15

## 2023-03-14 RX ADMIN — VANCOMYCIN HYDROCHLORIDE 750 MG: 10 INJECTION, POWDER, LYOPHILIZED, FOR SOLUTION INTRAVENOUS at 04:42

## 2023-03-14 RX ADMIN — POTASSIUM CHLORIDE 40 MEQ: 10 CAPSULE, COATED, EXTENDED RELEASE ORAL at 09:58

## 2023-03-14 RX ADMIN — IPRATROPIUM BROMIDE AND ALBUTEROL SULFATE 3 ML: 2.5; .5 SOLUTION RESPIRATORY (INHALATION) at 10:55

## 2023-03-14 RX ADMIN — Medication 10 ML: at 08:16

## 2023-03-14 RX ADMIN — CEFEPIME 2 G: 2 INJECTION, POWDER, FOR SOLUTION INTRAVENOUS at 08:15

## 2023-03-14 RX ADMIN — IPRATROPIUM BROMIDE AND ALBUTEROL SULFATE 3 ML: 2.5; .5 SOLUTION RESPIRATORY (INHALATION) at 05:49

## 2023-03-14 RX ADMIN — Medication 10 ML: at 20:57

## 2023-03-14 RX ADMIN — GUAIFENESIN 1200 MG: 600 TABLET, EXTENDED RELEASE ORAL at 20:57

## 2023-03-14 RX ADMIN — IPRATROPIUM BROMIDE AND ALBUTEROL SULFATE 3 ML: 2.5; .5 SOLUTION RESPIRATORY (INHALATION) at 18:49

## 2023-03-14 RX ADMIN — CEFEPIME 2 G: 2 INJECTION, POWDER, FOR SOLUTION INTRAVENOUS at 16:37

## 2023-03-14 RX ADMIN — GUAIFENESIN 1200 MG: 600 TABLET, EXTENDED RELEASE ORAL at 08:15

## 2023-03-14 RX ADMIN — CEFEPIME 2 G: 2 INJECTION, POWDER, FOR SOLUTION INTRAVENOUS at 00:36

## 2023-03-14 RX ADMIN — AMLODIPINE BESYLATE 10 MG: 10 TABLET ORAL at 08:15

## 2023-03-14 RX ADMIN — FAMOTIDINE 20 MG: 20 TABLET, FILM COATED ORAL at 16:37

## 2023-03-14 RX ADMIN — FAMOTIDINE 20 MG: 20 TABLET, FILM COATED ORAL at 09:58

## 2023-03-14 NOTE — PLAN OF CARE
Goal Outcome Evaluation:  Plan of Care Reviewed With: patient        Progress: improving  Outcome Evaluation: Pt is A/O, on RA, up ab mirza, VSS. Pt has had no complaints of pain, SOA, or dizziness this shift. Pt has ambulated in the maier three times this shift. IVF infusing at 50 mL/hr. IV abx given. Tele shows S/ST, HR  with frequent and multifocal PVC's with a 5 run of PVC's. Safety maintained.

## 2023-03-14 NOTE — PLAN OF CARE
Goal Outcome Evaluation:  A&Ox4, on room air, ambulates independently, no c/o discomfort. Pending sputum sample- patient states he has a non productive cough. NSR- Sinus tach on the monitor otherwise VSS. On continuous pulse ox. Receiving IV antibiotic. Replaced K+ 3.4 w/ PO supplement. Lovenox VTE. Safety maintained. Will continue to monitor & notify MD appropriately.

## 2023-03-14 NOTE — PROGRESS NOTES
[facility] Medicine Services  INPATIENT PROGRESS NOTE    Patient Name: Reid Morrell  Date of Admission: 3/12/2023  Today's Date: 03/14/23  Length of Stay: 2  Primary Care Physician: Reid Islas MD    Subjective   Chief Complaint: Short of breath, fever   HPI  Mr. Morrell presented to [facility] ER 3/12/2023 with increasing shortness of breath, cough, fever.  Patient began feeling ill on 3/10.  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  He later had temperature of 103.  He used ibuprofen.  He continued to have worsening symptoms associated with dry cough.  He has a history of pulmonary fibrosis followed by Dr. Pisano.  WBC 21.6, potassium 3.1, creatinine 1.25, sodium 135, CRP 34.76, Lactate 2.8.  Chest x-ray showed masslike right midlung infiltrate likely related to acute pneumonia.  CT angiogram chest no pulmonary thromboembolic disease.  Thoracic aorta and great vessels normal.  Extensive right upper lobe airspace consolidation with air bronchograms.  Bilateral lower lobe airspace disease with diffuse groundglass opacity both lungs.  Findings consistent with bilateral pneumonia.  There is superimposed pulmonary fibrosis in the lower lobes.  Mildly enlarged right paratracheal node may be reactive.  Zosyn, cefepime, Solu-Medrol, normal saline fluid bolus given in ER.  Patient met sepsis criteria in ER.    Sitting up in chair.  Oxygen has been weaned off with saturation 95%.  He feels tired and weak today but overall improved.  He has not had fever in the last 24 hours.  He hopes to be discharged tomorrow if he continues to improve.  He has not been able to cough up any sputum..  Discussed with CANDACE Goetz, pulmonology today.  Dr. Pisano to see today.  Pulmonary advises patient should remain off of work until 3/20.  Continue cefepime.  Discontinue vancomycin today  as MRSA PCR negative.    Review of Systems   Constitutional: Positive for activity change and fatigue.   HENT: Negative for congestion and trouble swallowing.    Eyes: Negative for photophobia and visual disturbance.   Respiratory: Positive for cough and shortness of breath.    Cardiovascular: Negative for chest pain, palpitations and leg swelling.   Gastrointestinal: Negative for constipation, diarrhea, nausea and vomiting.   Endocrine: Negative for cold intolerance, heat intolerance and polyuria.   Genitourinary: Negative for dysuria, frequency and urgency.   Musculoskeletal: Negative for gait problem.   Skin: Negative for color change, pallor, rash and wound.   Allergic/Immunologic: Negative for immunocompromised state.   Neurological: Positive for weakness. Negative for dizziness and light-headedness.   Hematological: Negative for adenopathy. Does not bruise/bleed easily.   Psychiatric/Behavioral: Negative for agitation, behavioral problems and confusion.      All pertinent negatives and positives are as above. All other systems have been reviewed and are negative unless otherwise stated.     Objective    Temp:  [97.7 °F (36.5 °C)-97.9 °F (36.6 °C)] 97.8 °F (36.6 °C)  Heart Rate:  [71-88] 73  Resp:  [16] 16  BP: (121-132)/(70-79) 128/78  Physical Exam  Vitals and nursing note reviewed.   Constitutional:       Comments: Sitting up in bed.  Oxygen off.  No visitors in the room.     HENT:      Head: Normocephalic and atraumatic.      Nose: No congestion.      Mouth/Throat:      Pharynx: Oropharynx is clear. No oropharyngeal exudate or posterior oropharyngeal erythema.   Eyes:      Extraocular Movements: Extraocular movements intact.      Pupils: Pupils are equal, round, and reactive to light.   Cardiovascular:      Rate and Rhythm: Normal rate and regular rhythm.      Heart sounds: No murmur heard.     Comments: Normal sinus rhythm 78 on telemetry.  Pulmonary:      Breath sounds: Rales (Bilateral bases) present. No  wheezing or rhonchi.      Comments: Oxygen off.  Saturation 95%.  Decreased breath sounds throughout.  No sputum production.  Abdominal:      Palpations: Abdomen is soft.      Tenderness: There is no abdominal tenderness.   Genitourinary:     Comments: Voiding.  Musculoskeletal:         General: No swelling or tenderness.      Cervical back: Normal range of motion and neck supple.   Skin:     General: Skin is warm and dry.   Neurological:      General: No focal deficit present.      Mental Status: He is alert and oriented to person, place, and time.   Psychiatric:         Mood and Affect: Mood normal.         Behavior: Behavior normal.         Thought Content: Thought content normal.         Judgment: Judgment normal.       Results Review:  I have reviewed the labs, radiology results, and diagnostic studies.    Laboratory Data:   Results from last 7 days   Lab Units 03/14/23  0412 03/13/23  1200 03/13/23  0343   WBC 10*3/mm3 11.23* 13.35* 12.79*   HEMOGLOBIN g/dL 12.7* 13.6 13.4   HEMATOCRIT % 38.2 39.6 40.5   PLATELETS 10*3/mm3 187 190 166        Results from last 7 days   Lab Units 03/14/23  0412 03/13/23  0343 03/12/23  1314   SODIUM mmol/L 142 138 135*   POTASSIUM mmol/L 3.4* 3.4* 3.1*   CHLORIDE mmol/L 108* 101 95*   CO2 mmol/L 25.0 25.0 27.0   BUN mg/dL 25* 27* 33*   CREATININE mg/dL 0.78 0.99 1.25   CALCIUM mg/dL 8.4* 8.6 8.9   GLUCOSE mg/dL 135* 208* 143*     Imaging Results (All)     Procedure Component Value Units Date/Time    CT Angiogram Chest [[id number]] Collected: 03/12/23 1644     Updated: 03/12/23 1656    Narrative:      Exam: CT angiogram of the of the chest with intravenous contrast.     CLINICAL HISTORY:  Shortness of breath. Suspected pulmonary embolus.     DOSE:  250 mGycm. All CT scans are performed using dose optimization  techniques as appropriate to the performed exam and including at least  one of the following: Automated exposure control, adjustment of the mA  and/or kV according to size,  and the use of the iterative reconstruction  technique.     TECHNIQUE: Contiguous axial images were obtained through the thorax  following intravenous contrast administration with reformatted images  obtained in the sagittal and coronal projections from the original data  set. Three-dimensional reconstructions are also obtained.     COMPARISON:  12/16/2022     FINDINGS:     Pulmonary arteries:  There is normal enhancement of the pulmonary  arteries with no evidence of pulmonary thromboembolic disease.     Aorta:  The thoracic aorta and proximal great vessels are normal in  appearance. There is no evidence of aortic dissection or aneurysm.     LUNGS:  There is groundglass disease throughout both lungs. There is  extensive right upper lobe as well as bilateral lower lobe pneumonia     Pleural spaces: No effusions are demonstrated. There is no pneumothorax.     HEART: There is mild cardiomegaly. No evidence of pericardial effusion  or elevated right heart pressure. Mild coronary calcifications are  present.     Bones: Multiple old left-sided rib fractures are present. No acute or  suspicious bony abnormalities are present.     CHEST WALL: No chest wall abnormalities are demonstrated.     Lymph nodes: There are some prominent right paratracheal nodes including  a 1.4 cm short axis level 4R right paratracheal node. These may be  reactive in nature. No axillary lymphadenopathy.     Upper abdomen: The gallbladder is surgically absent. There is no biliary  dilatation present. Limited images of the upper abdomen are otherwise  unremarkable.       Impression:      1. No evidence of pulmonary thromboembolic disease. The thoracic aorta  and great vessels are normal in appearance.  2. Rather extensive right upper lobe airspace consolidation with air  bronchograms. There is also bilateral lower lobe airspace disease with  diffuse groundglass opacity within both lungs. FINDINGS consistent with  bilateral pneumonia. There is  superimposed pulmonary fibrosis within the  lower lobes. No effusions are present.  3. Mildly enlarged right paratracheal nodes may be reactive in nature.  No axillary adenopathy. Mild coronary calcifications are present.  This report was finalized on 03/12/2023 16:53 by Dr. Kenneth Cox MD.    XR Chest 1 View [[id number]] Collected: 03/12/23 1402     Updated: 03/12/23 1407    Narrative:      EXAMINATION: XR CHEST 1 VW- 3/12/2023 2:02 PM CDT     HISTORY: Shortness of air     REPORT: A frontal view the chest was obtained.     COMPARISON: Chest CT 12/16/2022.     There is moderate volume loss in the lung bases as well as mild  elevation of the right hemidiaphragm. There is a focal masslike  infiltrate in the right midlung zone which is new compared with the  previous chest CTA. This is superimposed on a background of pulmonary  fibrosis, which is better demonstrated on previous high-resolution chest  CT. Heart size appears normal. No pneumothorax or pleural effusion is  identified.       Impression:      Masslike right midlung infiltrate likely related to acute  pneumonia. This is superimposed on a background of pulmonary fibrosis as  demonstrated on previous high-resolution chest CT. Repeat chest x-rays  are recommended after appropriate antibiotic therapy.  This report was finalized on 03/12/2023 14:04 by Dr. Tello Guerrero MD.        Scheduled medications  amLODIPine, 10 mg, Oral, Daily  cefepime, 2 g, Intravenous, Q8H  enoxaparin, 40 mg, Subcutaneous, Daily  famotidine, 20 mg, Oral, BID AC  guaiFENesin, 1,200 mg, Oral, Q12H  ipratropium-albuterol, 3 mL, Nebulization, Q6H - RT  potassium chloride, 40 mEq, Oral, Once  sodium chloride, 10 mL, Intravenous, Q12H      I have reviewed the patient's current medications.     Assessment/Plan   Assessment  Active Hospital Problems    Diagnosis    • **Bilateral pneumonia    • Obesity (BMI 30-39.9)    • Sepsis due to pneumonia (HCC)    • Hyperlactatemia    • Essential  hypertension    • Hypokalemia    • Elevated troponin level not due to acute coronary syndrome    • Hypoxia secondary to pneumonia and sepsis    • Pulmonary fibrosis (HCC)      Treatment Plan  Sepsis due to bilateral pneumonia.  WBC 21, heart rate 115, temperature 102, lactate 2.8.  Source of infection.  Fluid bolus given in ER.  Vancomycin and Zosyn given in ER.  Antibiotics switched to cefepime and vancomycin on admission.    Bilateral pneumonia.  Cefepime and vancomycin ordered on admission.  Neb treatments every 6 hours.  Mucinex twice daily.  Strep pneumonia negative, Legionella negative, MRSA PCR negative.  Blood cultures no growth at 24 hours.  WBC down to 11.23.  Discontinue vancomycin today as MRSA PCR negative.  Afebrile.  Continue incentive spirometry.  Patient has not been able to produce any sputum.    Primary hypertension.  Blood pressure 128/78.  Continue amlodipine    Hypokalemia.  Potassium 3.1, replaced.  Follow-up potassium 3.4.  Potassium 40 mEq x 1 dose today.  BMP in AM.    Elevated troponin not due to ACS.  Troponin elevation due to sepsis.    Hypoxia secondary to pneumonia and sepsis.  Saturation 88% on room air.  Placed on cannula at 2 L.  She has been weaned to room air.  Saturation 95%.    Hyperlactatemia due to sepsis.    Pulmonary fibrosis.  Pulmonary medicine consulted. Dr. Pisano following.  Recommend being off work until at least 3/20/2023.    Lovenox for deep vein thrombosis prophylaxis.     Ambulate in hallway.    Medical Decision Making  Number and Complexity of problems: 8  Sepsis due to bilateral pneumonia: Acute, high complexity  Bilateral pneumonia: Acute, high complexity  Elevated troponin due to sepsis: Acute, high complexity  Hypoxia secondary to pneumonia and sepsis: Acute, high complexity  Hyperlactatemia, acute, high complexity  Hypokalemia: Acute, moderate complexity  Primary hypertension: Chronic, moderate complexity  Pulmonary fibrosis: Chronic, high  complexity    Differential Diagnosis: None    Conditions and Status   Condition is unchanged     [facility] Data  External documents reviewed: Prior records epic  Cardiac tracing (EKG, telemetry) interpretation: Normal sinus rhythm 80 on telemetry  Radiology interpretation: Radiology interpretation CTA  Labs reviewed:   CBC 3/14/2023  BMP 3/14/2023  Blood cultures  Pneumonia, Legionella, MRSA PCR      Any tests that were considered but not ordered: None     Decision rules/scores evaluated (example [id number], [place], etc): None     Discussed with: Dr. Wick and patient     Care Planning  Shared decision making: Dr. Wick, Aliya Linda, CANDACE pulmonology and patient.  Patient agreeable to pulmonary consult, IV antibiotics, wean oxygen.  Code status and discussions: Full code  Patient surrogate decision maker is his wife, Jaimie.    Disposition  Social Determinants of Health that impact treatment or disposition: None  I expect the patient to be discharged to home in 1-2 days.     Electronically signed by CANDACE Miner, 03/14/23, 09:17 CDT.

## 2023-03-14 NOTE — PROGRESS NOTES
[facility] [place] PULMONARY AND CRITICAL CARE PROGRESS NOTE - [facility]    Patient: Reid Morrell  1950   MR# [id number]   Acct# [id number]  03/14/23   08:53 CDT  Referring Provider: Mart Wick MD    Chief Complaint:  Shortness of breath, fever  Interval history: The patient is resting in the bedside chair on room air with O2 sat 95 to 99%, heart rate 78.  The patient is asking if he can go home tomorrow and if he can go back to work.  He is a . Recommend being off work until at least 3/20/2023. No new imaging to review this AM. No other aggravating or alleviating factors.     Meds:  amLODIPine, 10 mg, Oral, Daily  cefepime, 2 g, Intravenous, Q8H  enoxaparin, 40 mg, Subcutaneous, Daily  guaiFENesin, 1,200 mg, Oral, Q12H  ipratropium-albuterol, 3 mL, Nebulization, Q6H - RT  sodium chloride, 10 mL, Intravenous, Q12H           Physical Exam:  SpO2 Percentage    03/14/23 0549 03/14/23 0554 03/14/23 0700   SpO2: 93% 95% 95%     Body mass index is 29.99 kg/m².   Temp:  [97.7 °F (36.5 °C)-97.9 °F (36.6 °C)] 97.8 °F (36.6 °C)  Heart Rate:  [71-88] 73  Resp:  [16] 16  BP: (121-132)/(70-79) 128/78    Intake/Output Summary (Last 24 hours) at 3/14/2023 0853  Last data filed at 3/14/2023 0700  Gross per 24 hour   Intake 1260 ml   Output 2525 ml   Net -1265 ml     Physical Exam  Vitals reviewed.   Constitutional:       General: He is not in acute distress.     Appearance: He is well-developed and overweight.   HENT:      Head: Normocephalic and atraumatic.   Eyes:      General: No scleral icterus.     Conjunctiva/sclera: Conjunctivae normal.      Pupils: Pupils are equal, round, and reactive to light.   Cardiovascular:      Rate and Rhythm: Normal rate.   Pulmonary:      Effort: Pulmonary effort is normal. No respiratory distress.      Breath sounds: Rhonchi (few) present. No wheezing or rales.   Musculoskeletal:         General: Normal range of motion.      Cervical back: Normal range of  motion and neck supple.   Skin:     General: Skin is warm and dry.   Neurological:      Mental Status: He is alert and oriented to person, place, and time.   Psychiatric:         Behavior: Behavior normal.         Thought Content: Thought content normal.         Judgment: Judgment normal.       Electronically signed by CANDACE Finnegan, 3/14/2023, 08:53 CDT      Physician substantive portion: medical decision making    Result Review    Laboratory Data:  Results from last 7 days   Lab Units 03/14/23  0412 03/13/23  1200 03/13/23  0343   WBC 10*3/mm3 11.23* 13.35* 12.79*   HEMOGLOBIN g/dL 12.7* 13.6 13.4   PLATELETS 10*3/mm3 187 190 166     Results from last 7 days   Lab Units 03/14/23  0412 03/13/23  0343 03/12/23 2039 03/12/23  1643 03/12/23  1314   SODIUM mmol/L 142 138  --   --  135*   POTASSIUM mmol/L 3.4* 3.4*  --   --  3.1*   CO2 mmol/L 25.0 25.0  --   --  27.0   BUN mg/dL 25* 27*  --   --  33*   CREATININE mg/dL 0.78 0.99  --   --  1.25   LACTATE mmol/L  --   --  1.6   < > 2.8*   CRP mg/dL  --   --   --   --  34.76*    < > = values in this interval not displayed.     Results from last 7 days   Lab Units 03/12/23  1327   PH, ARTERIAL pH units 7.539*   PCO2, ARTERIAL mm Hg 34.0*   PO2 ART mm Hg 62.8*     Microbiology Results (last 10 days)     Procedure Component Value - Date/Time    MRSA Screen, PCR (Inpatient) - Swab, Nares [[id number]]  (Normal) Collected: 03/13/23 0212    Lab Status: Final result Specimen: Swab from Nares Updated: 03/13/23 0332     MRSA PCR No MRSA Detected    Narrative:      The negative predictive value of this diagnostic test is high and should only be used to consider de-escalating anti-MRSA therapy. A positive result may indicate colonization with MRSA and must be correlated clinically.    S. Pneumo Ag Urine or CSF - Urine, Urine, Clean Catch [[id number]]  (Normal) Collected: 03/12/23 1838    Lab Status: Final result Specimen: Urine, Clean Catch Updated: 03/12/23 1917     Strep  Pneumo Ag Negative    Legionella Antigen, Urine - Urine, Urine, Clean Catch [[id number]]  (Normal) Collected: 03/12/23 1838    Lab Status: Final result Specimen: Urine, Clean Catch Updated: 03/12/23 1917     LEGIONELLA ANTIGEN, URINE Negative    Blood Culture - Blood, Arm, Right [[id number]]  (Normal) Collected: 03/12/23 1342    Lab Status: Preliminary result Specimen: Blood from Arm, Right Updated: 03/13/23 1415     Blood Culture No growth at 24 hours    COVID PRE-OP / PRE-PROCEDURE SCREENING ORDER (NO ISOLATION) - Swab, Nasal Cavity [[id number]]  (Normal) Collected: 03/12/23 1319    Lab Status: Final result Specimen: Swab from Nasal Cavity Updated: 03/12/23 1358    Narrative:      The following orders were created for panel order COVID PRE-OP / PRE-PROCEDURE SCREENING ORDER (NO ISOLATION) - Swab, Nasal Cavity.  Procedure                               Abnormality         Status                     ---------                               -----------         ------                     COVID-19 and FLU A/B PCR...[[id number]]  Normal              Final result                 Please view results for these tests on the individual orders.    COVID-19 and FLU A/B PCR - Swab, Nasopharynx [[id number]]  (Normal) Collected: 03/12/23 1319    Lab Status: Final result Specimen: Swab from Nasopharynx Updated: 03/12/23 1358     COVID19 Not Detected     Influenza A PCR Not Detected     Influenza B PCR Not Detected    Narrative:      Fact sheet for providers: https://www.fda.gov/media/035108/download    Fact sheet for patients: https://www.fda.gov/media/163543/download    Test performed by PCR.    Blood Culture - Blood, Arm, Right [[id number]]  (Normal) Collected: 03/12/23 1314    Lab Status: Preliminary result Specimen: Blood from Arm, Right Updated: 03/13/23 1715     Blood Culture No growth at 24 hours         Recent films:  XR Chest 1 View    Result Date: 3/12/2023  EXAMINATION: XR CHEST 1 VW- 3/12/2023 2:02 PM CDT  HISTORY: Shortness  of air  REPORT: A frontal view the chest was obtained.  COMPARISON: Chest CT 12/16/2022.  There is moderate volume loss in the lung bases as well as mild elevation of the right hemidiaphragm. There is a focal masslike infiltrate in the right midlung zone which is new compared with the previous chest CTA. This is superimposed on a background of pulmonary fibrosis, which is better demonstrated on previous high-resolution chest CT. Heart size appears normal. No pneumothorax or pleural effusion is identified.      Impression: Masslike right midlung infiltrate likely related to acute pneumonia. This is superimposed on a background of pulmonary fibrosis as demonstrated on previous high-resolution chest CT. Repeat chest x-rays are recommended after appropriate antibiotic therapy. This report was finalized on 03/12/2023 14:04 by Dr. Tello Guerrero MD.    CT Angiogram Chest    Result Date: 3/12/2023  Exam: CT angiogram of the of the chest with intravenous contrast.  CLINICAL HISTORY:  Shortness of breath. Suspected pulmonary embolus.  DOSE:  250 mGycm. All CT scans are performed using dose optimization techniques as appropriate to the performed exam and including at least one of the following: Automated exposure control, adjustment of the mA and/or kV according to size, and the use of the iterative reconstruction technique.  TECHNIQUE: Contiguous axial images were obtained through the thorax following intravenous contrast administration with reformatted images obtained in the sagittal and coronal projections from the original data set. Three-dimensional reconstructions are also obtained.  COMPARISON:  12/16/2022  FINDINGS:  Pulmonary arteries:  There is normal enhancement of the pulmonary arteries with no evidence of pulmonary thromboembolic disease.  Aorta:  The thoracic aorta and proximal great vessels are normal in appearance. There is no evidence of aortic dissection or aneurysm.  LUNGS:  There is groundglass disease  throughout both lungs. There is extensive right upper lobe as well as bilateral lower lobe pneumonia  Pleural spaces: No effusions are demonstrated. There is no pneumothorax.  HEART: There is mild cardiomegaly. No evidence of pericardial effusion or elevated right heart pressure. Mild coronary calcifications are present.  Bones: Multiple old left-sided rib fractures are present. No acute or suspicious bony abnormalities are present.  CHEST WALL: No chest wall abnormalities are demonstrated.  Lymph nodes: There are some prominent right paratracheal nodes including a 1.4 cm short axis level 4R right paratracheal node. These may be reactive in nature. No axillary lymphadenopathy.  Upper abdomen: The gallbladder is surgically absent. There is no biliary dilatation present. Limited images of the upper abdomen are otherwise unremarkable.      Impression: 1. No evidence of pulmonary thromboembolic disease. The thoracic aorta and great vessels are normal in appearance. 2. Rather extensive right upper lobe airspace consolidation with air bronchograms. There is also bilateral lower lobe airspace disease with diffuse groundglass opacity within both lungs. FINDINGS consistent with bilateral pneumonia. There is superimposed pulmonary fibrosis within the lower lobes. No effusions are present. 3. Mildly enlarged right paratracheal nodes may be reactive in nature. No axillary adenopathy. Mild coronary calcifications are present. This report was finalized on 03/12/2023 16:53 by Dr. Kenneth Cox MD.         Pulmonary Assessment:  1. rul pneumonia  2. Pulmonary fibrosis  3. Hx positive zenobia    Recommend/plan:   · Continue abx with current dosing today  · Ambulate in halls today  · Possibly home as early as tomorrow if better and ambulatory    This visit was performed by both a physician and an [facility] RN.  I personally evaluated and examined the patient.  I performed all aspects of the medical decision making as  documented.  Electronically signed by Nash Pisano MD, 3/14/2023, 11:33 CDT

## 2023-03-15 ENCOUNTER — READMISSION MANAGEMENT (OUTPATIENT)
Dept: CALL CENTER | Facility: HOSPITAL | Age: 73
End: 2023-03-15
Payer: MEDICARE

## 2023-03-15 VITALS
RESPIRATION RATE: 18 BRPM | HEIGHT: 71 IN | WEIGHT: 214.19 LBS | DIASTOLIC BLOOD PRESSURE: 83 MMHG | SYSTOLIC BLOOD PRESSURE: 151 MMHG | HEART RATE: 50 BPM | OXYGEN SATURATION: 95 % | BODY MASS INDEX: 29.98 KG/M2 | TEMPERATURE: 98.4 F

## 2023-03-15 LAB
ANION GAP SERPL CALCULATED.3IONS-SCNC: 9 MMOL/L (ref 5–15)
BASOPHILS # BLD AUTO: 0.03 10*3/MM3 (ref 0–0.2)
BASOPHILS NFR BLD AUTO: 0.3 % (ref 0–1.5)
BUN SERPL-MCNC: 17 MG/DL (ref 8–23)
BUN/CREAT SERPL: 19.1 (ref 7–25)
CALCIUM SPEC-SCNC: 8.6 MG/DL (ref 8.6–10.5)
CHLORIDE SERPL-SCNC: 101 MMOL/L (ref 98–107)
CO2 SERPL-SCNC: 28 MMOL/L (ref 22–29)
CREAT SERPL-MCNC: 0.89 MG/DL (ref 0.76–1.27)
DEPRECATED RDW RBC AUTO: 48.2 FL (ref 37–54)
EGFRCR SERPLBLD CKD-EPI 2021: 90.5 ML/MIN/1.73
EOSINOPHIL # BLD AUTO: 0.05 10*3/MM3 (ref 0–0.4)
EOSINOPHIL NFR BLD AUTO: 0.6 % (ref 0.3–6.2)
ERYTHROCYTE [DISTWIDTH] IN BLOOD BY AUTOMATED COUNT: 14.4 % (ref 12.3–15.4)
GLUCOSE SERPL-MCNC: 104 MG/DL (ref 65–99)
HCT VFR BLD AUTO: 40.1 % (ref 37.5–51)
HGB BLD-MCNC: 13.1 G/DL (ref 13–17.7)
IMM GRANULOCYTES # BLD AUTO: 0.07 10*3/MM3 (ref 0–0.05)
IMM GRANULOCYTES NFR BLD AUTO: 0.8 % (ref 0–0.5)
LYMPHOCYTES # BLD AUTO: 1.52 10*3/MM3 (ref 0.7–3.1)
LYMPHOCYTES NFR BLD AUTO: 17.2 % (ref 19.6–45.3)
MCH RBC QN AUTO: 29.8 PG (ref 26.6–33)
MCHC RBC AUTO-ENTMCNC: 32.7 G/DL (ref 31.5–35.7)
MCV RBC AUTO: 91.1 FL (ref 79–97)
MONOCYTES # BLD AUTO: 1.13 10*3/MM3 (ref 0.1–0.9)
MONOCYTES NFR BLD AUTO: 12.8 % (ref 5–12)
NEUTROPHILS NFR BLD AUTO: 6.03 10*3/MM3 (ref 1.7–7)
NEUTROPHILS NFR BLD AUTO: 68.3 % (ref 42.7–76)
NRBC BLD AUTO-RTO: 0 /100 WBC (ref 0–0.2)
PLATELET # BLD AUTO: 226 10*3/MM3 (ref 140–450)
PMV BLD AUTO: 9.4 FL (ref 6–12)
POTASSIUM SERPL-SCNC: 3.9 MMOL/L (ref 3.5–5.2)
QT INTERVAL: 408 MS
QTC INTERVAL: 461 MS
RBC # BLD AUTO: 4.4 10*6/MM3 (ref 4.14–5.8)
SODIUM SERPL-SCNC: 138 MMOL/L (ref 136–145)
WBC NRBC COR # BLD: 8.83 10*3/MM3 (ref 3.4–10.8)

## 2023-03-15 PROCEDURE — 93010 ELECTROCARDIOGRAM REPORT: CPT | Performed by: INTERNAL MEDICINE

## 2023-03-15 PROCEDURE — 93005 ELECTROCARDIOGRAM TRACING: CPT | Performed by: NURSE PRACTITIONER

## 2023-03-15 PROCEDURE — 94799 UNLISTED PULMONARY SVC/PX: CPT

## 2023-03-15 PROCEDURE — 94664 DEMO&/EVAL PT USE INHALER: CPT

## 2023-03-15 PROCEDURE — 85025 COMPLETE CBC W/AUTO DIFF WBC: CPT | Performed by: NURSE PRACTITIONER

## 2023-03-15 PROCEDURE — 25010000002 ENOXAPARIN PER 10 MG: Performed by: INTERNAL MEDICINE

## 2023-03-15 PROCEDURE — 25010000002 CEFEPIME PER 500 MG: Performed by: INTERNAL MEDICINE

## 2023-03-15 PROCEDURE — 80048 BASIC METABOLIC PNL TOTAL CA: CPT | Performed by: NURSE PRACTITIONER

## 2023-03-15 PROCEDURE — 99231 SBSQ HOSP IP/OBS SF/LOW 25: CPT | Performed by: INTERNAL MEDICINE

## 2023-03-15 RX ORDER — CEFDINIR 300 MG/1
300 CAPSULE ORAL EVERY 12 HOURS SCHEDULED
Status: DISCONTINUED | OUTPATIENT
Start: 2023-03-15 | End: 2023-03-15 | Stop reason: HOSPADM

## 2023-03-15 RX ORDER — CEFDINIR 300 MG/1
300 CAPSULE ORAL EVERY 12 HOURS SCHEDULED
Qty: 10 CAPSULE | Refills: 0 | Status: SHIPPED | OUTPATIENT
Start: 2023-03-15 | End: 2023-03-20

## 2023-03-15 RX ORDER — LOSARTAN POTASSIUM 25 MG/1
25 TABLET ORAL
Status: DISCONTINUED | OUTPATIENT
Start: 2023-03-15 | End: 2023-03-15 | Stop reason: HOSPADM

## 2023-03-15 RX ORDER — GUAIFENESIN 600 MG/1
1200 TABLET, EXTENDED RELEASE ORAL EVERY 12 HOURS SCHEDULED
Qty: 20 TABLET | Refills: 0 | Status: SHIPPED | OUTPATIENT
Start: 2023-03-15

## 2023-03-15 RX ADMIN — ENOXAPARIN SODIUM 40 MG: 100 INJECTION SUBCUTANEOUS at 08:04

## 2023-03-15 RX ADMIN — AMLODIPINE BESYLATE 10 MG: 10 TABLET ORAL at 08:04

## 2023-03-15 RX ADMIN — CEFEPIME 2 G: 2 INJECTION, POWDER, FOR SOLUTION INTRAVENOUS at 08:05

## 2023-03-15 RX ADMIN — LOSARTAN POTASSIUM 25 MG: 25 TABLET, FILM COATED ORAL at 08:04

## 2023-03-15 RX ADMIN — GUAIFENESIN 1200 MG: 600 TABLET, EXTENDED RELEASE ORAL at 08:04

## 2023-03-15 RX ADMIN — IPRATROPIUM BROMIDE AND ALBUTEROL SULFATE 3 ML: 2.5; .5 SOLUTION RESPIRATORY (INHALATION) at 11:15

## 2023-03-15 RX ADMIN — IPRATROPIUM BROMIDE AND ALBUTEROL SULFATE 3 ML: 2.5; .5 SOLUTION RESPIRATORY (INHALATION) at 05:25

## 2023-03-15 RX ADMIN — IPRATROPIUM BROMIDE AND ALBUTEROL SULFATE 3 ML: 2.5; .5 SOLUTION RESPIRATORY (INHALATION) at 00:32

## 2023-03-15 RX ADMIN — FAMOTIDINE 20 MG: 20 TABLET, FILM COATED ORAL at 08:04

## 2023-03-15 RX ADMIN — CEFEPIME 2 G: 2 INJECTION, POWDER, FOR SOLUTION INTRAVENOUS at 00:29

## 2023-03-15 NOTE — PLAN OF CARE
Goal Outcome Evaluation:  Plan of Care Reviewed With: patient        Progress: improving  Outcome Evaluation: Pt is A/O, on RA, up ab mirza, VSS. Pt has ambulated in the halls twice this shift. No complaints of fatigue or SOA tonight. IV abx given. Tele shows S/ST, HR  with multifocal PVC's, 3 runs of PVC's, and paired PVC's. Safety maintained.

## 2023-03-15 NOTE — PLAN OF CARE
Goal Outcome Evaluation:   A&Ox4, on room air, ambulates independently, no c/o discomfort. NSR- Sinus tach on the monitor otherwise VSS. On continuous pulse ox. Receiving IV antibiotic. Encouraged to ambulate frequently. Lovenox VTE. Safety maintained. Awaiting orders for d/c. Will continue to monitor & notify MD appropriately.

## 2023-03-15 NOTE — PAYOR COMM NOTE
"3/14 CLINICAL   [id number]    Reid Hearn (72 y.o. Male)     Date of Birth   1950    Social Security Number       Address   [address] [id number]    Home Phone   [phone]    MRN   [id number]       Orthodox   Orthodox    Marital Status                               Admission Date   3/12/23    Admission Type   Emergency    Admitting Provider   Mart Wick MD    Attending Provider   Mart Wick MD    Department, Room/Bed   [facility] 4B, 409/1       Discharge Date       Discharge Disposition       Discharge Destination                               Attending Provider: Mart Wick MD    Allergies: No Known Allergies    Isolation: None   Infection: None   Code Status: CPR    Ht: 180.3 cm (71\")   Wt: 97.2 kg (214 lb 3 oz)    Admission Cmt: None   Principal Problem: Bilateral pneumonia [J18.9]                 Active Insurance as of 3/12/2023     Primary Coverage     Payor Plan Insurance Group Employer/Plan Group    ANTHEM MEDICARE REPLACEMENT ANTHEM MEDICARE ADVANTAGE [id number]     Payor Plan Address Payor Plan Phone Number Payor Plan Fax Number Effective Dates    [address] [phone]  1/1/2015 - None Entered    [facility] [id number]       Subscriber Name Subscriber Birth Date Member ID       REID HEARN 1950 [id number]                 Emergency Contacts      (Rel.) Home Phone Work Phone Mobile Phone    Jaimie Hearn (Spouse) [phone] -- [phone]              Current Facility-Administered Medications   Medication Dose Route Frequency Provider Last Rate Last Admin   • acetaminophen (TYLENOL) tablet 650 mg  650 mg Oral Q4H PRN Han Whitt, DO        Or   • acetaminophen (TYLENOL) 160 MG/5ML solution 650 mg  650 mg Oral Q4H PRN Han Whitt DO        Or   • acetaminophen (TYLENOL) suppository 650 mg  650 mg Rectal Q4H PRN Han Whitt, DO       • amLODIPine (NORVASC) tablet 10 mg  10 " mg Oral Daily Han Whitt DO   10 mg at 03/14/23 0815   • cefepime (MAXIPIME) 2 g/100 mL 0.9% NS (mbp)  2 g Intravenous Q8H Han Whitt DO 0 mL/hr at 03/14/23 0440 2 g at 03/15/23 0029   • Enoxaparin Sodium (LOVENOX) syringe 40 mg  40 mg Subcutaneous Daily Han Whitt DO   40 mg at 03/14/23 0815   • famotidine (PEPCID) tablet 20 mg  20 mg Oral BID AC LindaAliya, APRN   20 mg at 03/14/23 1637   • guaiFENesin (MUCINEX) 12 hr tablet 1,200 mg  1,200 mg Oral Q12H Han Whitt DO   1,200 mg at 03/14/23 2057   • ipratropium-albuterol (DUO-NEB) nebulizer solution 3 mL  3 mL Nebulization Q6H - RT Han Whitt DO   3 mL at 03/15/23 0525   • ondansetron (ZOFRAN) injection 4 mg  4 mg Intravenous Q6H PRN Han Whitt DO       • sodium chloride 0.9 % flush 10 mL  10 mL Intravenous Q12H Han Whitt DO   10 mL at 03/14/23 2057   • sodium chloride 0.9 % flush 10 mL  10 mL Intravenous PRN Han Whitt DO       • sodium chloride 0.9 % infusion 40 mL  40 mL Intravenous PRN Han Whitt DO         Orders (last 24 hrs)      Start     Ordered    03/15/23 0600  CBC Auto Differential  PROCEDURE ONCE         03/14/23 2201    03/14/23 1900  Oscillating Positive Expiratory Pressure (OPEP)  Every 6 Hours - RT       03/14/23 1305    03/14/23 1500  Vancomycin, Trough  Timed,   Status:  Canceled         03/13/23 1357    03/14/23 1300  Ambulate Patient  3 Times Daily         03/14/23 0858    03/14/23 1230  Oscillating Positive Expiratory Pressure (OPEP)  4 Times Daily - RT,   Status:  Canceled       03/14/23 0855    03/14/23 1000  potassium chloride (MICRO-K) CR capsule 40 mEq  Once         03/14/23 0907    03/14/23 0945  famotidine (PEPCID) tablet 20 mg  2 Times Daily Before Meals         03/14/23 0858    03/14/23 0600  Basic Metabolic Panel  Daily       03/13/23 1129    03/14/23 0600  CBC Auto Differential  PROCEDURE ONCE         03/13/23 2202    03/13/23 1130  CBC & Differential  Daily        03/13/23 1129    03/13/23 0400  vancomycin 750 mg/250 mL 0.9% NS IVPB ([facility])  Every 12 Hours,   Status:  Discontinued        See Hyperspace for full Linked Orders Report.    03/12/23 1717 03/13/23 0100  cefepime (MAXIPIME) 2 g/100 mL 0.9% NS (mbp)  Every 8 Hours         03/12/23 1711 03/12/23 2100  guaiFENesin (MUCINEX) 12 hr tablet 1,200 mg  Every 12 Hours Scheduled         03/12/23 1717 03/12/23 2100  sodium chloride 0.9 % flush 10 mL  Every 12 Hours Scheduled         03/12/23 1719 03/12/23 2045  amLODIPine (NORVASC) tablet 10 mg  Daily         03/12/23 1714 03/12/23 2000  Vital Signs  Every 4 Hours       03/12/23 1719 03/12/23 1900  ipratropium-albuterol (DUO-NEB) nebulizer solution 3 mL  Every 6 Hours - RT         03/12/23 1717 03/12/23 1800  Incentive Spirometry  Every 2 Hours While Awake       03/12/23 1717 03/12/23 1800  Oral Care  2 Times Daily       03/12/23 1719 03/12/23 1721  Enoxaparin Sodium (LOVENOX) syringe 40 mg  Daily         03/12/23 1719 03/12/23 1721  sodium chloride 0.9 % with KCl 20 mEq/L infusion  Continuous,   Status:  Discontinued         03/12/23 1719 03/12/23 1719  Intake & Output  Every Shift       03/12/23 1719    03/12/23 1718  ondansetron (ZOFRAN) injection 4 mg  Every 6 Hours PRN         03/12/23 1719    03/12/23 1718  acetaminophen (TYLENOL) tablet 650 mg  Every 4 Hours PRN        See Hyperspace for full Linked Orders Report.    03/12/23 1719    03/12/23 1718  acetaminophen (TYLENOL) 160 MG/5ML solution 650 mg  Every 4 Hours PRN        See Hyperspace for full Linked Orders Report.    03/12/23 1719    03/12/23 1718  acetaminophen (TYLENOL) suppository 650 mg  Every 4 Hours PRN        See Hyperspace for full Linked Orders Report.    03/12/23 1719 03/12/23 1718  sodium chloride 0.9 % flush 10 mL  As Needed         03/12/23 1719 03/12/23 1718  sodium chloride 0.9 % infusion 40 mL  As Needed         03/12/23 1719    --  SCANNED - TELEMETRY            03/12/23 0000    --  sildenafil (VIAGRA) 100 MG tablet  Daily PRN         03/12/23 1538    --  B Complex-C (SUPER B COMPLEX PO)  Daily         03/12/23 2056    --  SCANNED - TELEMETRY           03/12/23 0000    --  SCANNED EKG         03/12/23 0000    --  SCANNED - TELEMETRY           03/12/23 0000    --  SCANNED - TELEMETRY           03/12/23 0000    --  SCANNED - TELEMETRY           03/12/23 0000                Ventilator/Non-Invasive Ventilation Settings (From admission, onward)    None           Physician Progress Notes (last 24 hours)      Claudia White APRN at 03/14/23 0902     Attestation signed by Mart Wick MD at 03/14/23 7761    I personally evaluated and examined the patient face to face in conjunction with the APC and agree with the management and disposition of the patient. My key findings are:    • History: Patient shortness of breath is improved.    PHYSICAL EXAM  Constitutional: He is oriented to person, place, and time. He appears well-developed and well-nourished.   HENT:   Head: Normocephalic and atraumatic.   Cardiovascular: Normal rate and regular rhythm.   Pulmonary/Chest: Effort normal and reduced breath sounds globally. No stridor. No respiratory distress.   Abdominal: Soft. Bowel sounds are normal. He exhibits no distension. There is no tenderness.   Neurological: He is alert and oriented to person, place, and time.   Skin: Skin is warm and dry.   Psychiatric: He has a normal mood and affect     Plan  Continue antibiotics. Pulmonology consultation input appreciated.  Continue other medications for essential hypertension.                    [facility] Medicine Services  INPATIENT PROGRESS NOTE    Patient Name: Reid Morrell  Date of Admission: 3/12/2023  Today's Date: 03/14/23  Length of Stay: 2  Primary Care Physician: Reid Islas MD    Subjective   Chief Complaint: Short of breath, fever   HPI  Mr. Morrell presented to [facility] ER 3/12/2023 with increasing shortness of breath, cough, fever.  Patient began feeling ill on 3/10.  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  He later had temperature of 103.  He used ibuprofen.  He continued to have worsening symptoms associated with dry cough.  He has a history of pulmonary fibrosis followed by Dr. Pisano.  WBC 21.6, potassium 3.1, creatinine 1.25, sodium 135, CRP 34.76, Lactate 2.8.  Chest x-ray showed masslike right midlung infiltrate likely related to acute pneumonia.  CT angiogram chest no pulmonary thromboembolic disease.  Thoracic aorta and great vessels normal.  Extensive right upper lobe airspace consolidation with air bronchograms.  Bilateral lower lobe airspace disease with diffuse groundglass opacity both lungs.  Findings consistent with bilateral pneumonia.  There is superimposed pulmonary fibrosis in the lower lobes.  Mildly enlarged right paratracheal node may be reactive.  Zosyn, cefepime, Solu-Medrol, normal saline fluid bolus given in ER.  Patient met sepsis criteria in ER.    Sitting up in chair.  Oxygen has been weaned off with saturation 95%.  He feels tired and weak today but overall improved.  He has not had fever in the last 24 hours.  He hopes to be discharged tomorrow if he continues to improve.  He has not been able to cough up any sputum..  Discussed with CANDACE Goetz, pulmonology today.  Dr. Pisano to see today.  Pulmonary advises patient should remain off of work until 3/20.  Continue cefepime.  Discontinue vancomycin today as MRSA PCR negative.    Review of Systems   Constitutional: Positive for activity change and fatigue.   HENT: Negative for congestion and trouble swallowing.    Eyes: Negative for photophobia and visual disturbance.   Respiratory: Positive for cough and shortness of breath.    Cardiovascular: Negative for chest pain, palpitations and leg  swelling.   Gastrointestinal: Negative for constipation, diarrhea, nausea and vomiting.   Endocrine: Negative for cold intolerance, heat intolerance and polyuria.   Genitourinary: Negative for dysuria, frequency and urgency.   Musculoskeletal: Negative for gait problem.   Skin: Negative for color change, pallor, rash and wound.   Allergic/Immunologic: Negative for immunocompromised state.   Neurological: Positive for weakness. Negative for dizziness and light-headedness.   Hematological: Negative for adenopathy. Does not bruise/bleed easily.   Psychiatric/Behavioral: Negative for agitation, behavioral problems and confusion.      All pertinent negatives and positives are as above. All other systems have been reviewed and are negative unless otherwise stated.     Objective    Temp:  [97.7 °F (36.5 °C)-97.9 °F (36.6 °C)] 97.8 °F (36.6 °C)  Heart Rate:  [71-88] 73  Resp:  [16] 16  BP: (121-132)/(70-79) 128/78  Physical Exam  Vitals and nursing note reviewed.   Constitutional:       Comments: Sitting up in bed.  Oxygen off.  No visitors in the room.     HENT:      Head: Normocephalic and atraumatic.      Nose: No congestion.      Mouth/Throat:      Pharynx: Oropharynx is clear. No oropharyngeal exudate or posterior oropharyngeal erythema.   Eyes:      Extraocular Movements: Extraocular movements intact.      Pupils: Pupils are equal, round, and reactive to light.   Cardiovascular:      Rate and Rhythm: Normal rate and regular rhythm.      Heart sounds: No murmur heard.     Comments: Normal sinus rhythm 78 on telemetry.  Pulmonary:      Breath sounds: Rales (Bilateral bases) present. No wheezing or rhonchi.      Comments: Oxygen off.  Saturation 95%.  Decreased breath sounds throughout.  No sputum production.  Abdominal:      Palpations: Abdomen is soft.      Tenderness: There is no abdominal tenderness.   Genitourinary:     Comments: Voiding.  Musculoskeletal:         General: No swelling or tenderness.      Cervical  back: Normal range of motion and neck supple.   Skin:     General: Skin is warm and dry.   Neurological:      General: No focal deficit present.      Mental Status: He is alert and oriented to person, place, and time.   Psychiatric:         Mood and Affect: Mood normal.         Behavior: Behavior normal.         Thought Content: Thought content normal.         Judgment: Judgment normal.       Results Review:  I have reviewed the labs, radiology results, and diagnostic studies.    Laboratory Data:   Results from last 7 days   Lab Units 03/14/23  0412 03/13/23  1200 03/13/23  0343   WBC 10*3/mm3 11.23* 13.35* 12.79*   HEMOGLOBIN g/dL 12.7* 13.6 13.4   HEMATOCRIT % 38.2 39.6 40.5   PLATELETS 10*3/mm3 187 190 166        Results from last 7 days   Lab Units 03/14/23  0412 03/13/23  0343 03/12/23  1314   SODIUM mmol/L 142 138 135*   POTASSIUM mmol/L 3.4* 3.4* 3.1*   CHLORIDE mmol/L 108* 101 95*   CO2 mmol/L 25.0 25.0 27.0   BUN mg/dL 25* 27* 33*   CREATININE mg/dL 0.78 0.99 1.25   CALCIUM mg/dL 8.4* 8.6 8.9   GLUCOSE mg/dL 135* 208* 143*     Imaging Results (All)     Procedure Component Value Units Date/Time    CT Angiogram Chest [[id number]] Collected: 03/12/23 1644     Updated: 03/12/23 1656    Narrative:      Exam: CT angiogram of the of the chest with intravenous contrast.     CLINICAL HISTORY:  Shortness of breath. Suspected pulmonary embolus.     DOSE:  250 mGycm. All CT scans are performed using dose optimization  techniques as appropriate to the performed exam and including at least  one of the following: Automated exposure control, adjustment of the mA  and/or kV according to size, and the use of the iterative reconstruction  technique.     TECHNIQUE: Contiguous axial images were obtained through the thorax  following intravenous contrast administration with reformatted images  obtained in the sagittal and coronal projections from the original data  set. Three-dimensional reconstructions are also obtained.      COMPARISON:  12/16/2022     FINDINGS:     Pulmonary arteries:  There is normal enhancement of the pulmonary  arteries with no evidence of pulmonary thromboembolic disease.     Aorta:  The thoracic aorta and proximal great vessels are normal in  appearance. There is no evidence of aortic dissection or aneurysm.     LUNGS:  There is groundglass disease throughout both lungs. There is  extensive right upper lobe as well as bilateral lower lobe pneumonia     Pleural spaces: No effusions are demonstrated. There is no pneumothorax.     HEART: There is mild cardiomegaly. No evidence of pericardial effusion  or elevated right heart pressure. Mild coronary calcifications are  present.     Bones: Multiple old left-sided rib fractures are present. No acute or  suspicious bony abnormalities are present.     CHEST WALL: No chest wall abnormalities are demonstrated.     Lymph nodes: There are some prominent right paratracheal nodes including  a 1.4 cm short axis level 4R right paratracheal node. These may be  reactive in nature. No axillary lymphadenopathy.     Upper abdomen: The gallbladder is surgically absent. There is no biliary  dilatation present. Limited images of the upper abdomen are otherwise  unremarkable.       Impression:      1. No evidence of pulmonary thromboembolic disease. The thoracic aorta  and great vessels are normal in appearance.  2. Rather extensive right upper lobe airspace consolidation with air  bronchograms. There is also bilateral lower lobe airspace disease with  diffuse groundglass opacity within both lungs. FINDINGS consistent with  bilateral pneumonia. There is superimposed pulmonary fibrosis within the  lower lobes. No effusions are present.  3. Mildly enlarged right paratracheal nodes may be reactive in nature.  No axillary adenopathy. Mild coronary calcifications are present.  This report was finalized on 03/12/2023 16:53 by Dr. Kenneth Cox MD.    XR Chest 1 View [[id number]] Collected:  03/12/23 1402     Updated: 03/12/23 1407    Narrative:      EXAMINATION: XR CHEST 1 VW- 3/12/2023 2:02 PM CDT     HISTORY: Shortness of air     REPORT: A frontal view the chest was obtained.     COMPARISON: Chest CT 12/16/2022.     There is moderate volume loss in the lung bases as well as mild  elevation of the right hemidiaphragm. There is a focal masslike  infiltrate in the right midlung zone which is new compared with the  previous chest CTA. This is superimposed on a background of pulmonary  fibrosis, which is better demonstrated on previous high-resolution chest  CT. Heart size appears normal. No pneumothorax or pleural effusion is  identified.       Impression:      Masslike right midlung infiltrate likely related to acute  pneumonia. This is superimposed on a background of pulmonary fibrosis as  demonstrated on previous high-resolution chest CT. Repeat chest x-rays  are recommended after appropriate antibiotic therapy.  This report was finalized on 03/12/2023 14:04 by Dr. Tello Guerrero MD.        Scheduled medications  amLODIPine, 10 mg, Oral, Daily  cefepime, 2 g, Intravenous, Q8H  enoxaparin, 40 mg, Subcutaneous, Daily  famotidine, 20 mg, Oral, BID AC  guaiFENesin, 1,200 mg, Oral, Q12H  ipratropium-albuterol, 3 mL, Nebulization, Q6H - RT  potassium chloride, 40 mEq, Oral, Once  sodium chloride, 10 mL, Intravenous, Q12H      I have reviewed the patient's current medications.     Assessment/Plan   Assessment  Active Hospital Problems    Diagnosis    • **Bilateral pneumonia    • Obesity (BMI 30-39.9)    • Sepsis due to pneumonia (HCC)    • Hyperlactatemia    • Essential hypertension    • Hypokalemia    • Elevated troponin level not due to acute coronary syndrome    • Hypoxia secondary to pneumonia and sepsis    • Pulmonary fibrosis (HCC)      Treatment Plan  Sepsis due to bilateral pneumonia.  WBC 21, heart rate 115, temperature 102, lactate 2.8.  Source of infection.  Fluid bolus given in ER.  Vancomycin  and Zosyn given in ER.  Antibiotics switched to cefepime and vancomycin on admission.    Bilateral pneumonia.  Cefepime and vancomycin ordered on admission.  Neb treatments every 6 hours.  Mucinex twice daily.  Strep pneumonia negative, Legionella negative, MRSA PCR negative.  Blood cultures no growth at 24 hours.  WBC down to 11.23.  Discontinue vancomycin today as MRSA PCR negative.  Afebrile.  Continue incentive spirometry.  Patient has not been able to produce any sputum.    Primary hypertension.  Blood pressure 128/78.  Continue amlodipine    Hypokalemia.  Potassium 3.1, replaced.  Follow-up potassium 3.4.  Potassium 40 mEq x 1 dose today.  BMP in AM.    Elevated troponin not due to ACS.  Troponin elevation due to sepsis.    Hypoxia secondary to pneumonia and sepsis.  Saturation 88% on room air.  Placed on cannula at 2 L.  She has been weaned to room air.  Saturation 95%.    Hyperlactatemia due to sepsis.    Pulmonary fibrosis.  Pulmonary medicine consulted. Dr. Pisnao following.  Recommend being off work until at least 3/20/2023.    Lovenox for deep vein thrombosis prophylaxis.     Ambulate in hallway.    Medical Decision Making  Number and Complexity of problems: 8  Sepsis due to bilateral pneumonia: Acute, high complexity  Bilateral pneumonia: Acute, high complexity  Elevated troponin due to sepsis: Acute, high complexity  Hypoxia secondary to pneumonia and sepsis: Acute, high complexity  Hyperlactatemia, acute, high complexity  Hypokalemia: Acute, moderate complexity  Primary hypertension: Chronic, moderate complexity  Pulmonary fibrosis: Chronic, high complexity    Differential Diagnosis: None    Conditions and Status   Condition is unchanged     MDM Data  External documents reviewed: Prior records epic  Cardiac tracing (EKG, telemetry) interpretation: Normal sinus rhythm 80 on telemetry  Radiology interpretation: Radiology interpretation CTA  Labs reviewed:   CBC 3/14/2023  BMP 3/14/2023  Blood  cultures  Pneumonia, Legionella, MRSA PCR      Any tests that were considered but not ordered: None     Decision rules/scores evaluated (example [id number], Wells, etc): None     Discussed with: Dr. Wick and patient     Care Planning  Shared decision making: Dr. Wick, Aliya Linda, CANDACE pulmonology and patient.  Patient agreeable to pulmonary consult, IV antibiotics, wean oxygen.  Code status and discussions: Full code  Patient surrogate decision maker is his wife, Jaimie.    Disposition  Social Determinants of Health that impact treatment or disposition: None  I expect the patient to be discharged to home in 1-2 days.     Electronically signed by CANDACE Miner, 03/14/23, 09:17 CDT.    Electronically signed by Mart Wick MD at 03/14/23 1458     Nash Pisano MD at 03/14/23 0820              [facility] [place] PULMONARY AND CRITICAL CARE PROGRESS NOTE - [facility]    Patient: Reid Morrell  1950   MR# [id number]   Acct# [id number]  03/14/23   08:53 CDT  Referring Provider: Mart Wick MD    Chief Complaint:  Shortness of breath, fever  Interval history: The patient is resting in the bedside chair on room air with O2 sat 95 to 99%, heart rate 78.  The patient is asking if he can go home tomorrow and if he can go back to work.  He is a . Recommend being off work until at least 3/20/2023. No new imaging to review this AM. No other aggravating or alleviating factors.     Meds:  amLODIPine, 10 mg, Oral, Daily  cefepime, 2 g, Intravenous, Q8H  enoxaparin, 40 mg, Subcutaneous, Daily  guaiFENesin, 1,200 mg, Oral, Q12H  ipratropium-albuterol, 3 mL, Nebulization, Q6H - RT  sodium chloride, 10 mL, Intravenous, Q12H           Physical Exam:  SpO2 Percentage    03/14/23 0549 03/14/23 0554 03/14/23 0700   SpO2: 93% 95% 95%     Body mass index is 29.99 kg/m².   Temp:  [97.7 °F (36.5 °C)-97.9 °F (36.6 °C)] 97.8 °F (36.6 °C)  Heart Rate:  [71-88] 73  Resp:  [16]  16  BP: (121-132)/(70-79) 128/78    Intake/Output Summary (Last 24 hours) at 3/14/2023 0853  Last data filed at 3/14/2023 0700  Gross per 24 hour   Intake 1260 ml   Output 2525 ml   Net -1265 ml     Physical Exam  Vitals reviewed.   Constitutional:       General: He is not in acute distress.     Appearance: He is well-developed and overweight.   HENT:      Head: Normocephalic and atraumatic.   Eyes:      General: No scleral icterus.     Conjunctiva/sclera: Conjunctivae normal.      Pupils: Pupils are equal, round, and reactive to light.   Cardiovascular:      Rate and Rhythm: Normal rate.   Pulmonary:      Effort: Pulmonary effort is normal. No respiratory distress.      Breath sounds: Rhonchi (few) present. No wheezing or rales.   Musculoskeletal:         General: Normal range of motion.      Cervical back: Normal range of motion and neck supple.   Skin:     General: Skin is warm and dry.   Neurological:      Mental Status: He is alert and oriented to person, place, and time.   Psychiatric:         Behavior: Behavior normal.         Thought Content: Thought content normal.         Judgment: Judgment normal.       Electronically signed by CANDACE Finnegan, 3/14/2023, 08:53 CDT      Physician substantive portion: medical decision making    Result Review    Laboratory Data:  Results from last 7 days   Lab Units 03/14/23  0412 03/13/23  1200 03/13/23  0343   WBC 10*3/mm3 11.23* 13.35* 12.79*   HEMOGLOBIN g/dL 12.7* 13.6 13.4   PLATELETS 10*3/mm3 187 190 166     Results from last 7 days   Lab Units 03/14/23  0412 03/13/23  0343 03/12/23  2039 03/12/23  1643 03/12/23  1314   SODIUM mmol/L 142 138  --   --  135*   POTASSIUM mmol/L 3.4* 3.4*  --   --  3.1*   CO2 mmol/L 25.0 25.0  --   --  27.0   BUN mg/dL 25* 27*  --   --  33*   CREATININE mg/dL 0.78 0.99  --   --  1.25   LACTATE mmol/L  --   --  1.6   < > 2.8*   CRP mg/dL  --   --   --   --  34.76*    < > = values in this interval not displayed.     Results from  last 7 days   Lab Units 03/12/23  1327   PH, ARTERIAL pH units 7.539*   PCO2, ARTERIAL mm Hg 34.0*   PO2 ART mm Hg 62.8*     Microbiology Results (last 10 days)     Procedure Component Value - Date/Time    MRSA Screen, PCR (Inpatient) - Swab, Nares [[id number]]  (Normal) Collected: 03/13/23 0212    Lab Status: Final result Specimen: Swab from Nares Updated: 03/13/23 0332     MRSA PCR No MRSA Detected    Narrative:      The negative predictive value of this diagnostic test is high and should only be used to consider de-escalating anti-MRSA therapy. A positive result may indicate colonization with MRSA and must be correlated clinically.    S. Pneumo Ag Urine or CSF - Urine, Urine, Clean Catch [[id number]]  (Normal) Collected: 03/12/23 1838    Lab Status: Final result Specimen: Urine, Clean Catch Updated: 03/12/23 1917     Strep Pneumo Ag Negative    Legionella Antigen, Urine - Urine, Urine, Clean Catch [[id number]]  (Normal) Collected: 03/12/23 1838    Lab Status: Final result Specimen: Urine, Clean Catch Updated: 03/12/23 1917     LEGIONELLA ANTIGEN, URINE Negative    Blood Culture - Blood, Arm, Right [[id number]]  (Normal) Collected: 03/12/23 1342    Lab Status: Preliminary result Specimen: Blood from Arm, Right Updated: 03/13/23 1415     Blood Culture No growth at 24 hours    COVID PRE-OP / PRE-PROCEDURE SCREENING ORDER (NO ISOLATION) - Swab, Nasal Cavity [[id number]]  (Normal) Collected: 03/12/23 1319    Lab Status: Final result Specimen: Swab from Nasal Cavity Updated: 03/12/23 1358    Narrative:      The following orders were created for panel order COVID PRE-OP / PRE-PROCEDURE SCREENING ORDER (NO ISOLATION) - Swab, Nasal Cavity.  Procedure                               Abnormality         Status                     ---------                               -----------         ------                     COVID-19 and FLU A/B PCR...[[id number]]  Normal              Final result                 Please view results for  these tests on the individual orders.    COVID-19 and FLU A/B PCR - Swab, Nasopharynx [[id number]]  (Normal) Collected: 03/12/23 1319    Lab Status: Final result Specimen: Swab from Nasopharynx Updated: 03/12/23 1358     COVID19 Not Detected     Influenza A PCR Not Detected     Influenza B PCR Not Detected    Narrative:      Fact sheet for providers: https://www.fda.gov/media/574179/download    Fact sheet for patients: https://www.fda.gov/media/690969/download    Test performed by PCR.    Blood Culture - Blood, Arm, Right [[id number]]  (Normal) Collected: 03/12/23 1314    Lab Status: Preliminary result Specimen: Blood from Arm, Right Updated: 03/13/23 1715     Blood Culture No growth at 24 hours         Recent films:  XR Chest 1 View    Result Date: 3/12/2023  EXAMINATION: XR CHEST 1 VW- 3/12/2023 2:02 PM CDT  HISTORY: Shortness of air  REPORT: A frontal view the chest was obtained.  COMPARISON: Chest CT 12/16/2022.  There is moderate volume loss in the lung bases as well as mild elevation of the right hemidiaphragm. There is a focal masslike infiltrate in the right midlung zone which is new compared with the previous chest CTA. This is superimposed on a background of pulmonary fibrosis, which is better demonstrated on previous high-resolution chest CT. Heart size appears normal. No pneumothorax or pleural effusion is identified.      Impression: Masslike right midlung infiltrate likely related to acute pneumonia. This is superimposed on a background of pulmonary fibrosis as demonstrated on previous high-resolution chest CT. Repeat chest x-rays are recommended after appropriate antibiotic therapy. This report was finalized on 03/12/2023 14:04 by Dr. Tello Guerrero MD.    CT Angiogram Chest    Result Date: 3/12/2023  Exam: CT angiogram of the of the chest with intravenous contrast.  CLINICAL HISTORY:  Shortness of breath. Suspected pulmonary embolus.  DOSE:  250 mGycm. All CT scans are performed using dose  optimization techniques as appropriate to the performed exam and including at least one of the following: Automated exposure control, adjustment of the mA and/or kV according to size, and the use of the iterative reconstruction technique.  TECHNIQUE: Contiguous axial images were obtained through the thorax following intravenous contrast administration with reformatted images obtained in the sagittal and coronal projections from the original data set. Three-dimensional reconstructions are also obtained.  COMPARISON:  12/16/2022  FINDINGS:  Pulmonary arteries:  There is normal enhancement of the pulmonary arteries with no evidence of pulmonary thromboembolic disease.  Aorta:  The thoracic aorta and proximal great vessels are normal in appearance. There is no evidence of aortic dissection or aneurysm.  LUNGS:  There is groundglass disease throughout both lungs. There is extensive right upper lobe as well as bilateral lower lobe pneumonia  Pleural spaces: No effusions are demonstrated. There is no pneumothorax.  HEART: There is mild cardiomegaly. No evidence of pericardial effusion or elevated right heart pressure. Mild coronary calcifications are present.  Bones: Multiple old left-sided rib fractures are present. No acute or suspicious bony abnormalities are present.  CHEST WALL: No chest wall abnormalities are demonstrated.  Lymph nodes: There are some prominent right paratracheal nodes including a 1.4 cm short axis level 4R right paratracheal node. These may be reactive in nature. No axillary lymphadenopathy.  Upper abdomen: The gallbladder is surgically absent. There is no biliary dilatation present. Limited images of the upper abdomen are otherwise unremarkable.      Impression: 1. No evidence of pulmonary thromboembolic disease. The thoracic aorta and great vessels are normal in appearance. 2. Rather extensive right upper lobe airspace consolidation with air bronchograms. There is also bilateral lower lobe  airspace disease with diffuse groundglass opacity within both lungs. FINDINGS consistent with bilateral pneumonia. There is superimposed pulmonary fibrosis within the lower lobes. No effusions are present. 3. Mildly enlarged right paratracheal nodes may be reactive in nature. No axillary adenopathy. Mild coronary calcifications are present. This report was finalized on 03/12/2023 16:53 by Dr. Kenneth Cox MD.         Pulmonary Assessment:  1. rul pneumonia  2. Pulmonary fibrosis  3. Hx positive zenobia    Recommend/plan:   · Continue abx with current dosing today  · Ambulate in halls today  · Possibly home as early as tomorrow if better and ambulatory    This visit was performed by both a physician and an [facility] RN.  I personally evaluated and examined the patient.  I performed all aspects of the medical decision making as documented.  Electronically signed by Nash Pisano MD, 3/14/2023, 11:33 CDT         Electronically signed by Nash Pisano MD at 03/14/23 1136       Consult Notes (last 24 hours)  Notes from 03/14/23 0551 through 03/15/23 0551   No notes of this type exist for this encounter.

## 2023-03-15 NOTE — PROGRESS NOTES
[facility] [place] PULMONARY AND CRITICAL CARE PROGRESS NOTE - [facility]    Patient: Reid Morrell  1950   MR# [id number]   Acct# [id number]  03/15/23   07:06 CDT  Referring Provider: Mart Wick MD    Chief Complaint:  Shortness of breath, fever  Interval history: The patient is on room air with O2 sat 95 %. He will remain off work until at least 3/20/2023. No overnight events. No new imaging to review this AM. No other aggravating or alleviating factors. Pulmonary will sign off. Follow up in the office in 6 weeks with CT of chest. Orders placed.     Meds:  amLODIPine, 10 mg, Oral, Daily  cefepime, 2 g, Intravenous, Q8H  enoxaparin, 40 mg, Subcutaneous, Daily  famotidine, 20 mg, Oral, BID AC  guaiFENesin, 1,200 mg, Oral, Q12H  ipratropium-albuterol, 3 mL, Nebulization, Q6H - RT  losartan, 25 mg, Oral, Q24H  sodium chloride, 10 mL, Intravenous, Q12H           Physical Exam:  SpO2 Percentage    03/15/23 0038 03/15/23 0409 03/15/23 0525   SpO2: 96% 96% 95%     Body mass index is 29.87 kg/m².   Temp:  [97.6 °F (36.4 °C)-99 °F (37.2 °C)] 98.4 °F (36.9 °C)  Heart Rate:  [70-83] 76  Resp:  [12-20] 16  BP: (117-142)/(71-91) 142/80    Intake/Output Summary (Last 24 hours) at 3/15/2023 0706  Last data filed at 3/15/2023 0158  Gross per 24 hour   Intake 1520 ml   Output 2325 ml   Net -805 ml     Physical Exam  Vitals reviewed.   Constitutional:       General: He is not in acute distress.     Appearance: He is well-developed and overweight.   HENT:      Head: Normocephalic and atraumatic.   Eyes:      General: No scleral icterus.     Conjunctiva/sclera: Conjunctivae normal.      Pupils: Pupils are equal, round, and reactive to light.   Cardiovascular:      Rate and Rhythm: Normal rate.   Pulmonary:      Effort: Pulmonary effort is normal. No respiratory distress.      Breath sounds: Rhonchi (few) present. No wheezing or rales.   Musculoskeletal:         General: Normal range of motion.      Cervical back:  Normal range of motion and neck supple.   Skin:     General: Skin is warm and dry.   Neurological:      Mental Status: He is alert and oriented to person, place, and time.   Psychiatric:         Behavior: Behavior normal.         Thought Content: Thought content normal.         Judgment: Judgment normal.       Electronically signed by CANDACE Ac, 3/15/2023, 07:06 CDT      Physician substantive portion: medical decision making    Result Review    Laboratory Data:  Results from last 7 days   Lab Units 03/15/23  0407 03/14/23  0412 03/13/23  1200   WBC 10*3/mm3 8.83 11.23* 13.35*   HEMOGLOBIN g/dL 13.1 12.7* 13.6   PLATELETS 10*3/mm3 226 187 190     Results from last 7 days   Lab Units 03/15/23  0407 03/14/23  0412 03/13/23  0343 03/12/23  2039 03/12/23  1643 03/12/23  1314   SODIUM mmol/L 138 142 138  --   --  135*   POTASSIUM mmol/L 3.9 3.4* 3.4*  --   --  3.1*   CO2 mmol/L 28.0 25.0 25.0  --   --  27.0   BUN mg/dL 17 25* 27*  --   --  33*   CREATININE mg/dL 0.89 0.78 0.99  --   --  1.25   LACTATE mmol/L  --   --   --  1.6   < > 2.8*   CRP mg/dL  --   --   --   --   --  34.76*    < > = values in this interval not displayed.     Results from last 7 days   Lab Units 03/12/23  1327   PH, ARTERIAL pH units 7.539*   PCO2, ARTERIAL mm Hg 34.0*   PO2 ART mm Hg 62.8*     Microbiology Results (last 10 days)     Procedure Component Value - Date/Time    Respiratory Culture - Sputum, Cough [[id number]] Collected: 03/14/23 1504    Lab Status: Preliminary result Specimen: Sputum from Cough Updated: 03/15/23 1133     Respiratory Culture Light growth (2+) The culture consists of normal respiratory helen. This is a preliminary report; final report to follow.     Gram Stain Rare (1+) Epithelial cells seen      Many (4+) WBCs seen      Moderate (3+) Gram positive cocci    MRSA Screen, PCR (Inpatient) - Swab, Nares [[id number]]  (Normal) Collected: 03/13/23 0212    Lab Status: Final result Specimen: Swab from Nares  Updated: 03/13/23 0332     MRSA PCR No MRSA Detected    Narrative:      The negative predictive value of this diagnostic test is high and should only be used to consider de-escalating anti-MRSA therapy. A positive result may indicate colonization with MRSA and must be correlated clinically.    S. Pneumo Ag Urine or CSF - Urine, Urine, Clean Catch [[id number]]  (Normal) Collected: 03/12/23 1838    Lab Status: Final result Specimen: Urine, Clean Catch Updated: 03/12/23 1917     Strep Pneumo Ag Negative    Legionella Antigen, Urine - Urine, Urine, Clean Catch [[id number]]  (Normal) Collected: 03/12/23 1838    Lab Status: Final result Specimen: Urine, Clean Catch Updated: 03/12/23 1917     LEGIONELLA ANTIGEN, URINE Negative    Blood Culture - Blood, Arm, Right [[id number]]  (Normal) Collected: 03/12/23 1342    Lab Status: Preliminary result Specimen: Blood from Arm, Right Updated: 03/15/23 1415     Blood Culture No growth at 3 days    COVID PRE-OP / PRE-PROCEDURE SCREENING ORDER (NO ISOLATION) - Swab, Nasal Cavity [[id number]]  (Normal) Collected: 03/12/23 1319    Lab Status: Final result Specimen: Swab from Nasal Cavity Updated: 03/12/23 1358    Narrative:      The following orders were created for panel order COVID PRE-OP / PRE-PROCEDURE SCREENING ORDER (NO ISOLATION) - Swab, Nasal Cavity.  Procedure                               Abnormality         Status                     ---------                               -----------         ------                     COVID-19 and FLU A/B PCR...[[id number]]  Normal              Final result                 Please view results for these tests on the individual orders.    COVID-19 and FLU A/B PCR - Swab, Nasopharynx [[id number]]  (Normal) Collected: 03/12/23 1319    Lab Status: Final result Specimen: Swab from Nasopharynx Updated: 03/12/23 1358     COVID19 Not Detected     Influenza A PCR Not Detected     Influenza B PCR Not Detected    Narrative:      Fact sheet for providers:  https://www.fda.gov/media/635847/download    Fact sheet for patients: https://www.fda.gov/media/509638/download    Test performed by PCR.    Blood Culture - Blood, Arm, Right [[id number]]  (Normal) Collected: 03/12/23 1314    Lab Status: Preliminary result Specimen: Blood from Arm, Right Updated: 03/15/23 1715     Blood Culture No growth at 3 days         Recent films:  No radiology results from the last 24 hrs       Pulmonary Assessment:  1. Pneumonia improved  2. Pulmonary fibrosis    Recommend/plan:   · Ok to home   · Return to work next week  · Follow up in office    This visit was performed by both a physician and an [facility] RN.  I personally evaluated and examined the patient.  I performed all aspects of the medical decision making as documented.  Electronically signed by Nash Pisano MD, 3/15/2023, 19:39 CDT

## 2023-03-15 NOTE — DISCHARGE SUMMARY
[facility] Medicine Services  DISCHARGE SUMMARY     Date of Admission: 3/12/2023  Date of Discharge:  3/15/2023  Primary Care Physician: Reid Islas MD    Presenting Problem/History of Present Illness:  Shortness of breath, fever    Final Discharge Diagnoses:  Active Hospital Problems    Diagnosis    • **Bilateral pneumonia    • Obesity (BMI 30-39.9)    • Sepsis due to pneumonia (HCC)    • Hyperlactatemia    • Essential hypertension    • Hypokalemia    • Elevated troponin level not due to acute coronary syndrome    • Hypoxia secondary to pneumonia and sepsis    • Pulmonary fibrosis (HCC)      Consults: Dr. Pisano, pulmonology    Procedures Performed: None    Pertinent Test Results:     Imaging Results (All)     Procedure Component Value Units Date/Time    CT Angiogram Chest [[id number]] Collected: 03/12/23 1644     Updated: 03/12/23 1656    Narrative:      Exam: CT angiogram of the of the chest with intravenous contrast.     CLINICAL HISTORY:  Shortness of breath. Suspected pulmonary embolus.     DOSE:  250 mGycm. All CT scans are performed using dose optimization  techniques as appropriate to the performed exam and including at least  one of the following: Automated exposure control, adjustment of the mA  and/or kV according to size, and the use of the iterative reconstruction  technique.     TECHNIQUE: Contiguous axial images were obtained through the thorax  following intravenous contrast administration with reformatted images  obtained in the sagittal and coronal projections from the original data  set. Three-dimensional reconstructions are also obtained.     COMPARISON:  12/16/2022     FINDINGS:     Pulmonary arteries:  There is normal enhancement of the pulmonary  arteries with no evidence of pulmonary thromboembolic disease.     Aorta:  The thoracic aorta and proximal great vessels are normal in  appearance. There is no evidence of aortic dissection or  aneurysm.     LUNGS:  There is groundglass disease throughout both lungs. There is  extensive right upper lobe as well as bilateral lower lobe pneumonia     Pleural spaces: No effusions are demonstrated. There is no pneumothorax.     HEART: There is mild cardiomegaly. No evidence of pericardial effusion  or elevated right heart pressure. Mild coronary calcifications are  present.     Bones: Multiple old left-sided rib fractures are present. No acute or  suspicious bony abnormalities are present.     CHEST WALL: No chest wall abnormalities are demonstrated.     Lymph nodes: There are some prominent right paratracheal nodes including  a 1.4 cm short axis level 4R right paratracheal node. These may be  reactive in nature. No axillary lymphadenopathy.     Upper abdomen: The gallbladder is surgically absent. There is no biliary  dilatation present. Limited images of the upper abdomen are otherwise  unremarkable.       Impression:      1. No evidence of pulmonary thromboembolic disease. The thoracic aorta  and great vessels are normal in appearance.  2. Rather extensive right upper lobe airspace consolidation with air  bronchograms. There is also bilateral lower lobe airspace disease with  diffuse groundglass opacity within both lungs. FINDINGS consistent with  bilateral pneumonia. There is superimposed pulmonary fibrosis within the  lower lobes. No effusions are present.  3. Mildly enlarged right paratracheal nodes may be reactive in nature.  No axillary adenopathy. Mild coronary calcifications are present.  This report was finalized on 03/12/2023 16:53 by Dr. Kenneth Cox MD.    XR Chest 1 View [[id number]] Collected: 03/12/23 1402     Updated: 03/12/23 1407    Narrative:      EXAMINATION: XR CHEST 1 VW- 3/12/2023 2:02 PM CDT     HISTORY: Shortness of air     REPORT: A frontal view the chest was obtained.     COMPARISON: Chest CT 12/16/2022.     There is moderate volume loss in the lung bases as well as  mild  elevation of the right hemidiaphragm. There is a focal masslike  infiltrate in the right midlung zone which is new compared with the  previous chest CTA. This is superimposed on a background of pulmonary  fibrosis, which is better demonstrated on previous high-resolution chest  CT. Heart size appears normal. No pneumothorax or pleural effusion is  identified.       Impression:      Masslike right midlung infiltrate likely related to acute  pneumonia. This is superimposed on a background of pulmonary fibrosis as  demonstrated on previous high-resolution chest CT. Repeat chest x-rays  are recommended after appropriate antibiotic therapy.  This report was finalized on 03/12/2023 14:04 by Dr. Tello Guerrero MD.        LAB RESULTS:      Lab 03/15/23  0407 03/14/23  0412 03/13/23  1200 03/13/23  0343 03/12/23 2039 03/12/23  1643 03/12/23  1314   WBC 8.83 11.23* 13.35* 12.79*  --   --  21.66*   HEMOGLOBIN 13.1 12.7* 13.6 13.4  --   --  15.1   HEMATOCRIT 40.1 38.2 39.6 40.5  --   --  45.3   PLATELETS 226 187 190 166  --   --  187   NEUTROS ABS 6.03 9.11* 11.72* 11.31*  --   --  19.94*   IMMATURE GRANS (ABS) 0.07* 0.12* 0.17* 0.46*  --   --  0.29*   LYMPHS ABS 1.52 1.32 0.72 0.54*  --   --  0.54*   MONOS ABS 1.13* 0.66 0.67 0.46  --   --  0.82   EOS ABS 0.05 0.00 0.03 0.00  --   --  0.00   MCV 91.1 89.9 88.8 90.8  --   --  89.9   CRP  --   --   --   --   --   --  34.76*   PROCALCITONIN  --   --   --   --   --   --  4.43*   LACTATE  --   --   --   --  1.6 2.2* 2.8*   D DIMER QUANT  --   --   --   --   --   --  1.46*         Lab 03/15/23  0407 03/14/23  0412 03/13/23 0343 03/12/23  1314   SODIUM 138 142 138 135*   POTASSIUM 3.9 3.4* 3.4* 3.1*   CHLORIDE 101 108* 101 95*   CO2 28.0 25.0 25.0 27.0   ANION GAP 9.0 9.0 12.0 13.0   BUN 17 25* 27* 33*   CREATININE 0.89 0.78 0.99 1.25   EGFR 90.5 94.2 80.4 60.8   GLUCOSE 104* 135* 208* 143*   CALCIUM 8.6 8.4* 8.6 8.9   MAGNESIUM  --   --   --  1.9             Lab  03/12/23 2039 03/12/23  1848 03/12/23  1314   PROBNP  --   --  1,748.0*   HSTROP T 14 17* 23*                 Lab 03/12/23  1327   PH, ARTERIAL 7.539*   PCO2, ARTERIAL 34.0*   PO2 ART 62.8*   O2 SATURATION ART 94.9   HCO3 ART 28.9*   BASE EXCESS ART 6.5*     Brief Urine Lab Results     None        Microbiology Results (last 10 days)     Procedure Component Value - Date/Time    Respiratory Culture - Sputum, Cough [[id number]] Collected: 03/14/23 1504    Lab Status: Preliminary result Specimen: Sputum from Cough Updated: 03/14/23 1806     Gram Stain Rare (1+) Epithelial cells seen      Many (4+) WBCs seen      Moderate (3+) Gram positive cocci    MRSA Screen, PCR (Inpatient) - Swab, Nares [[id number]]  (Normal) Collected: 03/13/23 0212    Lab Status: Final result Specimen: Swab from Nares Updated: 03/13/23 0332     MRSA PCR No MRSA Detected    Narrative:      The negative predictive value of this diagnostic test is high and should only be used to consider de-escalating anti-MRSA therapy. A positive result may indicate colonization with MRSA and must be correlated clinically.    S. Pneumo Ag Urine or CSF - Urine, Urine, Clean Catch [[id number]]  (Normal) Collected: 03/12/23 1838    Lab Status: Final result Specimen: Urine, Clean Catch Updated: 03/12/23 1917     Strep Pneumo Ag Negative    Legionella Antigen, Urine - Urine, Urine, Clean Catch [[id number]]  (Normal) Collected: 03/12/23 1838    Lab Status: Final result Specimen: Urine, Clean Catch Updated: 03/12/23 1917     LEGIONELLA ANTIGEN, URINE Negative    Blood Culture - Blood, Arm, Right [[id number]]  (Normal) Collected: 03/12/23 1342    Lab Status: Preliminary result Specimen: Blood from Arm, Right Updated: 03/14/23 1415     Blood Culture No growth at 2 days    COVID PRE-OP / PRE-PROCEDURE SCREENING ORDER (NO ISOLATION) - Swab, Nasal Cavity [[id number]]  (Normal) Collected: 03/12/23 1319    Lab Status: Final result Specimen: Swab from Nasal Cavity Updated: 03/12/23  1356    Narrative:      The following orders were created for panel order COVID PRE-OP / PRE-PROCEDURE SCREENING ORDER (NO ISOLATION) - Swab, Nasal Cavity.  Procedure                               Abnormality         Status                     ---------                               -----------         ------                     COVID-19 and FLU A/B PCR...[[id number]]  Normal              Final result                 Please view results for these tests on the individual orders.    COVID-19 and FLU A/B PCR - Swab, Nasopharynx [[id number]]  (Normal) Collected: 03/12/23 1319    Lab Status: Final result Specimen: Swab from Nasopharynx Updated: 03/12/23 1358     COVID19 Not Detected     Influenza A PCR Not Detected     Influenza B PCR Not Detected    Narrative:      Fact sheet for providers: https://www.fda.gov/media/827287/download    Fact sheet for patients: https://www.fda.gov/media/761167/download    Test performed by PCR.    Blood Culture - Blood, Arm, Right [[id number]]  (Normal) Collected: 03/12/23 1314    Lab Status: Preliminary result Specimen: Blood from Arm, Right Updated: 03/14/23 1715     Blood Culture No growth at 2 days        Hospital Course: Mr. Morrell presented to [facility] emergency room 3/12/2023 with increasing shortness of breath, cough,and fever.  Patient began feeling ill on 3/10.  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  He later had temperature of 103.  He used ibuprofen.  He continued to have worsening symptoms associated with dry cough.  He has a history of pulmonary fibrosis followed by Dr. Pisano.  WBC 21.6, potassium 3.1, creatinine 1.25, sodium 135, CRP 34.76, Lactate 2.8.  Chest x-ray showed masslike right midlung infiltrate likely related to acute pneumonia.  CT angiogram chest no pulmonary thromboembolic disease.  Thoracic aorta and great vessels normal.  Extensive right upper lobe airspace  consolidation with air bronchograms.  Bilateral lower lobe airspace disease with diffuse groundglass opacity both lungs.  Findings consistent with bilateral pneumonia.  There is superimposed pulmonary fibrosis in the lower lobes.  Mildly enlarged right paratracheal node may be reactive.  Zosyn, cefepime, Solu-Medrol, normal saline fluid bolus given in ER.  Patient met sepsis criteria in ER.    He was admitted to the medical floor with sepsis due to bilateral pneumonia.  WBC 21, heart rate 115, temperature 102, lactate 2.8 with source of infection.  Fluid bolus given in the emergency room.  Vancomycin and Zosyn started in ER and antibiotics switched to cefepime and vancomycin on admission.  Duo nebs and Mucinex ordered.  Strep pneumonia negative, Legionella negative, MRSA PCR negative.  Blood cultures remain no growth.  WBC trending down to 11.23.  Vancomycin discontinued after MRSA PCR negative.  Patient remained afebrile.  He continued to use use incentive spirometry but was not able to produce any sputum.  Cefepime transitioned to oral Omnicef to complete antibiotic treatment after discharge.    Patient was hypoxic on admission with saturation 88% on room air.  Hypoxia secondary to pneumonia and sepsis.  Patient required oxygen at 2 L and was weaned to room air.  Patient ambulated without desaturation prior to discharge.    Pulmonary medicine consulted and he was seen by Dr. Pisano who noted patient had been weaned to room air and was maintaining saturation.  Recommendations were for patient to remain off of work until 3/20/2023.  Patient ambulated in the hallway without desaturation.  Patient has a history of pulmonary fibrosis but does not require oxygen.  Patient will follow-up with Dr. Pisano in 6 weeks.    Amlodipine continued for primary hypertension.  Losartan resumed at discharge.  Blood pressure 129/82.    Potassium 3.1 and replaced.  Follow-up potassium 3.4.  Additional 40 mEq of potassium given.   "Potassium 3.9 on date of discharge.    Troponin elevation not due to ACS.  Troponin elevation due to sepsis and pneumonia.    Hyperlactatemia due to sepsis.    Again, patient has history of pulmonary fibrosis followed by Dr. Pisano and will follow-up in 6 weeks.    Home medications reviewed and resumed on admission if appropriate.  Lovenox ordered for deep vein thrombosis prophylaxis.    On 3/15/2023, he is stable for discharge.  Patient remains afebrile and blood cultures no growth.  Cefepime transition to oral Omnicef to complete antibiotic treatment.  He will follow-up with Dr. Pisano in 6 weeks and Dr. Rich Islas on 3/21/2023.    Physical Exam on Discharge:  /82 (BP Location: Left arm, Patient Position: Sitting)   Pulse 73   Temp 99 °F (37.2 °C) (Oral)   Resp 16   Ht 180.3 cm (71\")   Wt 97.2 kg (214 lb 3 oz)   SpO2 91%   BMI 29.87 kg/m²   Physical Exam  Vitals and nursing note reviewed.   Constitutional:       Comments: Sitting up in bed.  No oxygen use.  No visitors in room.   HENT:      Head: Atraumatic.      Nose: No congestion.      Mouth/Throat:      Pharynx: Oropharynx is clear. No oropharyngeal exudate or posterior oropharyngeal erythema.   Eyes:      Extraocular Movements: Extraocular movements intact.      Pupils: Pupils are equal, round, and reactive to light.   Cardiovascular:      Rate and Rhythm: Normal rate and regular rhythm.      Heart sounds: No murmur heard.     Comments: Normal sinus rhythm 70 on telemetry.  Pulmonary:      Breath sounds: No wheezing or rhonchi.      Comments: No oxygen use.  Saturation 91-96% on room air.  No sputum production.  Abdominal:      Palpations: Abdomen is soft.      Tenderness: There is no abdominal tenderness.   Genitourinary:     Comments: Voiding.  Musculoskeletal:         General: No swelling or tenderness.      Cervical back: Normal range of motion and neck supple.   Skin:     General: Skin is warm and dry.   Neurological:      General: No focal " deficit present.      Mental Status: He is alert and oriented to person, place, and time.   Psychiatric:         Mood and Affect: Mood normal.         Behavior: Behavior normal.         Thought Content: Thought content normal.         Judgment: Judgment normal.       Condition on Discharge: Stable    Discharge Disposition:  Home or Self Care    Discharge Medications:     Discharge Medications      New Medications      Instructions Start Date   cefdinir 300 MG capsule  Commonly known as: OMNICEF   300 mg, Oral, Every 12 Hours Scheduled      guaiFENesin 600 MG 12 hr tablet  Commonly known as: MUCINEX   1,200 mg, Oral, Every 12 Hours Scheduled         Continue These Medications      Instructions Start Date   amLODIPine 10 MG tablet  Commonly known as: NORVASC   10 mg, Oral, Daily      colestipol 1 g tablet  Commonly known as: COLESTID   1 g, Oral, 2 Times Daily      fish oil 1000 MG capsule capsule   1,000 mg, Oral, Daily With Breakfast      hydroCHLOROthiazide 25 MG tablet  Commonly known as: HYDRODIURIL   25 mg, Oral, Daily      losartan 100 MG tablet  Commonly known as: COZAAR   100 mg, Oral, Daily      meloxicam 15 MG tablet  Commonly known as: MOBIC   15 mg, Oral, Daily      sildenafil 100 MG tablet  Commonly known as: VIAGRA   100 mg, Oral, Daily PRN      SUPER B COMPLEX PO   1 tablet, Oral, Daily           Discharge Diet:   Diet Instructions     Advance Diet As Tolerated -Target Diet: Regular diet      Target Diet: Regular diet        Activity at Discharge:   Activity Instructions     Activity as Tolerated           Discharge instructions:  1.  For recurrent shortness of breath, fever, chills seek medical attention.  2.  Complete Omnicef after discharge  3.  Follow-up with Dr. Rich Islas 1 week, 3/21/2023  4.  Keep scheduled appointment Dr. Pisano  5.  Continue Mucinex twice daily    Follow-up Appointments:   Future Appointments   Date Time Provider Department Center   6/1/2023  9:00 AM Abi Rose APRN [facility]  [facility]   6/23/2023  8:45 AM PAD [facility] CT 1 BH PAD CT BI [facility]   6/26/2023  9:30 AM THERAPIST RESPIRATORY DI PAD [facility]   6/26/2023  9:45 AM Nash Pisano MD [facility]       Test Results Pending at Discharge: None    Electronically signed by CANDACE Miner, 03/15/23, 09:36 CDT.    Time: 35 minutes.  Discussed with Dr. Wick and patient.    The above documentation resulted from a face-to-face encounter by me Claudia MARIA, [facility].

## 2023-03-16 ENCOUNTER — TELEPHONE (OUTPATIENT)
Dept: INTERNAL MEDICINE | Age: 73
End: 2023-03-16

## 2023-03-16 LAB
BACTERIA SPEC RESP CULT: NORMAL
GRAM STN SPEC: NORMAL

## 2023-03-16 NOTE — OUTREACH NOTE
Prep Survey    Flowsheet Row Responses   [facility] facility patient discharged from? [facility]   Is LACE score < 7 ? No   Eligibility Readm Mgmt   Discharge diagnosis **Bilateral pneumonia   Does the patient have one of the following disease processes/diagnoses(primary or secondary)? Pneumonia   Does the patient have Home health ordered? No   Is there a DME ordered? No   Prep survey completed? Yes          Anahy WOODS - Registered Nurse

## 2023-03-16 NOTE — PAYOR COMM NOTE
"[place] [facility] 3-15-23  [id number]    Reid Morrell (72 y.o. Male)     Date of Birth   1950    Social Security Number       Address   [address] [address]    Home Phone   [phone]    MRN   [id number]       Zoroastrian   Religious    Marital Status                               Admission Date   3/12/23    Admission Type   Emergency    Admitting Provider   Mart Wick MD    Attending Provider       Department, Room/Bed   [facility] 4B, 409/1       Discharge Date   3/15/2023    Discharge Disposition   Home or Self Care    Discharge Destination                               Attending Provider: (none)   Allergies: No Known Allergies    Isolation: None   Infection: None   Code Status: Prior    Ht: 180.3 cm (71\")   Wt: 97.2 kg (214 lb 3 oz)    Admission Cmt: None   Principal Problem: Bilateral pneumonia [J18.9]                 Active Insurance as of 3/12/2023     Primary Coverage     Payor Plan Insurance Group Employer/Plan Group    ANTHEM MEDICARE REPLACEMENT ANTHEM MEDICARE ADVANTAGE [id number]     Payor Plan Address Payor Plan Phone Number Payor Plan Fax Number Effective Dates    [address] [phone]  1/1/2015 - None Entered    [facility] [id number]       Subscriber Name Subscriber Birth Date Member ID       REID MORRELL 1950 [id number]                 Emergency Contacts      (Rel.) Home Phone Work Phone Mobile Phone    Jaimie Morrell (Spouse) [phone] -- [phone]               Discharge Summary      Claudia White, APRN at 03/15/23 [address] [place] [address] Medicine Services  DISCHARGE SUMMARY     Date of Admission: 3/12/2023  Date of Discharge:  3/15/2023  Primary Care Physician: Reid Islas MD    Presenting Problem/History of Present Illness:  Shortness of breath, fever    Final Discharge Diagnoses:  Active Hospital Problems    Diagnosis    • **Bilateral pneumonia    • " Obesity (BMI 30-39.9)    • Sepsis due to pneumonia (HCC)    • Hyperlactatemia    • Essential hypertension    • Hypokalemia    • Elevated troponin level not due to acute coronary syndrome    • Hypoxia secondary to pneumonia and sepsis    • Pulmonary fibrosis (HCC)      Consults: Dr. Pisano, pulmonology    Procedures Performed: None    Pertinent Test Results:     Imaging Results (All)     Procedure Component Value Units Date/Time    CT Angiogram Chest [[id number]] Collected: 03/12/23 1644     Updated: 03/12/23 1656    Narrative:      Exam: CT angiogram of the of the chest with intravenous contrast.     CLINICAL HISTORY:  Shortness of breath. Suspected pulmonary embolus.     DOSE:  250 mGycm. All CT scans are performed using dose optimization  techniques as appropriate to the performed exam and including at least  one of the following: Automated exposure control, adjustment of the mA  and/or kV according to size, and the use of the iterative reconstruction  technique.     TECHNIQUE: Contiguous axial images were obtained through the thorax  following intravenous contrast administration with reformatted images  obtained in the sagittal and coronal projections from the original data  set. Three-dimensional reconstructions are also obtained.     COMPARISON:  12/16/2022     FINDINGS:     Pulmonary arteries:  There is normal enhancement of the pulmonary  arteries with no evidence of pulmonary thromboembolic disease.     Aorta:  The thoracic aorta and proximal great vessels are normal in  appearance. There is no evidence of aortic dissection or aneurysm.     LUNGS:  There is groundglass disease throughout both lungs. There is  extensive right upper lobe as well as bilateral lower lobe pneumonia     Pleural spaces: No effusions are demonstrated. There is no pneumothorax.     HEART: There is mild cardiomegaly. No evidence of pericardial effusion  or elevated right heart pressure. Mild coronary calcifications are  present.      Bones: Multiple old left-sided rib fractures are present. No acute or  suspicious bony abnormalities are present.     CHEST WALL: No chest wall abnormalities are demonstrated.     Lymph nodes: There are some prominent right paratracheal nodes including  a 1.4 cm short axis level 4R right paratracheal node. These may be  reactive in nature. No axillary lymphadenopathy.     Upper abdomen: The gallbladder is surgically absent. There is no biliary  dilatation present. Limited images of the upper abdomen are otherwise  unremarkable.       Impression:      1. No evidence of pulmonary thromboembolic disease. The thoracic aorta  and great vessels are normal in appearance.  2. Rather extensive right upper lobe airspace consolidation with air  bronchograms. There is also bilateral lower lobe airspace disease with  diffuse groundglass opacity within both lungs. FINDINGS consistent with  bilateral pneumonia. There is superimposed pulmonary fibrosis within the  lower lobes. No effusions are present.  3. Mildly enlarged right paratracheal nodes may be reactive in nature.  No axillary adenopathy. Mild coronary calcifications are present.  This report was finalized on 03/12/2023 16:53 by Dr. Kenneth Cox MD.    XR Chest 1 View [[id number]] Collected: 03/12/23 1402     Updated: 03/12/23 1407    Narrative:      EXAMINATION: XR CHEST 1 VW- 3/12/2023 2:02 PM CDT     HISTORY: Shortness of air     REPORT: A frontal view the chest was obtained.     COMPARISON: Chest CT 12/16/2022.     There is moderate volume loss in the lung bases as well as mild  elevation of the right hemidiaphragm. There is a focal masslike  infiltrate in the right midlung zone which is new compared with the  previous chest CTA. This is superimposed on a background of pulmonary  fibrosis, which is better demonstrated on previous high-resolution chest  CT. Heart size appears normal. No pneumothorax or pleural effusion is  identified.       Impression:       Masslike right midlung infiltrate likely related to acute  pneumonia. This is superimposed on a background of pulmonary fibrosis as  demonstrated on previous high-resolution chest CT. Repeat chest x-rays  are recommended after appropriate antibiotic therapy.  This report was finalized on 03/12/2023 14:04 by Dr. Tello Guerrero MD.        LAB RESULTS:      Lab 03/15/23  0407 03/14/23  0412 03/13/23  1200 03/13/23 0343 03/12/23 2039 03/12/23  1643 03/12/23  1314   WBC 8.83 11.23* 13.35* 12.79*  --   --  21.66*   HEMOGLOBIN 13.1 12.7* 13.6 13.4  --   --  15.1   HEMATOCRIT 40.1 38.2 39.6 40.5  --   --  45.3   PLATELETS 226 187 190 166  --   --  187   NEUTROS ABS 6.03 9.11* 11.72* 11.31*  --   --  19.94*   IMMATURE GRANS (ABS) 0.07* 0.12* 0.17* 0.46*  --   --  0.29*   LYMPHS ABS 1.52 1.32 0.72 0.54*  --   --  0.54*   MONOS ABS 1.13* 0.66 0.67 0.46  --   --  0.82   EOS ABS 0.05 0.00 0.03 0.00  --   --  0.00   MCV 91.1 89.9 88.8 90.8  --   --  89.9   CRP  --   --   --   --   --   --  34.76*   PROCALCITONIN  --   --   --   --   --   --  4.43*   LACTATE  --   --   --   --  1.6 2.2* 2.8*   D DIMER QUANT  --   --   --   --   --   --  1.46*         Lab 03/15/23  0407 03/14/23  0412 03/13/23  0343 03/12/23  1314   SODIUM 138 142 138 135*   POTASSIUM 3.9 3.4* 3.4* 3.1*   CHLORIDE 101 108* 101 95*   CO2 28.0 25.0 25.0 27.0   ANION GAP 9.0 9.0 12.0 13.0   BUN 17 25* 27* 33*   CREATININE 0.89 0.78 0.99 1.25   EGFR 90.5 94.2 80.4 60.8   GLUCOSE 104* 135* 208* 143*   CALCIUM 8.6 8.4* 8.6 8.9   MAGNESIUM  --   --   --  1.9             Lab 03/12/23 2039 03/12/23  1848 03/12/23  1314   PROBNP  --   --  1,748.0*   HSTROP T 14 17* 23*                 Lab 03/12/23  1327   PH, ARTERIAL 7.539*   PCO2, ARTERIAL 34.0*   PO2 ART 62.8*   O2 SATURATION ART 94.9   HCO3 ART 28.9*   BASE EXCESS ART 6.5*     Brief Urine Lab Results     None        Microbiology Results (last 10 days)     Procedure Component Value - Date/Time    Respiratory Culture -  Sputum, Cough [[id number]] Collected: 03/14/23 1504    Lab Status: Preliminary result Specimen: Sputum from Cough Updated: 03/14/23 1806     Gram Stain Rare (1+) Epithelial cells seen      Many (4+) WBCs seen      Moderate (3+) Gram positive cocci    MRSA Screen, PCR (Inpatient) - Swab, Nares [[id number]]  (Normal) Collected: 03/13/23 0212    Lab Status: Final result Specimen: Swab from Nares Updated: 03/13/23 0332     MRSA PCR No MRSA Detected    Narrative:      The negative predictive value of this diagnostic test is high and should only be used to consider de-escalating anti-MRSA therapy. A positive result may indicate colonization with MRSA and must be correlated clinically.    S. Pneumo Ag Urine or CSF - Urine, Urine, Clean Catch [[id number]]  (Normal) Collected: 03/12/23 1838    Lab Status: Final result Specimen: Urine, Clean Catch Updated: 03/12/23 1917     Strep Pneumo Ag Negative    Legionella Antigen, Urine - Urine, Urine, Clean Catch [[id number]]  (Normal) Collected: 03/12/23 1838    Lab Status: Final result Specimen: Urine, Clean Catch Updated: 03/12/23 1917     LEGIONELLA ANTIGEN, URINE Negative    Blood Culture - Blood, Arm, Right [[id number]]  (Normal) Collected: 03/12/23 1342    Lab Status: Preliminary result Specimen: Blood from Arm, Right Updated: 03/14/23 1415     Blood Culture No growth at 2 days    COVID PRE-OP / PRE-PROCEDURE SCREENING ORDER (NO ISOLATION) - Swab, Nasal Cavity [[id number]]  (Normal) Collected: 03/12/23 1319    Lab Status: Final result Specimen: Swab from Nasal Cavity Updated: 03/12/23 1358    Narrative:      The following orders were created for panel order COVID PRE-OP / PRE-PROCEDURE SCREENING ORDER (NO ISOLATION) - Swab, Nasal Cavity.  Procedure                               Abnormality         Status                     ---------                               -----------         ------                     COVID-19 and FLU A/B PCR...[[id number]]  Normal              Final result                  Please view results for these tests on the individual orders.    COVID-19 and FLU A/B PCR - Swab, Nasopharynx [[id number]]  (Normal) Collected: 03/12/23 1319    Lab Status: Final result Specimen: Swab from Nasopharynx Updated: 03/12/23 1358     COVID19 Not Detected     Influenza A PCR Not Detected     Influenza B PCR Not Detected    Narrative:      Fact sheet for providers: https://www.fda.gov/media/914846/download    Fact sheet for patients: https://www.fda.gov/media/902071/download    Test performed by PCR.    Blood Culture - Blood, Arm, Right [[id number]]  (Normal) Collected: 03/12/23 1314    Lab Status: Preliminary result Specimen: Blood from Arm, Right Updated: 03/14/23 1715     Blood Culture No growth at 2 days        Hospital Course: Mr. Morrell presented to [facility] emergency room 3/12/2023 with increasing shortness of breath, cough,and fever.  Patient began feeling ill on 3/10.  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  He later had temperature of 103.  He used ibuprofen.  He continued to have worsening symptoms associated with dry cough.  He has a history of pulmonary fibrosis followed by Dr. Pisano.  WBC 21.6, potassium 3.1, creatinine 1.25, sodium 135, CRP 34.76, Lactate 2.8.  Chest x-ray showed masslike right midlung infiltrate likely related to acute pneumonia.  CT angiogram chest no pulmonary thromboembolic disease.  Thoracic aorta and great vessels normal.  Extensive right upper lobe airspace consolidation with air bronchograms.  Bilateral lower lobe airspace disease with diffuse groundglass opacity both lungs.  Findings consistent with bilateral pneumonia.  There is superimposed pulmonary fibrosis in the lower lobes.  Mildly enlarged right paratracheal node may be reactive.  Zosyn, cefepime, Solu-Medrol, normal saline fluid bolus given in ER.  Patient met sepsis criteria in ER.    He was  admitted to the medical floor with sepsis due to bilateral pneumonia.  WBC 21, heart rate 115, temperature 102, lactate 2.8 with source of infection.  Fluid bolus given in the emergency room.  Vancomycin and Zosyn started in ER and antibiotics switched to cefepime and vancomycin on admission.  Duo nebs and Mucinex ordered.  Strep pneumonia negative, Legionella negative, MRSA PCR negative.  Blood cultures remain no growth.  WBC trending down to 11.23.  Vancomycin discontinued after MRSA PCR negative.  Patient remained afebrile.  He continued to use use incentive spirometry but was not able to produce any sputum.  Cefepime transitioned to oral Omnicef to complete antibiotic treatment after discharge.    Patient was hypoxic on admission with saturation 88% on room air.  Hypoxia secondary to pneumonia and sepsis.  Patient required oxygen at 2 L and was weaned to room air.  Patient ambulated without desaturation prior to discharge.    Pulmonary medicine consulted and he was seen by Dr. Pisano who noted patient had been weaned to room air and was maintaining saturation.  Recommendations were for patient to remain off of work until 3/20/2023.  Patient ambulated in the hallway without desaturation.  Patient has a history of pulmonary fibrosis but does not require oxygen.  Patient will follow-up with Dr. Pisano in 6 weeks.    Amlodipine continued for primary hypertension.  Losartan resumed at discharge.  Blood pressure 129/82.    Potassium 3.1 and replaced.  Follow-up potassium 3.4.  Additional 40 mEq of potassium given.  Potassium 3.9 on date of discharge.    Troponin elevation not due to ACS.  Troponin elevation due to sepsis and pneumonia.    Hyperlactatemia due to sepsis.    Again, patient has history of pulmonary fibrosis followed by Dr. Pisano and will follow-up in 6 weeks.    Home medications reviewed and resumed on admission if appropriate.  Lovenox ordered for deep vein thrombosis prophylaxis.    On 3/15/2023, he is  "stable for discharge.  Patient remains afebrile and blood cultures no growth.  Cefepime transition to oral Omnicef to complete antibiotic treatment.  He will follow-up with Dr. Pisano in 6 weeks and Dr. Rich Islas on 3/21/2023.    Physical Exam on Discharge:  /82 (BP Location: Left arm, Patient Position: Sitting)   Pulse 73   Temp 99 °F (37.2 °C) (Oral)   Resp 16   Ht 180.3 cm (71\")   Wt 97.2 kg (214 lb 3 oz)   SpO2 91%   BMI 29.87 kg/m²   Physical Exam  Vitals and nursing note reviewed.   Constitutional:       Comments: Sitting up in bed.  No oxygen use.  No visitors in room.   HENT:      Head: Atraumatic.      Nose: No congestion.      Mouth/Throat:      Pharynx: Oropharynx is clear. No oropharyngeal exudate or posterior oropharyngeal erythema.   Eyes:      Extraocular Movements: Extraocular movements intact.      Pupils: Pupils are equal, round, and reactive to light.   Cardiovascular:      Rate and Rhythm: Normal rate and regular rhythm.      Heart sounds: No murmur heard.     Comments: Normal sinus rhythm 70 on telemetry.  Pulmonary:      Breath sounds: No wheezing or rhonchi.      Comments: No oxygen use.  Saturation 91-96% on room air.  No sputum production.  Abdominal:      Palpations: Abdomen is soft.      Tenderness: There is no abdominal tenderness.   Genitourinary:     Comments: Voiding.  Musculoskeletal:         General: No swelling or tenderness.      Cervical back: Normal range of motion and neck supple.   Skin:     General: Skin is warm and dry.   Neurological:      General: No focal deficit present.      Mental Status: He is alert and oriented to person, place, and time.   Psychiatric:         Mood and Affect: Mood normal.         Behavior: Behavior normal.         Thought Content: Thought content normal.         Judgment: Judgment normal.       Condition on Discharge: Stable    Discharge Disposition:  Home or Self Care    Discharge Medications:     Discharge Medications      New " Medications      Instructions Start Date   cefdinir 300 MG capsule  Commonly known as: OMNICEF   300 mg, Oral, Every 12 Hours Scheduled      guaiFENesin 600 MG 12 hr tablet  Commonly known as: MUCINEX   1,200 mg, Oral, Every 12 Hours Scheduled         Continue These Medications      Instructions Start Date   amLODIPine 10 MG tablet  Commonly known as: NORVASC   10 mg, Oral, Daily      colestipol 1 g tablet  Commonly known as: COLESTID   1 g, Oral, 2 Times Daily      fish oil 1000 MG capsule capsule   1,000 mg, Oral, Daily With Breakfast      hydroCHLOROthiazide 25 MG tablet  Commonly known as: HYDRODIURIL   25 mg, Oral, Daily      losartan 100 MG tablet  Commonly known as: COZAAR   100 mg, Oral, Daily      meloxicam 15 MG tablet  Commonly known as: MOBIC   15 mg, Oral, Daily      sildenafil 100 MG tablet  Commonly known as: VIAGRA   100 mg, Oral, Daily PRN      SUPER B COMPLEX PO   1 tablet, Oral, Daily           Discharge Diet:   Diet Instructions     Advance Diet As Tolerated -Target Diet: Regular diet      Target Diet: Regular diet        Activity at Discharge:   Activity Instructions     Activity as Tolerated           Discharge instructions:  1.  For recurrent shortness of breath, fever, chills seek medical attention.  2.  Complete Omnicef after discharge  3.  Follow-up with Dr. Rich Islas 1 week, 3/21/2023  4.  Keep scheduled appointment Dr. Pisano  5.  Continue Mucinex twice daily    Follow-up Appointments:   Future Appointments   Date Time Provider Department Center   6/1/2023  9:00 AM Abi Rose APRN [facility] PAD   6/23/2023  8:45 AM PAD BIC CT 1 BH PAD CT BI PAD   6/26/2023  9:30 AM THERAPIST RESPIRATORY DI PAD [facility]   6/26/2023  9:45 AM Nash Pisano MD [facility]       Test Results Pending at Discharge: None    Electronically signed by CANDACE Miner, 03/15/23, 09:36 CDT.    Time: 35 minutes.  Discussed with Dr. Wick and patient.    The above documentation resulted  from a face-to-face encounter by me Claudia MARIA, [facility].          Electronically signed by Claudia White APRN at 03/15/23 0927

## 2023-03-16 NOTE — TELEPHONE ENCOUNTER
[facility] Transitions Initial Follow Up Call    Outreach made within 2 business days of discharge: Yes    Patient: aSúl April   Patient : 1950 MRN: [id number]    Reason for Admission: SOB,fever    Discharge Date: 03/15/2023     Discharge Diagnosis:   **Bilateral pneumonia    Obesity (BMI 30-39. 9)    Sepsis due to pneumonia ([facility] [address]    Hyperlactatemia    Essential hypertension    Hypokalemia    Elevated troponin level not due to acute coronary syndrome    Hypoxia secondary to pneumonia and sepsis    Pulmonary fibrosis ([facility] [address]      Spoke with: Attempted to make contact with patient/caregiver for an initial transitions of care follow up call post discharge without success. I will reach out again at a later time. Any previously scheduled hospital follow up appointments noted below.         Discharge department/facility: [facility]    Scheduled appointment with PCP within 7-14 days    Follow Up  Future Appointments   Date Time Provider Ramila Alcantar   3/21/2023 10:00 AM Clementine Moffett MD [facility]   2023 11:45 AM Clementine Moffett MD [address], [address] [place] [address]

## 2023-03-17 ENCOUNTER — TELEPHONE (OUTPATIENT)
Dept: INTERNAL MEDICINE | Age: 73
End: 2023-03-17

## 2023-03-17 LAB
BACTERIA SPEC AEROBE CULT: NORMAL
BACTERIA SPEC AEROBE CULT: NORMAL

## 2023-03-17 NOTE — TELEPHONE ENCOUNTER
[address] [facility] Transitions Initial Follow Up Call    Outreach made within 2 business days of discharge: Yes    Patient: Jefferson Rao   Patient : 1950 MRN: [id number]    Reason for Admission: SOB,fever    Discharge Date: 03/15/2023      Discharge Diagnosis:   **Bilateral pneumonia    Obesity (BMI 30-39. 9)    Sepsis due to pneumonia ([address]    Hyperlactatemia    Essential hypertension    Hypokalemia    Elevated troponin level not due to acute coronary syndrome    Hypoxia secondary to pneumonia and sepsis    Pulmonary fibrosis ([facility] [address]     Spoke with: Alice Guerrero    Discharge department/facility: [facility]    TCM Interactive Patient Contact:  Was patient able to fill all prescriptions: Yes  Was patient instructed to bring all medications to the follow-up visit: Yes  Is patient taking all medications as directed in the discharge summary? Yes  Does patient understand their discharge instructions: Yes  Does patient have questions or concerns that need addressed prior to 7-14 day follow up office visit: no    Spoke to Alice Guerrero she said that he is doing better, he is taking his antibiotics and tolerating those well. Pt is going to hold his lasix until he sees Dr Hugo Quintanilla due to he has been dehydrated and his potassium was low during his stay at the hospital. Pt will let us know if he needs anything before the apt.     Scheduled appointment with PCP within 7-14 days    Follow Up  Future Appointments   Date Time Provider Ramila Alcantar   3/21/2023 10:00 AM Deanna Cary MD [facility]   2023 11:45 AM Deanna Cary MD [address] [place] [address], [address] [place] [address]

## 2023-03-18 DIAGNOSIS — I10 ESSENTIAL (PRIMARY) HYPERTENSION: ICD-10-CM

## 2023-03-19 RX ORDER — AMLODIPINE BESYLATE 10 MG/1
TABLET ORAL
Qty: 90 TABLET | Refills: 3 | OUTPATIENT
Start: 2023-03-19

## 2023-03-20 ENCOUNTER — READMISSION MANAGEMENT (OUTPATIENT)
Dept: CALL CENTER | Facility: HOSPITAL | Age: 73
End: 2023-03-20
Payer: MEDICARE

## 2023-03-20 NOTE — OUTREACH NOTE
COPD/PN Week 1 Survey    Flowsheet Row Responses   [facility] facility patient discharged from? [facility]   Does the patient have one of the following disease processes/diagnoses(primary or secondary)? Pneumonia   Week 1 attempt successful? No   Unsuccessful attempts Attempt 1          HALIMA BAPTISTE - Registered Nurse

## 2023-03-21 ENCOUNTER — OFFICE VISIT (OUTPATIENT)
Dept: PRIMARY CARE CLINIC | Age: 73
End: 2023-03-21

## 2023-03-21 VITALS
HEART RATE: 67 BPM | BODY MASS INDEX: 30.13 KG/M2 | OXYGEN SATURATION: 95 % | WEIGHT: 216 LBS | DIASTOLIC BLOOD PRESSURE: 79 MMHG | TEMPERATURE: 98.6 F | SYSTOLIC BLOOD PRESSURE: 117 MMHG

## 2023-03-21 DIAGNOSIS — J18.9 PNEUMONIA OF BOTH LUNGS DUE TO INFECTIOUS ORGANISM, UNSPECIFIED PART OF LUNG: ICD-10-CM

## 2023-03-21 DIAGNOSIS — J18.9 PNEUMONIA OF BOTH LUNGS DUE TO INFECTIOUS ORGANISM, UNSPECIFIED PART OF LUNG: Primary | ICD-10-CM

## 2023-03-21 LAB
ALBUMIN SERPL-MCNC: 3.7 G/DL (ref 3.5–5.2)
ALP SERPL-CCNC: 82 U/L (ref 40–130)
ALT SERPL-CCNC: 37 U/L (ref 5–41)
ANION GAP SERPL CALCULATED.3IONS-SCNC: 7 MMOL/L (ref 7–19)
AST SERPL-CCNC: 24 U/L (ref 5–40)
BASOPHILS # BLD: 0 K/UL (ref 0–0.2)
BASOPHILS NFR BLD: 0.5 % (ref 0–1)
BILIRUB SERPL-MCNC: 0.4 MG/DL (ref 0.2–1.2)
BUN SERPL-MCNC: 14 MG/DL (ref 8–23)
CALCIUM SERPL-MCNC: 9.5 MG/DL (ref 8.8–10.2)
CHLORIDE SERPL-SCNC: 101 MMOL/L (ref 98–111)
CO2 SERPL-SCNC: 31 MMOL/L (ref 22–29)
CREAT SERPL-MCNC: 1.1 MG/DL (ref 0.5–1.2)
EOSINOPHIL # BLD: 0.1 K/UL (ref 0–0.6)
EOSINOPHIL NFR BLD: 1.5 % (ref 0–5)
ERYTHROCYTE [DISTWIDTH] IN BLOOD BY AUTOMATED COUNT: 13.7 % (ref 11.5–14.5)
GLUCOSE SERPL-MCNC: 91 MG/DL (ref 74–109)
HCT VFR BLD AUTO: 46 % (ref 42–52)
HGB BLD-MCNC: 15 G/DL (ref 14–18)
IMM GRANULOCYTES # BLD: 0.1 K/UL
LYMPHOCYTES # BLD: 1.2 K/UL (ref 1.1–4.5)
LYMPHOCYTES NFR BLD: 20.3 % (ref 20–40)
MCH RBC QN AUTO: 30.5 PG (ref 27–31)
MCHC RBC AUTO-ENTMCNC: 32.6 G/DL (ref 33–37)
MCV RBC AUTO: 93.5 FL (ref 80–94)
MONOCYTES # BLD: 0.6 K/UL (ref 0–0.9)
MONOCYTES NFR BLD: 9.2 % (ref 0–10)
NEUTROPHILS # BLD: 4.1 K/UL (ref 1.5–7.5)
NEUTS SEG NFR BLD: 67.7 % (ref 50–65)
PLATELET # BLD AUTO: 471 K/UL (ref 130–400)
PMV BLD AUTO: 8.6 FL (ref 9.4–12.4)
POTASSIUM SERPL-SCNC: 4.9 MMOL/L (ref 3.5–5)
PROT SERPL-MCNC: 8.1 G/DL (ref 6.6–8.7)
RBC # BLD AUTO: 4.92 M/UL (ref 4.7–6.1)
SODIUM SERPL-SCNC: 139 MMOL/L (ref 136–145)
WBC # BLD AUTO: 6.1 K/UL (ref 4.8–10.8)

## 2023-03-21 RX ORDER — ALBUTEROL SULFATE 90 UG/1
2 AEROSOL, METERED RESPIRATORY (INHALATION) 4 TIMES DAILY PRN
Qty: 18 G | Refills: 5 | Status: SHIPPED | OUTPATIENT
Start: 2023-03-21

## 2023-03-21 SDOH — ECONOMIC STABILITY: FOOD INSECURITY: WITHIN THE PAST 12 MONTHS, THE FOOD YOU BOUGHT JUST DIDN'T LAST AND YOU DIDN'T HAVE MONEY TO GET MORE.: NEVER TRUE

## 2023-03-21 SDOH — ECONOMIC STABILITY: HOUSING INSECURITY
IN THE LAST 12 MONTHS, WAS THERE A TIME WHEN YOU DID NOT HAVE A STEADY PLACE TO SLEEP OR SLEPT IN A SHELTER (INCLUDING NOW)?: NO

## 2023-03-21 SDOH — ECONOMIC STABILITY: FOOD INSECURITY: WITHIN THE PAST 12 MONTHS, YOU WORRIED THAT YOUR FOOD WOULD RUN OUT BEFORE YOU GOT MONEY TO BUY MORE.: NEVER TRUE

## 2023-03-21 SDOH — ECONOMIC STABILITY: INCOME INSECURITY: HOW HARD IS IT FOR YOU TO PAY FOR THE VERY BASICS LIKE FOOD, HOUSING, MEDICAL CARE, AND HEATING?: NOT VERY HARD

## 2023-03-21 ASSESSMENT — PATIENT HEALTH QUESTIONNAIRE - PHQ9
SUM OF ALL RESPONSES TO PHQ QUESTIONS 1-9: 0
1. LITTLE INTEREST OR PLEASURE IN DOING THINGS: 0
SUM OF ALL RESPONSES TO PHQ9 QUESTIONS 1 & 2: 0
SUM OF ALL RESPONSES TO PHQ QUESTIONS 1-9: 0
2. FEELING DOWN, DEPRESSED OR HOPELESS: 0

## 2023-03-21 ASSESSMENT — ENCOUNTER SYMPTOMS
NAUSEA: 0
SHORTNESS OF BREATH: 1
ABDOMINAL PAIN: 0
COUGH: 1
WHEEZING: 0
DIARRHEA: 0
CONSTIPATION: 0
CHEST TIGHTNESS: 0
ANAL BLEEDING: 0

## 2023-03-21 NOTE — PROGRESS NOTES
MOUTH EVERY DAY 90 tablet 3    colestipol (COLESTID) 1 g tablet Take 1 tablet by mouth 2 times daily 60 tablet 3    hydroCHLOROthiazide (HYDRODIURIL) 25 MG tablet TAKE 1 TABLET BY MOUTH 1 TIME DAILY 90 tablet 3    losartan (COZAAR) 100 MG tablet TAKE 1 TABLET BY MOUTH 1 TIME DAILY 90 tablet 3    meloxicam (MOBIC) 15 MG tablet TAKE 1 TABLET BY MOUTH ONCE A DAY 90 tablet 3    sildenafil (VIAGRA) 100 MG tablet TAKE BY MOUTH 1/2 TO 1 TABLET EVERY DAY AS NEEDED 10 tablet 11    Omega-3 Fatty Acids (FISH OIL) 1000 MG CAPS Take by mouth daily (with breakfast)      Misc Natural Products (NF FORMULAS TESTOSTERONE) CAPS Take by mouth      aspirin 81 MG chewable tablet Take 81 mg by mouth      B Complex-C (SUPER B COMPLEX PO) Take by mouth       No current facility-administered medications for this visit. No Known Allergies    Health Maintenance   Topic Date Due    COVID-19 Vaccine (3 - Booster for Moderna series) 04/16/2021    Colorectal Cancer Screen  03/16/2023    Depression Screen  06/07/2023    Annual Wellness Visit (AWV)  06/18/2023    Lipids  06/07/2027    DTaP/Tdap/Td vaccine (2 - Td or Tdap) 11/28/2027    Flu vaccine  Completed    Shingles vaccine  Completed    Pneumococcal 65+ years Vaccine  Completed    Hepatitis C screen  Completed    Hepatitis A vaccine  Aged Out    Hib vaccine  Aged Out    Meningococcal (ACWY) vaccine  Aged Out       Subjective:      Review of Systems   Constitutional:  Positive for fatigue. Negative for chills and fever. HENT:  Negative for congestion. Respiratory:  Positive for cough and shortness of breath. Negative for chest tightness and wheezing. Cardiovascular:  Negative for chest pain, palpitations and leg swelling. Gastrointestinal:  Negative for abdominal pain, anal bleeding, constipation, diarrhea and nausea. Genitourinary:  Negative for difficulty urinating. Psychiatric/Behavioral: Negative. See HPI for visit specific review of symptoms.   All others

## 2023-03-24 ENCOUNTER — READMISSION MANAGEMENT (OUTPATIENT)
Dept: CALL CENTER | Facility: HOSPITAL | Age: 73
End: 2023-03-24
Payer: MEDICARE

## 2023-03-24 NOTE — OUTREACH NOTE
COPD/PN Week 1 Survey    Flowsheet Row Responses   [facility] patient discharged from? [facility]   Does the patient have one of the following disease processes/diagnoses(primary or secondary)? Pneumonia   Week 1 attempt successful? Yes   Call start time 1054   Call end time 1056   Meds reviewed with patient/caregiver? Yes   Is the patient having any side effects they believe may be caused by any medication additions or changes? No   Does the patient have all medications ordered at discharge? Yes   Is the patient taking all medications as directed (includes completed medication regime)? Yes   Does the patient have a primary care provider?  Yes   Does the patient have an appointment with their PCP or specialist within 7 days of discharge? Yes   Comments regarding PCP PATIENT HAS SEEN HIS PCP   Has the patient kept scheduled appointments due by today? Yes   Has home health visited the patient within 72 hours of discharge? N/A   Psychosocial issues? No   Did the patient receive a copy of their discharge instructions? Yes   Nursing interventions Reviewed instructions with patient   What is the patient's perception of their health status since discharge? Improving   Nursing Interventions Nurse provided patient education   If the patient is a current smoker, are they able to teach back resources for cessation? Not a smoker   Is the patient/caregiver able to teach back the hierarchy of who to call/visit for symptoms/problems? PCP, Specialist, Home health nurse, Urgent Care, ED, 911 Yes   Is the patient/caregiver able to teach back signs and symptoms of worsening condition: Fever/chills, Shortness of breath, Chest pain   Is the patient/caregiver able to teach back importance of completing antibiotic course of treatment? Yes   Week 1 call completed? Yes          Dulce LAGUERRE - Licensed Nurse

## 2023-05-02 ENCOUNTER — APPOINTMENT (OUTPATIENT)
Dept: CT IMAGING | Facility: HOSPITAL | Age: 73
End: 2023-05-02
Payer: MEDICARE

## 2023-05-02 ENCOUNTER — HOSPITAL ENCOUNTER (EMERGENCY)
Facility: HOSPITAL | Age: 73
Discharge: HOME | End: 2023-05-02
Attending: FAMILY MEDICINE | Admitting: FAMILY MEDICINE
Payer: MEDICARE

## 2023-05-02 ENCOUNTER — APPOINTMENT (OUTPATIENT)
Dept: GENERAL RADIOLOGY | Facility: HOSPITAL | Age: 73
End: 2023-05-02
Payer: MEDICARE

## 2023-05-02 VITALS
TEMPERATURE: 98.2 F | BODY MASS INDEX: 30.1 KG/M2 | DIASTOLIC BLOOD PRESSURE: 71 MMHG | WEIGHT: 215 LBS | RESPIRATION RATE: 16 BRPM | SYSTOLIC BLOOD PRESSURE: 108 MMHG | HEART RATE: 89 BPM | HEIGHT: 71 IN | OXYGEN SATURATION: 93 %

## 2023-05-02 DIAGNOSIS — J18.9 PNEUMONIA DUE TO INFECTIOUS ORGANISM, UNSPECIFIED LATERALITY, UNSPECIFIED PART OF LUNG: ICD-10-CM

## 2023-05-02 DIAGNOSIS — J84.10 PULMONARY FIBROSIS: Primary | ICD-10-CM

## 2023-05-02 DIAGNOSIS — R06.02 SHORTNESS OF BREATH: ICD-10-CM

## 2023-05-02 LAB
ANION GAP SERPL CALCULATED.3IONS-SCNC: 13 MMOL/L (ref 5–15)
ARTERIAL PATENCY WRIST A: ABNORMAL
ATMOSPHERIC PRESS: 745 MMHG
BASE EXCESS BLDA CALC-SCNC: 4 MMOL/L (ref 0–2)
BASOPHILS # BLD AUTO: 0.08 10*3/MM3 (ref 0–0.2)
BASOPHILS NFR BLD AUTO: 0.4 % (ref 0–1.5)
BDY SITE: ABNORMAL
BODY TEMPERATURE: 37 C
BUN SERPL-MCNC: 26 MG/DL (ref 8–23)
BUN/CREAT SERPL: 16.1 (ref 7–25)
CALCIUM SPEC-SCNC: 8.9 MG/DL (ref 8.6–10.5)
CHLORIDE SERPL-SCNC: 96 MMOL/L (ref 98–107)
CO2 SERPL-SCNC: 26 MMOL/L (ref 22–29)
CREAT SERPL-MCNC: 1.61 MG/DL (ref 0.76–1.27)
D DIMER PPP FEU-MCNC: 0.97 MCGFEU/ML (ref 0–0.73)
DEPRECATED RDW RBC AUTO: 47.9 FL (ref 37–54)
EGFRCR SERPLBLD CKD-EPI 2021: 44.9 ML/MIN/1.73
EOSINOPHIL # BLD AUTO: 0.03 10*3/MM3 (ref 0–0.4)
EOSINOPHIL NFR BLD AUTO: 0.2 % (ref 0.3–6.2)
ERYTHROCYTE [DISTWIDTH] IN BLOOD BY AUTOMATED COUNT: 14.1 % (ref 12.3–15.4)
GLUCOSE SERPL-MCNC: 104 MG/DL (ref 65–99)
HCO3 BLDA-SCNC: 27.9 MMOL/L (ref 20–26)
HCT VFR BLD AUTO: 46.9 % (ref 37.5–51)
HGB BLD-MCNC: 15 G/DL (ref 13–17.7)
IMM GRANULOCYTES # BLD AUTO: 0.17 10*3/MM3 (ref 0–0.05)
IMM GRANULOCYTES NFR BLD AUTO: 0.9 % (ref 0–0.5)
INHALED O2 CONCENTRATION: 21 %
LYMPHOCYTES # BLD AUTO: 1.35 10*3/MM3 (ref 0.7–3.1)
LYMPHOCYTES NFR BLD AUTO: 7.4 % (ref 19.6–45.3)
Lab: ABNORMAL
MAGNESIUM SERPL-MCNC: 2.2 MG/DL (ref 1.6–2.4)
MCH RBC QN AUTO: 29.9 PG (ref 26.6–33)
MCHC RBC AUTO-ENTMCNC: 32 G/DL (ref 31.5–35.7)
MCV RBC AUTO: 93.6 FL (ref 79–97)
MODALITY: ABNORMAL
MONOCYTES # BLD AUTO: 2.17 10*3/MM3 (ref 0.1–0.9)
MONOCYTES NFR BLD AUTO: 11.9 % (ref 5–12)
NEUTROPHILS NFR BLD AUTO: 14.43 10*3/MM3 (ref 1.7–7)
NEUTROPHILS NFR BLD AUTO: 79.2 % (ref 42.7–76)
NRBC BLD AUTO-RTO: 0 /100 WBC (ref 0–0.2)
NT-PROBNP SERPL-MCNC: 1442 PG/ML (ref 0–900)
PCO2 BLDA: 38.6 MM HG (ref 35–45)
PCO2 TEMP ADJ BLD: 38.6 MM HG (ref 35–45)
PH BLDA: 7.47 PH UNITS (ref 7.35–7.45)
PH, TEMP CORRECTED: 7.47 PH UNITS (ref 7.35–7.45)
PLATELET # BLD AUTO: 254 10*3/MM3 (ref 140–450)
PMV BLD AUTO: 9.1 FL (ref 6–12)
PO2 BLDA: 60.4 MM HG (ref 83–108)
PO2 TEMP ADJ BLD: 60.4 MM HG (ref 83–108)
POTASSIUM SERPL-SCNC: 3.5 MMOL/L (ref 3.5–5.2)
RBC # BLD AUTO: 5.01 10*6/MM3 (ref 4.14–5.8)
SAO2 % BLDCOA: 93.6 % (ref 94–99)
SODIUM SERPL-SCNC: 135 MMOL/L (ref 136–145)
TROPONIN T SERPL HS-MCNC: 31 NG/L
VENTILATOR MODE: ABNORMAL
WBC NRBC COR # BLD: 18.23 10*3/MM3 (ref 3.4–10.8)

## 2023-05-02 PROCEDURE — 36600 WITHDRAWAL OF ARTERIAL BLOOD: CPT

## 2023-05-02 PROCEDURE — 82803 BLOOD GASES ANY COMBINATION: CPT

## 2023-05-02 PROCEDURE — 71275 CT ANGIOGRAPHY CHEST: CPT

## 2023-05-02 PROCEDURE — 96365 THER/PROPH/DIAG IV INF INIT: CPT

## 2023-05-02 PROCEDURE — 93010 ELECTROCARDIOGRAM REPORT: CPT | Performed by: INTERNAL MEDICINE

## 2023-05-02 PROCEDURE — 25510000001 IOPAMIDOL PER 1 ML: Performed by: FAMILY MEDICINE

## 2023-05-02 PROCEDURE — 83880 ASSAY OF NATRIURETIC PEPTIDE: CPT | Performed by: FAMILY MEDICINE

## 2023-05-02 PROCEDURE — 71045 X-RAY EXAM CHEST 1 VIEW: CPT

## 2023-05-02 PROCEDURE — 83735 ASSAY OF MAGNESIUM: CPT | Performed by: FAMILY MEDICINE

## 2023-05-02 PROCEDURE — 94640 AIRWAY INHALATION TREATMENT: CPT

## 2023-05-02 PROCEDURE — 94799 UNLISTED PULMONARY SVC/PX: CPT

## 2023-05-02 PROCEDURE — 85379 FIBRIN DEGRADATION QUANT: CPT | Performed by: FAMILY MEDICINE

## 2023-05-02 PROCEDURE — 93005 ELECTROCARDIOGRAM TRACING: CPT | Performed by: FAMILY MEDICINE

## 2023-05-02 PROCEDURE — 99284 EMERGENCY DEPT VISIT MOD MDM: CPT

## 2023-05-02 PROCEDURE — 25010000002 METHYLPREDNISOLONE PER 125 MG: Performed by: FAMILY MEDICINE

## 2023-05-02 PROCEDURE — 84484 ASSAY OF TROPONIN QUANT: CPT | Performed by: FAMILY MEDICINE

## 2023-05-02 PROCEDURE — 25010000002 AZITHROMYCIN PER 500 MG: Performed by: FAMILY MEDICINE

## 2023-05-02 PROCEDURE — 96375 TX/PRO/DX INJ NEW DRUG ADDON: CPT

## 2023-05-02 PROCEDURE — 80048 BASIC METABOLIC PNL TOTAL CA: CPT | Performed by: FAMILY MEDICINE

## 2023-05-02 PROCEDURE — 85025 COMPLETE CBC W/AUTO DIFF WBC: CPT | Performed by: FAMILY MEDICINE

## 2023-05-02 RX ORDER — AZITHROMYCIN 250 MG/1
250 TABLET, FILM COATED ORAL DAILY
Qty: 4 TABLET | Refills: 0 | Status: SHIPPED | OUTPATIENT
Start: 2023-05-03 | End: 2023-05-07

## 2023-05-02 RX ORDER — IPRATROPIUM BROMIDE AND ALBUTEROL SULFATE 2.5; .5 MG/3ML; MG/3ML
3 SOLUTION RESPIRATORY (INHALATION) ONCE
Status: COMPLETED | OUTPATIENT
Start: 2023-05-02 | End: 2023-05-02

## 2023-05-02 RX ORDER — METHYLPREDNISOLONE SODIUM SUCCINATE 125 MG/2ML
125 INJECTION, POWDER, LYOPHILIZED, FOR SOLUTION INTRAMUSCULAR; INTRAVENOUS ONCE
Status: COMPLETED | OUTPATIENT
Start: 2023-05-02 | End: 2023-05-02

## 2023-05-02 RX ORDER — SODIUM CHLORIDE 0.9 % (FLUSH) 0.9 %
10 SYRINGE (ML) INJECTION AS NEEDED
Status: DISCONTINUED | OUTPATIENT
Start: 2023-05-02 | End: 2023-05-02 | Stop reason: HOSPADM

## 2023-05-02 RX ORDER — ALBUTEROL SULFATE 0.63 MG/3ML
1 SOLUTION RESPIRATORY (INHALATION) EVERY 6 HOURS PRN
Qty: 360 ML | Refills: 0 | Status: SHIPPED | OUTPATIENT
Start: 2023-05-02

## 2023-05-02 RX ORDER — PREDNISONE 50 MG/1
50 TABLET ORAL DAILY
Qty: 5 TABLET | Refills: 0 | Status: SHIPPED | OUTPATIENT
Start: 2023-05-02 | End: 2023-05-07

## 2023-05-02 RX ADMIN — IOPAMIDOL 100 ML: 755 INJECTION, SOLUTION INTRAVENOUS at 13:13

## 2023-05-02 RX ADMIN — METHYLPREDNISOLONE SODIUM SUCCINATE 125 MG: 125 INJECTION, POWDER, FOR SOLUTION INTRAMUSCULAR; INTRAVENOUS at 12:38

## 2023-05-02 RX ADMIN — AZITHROMYCIN 500 MG: 500 INJECTION, POWDER, LYOPHILIZED, FOR SOLUTION INTRAVENOUS at 14:15

## 2023-05-02 RX ADMIN — IPRATROPIUM BROMIDE AND ALBUTEROL SULFATE 3 ML: .5; 3 SOLUTION RESPIRATORY (INHALATION) at 13:20

## 2023-05-02 NOTE — ED PROVIDER NOTES
Subjective   History of Present Illness  72-year-old male with a history of pulmonary fibrosis comes into the emergency room with complaints of shortness of breath.  Patient states has been going on for couple days now.  Patient states that he is also had a dry cough with no productive sputum.  Patient did take Mucinex yesterday.  Patient has not had any chest pain.  Patient states that he does have a fever today of 103 °F.  Patient did take ibuprofen this morning.  Patient states that he went to the urgent care and his oxygen saturation was 93% on room air.  Patient states that the urgent care sent him here to the emergency room for evaluation.  Patient states that in March of this year he was diagnosed with pneumonia bilaterally.  Patient denies any other symptoms at this time.        Review of Systems   Constitutional: Positive for fever.   Respiratory: Positive for cough and shortness of breath.    All other systems reviewed and are negative.      Past Medical History:   Diagnosis Date   • Anemia    • Arthritis    • Colon polyp    • Erythematous condition    • Hypertension    • Liver disease    • CANNON (nonalcoholic steatohepatitis)    • Pneumonia    • Pulmonary fibrosis    • Shortness of breath        No Known Allergies    Past Surgical History:   Procedure Laterality Date   • CATARACT EXTRACTION, BILATERAL      Summer of 2019   • CHOLECYSTECTOMY     • COLONOSCOPY  03/05/2013   • COLONOSCOPY N/A 3/16/2018    Procedure: COLONOSCOPY WITH ANESTHESIA;  Surgeon: Jemal Batista MD;  Location: [facility] ENDOSCOPY;  Service: Gastroenterology   • ENDOSCOPY AND COLONOSCOPY  06/24/1995   • TONSILLECTOMY         Family History   Problem Relation Age of Onset   • Colon polyps Father    • Colon cancer Neg Hx        Social History     Socioeconomic History   • Marital status:    Tobacco Use   • Smoking status: Never   • Smokeless tobacco: Never   Vaping Use   • Vaping Use: Never used   Substance and Sexual Activity   •  Alcohol use: No   • Drug use: No   • Sexual activity: Defer           Objective   Physical Exam  Vitals and nursing note reviewed.   Constitutional:       Appearance: He is well-developed.   HENT:      Head: Normocephalic and atraumatic.   Cardiovascular:      Rate and Rhythm: Normal rate and regular rhythm.      Heart sounds: Normal heart sounds.   Pulmonary:      Breath sounds: Normal breath sounds.   Abdominal:      General: Bowel sounds are normal.      Palpations: Abdomen is soft.      Tenderness: There is no abdominal tenderness.   Musculoskeletal:      Right lower leg: No edema.      Left lower leg: No edema.   Skin:     General: Skin is warm and dry.   Neurological:      General: No focal deficit present.      Mental Status: He is alert and oriented to person, place, and time.   Psychiatric:         Mood and Affect: Mood normal.         Behavior: Behavior normal.         Procedures           ED Course  ED Course as of 05/02/23 1538   Tue May 02, 2023   1206 EKG rate 90  Normal sinus rhythm  No STEMI  No significant changes found from previous EKG [RP]      ED Course User Index  [RP] Aguila James MD                                         Lab Results (last 24 hours)     Procedure Component Value Units Date/Time    CBC & Differential [[id number]]  (Abnormal) Collected: 05/02/23 1210    Specimen: Blood Updated: 05/02/23 1234    Narrative:      The following orders were created for panel order CBC & Differential.  Procedure                               Abnormality         Status                     ---------                               -----------         ------                     CBC Auto Differential[[id number]]        Abnormal            Final result                 Please view results for these tests on the individual orders.    Basic Metabolic Panel [[id number]]  (Abnormal) Collected: 05/02/23 1210    Specimen: Blood Updated: 05/02/23 1248     Glucose 104 mg/dL      BUN 26 mg/dL      Creatinine 1.61 mg/dL  "     Sodium 135 mmol/L      Potassium 3.5 mmol/L      Chloride 96 mmol/L      CO2 26.0 mmol/L      Calcium 8.9 mg/dL      BUN/Creatinine Ratio 16.1     Anion Gap 13.0 mmol/L      eGFR 44.9 mL/min/1.73     Narrative:      GFR Normal >60  Chronic Kidney Disease <60  Kidney Failure <15    The GFR formula is only valid for adults with stable renal function between ages 18 and 70.    BNP [[id number]]  (Abnormal) Collected: 05/02/23 1210    Specimen: Blood Updated: 05/02/23 1246     proBNP 1,442.0 pg/mL     Narrative:      Among patients with dyspnea, NT-proBNP is highly sensitive for the detection of acute congestive heart failure. In addition NT-proBNP of <300 pg/ml effectively rules out acute congestive heart failure with 99% negative predictive value.    Results may be falsely decreased if patient taking Biotin.      D-dimer, Quantitative [[id number]]  (Abnormal) Collected: 05/02/23 1210    Specimen: Blood Updated: 05/02/23 1241     D-Dimer, Quantitative 0.97 MCGFEU/mL     Narrative:      According to the assay 's published package insert, a normal (<0.50 MCGFEU/mL) D-dimer result in conjunction with a non-high clinical probability assessment, excludes deep vein thrombosis (DVT) and pulmonary embolism (PE) with high sensitivity.    D-dimer values increase with age and this can make VTE exclusion of an older population difficult. To address this, the American College [employer], based on best available evidence and recent guidelines, recommends that clinicians use age-adjusted D-dimer thresholds in patients greater than 50 years of age with: a) a low probability of PE who do not meet all Pulmonary Embolism Rule Out Criteria, or b) in those with intermediate probability of PE.   The formula for an age-adjusted D-dimer cut-off is \"age/100\".  For example, a 60 year old patient would have an age-adjusted cut-off of 0.60 MCGFEU/mL and an 80 year old 0.80 MCGFEU/mL.    Single High Sensitivity Troponin T " [[id number]]  (Abnormal) Collected: 05/02/23 1210    Specimen: Blood Updated: 05/02/23 1246     HS Troponin T 31 ng/L     Narrative:      High Sensitive Troponin T Reference Range:  <10.0 ng/L- Negative Female for AMI  <15.0 ng/L- Negative Male for AMI  >=10 - Abnormal Female indicating possible myocardial injury.  >=15 - Abnormal Male indicating possible myocardial injury.   Clinicians would have to utilize clinical acumen, EKG, Troponin, and serial changes to determine if it is an Acute Myocardial Infarction or myocardial injury due to an underlying chronic condition.         Magnesium [[id number]]  (Normal) Collected: 05/02/23 1210    Specimen: Blood Updated: 05/02/23 1243     Magnesium 2.2 mg/dL     CBC Auto Differential [[id number]]  (Abnormal) Collected: 05/02/23 1210    Specimen: Blood Updated: 05/02/23 1234     WBC 18.23 10*3/mm3      RBC 5.01 10*6/mm3      Hemoglobin 15.0 g/dL      Hematocrit 46.9 %      MCV 93.6 fL      MCH 29.9 pg      MCHC 32.0 g/dL      RDW 14.1 %      RDW-SD 47.9 fl      MPV 9.1 fL      Platelets 254 10*3/mm3      Neutrophil % 79.2 %      Lymphocyte % 7.4 %      Monocyte % 11.9 %      Eosinophil % 0.2 %      Basophil % 0.4 %      Immature Grans % 0.9 %      Neutrophils, Absolute 14.43 10*3/mm3      Lymphocytes, Absolute 1.35 10*3/mm3      Monocytes, Absolute 2.17 10*3/mm3      Eosinophils, Absolute 0.03 10*3/mm3      Basophils, Absolute 0.08 10*3/mm3      Immature Grans, Absolute 0.17 10*3/mm3      nRBC 0.0 /100 WBC     Blood Gas, Arterial - [[id number]]  (Abnormal) Collected: 05/02/23 1326    Specimen: Arterial Blood Updated: 05/02/23 1325     Site Left Brachial     Leo's Test N/A     pH, Arterial 7.467 pH units      Comment: 83 Value above reference range        pCO2, Arterial 38.6 mm Hg      pO2, Arterial 60.4 mm Hg      Comment: 84 Value below reference range        HCO3, Arterial 27.9 mmol/L      Comment: 83 Value above reference range        Base Excess, Arterial 4.0 mmol/L       Comment: 83 Value above reference range        O2 Saturation, Arterial 93.6 %      Comment: 84 Value below reference range        Temperature 37.0 C      Barometric Pressure for Blood Gas 745 mmHg      Modality Room Air     FIO2 21 %      Ventilator Mode NA     Collected by [id number]     Comment: Meter: [id number]     :  [id number]        pCO2, Temperature Corrected 38.6 mm Hg      pH, Temp Corrected 7.467 pH Units      pO2, Temperature Corrected 60.4 mm Hg         CT Angiogram Chest   Final Result   1. No CT evidence of pulmonary embolus. The pulmonary arteries are   dilated. There is cardiomegaly. Ascending thoracic aorta measures 3.6   cm. Aortic arch and descending thoracic aorta measures 3.1 and 2.9 cm   respectively.   2. Groundglass and consolidative lung infiltrates bilaterally. The   findings are likely infectious or inflammatory. There are patchy nodular   infiltrates also in the right upper lobe. No significant pleural   effusion is seen.   3. Mediastinal and right hilar lymphadenopathy appears to be decreased   compared to the previous exam. Lymph nodes are likely reactive.       The full report of this exam was immediately signed and available to the   emergency room. The patient is currently in the emergency room.     This report was finalized on 05/02/2023 13:31 by Dr. Jaden So MD.      XR Chest 1 View   Final Result   1. Improved aeration of the RIGHT lung compared to 03/12/2023.    2. Similar chronic interstitial opacities suggesting chronic lung   disease.   This report was finalized on 05/02/2023 12:32 by Dr Taniya Beck MD.          Medications   sodium chloride 0.9 % flush 10 mL (has no administration in time range)   ipratropium-albuterol (DUO-NEB) nebulizer solution 3 mL (3 mL Nebulization Given 5/2/23 1320)   methylPREDNISolone sodium succinate (SOLU-Medrol) injection 125 mg (125 mg Intravenous Given 5/2/23 1238)   iopamidol (ISOVUE-370) 76 % injection 100 mL (100 mL  Intravenous Given 5/2/23 1313)   azithromycin (ZITHROMAX) 500 mg in sodium chloride 0.9 % 250 mL IVPB-VTB (0 mg Intravenous Stopped 5/2/23 1527)       Medical Decision Making  72-year-old male came to the emergency room with shortness of breath.  Patient was having that going on for couple days as well as a cough.  Patient was found to have pneumonia on his diagnostic imaging.  Inpatient versus outpatient therapy was discussed with the patient.  Patient is in no respiratory distress.  Patient is not requiring any oxygen.  Patient states that he will like to try outpatient therapy at this time.  Patient was given IV Zithromax here in the emergency room.  Patient was then discharged home on p.o. Zithromax to start taking tomorrow.  Patient was given a nebulizer machine as well here in the emergency room.  Patient was also given prednisone to take at home.  Patient was advised to return to emergency room with new or worsening symptoms.  All questions were answered for the patient and his wife present bedside.  Both verbalized understanding and were agreeable to plan as discussed.    Pneumonia due to infectious organism, unspecified laterality, unspecified part of lung: acute illness or injury  Pulmonary fibrosis: acute illness or injury  Shortness of breath: acute illness or injury  Amount and/or Complexity of Data Reviewed  Labs: ordered. Decision-making details documented in ED Course.  Radiology: ordered. Decision-making details documented in ED Course.  ECG/medicine tests: ordered. Decision-making details documented in ED Course.      Risk  Prescription drug management.          Final diagnoses:   Pulmonary fibrosis   Pneumonia due to infectious organism, unspecified laterality, unspecified part of lung   Shortness of breath       ED Disposition  ED Disposition     ED Disposition   Discharge    Condition   Stable    Comment   --             Reid Islas MD  [address] [place] [address] DR BISHOP [address] [place] [address]  [address]  [phone]    Schedule an appointment as soon as possible for a visit       Nash Pisano MD  [address] [place] [id number]  [phone]    Schedule an appointment as soon as possible for a visit       [facility] Emergency Department  [address] [facility] [address] [phone]    As needed, If symptoms worsen         Medication List      New Prescriptions    azithromycin 250 MG tablet  Commonly known as: ZITHROMAX  Take 1 tablet by mouth Daily for 4 days.  Start taking on: May 3, 2023     predniSONE 50 MG tablet  Commonly known as: DELTASONE  Take 1 tablet by mouth Daily for 5 days.        Changed    * Ventolin  (90 Base) MCG/ACT inhaler  Generic drug: albuterol sulfate HFA  What changed: Another medication with the same name was added. Make sure you understand how and when to take each.     * albuterol sulfate  (90 Base) MCG/ACT inhaler  Commonly known as: PROVENTIL HFA;VENTOLIN HFA;PROAIR HFA  What changed: Another medication with the same name was added. Make sure you understand how and when to take each.     * albuterol 0.63 MG/3ML nebulizer solution  Commonly known as: ACCUNEB  Take 3 mL by nebulization Every 6 (Six) Hours As Needed for Wheezing.  What changed: You were already taking a medication with the same name, and this prescription was added. Make sure you understand how and when to take each.         * This list has 3 medication(s) that are the same as other medications prescribed for you. Read the directions carefully, and ask your doctor or other care provider to review them with you.               Where to Get Your Medications      These medications were sent to [facility] [place] - [address] - [phone]  - [phone]   [address] [facility] [address]    Phone: [phone]   · albuterol 0.63 MG/3ML nebulizer solution  · azithromycin 250 MG tablet  · predniSONE 50 MG tablet          Aguila James,  MD  05/02/23 1539

## 2023-05-02 NOTE — Clinical Note
[facility] EMERGENCY DEPARTMENT  [facility] [place] [facility] [address]  Phone: [phone]    Reid Morrell was seen and treated in our emergency department on 5/2/2023.  He may return to work on 05/08/2023.         Thank you for choosing [facility].    Aguila James MD

## 2023-05-03 LAB
QT INTERVAL: 358 MS
QTC INTERVAL: 437 MS

## 2023-05-09 ENCOUNTER — OFFICE VISIT (OUTPATIENT)
Dept: PRIMARY CARE CLINIC | Age: 73
End: 2023-05-09
Payer: MEDICARE

## 2023-05-09 VITALS
DIASTOLIC BLOOD PRESSURE: 80 MMHG | TEMPERATURE: 98.8 F | WEIGHT: 220 LBS | BODY MASS INDEX: 30.8 KG/M2 | HEART RATE: 83 BPM | HEIGHT: 71 IN | OXYGEN SATURATION: 96 % | SYSTOLIC BLOOD PRESSURE: 132 MMHG

## 2023-05-09 DIAGNOSIS — R76.8 POSITIVE ANA (ANTINUCLEAR ANTIBODY): ICD-10-CM

## 2023-05-09 DIAGNOSIS — J84.10 PULMONARY FIBROSIS (HCC): ICD-10-CM

## 2023-05-09 DIAGNOSIS — J18.9 PNEUMONIA OF BOTH LUNGS DUE TO INFECTIOUS ORGANISM, UNSPECIFIED PART OF LUNG: ICD-10-CM

## 2023-05-09 DIAGNOSIS — N28.9 ACUTE RENAL INSUFFICIENCY: ICD-10-CM

## 2023-05-09 DIAGNOSIS — D72.829 LEUKOCYTOSIS, UNSPECIFIED TYPE: ICD-10-CM

## 2023-05-09 DIAGNOSIS — I10 ESSENTIAL (PRIMARY) HYPERTENSION: ICD-10-CM

## 2023-05-09 LAB
ANION GAP SERPL CALCULATED.3IONS-SCNC: 8 MMOL/L (ref 7–19)
BASOPHILS # BLD: 0.1 K/UL (ref 0–0.2)
BASOPHILS NFR BLD: 0.5 % (ref 0–1)
BUN SERPL-MCNC: 22 MG/DL (ref 8–23)
CALCIUM SERPL-MCNC: 9.3 MG/DL (ref 8.8–10.2)
CHLORIDE SERPL-SCNC: 98 MMOL/L (ref 98–111)
CO2 SERPL-SCNC: 32 MMOL/L (ref 22–29)
CREAT SERPL-MCNC: 1.1 MG/DL (ref 0.5–1.2)
EOSINOPHIL # BLD: 0.3 K/UL (ref 0–0.6)
EOSINOPHIL NFR BLD: 2.4 % (ref 0–5)
ERYTHROCYTE [DISTWIDTH] IN BLOOD BY AUTOMATED COUNT: 13.8 % (ref 11.5–14.5)
ERYTHROCYTE [SEDIMENTATION RATE] IN BLOOD BY WESTERGREN METHOD: 18 MM/HR (ref 0–15)
GLUCOSE SERPL-MCNC: 99 MG/DL (ref 74–109)
HCT VFR BLD AUTO: 48.8 % (ref 42–52)
HGB BLD-MCNC: 15.9 G/DL (ref 14–18)
IMM GRANULOCYTES # BLD: 0.2 K/UL
LYMPHOCYTES # BLD: 2.4 K/UL (ref 1.1–4.5)
LYMPHOCYTES NFR BLD: 21.8 % (ref 20–40)
MCH RBC QN AUTO: 30.1 PG (ref 27–31)
MCHC RBC AUTO-ENTMCNC: 32.6 G/DL (ref 33–37)
MCV RBC AUTO: 92.4 FL (ref 80–94)
MONOCYTES # BLD: 0.8 K/UL (ref 0–0.9)
MONOCYTES NFR BLD: 7.5 % (ref 0–10)
NEUTROPHILS # BLD: 7.3 K/UL (ref 1.5–7.5)
NEUTS SEG NFR BLD: 66.2 % (ref 50–65)
PLATELET # BLD AUTO: 466 K/UL (ref 130–400)
PMV BLD AUTO: 8.8 FL (ref 9.4–12.4)
POTASSIUM SERPL-SCNC: 3.8 MMOL/L (ref 3.5–5)
RBC # BLD AUTO: 5.28 M/UL (ref 4.7–6.1)
SODIUM SERPL-SCNC: 138 MMOL/L (ref 136–145)
WBC # BLD AUTO: 11 K/UL (ref 4.8–10.8)

## 2023-05-09 PROCEDURE — 99214 OFFICE O/P EST MOD 30 MIN: CPT | Performed by: NURSE PRACTITIONER

## 2023-05-09 PROCEDURE — 1123F ACP DISCUSS/DSCN MKR DOCD: CPT | Performed by: NURSE PRACTITIONER

## 2023-05-09 PROCEDURE — 3079F DIAST BP 80-89 MM HG: CPT | Performed by: NURSE PRACTITIONER

## 2023-05-09 PROCEDURE — 3075F SYST BP GE 130 - 139MM HG: CPT | Performed by: NURSE PRACTITIONER

## 2023-05-09 ASSESSMENT — ENCOUNTER SYMPTOMS
WHEEZING: 0
SORE THROAT: 0
DIARRHEA: 0
ABDOMINAL PAIN: 0
SHORTNESS OF BREATH: 1
NAUSEA: 0
COUGH: 1
CHEST TIGHTNESS: 0

## 2023-05-09 NOTE — PROGRESS NOTES
Bruce Reid is a 67 y.o. male who presents today for  Chief Complaint   Patient presents with    ED Follow-up       HPI:  Patient of Dr. Manzano Plane here for ER follow-up. Patient has a history of pulmonary fibrosis and is followed by Dr. Erica Toro at [facility].  He was hospitalized at [facility] in March with bilateral pneumonia and sepsis. He feels he improved following discharge and was at his baseline. He started feeling poorly again about 8 days ago with cough, fever, Tmax 103. He went to [facility] urgent care the following day on 5/2 and was found to be hypoxic with O2 sat in upper 80s and was sent to [facility] ER. CXR showed chronic interstitial opacities. D-dimer was elevated. CTA chest negative for PE but showed groundglass infiltrates bilaterally felt infectious or inflammatory. Nodular infiltrates in right upper lung. He was treated with Z-Caleb and prednisone x5 days which he completed 2 days ago. He feels he has improved and is currently at his pulmonary baseline. He has had no fever in about a week. He does not require oxygen at home. He was given a nebulizer with albuterol per ER and is using it 3 times a day. Lab in ER on 5/2 also showed acute renal insufficiency with creatinine 1.6. No history of CKD. He has been trying to increase water intake. He mentions that he was diagnosed with lupus in his 35s. He was seeing a rheumatologist at that time up until his 45s. PARKER was positive with speckled pattern on lab in 2020. He was referred to rheumatology at that time but apparently never scheduled. He states he has had some diffuse joint pain which has been fairly chronic. He does mention that he had abrupt fever and diffuse joint pain in December with Tmax 103. No rash. Symptoms lasted about 4 days and resolved. Review of Systems   Constitutional:  Negative for chills and fever. HENT:  Negative for congestion, ear pain and sore throat.     Respiratory:  Positive for cough

## 2023-05-19 NOTE — PROGRESS NOTES
Chief Complaint  Pulmonary fibrosis (Was in ER 05/02/23 for pneumonia/)    Subjective    History of Present Illness {CC  Problem List  Visit Diagnosis   Encounters  Notes  Medications  Labs  Result Review Imaging  Media     Reid Morrell presents to [facility] for:    History of Present Illness  Mr. Morrell is here for hospital follow up appointment. He was treated for pneumonia in March and again this month at [facility]. He was experiencing cough, shortness of breath and fevers of 103. He had CT chest completed showing improvement in rul infiltrate but persistent bibasilar infiltrates with underlying fibrosis. He tells me he feels much better since completing second round of antibiotics and steroids. He even went fishing a week after being sick this last time.      Prior to Admission medications    Medication Sig Start Date End Date Taking? Authorizing Provider   albuterol (ACCUNEB) 0.63 MG/3ML nebulizer solution Take 3 mL by nebulization Every 6 (Six) Hours As Needed for Wheezing. 5/2/23   Aguila James MD   albuterol sulfate  (90 Base) MCG/ACT inhaler Inhale 2 puffs. 3/21/23   ProviderDarinel MD   amLODIPine (NORVASC) 10 MG tablet Take 1 tablet by mouth Daily.    ProviderDarinel MD   B Complex-C (SUPER B COMPLEX PO) Take 1 tablet by mouth Daily.    ProviderDarinel MD   colestipol (COLESTID) 1 g tablet Take 1 tablet by mouth 2 (Two) Times a Day.    ProviderDarinel MD   guaiFENesin (MUCINEX) 600 MG 12 hr tablet Take 2 tablets by mouth Every 12 (Twelve) Hours. 3/15/23   Claudia White APRN   hydrochlorothiazide (HYDRODIURIL) 25 MG tablet Take 1 tablet by mouth Daily.    Emergency, Nurse MARAL Yousif   losartan (COZAAR) 100 MG tablet Take 1 tablet by mouth Daily.    Emergency, Nurse Epic, RN   meloxicam (MOBIC) 15 MG tablet Take 1 tablet by mouth Daily.    Emergency, Nurse MARAL Yousif   Omega-3 Fatty Acids (FISH OIL)  "1000 MG capsule capsule Take 1 capsule by mouth Daily With Breakfast.    Provider, MD Darinel   sildenafil (VIAGRA) 100 MG tablet Take 1 tablet by mouth Daily As Needed for Erectile Dysfunction.    ProviderDarinel MD   Ventolin  (90 Base) MCG/ACT inhaler  3/21/23   ProviderDarinel MD       Social History     Socioeconomic History    Marital status:    Tobacco Use    Smoking status: Never    Smokeless tobacco: Never   Vaping Use    Vaping Use: Never used   Substance and Sexual Activity    Alcohol use: No    Drug use: No    Sexual activity: Yes     Partners: Female     Birth control/protection: None       Objective   Vital Signs:   /82   Pulse 71   Ht 180.3 cm (71\")   Wt 98.4 kg (217 lb)   SpO2 92% Comment: RA  BMI 30.27 kg/m²     Physical Exam  Constitutional:       General: He is not in acute distress.  HENT:      Head: Normocephalic.      Nose: Nose normal.      Mouth/Throat:      Mouth: Mucous membranes are moist.   Eyes:      General: No scleral icterus.  Cardiovascular:      Rate and Rhythm: Normal rate.   Pulmonary:      Effort: No respiratory distress.      Breath sounds: Rales present.   Abdominal:      General: There is no distension.   Neurological:      Mental Status: He is alert and oriented to person, place, and time.   Psychiatric:         Mood and Affect: Mood normal.         Behavior: Behavior is cooperative.      Result Review :{ Labs  Result Review  Imaging  Med Tab  Media :    PFT Values          8/9/2021    11:30 8/10/2022    10:45 12/20/2022    16:00   Pre Drug PFT Results   FVC 73 67 67   FEV1 75 76 76   FEF 25-75% 86 120 130   FEV1/FVC 81.75 86 85   Other Tests PFT Results   DLCO 70 80 83   D/VAsb 94 110 119         Results for orders placed in visit on 12/20/22    Pulmonary Function Test    Narrative  Pulmonary Function Test  Performed by: Chriss Faust CMA  Authorized by: Nash Pisano MD    Pre Drug % Predicted  FVC: 67%  FEV1: 76%  FEF " 25-75%: 130%  FEV1/FVC: 85%  DLCO: 83%  D/VAsb: 119%    Interpretation  Spirometry  Spirometry shows moderate restriction. midflow is normal.  Diffusion Capacity  The patient's diffusion capacity is normal.  Diffusion capacity is normal when corrected for alveolar volume.    stable compared with most recent study  Electronically signed by Nash Pisano MD, 12/20/2022, 18:25 CST      Results for orders placed in visit on 08/10/22    Pulmonary Function Test    Narrative  Pulmonary Function Test  Performed by: Candi Shaw, RRT  Authorized by: Nash Pisano MD    Pre Drug % Predicted  FVC: 67%  FEV1: 76%  FEF 25-75%: 120%  FEV1/FVC: 86%  DLCO: 80%  D/VAsb: 110%    Interpretation  Spirometry  Spirometry shows moderate restriction. midflow is normal.  Diffusion Capacity  The patient's diffusion capacity is normal.  Diffusion capacity is normal when corrected for alveolar volume.  Overall comments: svc and erv mildly reduced      Results for orders placed in visit on 08/09/21    Pulmonary Function Test    Narrative  Pulmonary Function Test  Performed by: Candi Shaw, RRT  Authorized by: Nash Pisano MD    Pre Drug % Predicted  FVC: 73%  FEV1: 75%  FEF 25-75%: 86%  FEV1/FVC: 81.75%  DLCO: 70%  D/VAsb: 94%               CT Angiogram Chest (05/02/2023 13:13)   My interpretation of imaging:  bilateral ground glass and consolidative infiltrates superimposed on underlying fibrotic disease. Right upper lobe consolidation significantly improved         Assessment and Plan {CC Problem List  Visit Diagnosis  ROS  Review (Popup)  Health Maintenance  Quality  BestPractice  Medications  SmartSets  SnapShot Encounters  Media      Diagnoses and all orders for this visit:    1. Pulmonary fibrosis (Primary)    2. Restrictive lung disease    3. History of recent pneumonia        BMI is >= 25 and <30. (Overweight) The following options were offered after discussion;: weight loss  educational material (shared in after visit summary)      He does not want to perform walking oximetry today. He reports he has oximeter at home and will monitor sats closely. Reschedule CT chest in 3 months. Keep next scheduled appointment to follow up spirometry and dlco. Continue albuterol as needed. Keep next scheduled office visit with Dr. Pisano. Call in the interim with issues.     Romy Valera, APRN  5/26/2023  14:22 CDT    Follow Up {Instructions Charge Capture  Follow-up Communications   Return for Next scheduled follow up.    Patient was given instructions and counseling regarding his condition or for health maintenance advice. Please see specific information pulled into the AVS if appropriate.

## 2023-05-23 DIAGNOSIS — I10 ESSENTIAL (PRIMARY) HYPERTENSION: ICD-10-CM

## 2023-05-23 RX ORDER — LOSARTAN POTASSIUM 100 MG/1
TABLET ORAL
Qty: 90 TABLET | Refills: 3 | Status: SHIPPED | OUTPATIENT
Start: 2023-05-23

## 2023-05-26 ENCOUNTER — OFFICE VISIT (OUTPATIENT)
Dept: PULMONOLOGY | Facility: CLINIC | Age: 73
End: 2023-05-26
Payer: MEDICARE

## 2023-05-26 VITALS
WEIGHT: 217 LBS | HEIGHT: 71 IN | SYSTOLIC BLOOD PRESSURE: 140 MMHG | BODY MASS INDEX: 30.38 KG/M2 | HEART RATE: 71 BPM | OXYGEN SATURATION: 92 % | DIASTOLIC BLOOD PRESSURE: 82 MMHG

## 2023-05-26 DIAGNOSIS — J84.10 PULMONARY FIBROSIS: Primary | Chronic | ICD-10-CM

## 2023-05-26 DIAGNOSIS — J98.4 RESTRICTIVE LUNG DISEASE: Chronic | ICD-10-CM

## 2023-05-26 DIAGNOSIS — Z87.01 HISTORY OF RECENT PNEUMONIA: ICD-10-CM

## 2023-06-13 DIAGNOSIS — I10 ESSENTIAL (PRIMARY) HYPERTENSION: ICD-10-CM

## 2023-06-13 DIAGNOSIS — Z12.5 ENCOUNTER FOR PROSTATE CANCER SCREENING: ICD-10-CM

## 2023-06-13 DIAGNOSIS — Z13.220 LIPID SCREENING: ICD-10-CM

## 2023-06-13 LAB
ALBUMIN SERPL-MCNC: 4.2 G/DL (ref 3.5–5.2)
ALP SERPL-CCNC: 81 U/L (ref 40–130)
ALT SERPL-CCNC: 21 U/L (ref 5–41)
ANION GAP SERPL CALCULATED.3IONS-SCNC: 8 MMOL/L (ref 7–19)
AST SERPL-CCNC: 26 U/L (ref 5–40)
BASOPHILS # BLD: 0 K/UL (ref 0–0.2)
BASOPHILS NFR BLD: 0.2 % (ref 0–1)
BILIRUB SERPL-MCNC: 0.7 MG/DL (ref 0.2–1.2)
BUN SERPL-MCNC: 14 MG/DL (ref 8–23)
CALCIUM SERPL-MCNC: 9.6 MG/DL (ref 8.8–10.2)
CHLORIDE SERPL-SCNC: 98 MMOL/L (ref 98–111)
CHOLEST SERPL-MCNC: 159 MG/DL (ref 160–199)
CO2 SERPL-SCNC: 32 MMOL/L (ref 22–29)
CREAT SERPL-MCNC: 1 MG/DL (ref 0.5–1.2)
EOSINOPHIL # BLD: 0.2 K/UL (ref 0–0.6)
EOSINOPHIL NFR BLD: 3.1 % (ref 0–5)
ERYTHROCYTE [DISTWIDTH] IN BLOOD BY AUTOMATED COUNT: 14.4 % (ref 11.5–14.5)
GLUCOSE SERPL-MCNC: 98 MG/DL (ref 74–109)
HCT VFR BLD AUTO: 47.3 % (ref 42–52)
HDLC SERPL-MCNC: 39 MG/DL (ref 55–121)
HGB BLD-MCNC: 15.7 G/DL (ref 14–18)
IMM GRANULOCYTES # BLD: 0 K/UL
LDLC SERPL CALC-MCNC: 101 MG/DL
LYMPHOCYTES # BLD: 1.6 K/UL (ref 1.1–4.5)
LYMPHOCYTES NFR BLD: 27.9 % (ref 20–40)
MCH RBC QN AUTO: 30.4 PG (ref 27–31)
MCHC RBC AUTO-ENTMCNC: 33.2 G/DL (ref 33–37)
MCV RBC AUTO: 91.5 FL (ref 80–94)
MONOCYTES # BLD: 0.6 K/UL (ref 0–0.9)
MONOCYTES NFR BLD: 10.1 % (ref 0–10)
NEUTROPHILS # BLD: 3.4 K/UL (ref 1.5–7.5)
NEUTS SEG NFR BLD: 58.5 % (ref 50–65)
PLATELET # BLD AUTO: 269 K/UL (ref 130–400)
PMV BLD AUTO: 8.8 FL (ref 9.4–12.4)
POTASSIUM SERPL-SCNC: 4.3 MMOL/L (ref 3.5–5)
PROT SERPL-MCNC: 7.8 G/DL (ref 6.6–8.7)
PSA SERPL-MCNC: 1.02 NG/ML (ref 0–4)
RBC # BLD AUTO: 5.17 M/UL (ref 4.7–6.1)
SODIUM SERPL-SCNC: 138 MMOL/L (ref 136–145)
TRIGL SERPL-MCNC: 97 MG/DL (ref 0–149)
WBC # BLD AUTO: 5.9 K/UL (ref 4.8–10.8)

## 2023-06-19 ENCOUNTER — OFFICE VISIT (OUTPATIENT)
Dept: PRIMARY CARE CLINIC | Age: 73
End: 2023-06-19
Payer: MEDICARE

## 2023-06-19 VITALS
BODY MASS INDEX: 30.94 KG/M2 | HEART RATE: 67 BPM | SYSTOLIC BLOOD PRESSURE: 132 MMHG | DIASTOLIC BLOOD PRESSURE: 80 MMHG | WEIGHT: 221 LBS | TEMPERATURE: 98.2 F | HEIGHT: 71 IN | OXYGEN SATURATION: 97 %

## 2023-06-19 DIAGNOSIS — M25.562 CHRONIC PAIN OF BOTH KNEES: ICD-10-CM

## 2023-06-19 DIAGNOSIS — G89.29 CHRONIC PAIN OF BOTH KNEES: ICD-10-CM

## 2023-06-19 DIAGNOSIS — J84.10 PULMONARY FIBROSIS (HCC): ICD-10-CM

## 2023-06-19 DIAGNOSIS — I10 ESSENTIAL (PRIMARY) HYPERTENSION: ICD-10-CM

## 2023-06-19 DIAGNOSIS — N52.9 ERECTILE DYSFUNCTION, UNSPECIFIED ERECTILE DYSFUNCTION TYPE: ICD-10-CM

## 2023-06-19 DIAGNOSIS — M25.561 CHRONIC PAIN OF BOTH KNEES: ICD-10-CM

## 2023-06-19 DIAGNOSIS — R19.7 POSTCHOLECYSTECTOMY DIARRHEA: ICD-10-CM

## 2023-06-19 DIAGNOSIS — K91.89 POSTCHOLECYSTECTOMY DIARRHEA: ICD-10-CM

## 2023-06-19 DIAGNOSIS — Z00.00 ANNUAL PHYSICAL EXAM: Primary | ICD-10-CM

## 2023-06-19 PROCEDURE — 1123F ACP DISCUSS/DSCN MKR DOCD: CPT | Performed by: FAMILY MEDICINE

## 2023-06-19 PROCEDURE — 3075F SYST BP GE 130 - 139MM HG: CPT | Performed by: FAMILY MEDICINE

## 2023-06-19 PROCEDURE — G0439 PPPS, SUBSEQ VISIT: HCPCS | Performed by: FAMILY MEDICINE

## 2023-06-19 PROCEDURE — 99213 OFFICE O/P EST LOW 20 MIN: CPT | Performed by: FAMILY MEDICINE

## 2023-06-19 PROCEDURE — 3079F DIAST BP 80-89 MM HG: CPT | Performed by: FAMILY MEDICINE

## 2023-06-19 RX ORDER — MELOXICAM 15 MG/1
TABLET ORAL
Qty: 90 TABLET | Refills: 3 | Status: SHIPPED | OUTPATIENT
Start: 2023-06-19

## 2023-06-19 RX ORDER — HYDROCHLOROTHIAZIDE 25 MG/1
TABLET ORAL
Qty: 90 TABLET | Refills: 3 | Status: SHIPPED | OUTPATIENT
Start: 2023-06-19

## 2023-06-19 RX ORDER — MONTELUKAST SODIUM 4 MG/1
1 TABLET, CHEWABLE ORAL 2 TIMES DAILY
Qty: 180 TABLET | Refills: 3 | Status: SHIPPED | OUTPATIENT
Start: 2023-06-19

## 2023-06-19 RX ORDER — FAMOTIDINE 20 MG/1
20 TABLET, FILM COATED ORAL 2 TIMES DAILY
COMMUNITY

## 2023-06-19 RX ORDER — AMLODIPINE BESYLATE 10 MG/1
TABLET ORAL
Qty: 90 TABLET | Refills: 3 | Status: SHIPPED | OUTPATIENT
Start: 2023-06-19

## 2023-06-19 RX ORDER — SILDENAFIL 100 MG/1
TABLET, FILM COATED ORAL
Qty: 10 TABLET | Refills: 11 | Status: SHIPPED | OUTPATIENT
Start: 2023-06-19

## 2023-06-19 RX ORDER — GUAIFENESIN 600 MG/1
1200 TABLET, EXTENDED RELEASE ORAL 2 TIMES DAILY
COMMUNITY

## 2023-06-19 RX ORDER — ALBUTEROL SULFATE 2.5 MG/3ML
2.5 SOLUTION RESPIRATORY (INHALATION) EVERY 6 HOURS PRN
Qty: 30 EACH | Refills: 5 | Status: SHIPPED | OUTPATIENT
Start: 2023-06-19

## 2023-06-19 RX ORDER — LOSARTAN POTASSIUM 100 MG/1
TABLET ORAL
Qty: 90 TABLET | Refills: 3 | Status: SHIPPED | OUTPATIENT
Start: 2023-06-19

## 2023-06-19 ASSESSMENT — ENCOUNTER SYMPTOMS
CHEST TIGHTNESS: 0
DIARRHEA: 0
NAUSEA: 0
COUGH: 0
CONSTIPATION: 0
ABDOMINAL PAIN: 0
ANAL BLEEDING: 0
SHORTNESS OF BREATH: 0

## 2023-06-19 ASSESSMENT — PATIENT HEALTH QUESTIONNAIRE - PHQ9
SUM OF ALL RESPONSES TO PHQ QUESTIONS 1-9: 0
SUM OF ALL RESPONSES TO PHQ QUESTIONS 1-9: 0
SUM OF ALL RESPONSES TO PHQ9 QUESTIONS 1 & 2: 0
1. LITTLE INTEREST OR PLEASURE IN DOING THINGS: 0
SUM OF ALL RESPONSES TO PHQ QUESTIONS 1-9: 0
SUM OF ALL RESPONSES TO PHQ QUESTIONS 1-9: 0
2. FEELING DOWN, DEPRESSED OR HOPELESS: 0

## 2023-06-19 ASSESSMENT — LIFESTYLE VARIABLES
HOW OFTEN DO YOU HAVE A DRINK CONTAINING ALCOHOL: NEVER
HOW MANY STANDARD DRINKS CONTAINING ALCOHOL DO YOU HAVE ON A TYPICAL DAY: PATIENT DOES NOT DRINK

## 2023-06-19 NOTE — PROGRESS NOTES
Medicare Annual Wellness Visit - Subsequent    Name: Kendall Morocho Date: 2023   MRN: [id number] Sex: Male   Age: 67 y.o. Ethnicity: Non- / Non    : 1950 Race: White (non-)      Keshia Reyna is here for   Chief Complaint   Patient presents with    Medicare AWV        Screenings for behavioral, psychosocial and functional/safety risks, and cognitive dysfunction are all negative except as indicated below. These results, as well as other patient data from the [address] form, are documented in Flowsheets linked to this Encounter. No Known Allergies    Prior to Visit Medications    Medication Sig Taking?  Authorizing Provider   guaiFENesin (MUCINEX) 600 MG extended release tablet Take 2 tablets by mouth 2 times daily Yes Historical Provider, MD   famotidine (PEPCID) 20 MG tablet Take 1 tablet by mouth 2 times daily Yes Historical Provider, MD   losartan (COZAAR) 100 MG tablet TAKE 1 TABLET BY MOUTH EVERY DAY Yes Annette Babin MD   albuterol sulfate HFA (VENTOLIN HFA) 108 (90 Base) MCG/ACT inhaler Inhale 2 puffs into the lungs 4 times daily as needed for Wheezing Yes Annette Babin MD   amLODIPine (NORVASC) 10 MG tablet TAKE 1 TABLET BY MOUTH EVERY DAY Yes Annette Babin MD   colestipol (COLESTID) 1 g tablet Take 1 tablet by mouth 2 times daily Yes Annette Babin MD   hydroCHLOROthiazide (HYDRODIURIL) 25 MG tablet TAKE 1 TABLET BY MOUTH 1 TIME DAILY Yes Annette Babin MD   meloxicam (MOBIC) 15 MG tablet TAKE 1 TABLET BY MOUTH ONCE A DAY Yes Annette Babin MD   sildenafil (VIAGRA) 100 MG tablet TAKE BY MOUTH 1/2 TO 1 TABLET EVERY DAY AS NEEDED Yes Annette Babin MD   Omega-3 Fatty Acids (FISH OIL) 1000 MG CAPS Take by mouth daily (with breakfast) Yes Historical Provider, MD   aspirin 81 MG chewable tablet Take 1 tablet by mouth Yes Historical Provider, MD   B Complex-C (SUPER B COMPLEX PO) Take by mouth Yes Historical Provider, MD Macielc
Outpatient Medications   Medication Sig Dispense Refill    guaiFENesin (MUCINEX) 600 MG extended release tablet Take 2 tablets by mouth 2 times daily      famotidine (PEPCID) 20 MG tablet Take 1 tablet by mouth 2 times daily      amLODIPine (NORVASC) 10 MG tablet TAKE 1 TABLET BY MOUTH EVERY DAY 90 tablet 3    losartan (COZAAR) 100 MG tablet TAKE 1 TABLET BY MOUTH EVERY DAY 90 tablet 3    hydroCHLOROthiazide (HYDRODIURIL) 25 MG tablet TAKE 1 TABLET BY MOUTH 1 TIME DAILY 90 tablet 3    colestipol (COLESTID) 1 g tablet Take 1 tablet by mouth 2 times daily 180 tablet 3    meloxicam (MOBIC) 15 MG tablet TAKE 1 TABLET BY MOUTH ONCE A DAY 90 tablet 3    sildenafil (VIAGRA) 100 MG tablet TAKE BY MOUTH 1/2 TO 1 TABLET EVERY DAY AS NEEDED 10 tablet 11    albuterol (PROVENTIL) (2.5 MG/3ML) 0.083% nebulizer solution Take 3 mLs by nebulization every 6 hours as needed for Wheezing 30 each 5    albuterol sulfate HFA (VENTOLIN HFA) 108 (90 Base) MCG/ACT inhaler Inhale 2 puffs into the lungs 4 times daily as needed for Wheezing 18 g 5    Omega-3 Fatty Acids (FISH OIL) 1000 MG CAPS Take by mouth daily (with breakfast)      aspirin 81 MG chewable tablet Take 1 tablet by mouth      B Complex-C (SUPER B COMPLEX PO) Take by mouth      Misc Natural Products (NF FORMULAS TESTOSTERONE) CAPS Take by mouth       No current facility-administered medications for this visit. Return in about 6 months (around 12/19/2023) for Routine follow up - 20 minutes. Discussed use, benefit, and side effects of prescribed medications.      History, Medications, Allergies     No Known Allergies  _______________________________________________________________  Past Medical History:   Diagnosis Date    Hypertension     Pulmonary fibrosis ([address]      Past Surgical History:   Procedure Laterality Date    CHOLECYSTECTOMY      TONSILLECTOMY        Family History   Problem Relation Age of Onset    High Blood Pressure Mother     Diabetes Mother     High Blood

## 2023-11-02 ENCOUNTER — TELEPHONE (OUTPATIENT)
Dept: PULMONOLOGY | Facility: CLINIC | Age: 73
End: 2023-11-02
Payer: MEDICARE

## 2023-11-02 NOTE — TELEPHONE ENCOUNTER
Spoke to patient's wife and informed her that Dr. Pisano said that he will discuss at 11/22 office visit and then proceed to try to get auth after more information is received.  Wife voiced understanding and will inform patient.

## 2023-11-02 NOTE — TELEPHONE ENCOUNTER
Caller: Reid Morrell    Relationship to patient: Self    Best call back number: [phone] (home) [phone] (work)      Patient is needing: PT INSURANCE WILL NOT PAY FOR CT SCAN THAT HAS BEEN ORDERED. IS THERE SOMETHING THAT CAN BE DONE TO GET THEM TO PAY FOR IT? PLEASE CALL PT TO ADVISE

## 2023-11-07 ENCOUNTER — ANESTHESIA (OUTPATIENT)
Dept: GASTROENTEROLOGY | Facility: HOSPITAL | Age: 73
End: 2023-11-07
Payer: MEDICARE

## 2023-11-07 ENCOUNTER — ANESTHESIA EVENT (OUTPATIENT)
Dept: GASTROENTEROLOGY | Facility: HOSPITAL | Age: 73
End: 2023-11-07
Payer: MEDICARE

## 2023-11-07 ENCOUNTER — HOSPITAL ENCOUNTER (OUTPATIENT)
Facility: HOSPITAL | Age: 73
Setting detail: HOSPITAL OUTPATIENT SURGERY
Discharge: HOME | End: 2023-11-07
Attending: INTERNAL MEDICINE | Admitting: INTERNAL MEDICINE
Payer: MEDICARE

## 2023-11-07 VITALS
TEMPERATURE: 97.2 F | BODY MASS INDEX: 30.1 KG/M2 | WEIGHT: 215 LBS | OXYGEN SATURATION: 97 % | HEIGHT: 71 IN | DIASTOLIC BLOOD PRESSURE: 76 MMHG | SYSTOLIC BLOOD PRESSURE: 113 MMHG | RESPIRATION RATE: 20 BRPM | HEART RATE: 56 BPM

## 2023-11-07 DIAGNOSIS — Z86.010 HX OF COLONIC POLYP: ICD-10-CM

## 2023-11-07 DIAGNOSIS — Z83.719 FAMILY HX COLONIC POLYPS: ICD-10-CM

## 2023-11-07 PROCEDURE — 88305 TISSUE EXAM BY PATHOLOGIST: CPT | Performed by: INTERNAL MEDICINE

## 2023-11-07 PROCEDURE — 25010000002 PROPOFOL 10 MG/ML EMULSION: Performed by: NURSE ANESTHETIST, CERTIFIED REGISTERED

## 2023-11-07 PROCEDURE — 25810000003 SODIUM CHLORIDE 0.9 % SOLUTION: Performed by: NURSE ANESTHETIST, CERTIFIED REGISTERED

## 2023-11-07 RX ORDER — ONDANSETRON 2 MG/ML
4 INJECTION INTRAMUSCULAR; INTRAVENOUS ONCE AS NEEDED
Status: DISCONTINUED | OUTPATIENT
Start: 2023-11-07 | End: 2023-11-07 | Stop reason: HOSPADM

## 2023-11-07 RX ORDER — SODIUM CHLORIDE 0.9 % (FLUSH) 0.9 %
10 SYRINGE (ML) INJECTION AS NEEDED
Status: CANCELLED | OUTPATIENT
Start: 2023-11-07

## 2023-11-07 RX ORDER — SODIUM CHLORIDE 9 MG/ML
100 INJECTION, SOLUTION INTRAVENOUS CONTINUOUS
Status: CANCELLED | OUTPATIENT
Start: 2023-11-07

## 2023-11-07 RX ORDER — PROPOFOL 10 MG/ML
VIAL (ML) INTRAVENOUS AS NEEDED
Status: DISCONTINUED | OUTPATIENT
Start: 2023-11-07 | End: 2023-11-07 | Stop reason: SURG

## 2023-11-07 RX ORDER — SODIUM CHLORIDE 9 MG/ML
500 INJECTION, SOLUTION INTRAVENOUS CONTINUOUS PRN
Status: DISCONTINUED | OUTPATIENT
Start: 2023-11-07 | End: 2023-11-07 | Stop reason: HOSPADM

## 2023-11-07 RX ORDER — LIDOCAINE HYDROCHLORIDE 20 MG/ML
INJECTION, SOLUTION EPIDURAL; INFILTRATION; INTRACAUDAL; PERINEURAL AS NEEDED
Status: DISCONTINUED | OUTPATIENT
Start: 2023-11-07 | End: 2023-11-07 | Stop reason: SURG

## 2023-11-07 RX ORDER — SODIUM CHLORIDE 0.9 % (FLUSH) 0.9 %
10 SYRINGE (ML) INJECTION EVERY 12 HOURS SCHEDULED
Status: CANCELLED | OUTPATIENT
Start: 2023-11-07

## 2023-11-07 RX ORDER — SODIUM CHLORIDE 9 MG/ML
40 INJECTION, SOLUTION INTRAVENOUS AS NEEDED
Status: CANCELLED | OUTPATIENT
Start: 2023-11-07

## 2023-11-07 RX ORDER — SODIUM CHLORIDE 0.9 % (FLUSH) 0.9 %
10 SYRINGE (ML) INJECTION AS NEEDED
Status: DISCONTINUED | OUTPATIENT
Start: 2023-11-07 | End: 2023-11-07 | Stop reason: HOSPADM

## 2023-11-07 RX ADMIN — LIDOCAINE HYDROCHLORIDE 100 MG: 20 INJECTION, SOLUTION EPIDURAL; INFILTRATION; INTRACAUDAL; PERINEURAL at 08:33

## 2023-11-07 RX ADMIN — PROPOFOL INJECTABLE EMULSION 50 MG: 10 INJECTION, EMULSION INTRAVENOUS at 08:42

## 2023-11-07 RX ADMIN — SODIUM CHLORIDE 500 ML: 9 INJECTION, SOLUTION INTRAVENOUS at 07:12

## 2023-11-07 RX ADMIN — PROPOFOL INJECTABLE EMULSION 100 MG: 10 INJECTION, EMULSION INTRAVENOUS at 08:33

## 2023-11-07 RX ADMIN — PROPOFOL INJECTABLE EMULSION 50 MG: 10 INJECTION, EMULSION INTRAVENOUS at 08:34

## 2023-11-07 RX ADMIN — PROPOFOL INJECTABLE EMULSION 50 MG: 10 INJECTION, EMULSION INTRAVENOUS at 08:36

## 2023-11-07 NOTE — ANESTHESIA PREPROCEDURE EVALUATION
Anesthesia Evaluation     Patient summary reviewed and Nursing notes reviewed   NPO Solid Status: > 6 hours  NPO Liquid Status: > 6 hours           Airway   Mallampati: II  TM distance: >3 FB  Neck ROM: full  No difficulty expected  Dental - normal exam     Pulmonary - normal exam   (+) pneumonia ,    ROS comment: Pul fibrosis  Cardiovascular   Exercise tolerance: excellent (>7 METS)    Rhythm: regular    (+) hypertension well controlled      Neuro/Psych- negative ROS  GI/Hepatic/Renal/Endo    (+) liver disease    Musculoskeletal     Abdominal    Substance History      OB/GYN          Other                          Anesthesia Plan    ASA 2     general     intravenous induction     Anesthetic plan, risks, benefits, and alternatives have been provided, discussed and informed consent has been obtained with: patient.

## 2023-11-07 NOTE — ANESTHESIA POSTPROCEDURE EVALUATION
Patient: Reid Morrell    Procedure Summary       Date: 11/07/23 Room / Location: [facility] ENDOSCOPY 4 / [facility] ENDOSCOPY    Anesthesia Start: 0831 Anesthesia Stop: 0857    Procedure: COLONOSCOPY WITH ANESTHESIA Diagnosis:       Hx of colonic polyp      Family hx colonic polyps      (Hx of colonic polyp [Z86.010])      (Family hx colonic polyps [Z83.71])    Surgeons: Jemal Batista MD Provider: Seymour Brandon CRNA    Anesthesia Type: general ASA Status: 2            Anesthesia Type: general    Vitals  Vitals Value Taken Time   /65 11/07/23 0911   Temp     Pulse 56 11/07/23 0915   Resp 24 11/07/23 0900   SpO2 97 % 11/07/23 0915   Vitals shown include unfiled device data.        Post Anesthesia Care and Evaluation    Patient location during evaluation: PHASE II  Patient participation: complete - patient participated  Level of consciousness: awake and sleepy but conscious  Pain score: 0  Pain management: adequate    Airway patency: patent  Anesthetic complications: No anesthetic complications    Cardiovascular status: acceptable  Respiratory status: acceptable  Hydration status: acceptable

## 2023-11-07 NOTE — H&P
[facility] Gastroenterology  Pre Procedure History & Physical    Chief Complaint:   Colon polyps    Subjective     HPI:   The patient has a history of colon polyps who presents for exam.  He notes he did have 1 episode of bright red blood per rectum a week or so ago.  It resolved.  Attributed to hemorrhoids.  Presents for colonoscopy    Past Medical History:   Past Medical History:   Diagnosis Date    Anemia     Arthritis     Chronic idiopathic fibrosing alveolitis     Colon polyp     Erythematous condition     Hypertension     Liver disease     CANNON (nonalcoholic steatohepatitis)     Pneumonia     Pulmonary fibrosis     Shortness of breath        Past Surgical History:  Past Surgical History:   Procedure Laterality Date    CATARACT EXTRACTION, BILATERAL      Summer of 2019    CHOLECYSTECTOMY      COLONOSCOPY  03/05/2013    COLONOSCOPY N/A 03/16/2018    Procedure: COLONOSCOPY WITH ANESTHESIA;  Surgeon: Jemal Batista MD;  Location: [facility] ENDOSCOPY;  Service: Gastroenterology    ENDOSCOPY AND COLONOSCOPY  06/24/1995    TONSILLECTOMY         Family History:  Family History   Problem Relation Age of Onset    Colon polyps Father     Colon cancer Neg Hx        Social History:   reports that he has never smoked. He has never used smokeless tobacco. He reports that he does not drink alcohol and does not use drugs.    Medications:   Prior to Admission medications    Medication Sig Start Date End Date Taking? Authorizing Provider   albuterol (ACCUNEB) 0.63 MG/3ML nebulizer solution Take 3 mL by nebulization Every 6 (Six) Hours As Needed for Wheezing. 5/2/23  Yes Aguila James MD   amLODIPine (NORVASC) 10 MG tablet Take 1 tablet by mouth Daily.   Yes Darinel Orourke MD   B Complex-C (SUPER B COMPLEX PO) Take 1 tablet by mouth Daily.   Yes Darinel Orourke MD   famotidine (PEPCID) 20 MG tablet Take 1 tablet by mouth Take As Directed. When taking mobic   Yes Darinel Orourke MD   guaiFENesin (MUCINEX) 600  "MG 12 hr tablet Take 2 tablets by mouth Every 12 (Twelve) Hours. 3/15/23  Yes Claudia White APRN   hydrochlorothiazide (HYDRODIURIL) 25 MG tablet Take 1 tablet by mouth Daily.   Yes Emergency, Nurse MARAL Yousif   losartan (COZAAR) 100 MG tablet Take 1 tablet by mouth Daily.   Yes Emergency, Nurse Nica RN   Omega-3 Fatty Acids (FISH OIL) 1000 MG capsule capsule Take 1 capsule by mouth Daily With Breakfast.   Yes Provider, MD Darinel   Ventolin  (90 Base) MCG/ACT inhaler  3/21/23  Yes Provider, MD Darinel   colestipol (COLESTID) 1 g tablet Take 1 tablet by mouth 2 (Two) Times a Day.    Provider, MD Darinel   meloxicam (MOBIC) 15 MG tablet Take 1 tablet by mouth Daily.    Emergency, Nurse MARAL Yousif   sildenafil (VIAGRA) 100 MG tablet Take 1 tablet by mouth Daily As Needed for Erectile Dysfunction.    Provider, MD Darinel       Allergies:  Patient has no known allergies.    ROS:    General: Weight stable  Resp: No SOA  Cardiovascular: No CP    Objective     Blood pressure 142/90, pulse 60, temperature 97.2 °F (36.2 °C), temperature source Temporal, resp. rate 20, height 180.3 cm (71\"), weight 97.5 kg (215 lb), SpO2 93%.    Physical Exam   Constitutional: Pt is oriented to person, place, and in no distress.   Cardiovascular: Normal rate, regular rhythm.    Pulmonary/Chest: Effort normal. No respiratory distress.   Abdominal:  Non-distended.  Psychiatric: Mood, memory, affect and judgment appear normal.     Assessment & Plan     Diagnosis:  Colon polyps    Anticipated Surgical Procedure:  Colonoscopy    The risks, benefits, and alternatives of this procedure have been discussed with the patient or the responsible party- the patient understands and agrees to proceed.      EMR Dragon/transcription disclaimer:  Much of this encounter note is electronic transcription/translation of spoken language to printed text.  The electronic translation of spoken language may be erroneous, or at times, " nonsensical words or phrases may be inadvertently transcribed.  Although I have reviewed the note for such errors, some may still exist.

## 2023-11-08 LAB
LAB AP CASE REPORT: NORMAL
Lab: NORMAL
PATH REPORT.FINAL DX SPEC: NORMAL
PATH REPORT.GROSS SPEC: NORMAL

## 2023-11-16 ENCOUNTER — OFFICE VISIT (OUTPATIENT)
Age: 73
End: 2023-11-16
Payer: MEDICARE

## 2023-11-16 VITALS
HEIGHT: 71 IN | SYSTOLIC BLOOD PRESSURE: 132 MMHG | RESPIRATION RATE: 18 BRPM | HEART RATE: 61 BPM | DIASTOLIC BLOOD PRESSURE: 80 MMHG | WEIGHT: 226 LBS | BODY MASS INDEX: 31.64 KG/M2 | OXYGEN SATURATION: 95 % | TEMPERATURE: 97.7 F

## 2023-11-16 DIAGNOSIS — Z87.09 HX OF PULMONARY FIBROSIS: ICD-10-CM

## 2023-11-16 DIAGNOSIS — J06.9 URI WITH COUGH AND CONGESTION: Primary | ICD-10-CM

## 2023-11-16 PROCEDURE — 1123F ACP DISCUSS/DSCN MKR DOCD: CPT | Performed by: NURSE PRACTITIONER

## 2023-11-16 PROCEDURE — 99213 OFFICE O/P EST LOW 20 MIN: CPT | Performed by: NURSE PRACTITIONER

## 2023-11-16 PROCEDURE — 3079F DIAST BP 80-89 MM HG: CPT | Performed by: NURSE PRACTITIONER

## 2023-11-16 PROCEDURE — 3075F SYST BP GE 130 - 139MM HG: CPT | Performed by: NURSE PRACTITIONER

## 2023-11-16 RX ORDER — CEFDINIR 300 MG/1
300 CAPSULE ORAL 2 TIMES DAILY
Qty: 20 CAPSULE | Refills: 0 | Status: SHIPPED | OUTPATIENT
Start: 2023-11-16 | End: 2023-11-26

## 2023-11-16 RX ORDER — GUAIFENESIN 600 MG/1
1200 TABLET, EXTENDED RELEASE ORAL EVERY 12 HOURS SCHEDULED
COMMUNITY
Start: 2023-03-15

## 2023-11-16 ASSESSMENT — VISUAL ACUITY: OU: 1

## 2023-11-16 ASSESSMENT — ENCOUNTER SYMPTOMS
EYES NEGATIVE: 1
ALLERGIC/IMMUNOLOGIC NEGATIVE: 1
VOMITING: 0
NAUSEA: 0
RHINORRHEA: 0
WHEEZING: 1
SINUS PRESSURE: 0
COUGH: 1
SHORTNESS OF BREATH: 1
SORE THROAT: 0
DIARRHEA: 0
ABDOMINAL PAIN: 0

## 2023-11-16 NOTE — PROGRESS NOTES
[address] [place] [address]  Dept: [phone]  Dept Fax: [phone]  Loc: [phone]    Mary Anne Healy is a 75 y.o. male who presents today for his medical conditions/complaintsas noted below. Mary Anne Healy is c/o of Congestion and Cough        HPI:     Cough  This is a new problem. The current episode started in the past 7 days (4 days). The problem has been unchanged. The problem occurs every few minutes. The cough is Productive of sputum. Associated symptoms include postnasal drip, shortness of breath and wheezing. Pertinent negatives include no chills, fever, headaches, myalgias, nasal congestion, rash, rhinorrhea or sore throat. Nothing aggravates the symptoms. He has tried OTC cough suppressant (Albuterol nebs, Mucinex) for the symptoms. The treatment provided mild relief. Nora Ortiz tells me he has a productive cough with yellow/ green sputum for 4 days. He says he had this about 3 weeks ago and seemed to have gotten better but is now worse again. He is using Albuterol via nebulizer at home which helps and he also has an Albuterol inhaler in his pocket if needed. He does have a hx of pulmonary fibrosis so he reports chronic shortness of breath. He has been afebrile.     Past Medical History:   Diagnosis Date    Hypertension     Pulmonary fibrosis ([address])      Past Surgical History:   Procedure Laterality Date    CHOLECYSTECTOMY      TONSILLECTOMY         Family History   Problem Relation Age of Onset    High Blood Pressure Mother     Diabetes Mother     High Blood Pressure Father     Heart Disease Father     Diabetes Father        Social History     Tobacco Use    Smoking status: Never    Smokeless tobacco: Never   Substance Use Topics    Alcohol use: No      Current Outpatient Medications   Medication Sig Dispense Refill    guaiFENesin (MUCINEX) 600 MG extended release tablet Take 2 tablets by mouth every 12

## 2023-11-16 NOTE — PATIENT INSTRUCTIONS
Plenty of fluids  Rest  OTC Tylenol or Motrin as needed   Omnicef as directed  Albuterol via nebulizer you have at home every 4-6 hrs as needed  Follow up with PCP or return to Urgent Care for worsening or unresolved symptoms.

## 2023-11-22 ENCOUNTER — PROCEDURE VISIT (OUTPATIENT)
Dept: PULMONOLOGY | Facility: CLINIC | Age: 73
End: 2023-11-22
Payer: MEDICARE

## 2023-11-22 ENCOUNTER — OFFICE VISIT (OUTPATIENT)
Dept: PULMONOLOGY | Facility: CLINIC | Age: 73
End: 2023-11-22
Payer: MEDICARE

## 2023-11-22 VITALS
OXYGEN SATURATION: 94 % | BODY MASS INDEX: 31.08 KG/M2 | HEIGHT: 71 IN | WEIGHT: 222 LBS | HEART RATE: 70 BPM | DIASTOLIC BLOOD PRESSURE: 86 MMHG | SYSTOLIC BLOOD PRESSURE: 142 MMHG

## 2023-11-22 DIAGNOSIS — J98.4 RESTRICTIVE LUNG DISEASE: ICD-10-CM

## 2023-11-22 DIAGNOSIS — J84.10 PULMONARY FIBROSIS: Primary | ICD-10-CM

## 2023-11-22 DIAGNOSIS — Z29.11 NEED FOR PROPHYLACTIC VACCINATION AND INOCULATION AGAINST RESPIRATORY SYNCYTIAL VIRUS (RSV): ICD-10-CM

## 2023-11-22 PROCEDURE — 99213 OFFICE O/P EST LOW 20 MIN: CPT | Performed by: INTERNAL MEDICINE

## 2023-11-22 PROCEDURE — 3079F DIAST BP 80-89 MM HG: CPT | Performed by: INTERNAL MEDICINE

## 2023-11-22 PROCEDURE — 3077F SYST BP >= 140 MM HG: CPT | Performed by: INTERNAL MEDICINE

## 2023-11-22 RX ORDER — ALBUTEROL SULFATE 0.63 MG/3ML
1 SOLUTION RESPIRATORY (INHALATION) EVERY 6 HOURS PRN
Qty: 360 ML | Refills: 11 | Status: SHIPPED | OUTPATIENT
Start: 2023-11-22

## 2023-11-22 NOTE — PROGRESS NOTES
"Background:  Pt with pulmonary fibrosis and hx moderate restriction fev1 fvc 76 and 79% pred 2019   Chief Complaint  He appears to be doing very well.  Will not make any changes in his inhaler regimen for now.Pulmonary fibrosis    Subjective    History of Present Illness     Reid Morrell is here for follow up with [facility].  History of Present Illness  Pt follows up after his insurance company denied his needed high resolution ct chest to monitor for progression of ILD which is potentially life threatening.  He is on antibiotics now, cefdinir for upper resp infection which is getting better..  He uses the nebulized meds twice a day.  Breathing has been ok.  Sat in 95 at home     Tobacco Use: Low Risk  (11/22/2023)    Patient History     Smoking Tobacco Use: Never     Smokeless Tobacco Use: Never     Passive Exposure: Not on file      Current Outpatient Medications   Medication Instructions    albuterol (ACCUNEB) 0.63 mg, Nebulization, Every 6 Hours PRN    amLODIPine (NORVASC) 10 mg, Oral, Daily    B Complex-C (SUPER B COMPLEX PO) 1 tablet, Daily    colestipol (COLESTID) 1 g, 2 Times Daily    famotidine (PEPCID) 20 mg, Take As Directed    fish oil 1,000 mg, Oral, Daily With Breakfast    hydroCHLOROthiazide (HYDRODIURIL) 25 mg, Oral, Daily    losartan (COZAAR) 100 mg, Oral, Daily    meloxicam (MOBIC) 15 mg, Oral, Daily    Mucus Relief ER 1,200 mg, Oral, Every 12 Hours Scheduled    RSVPreF3 Vac Recomb Adjuvanted (AREXVY) 120 mcg, Intramuscular, Once    sildenafil (VIAGRA) 100 mg, Oral, Daily PRN    Ventolin  (90 Base) MCG/ACT inhaler No dose, route, or frequency recorded.      Objective     Vital Signs:   /86   Pulse 70   Ht 180.3 cm (71\")   Wt 101 kg (222 lb)   SpO2 94% Comment: RA  BMI 30.96 kg/m²   Physical Exam  Constitutional:       Appearance: Normal appearance. He is not ill-appearing or diaphoretic.   Eyes:      Extraocular Movements: " Extraocular movements intact.   Pulmonary:      Effort: Pulmonary effort is normal. No respiratory distress.      Breath sounds: No wheezing, rhonchi or rales.   Skin:     Findings: No erythema or rash.   Neurological:      Mental Status: He is alert.        Result Review  Data Reviewed:         PFT Values          8/10/2022    10:45 12/20/2022    16:00   Pre Drug PFT Results   FVC 67 67   FEV1 76 76   FEF 25-75% 120 130   FEV1/FVC 86 85   Other Tests PFT Results   DLCO 80 83   D/VAsb 110 119                Assessment and Plan    Diagnoses and all orders for this visit:    1. Pulmonary fibrosis (Primary)  -     albuterol (ACCUNEB) 0.63 MG/3ML nebulizer solution; Take 3 mL by nebulization Every 6 (Six) Hours As Needed for Wheezing.  Dispense: 360 mL; Refill: 11    2. Restrictive lung disease  -     albuterol (ACCUNEB) 0.63 MG/3ML nebulizer solution; Take 3 mL by nebulization Every 6 (Six) Hours As Needed for Wheezing.  Dispense: 360 mL; Refill: 11    3. Need for prophylactic vaccination and inoculation against respiratory syncytial virus (RSV)  -     RSVPreF3 Vac Recomb Adjuvanted (AREXVY) 120 MCG/0.5ML reconstituted suspension injection; Inject 0.5 mL into the appropriate muscle as directed by prescriber 1 (One) Time for 1 dose.  Dispense: 1 each; Refill: 0    Recommend RSV vaccine.  Refill his nebs.  Call for acute problems through the winter.  He has been sick and so we skipped his spirometry this time but we will do it next time.    Follow Up   Return in about 6 months (around 5/22/2024) for Spirometry and DLCO.  Patient was given instructions and counseling regarding his condition or for health maintenance advice. Please see specific information pulled into the AVS if appropriate.    Electronically signed by Nash Pisano MD, 11/22/2023, 17:24 CST

## 2023-11-28 DIAGNOSIS — Z29.11 NEED FOR RSV IMMUNIZATION: Primary | ICD-10-CM

## 2023-11-28 RX ORDER — RESPIRATORY SYNCYTIAL VIRUS VACCINE 120MCG/0.5
0.5 KIT INTRAMUSCULAR ONCE
Qty: 0.5 ML | Refills: 0 | Status: SHIPPED | OUTPATIENT
Start: 2023-11-28 | End: 2023-11-28

## 2023-12-11 ENCOUNTER — TELEPHONE (OUTPATIENT)
Dept: PRIMARY CARE CLINIC | Age: 73
End: 2023-12-11

## 2023-12-11 DIAGNOSIS — E78.01 FAMILIAL HYPERCHOLESTEROLEMIA: ICD-10-CM

## 2023-12-11 DIAGNOSIS — N28.9 ACUTE RENAL INSUFFICIENCY: ICD-10-CM

## 2023-12-11 DIAGNOSIS — D72.829 LEUKOCYTOSIS, UNSPECIFIED TYPE: ICD-10-CM

## 2023-12-11 DIAGNOSIS — I10 ESSENTIAL (PRIMARY) HYPERTENSION: Primary | ICD-10-CM

## 2023-12-11 NOTE — TELEPHONE ENCOUNTER
----- Message from Cuate Route sent at 12/11/2023 10:42 AM CST -----  Subject: Message to Provider    QUESTIONS  Information for Provider? Patient would like to know if he needs to   complete any Lab work before his appointment 12/19/23. Please call and let   him know.  ---------------------------------------------------------------------------  --------------  Radha January INFO  [phone]; OK to leave message on voicemail  ---------------------------------------------------------------------------  --------------  SCRIPT ANSWERS  Relationship to Patient?  Self

## 2023-12-13 DIAGNOSIS — N28.9 ACUTE RENAL INSUFFICIENCY: ICD-10-CM

## 2023-12-13 DIAGNOSIS — E78.01 FAMILIAL HYPERCHOLESTEROLEMIA: ICD-10-CM

## 2023-12-13 DIAGNOSIS — D72.829 LEUKOCYTOSIS, UNSPECIFIED TYPE: ICD-10-CM

## 2023-12-13 DIAGNOSIS — I10 ESSENTIAL (PRIMARY) HYPERTENSION: ICD-10-CM

## 2023-12-13 LAB
ALBUMIN SERPL-MCNC: 4 G/DL (ref 3.5–5.2)
ALP SERPL-CCNC: 80 U/L (ref 40–130)
ALT SERPL-CCNC: 29 U/L (ref 5–41)
ANION GAP SERPL CALCULATED.3IONS-SCNC: 6 MMOL/L (ref 7–19)
AST SERPL-CCNC: 36 U/L (ref 5–40)
BASOPHILS # BLD: 0 K/UL (ref 0–0.2)
BASOPHILS NFR BLD: 0.2 % (ref 0–1)
BILIRUB SERPL-MCNC: 1 MG/DL (ref 0.2–1.2)
BUN SERPL-MCNC: 20 MG/DL (ref 8–23)
CALCIUM SERPL-MCNC: 9.2 MG/DL (ref 8.8–10.2)
CHLORIDE SERPL-SCNC: 97 MMOL/L (ref 98–111)
CHOLEST SERPL-MCNC: 143 MG/DL (ref 160–199)
CO2 SERPL-SCNC: 33 MMOL/L (ref 22–29)
CREAT SERPL-MCNC: 1 MG/DL (ref 0.5–1.2)
EOSINOPHIL # BLD: 0.1 K/UL (ref 0–0.6)
EOSINOPHIL NFR BLD: 2.2 % (ref 0–5)
ERYTHROCYTE [DISTWIDTH] IN BLOOD BY AUTOMATED COUNT: 13.6 % (ref 11.5–14.5)
GLUCOSE SERPL-MCNC: 91 MG/DL (ref 74–109)
HCT VFR BLD AUTO: 47.6 % (ref 42–52)
HDLC SERPL-MCNC: 35 MG/DL (ref 55–121)
HGB BLD-MCNC: 15.9 G/DL (ref 14–18)
IMM GRANULOCYTES # BLD: 0 K/UL
LDLC SERPL CALC-MCNC: 91 MG/DL
LYMPHOCYTES # BLD: 1.7 K/UL (ref 1.1–4.5)
LYMPHOCYTES NFR BLD: 29 % (ref 20–40)
MCH RBC QN AUTO: 30.9 PG (ref 27–31)
MCHC RBC AUTO-ENTMCNC: 33.4 G/DL (ref 33–37)
MCV RBC AUTO: 92.4 FL (ref 80–94)
MONOCYTES # BLD: 0.5 K/UL (ref 0–0.9)
MONOCYTES NFR BLD: 8.1 % (ref 0–10)
NEUTROPHILS # BLD: 3.5 K/UL (ref 1.5–7.5)
NEUTS SEG NFR BLD: 60.3 % (ref 50–65)
PLATELET # BLD AUTO: 262 K/UL (ref 130–400)
PMV BLD AUTO: 8.9 FL (ref 9.4–12.4)
POTASSIUM SERPL-SCNC: 3.6 MMOL/L (ref 3.5–5)
PROT SERPL-MCNC: 7.6 G/DL (ref 6.6–8.7)
RBC # BLD AUTO: 5.15 M/UL (ref 4.7–6.1)
SODIUM SERPL-SCNC: 136 MMOL/L (ref 136–145)
TRIGL SERPL-MCNC: 84 MG/DL (ref 0–149)
WBC # BLD AUTO: 5.8 K/UL (ref 4.8–10.8)

## 2023-12-21 DIAGNOSIS — B35.1 ONYCHOMYCOSIS: Primary | ICD-10-CM

## 2023-12-21 RX ORDER — TERBINAFINE HYDROCHLORIDE 250 MG/1
250 TABLET ORAL DAILY
Qty: 84 TABLET | Refills: 0 | Status: SHIPPED | OUTPATIENT
Start: 2023-12-21 | End: 2024-03-14

## 2024-01-23 DIAGNOSIS — B35.1 ONYCHOMYCOSIS: ICD-10-CM

## 2024-01-23 LAB
ALBUMIN SERPL-MCNC: 4.1 G/DL (ref 3.5–5.2)
ALP SERPL-CCNC: 82 U/L (ref 40–130)
ALT SERPL-CCNC: 22 U/L (ref 5–41)
ANION GAP SERPL CALCULATED.3IONS-SCNC: 8 MMOL/L (ref 7–19)
AST SERPL-CCNC: 23 U/L (ref 5–40)
BILIRUB SERPL-MCNC: 0.6 MG/DL (ref 0.2–1.2)
BUN SERPL-MCNC: 18 MG/DL (ref 8–23)
CALCIUM SERPL-MCNC: 9.4 MG/DL (ref 8.8–10.2)
CHLORIDE SERPL-SCNC: 99 MMOL/L (ref 98–111)
CO2 SERPL-SCNC: 32 MMOL/L (ref 22–29)
CREAT SERPL-MCNC: 1 MG/DL (ref 0.5–1.2)
GLUCOSE SERPL-MCNC: 93 MG/DL (ref 74–109)
POTASSIUM SERPL-SCNC: 4.2 MMOL/L (ref 3.5–5)
PROT SERPL-MCNC: 7.6 G/DL (ref 6.6–8.7)
SODIUM SERPL-SCNC: 139 MMOL/L (ref 136–145)

## 2024-02-26 DIAGNOSIS — Z12.5 ENCOUNTER FOR PROSTATE CANCER SCREENING: ICD-10-CM

## 2024-02-26 DIAGNOSIS — I10 ESSENTIAL (PRIMARY) HYPERTENSION: ICD-10-CM

## 2024-02-26 LAB
ALBUMIN SERPL-MCNC: 4.3 G/DL (ref 3.5–5.2)
ALP SERPL-CCNC: 97 U/L (ref 40–130)
ALT SERPL-CCNC: 26 U/L (ref 5–41)
ANION GAP SERPL CALCULATED.3IONS-SCNC: 13 MMOL/L (ref 7–19)
AST SERPL-CCNC: 25 U/L (ref 5–40)
BILIRUB SERPL-MCNC: 0.5 MG/DL (ref 0.2–1.2)
BUN SERPL-MCNC: 20 MG/DL (ref 8–23)
CALCIUM SERPL-MCNC: 9.7 MG/DL (ref 8.8–10.2)
CHLORIDE SERPL-SCNC: 100 MMOL/L (ref 98–111)
CO2 SERPL-SCNC: 28 MMOL/L (ref 22–29)
CREAT SERPL-MCNC: 1.1 MG/DL (ref 0.5–1.2)
ERYTHROCYTE [DISTWIDTH] IN BLOOD BY AUTOMATED COUNT: 13.5 % (ref 11.5–14.5)
GLUCOSE SERPL-MCNC: 82 MG/DL (ref 74–109)
HCT VFR BLD AUTO: 47.9 % (ref 42–52)
HGB BLD-MCNC: 16.3 G/DL (ref 14–18)
MCH RBC QN AUTO: 30.9 PG (ref 27–31)
MCHC RBC AUTO-ENTMCNC: 34 G/DL (ref 33–37)
MCV RBC AUTO: 90.7 FL (ref 80–94)
PLATELET # BLD AUTO: 277 K/UL (ref 130–400)
PMV BLD AUTO: 9.1 FL (ref 9.4–12.4)
POTASSIUM SERPL-SCNC: 3.9 MMOL/L (ref 3.5–5)
PROT SERPL-MCNC: 7.9 G/DL (ref 6.6–8.7)
PSA SERPL-MCNC: 0.85 NG/ML (ref 0–4)
RBC # BLD AUTO: 5.28 M/UL (ref 4.7–6.1)
SODIUM SERPL-SCNC: 141 MMOL/L (ref 136–145)
WBC # BLD AUTO: 7.3 K/UL (ref 4.8–10.8)

## 2024-03-20 DIAGNOSIS — I10 ESSENTIAL (PRIMARY) HYPERTENSION: ICD-10-CM

## 2024-03-21 RX ORDER — AMLODIPINE BESYLATE 10 MG/1
TABLET ORAL
Qty: 90 TABLET | Refills: 3 | OUTPATIENT
Start: 2024-03-21

## 2024-04-08 SDOH — ECONOMIC STABILITY: FOOD INSECURITY: WITHIN THE PAST 12 MONTHS, THE FOOD YOU BOUGHT JUST DIDN'T LAST AND YOU DIDN'T HAVE MONEY TO GET MORE.: NEVER TRUE

## 2024-04-08 SDOH — ECONOMIC STABILITY: TRANSPORTATION INSECURITY
IN THE PAST 12 MONTHS, HAS LACK OF TRANSPORTATION KEPT YOU FROM MEETINGS, WORK, OR FROM GETTING THINGS NEEDED FOR DAILY LIVING?: NO

## 2024-04-08 SDOH — ECONOMIC STABILITY: FOOD INSECURITY: WITHIN THE PAST 12 MONTHS, YOU WORRIED THAT YOUR FOOD WOULD RUN OUT BEFORE YOU GOT MONEY TO BUY MORE.: NEVER TRUE

## 2024-04-08 SDOH — ECONOMIC STABILITY: INCOME INSECURITY: HOW HARD IS IT FOR YOU TO PAY FOR THE VERY BASICS LIKE FOOD, HOUSING, MEDICAL CARE, AND HEATING?: NOT VERY HARD

## 2024-04-08 ASSESSMENT — PATIENT HEALTH QUESTIONNAIRE - PHQ9
1. LITTLE INTEREST OR PLEASURE IN DOING THINGS: NOT AT ALL
2. FEELING DOWN, DEPRESSED OR HOPELESS: NOT AT ALL
SUM OF ALL RESPONSES TO PHQ QUESTIONS 1-9: 0
SUM OF ALL RESPONSES TO PHQ9 QUESTIONS 1 & 2: 0
1. LITTLE INTEREST OR PLEASURE IN DOING THINGS: NOT AT ALL
2. FEELING DOWN, DEPRESSED OR HOPELESS: NOT AT ALL
SUM OF ALL RESPONSES TO PHQ9 QUESTIONS 1 & 2: 0
SUM OF ALL RESPONSES TO PHQ QUESTIONS 1-9: 0

## 2024-04-09 ENCOUNTER — OFFICE VISIT (OUTPATIENT)
Dept: PRIMARY CARE CLINIC | Age: 74
End: 2024-04-09
Payer: MEDICARE

## 2024-04-09 VITALS
SYSTOLIC BLOOD PRESSURE: 130 MMHG | BODY MASS INDEX: 31.08 KG/M2 | HEIGHT: 71 IN | DIASTOLIC BLOOD PRESSURE: 82 MMHG | HEART RATE: 63 BPM | TEMPERATURE: 97.3 F | WEIGHT: 222 LBS | OXYGEN SATURATION: 96 %

## 2024-04-09 DIAGNOSIS — J84.10 PULMONARY FIBROSIS (HCC): ICD-10-CM

## 2024-04-09 DIAGNOSIS — J20.9 ACUTE BRONCHITIS, UNSPECIFIED ORGANISM: ICD-10-CM

## 2024-04-09 PROCEDURE — 3075F SYST BP GE 130 - 139MM HG: CPT | Performed by: NURSE PRACTITIONER

## 2024-04-09 PROCEDURE — 3079F DIAST BP 80-89 MM HG: CPT | Performed by: NURSE PRACTITIONER

## 2024-04-09 PROCEDURE — 1123F ACP DISCUSS/DSCN MKR DOCD: CPT | Performed by: NURSE PRACTITIONER

## 2024-04-09 PROCEDURE — 99213 OFFICE O/P EST LOW 20 MIN: CPT | Performed by: NURSE PRACTITIONER

## 2024-04-09 RX ORDER — METHYLPREDNISOLONE 4 MG/1
TABLET ORAL
Qty: 1 KIT | Refills: 0 | Status: SHIPPED | OUTPATIENT
Start: 2024-04-09 | End: 2024-04-15

## 2024-04-09 RX ORDER — BENZONATATE 200 MG/1
200 CAPSULE ORAL 3 TIMES DAILY PRN
Qty: 21 CAPSULE | Refills: 0 | Status: SHIPPED | OUTPATIENT
Start: 2024-04-09

## 2024-04-09 RX ORDER — AZITHROMYCIN 250 MG/1
TABLET, FILM COATED ORAL
Qty: 6 TABLET | Refills: 0 | Status: SHIPPED | OUTPATIENT
Start: 2024-04-09 | End: 2024-04-19

## 2024-04-09 ASSESSMENT — ENCOUNTER SYMPTOMS
NAUSEA: 0
SHORTNESS OF BREATH: 0
DIARRHEA: 0
WHEEZING: 0
SORE THROAT: 0
ABDOMINAL PAIN: 0
CHEST TIGHTNESS: 1
COUGH: 1

## 2024-04-09 NOTE — PROGRESS NOTES
fibrosis (HCC)      Medications Discontinued During This Encounter   Medication Reason    aspirin 81 MG chewable tablet LIST CLEANUP    guaiFENesin (MUCINEX) 600 MG extended release tablet LIST CLEANUP    Misc Natural Products (NF FORMULAS TESTOSTERONE) CAPS LIST CLEANUP    Omega-3 Fatty Acids (FISH OIL) 1000 MG CAPS LIST CLEANUP     There are no Patient Instructions on file for this visit.    Patient voicesunderstanding and agrees to plans along with risks and benefits of plan.    Counseling:  Cachorro Thompson's case, medications and options were discussed in detail. Patient was instructed to call the office if he has any questions regarding him treatment. Should him conditions worsen, he should return to office to be reassessed by VALDEZ Zamudio. he Should to go the closest Emergency Department for any emergency. They have verbalized understanding regarding  the above instructions.     Return for as scheduled.

## 2024-04-15 ENCOUNTER — OFFICE VISIT (OUTPATIENT)
Dept: PRIMARY CARE CLINIC | Age: 74
End: 2024-04-15
Payer: MEDICARE

## 2024-04-15 ENCOUNTER — HOSPITAL ENCOUNTER (OUTPATIENT)
Dept: GENERAL RADIOLOGY | Age: 74
Discharge: HOME | End: 2024-04-15
Payer: MEDICARE

## 2024-04-15 VITALS
HEIGHT: 71 IN | OXYGEN SATURATION: 96 % | HEART RATE: 79 BPM | BODY MASS INDEX: 30.8 KG/M2 | SYSTOLIC BLOOD PRESSURE: 110 MMHG | TEMPERATURE: 98.4 F | DIASTOLIC BLOOD PRESSURE: 80 MMHG | WEIGHT: 220 LBS

## 2024-04-15 DIAGNOSIS — R50.9 FEVER, UNSPECIFIED FEVER CAUSE: ICD-10-CM

## 2024-04-15 DIAGNOSIS — J18.9 PNEUMONIA OF LOWER LOBE DUE TO INFECTIOUS ORGANISM, UNSPECIFIED LATERALITY: ICD-10-CM

## 2024-04-15 DIAGNOSIS — J84.10 PULMONARY FIBROSIS (HCC): ICD-10-CM

## 2024-04-15 DIAGNOSIS — J20.9 ACUTE BRONCHITIS, UNSPECIFIED ORGANISM: ICD-10-CM

## 2024-04-15 DIAGNOSIS — R05.1 ACUTE COUGH: ICD-10-CM

## 2024-04-15 LAB
B PARAP IS1001 DNA NPH QL NAA+NON-PROBE: NOT DETECTED
B PERT.PT PRMT NPH QL NAA+NON-PROBE: NOT DETECTED
C PNEUM DNA NPH QL NAA+NON-PROBE: NOT DETECTED
FLUAV RNA NPH QL NAA+NON-PROBE: NOT DETECTED
FLUBV RNA NPH QL NAA+NON-PROBE: NOT DETECTED
HADV DNA NPH QL NAA+NON-PROBE: NOT DETECTED
HCOV 229E RNA NPH QL NAA+NON-PROBE: NOT DETECTED
HCOV HKU1 RNA NPH QL NAA+NON-PROBE: NOT DETECTED
HCOV NL63 RNA NPH QL NAA+NON-PROBE: NOT DETECTED
HCOV OC43 RNA NPH QL NAA+NON-PROBE: NOT DETECTED
HMPV RNA NPH QL NAA+NON-PROBE: NOT DETECTED
HPIV1 RNA NPH QL NAA+NON-PROBE: NOT DETECTED
HPIV2 RNA NPH QL NAA+NON-PROBE: NOT DETECTED
HPIV3 RNA NPH QL NAA+NON-PROBE: NOT DETECTED
HPIV4 RNA NPH QL NAA+NON-PROBE: NOT DETECTED
M PNEUMO DNA NPH QL NAA+NON-PROBE: NOT DETECTED
RSV RNA NPH QL NAA+NON-PROBE: NOT DETECTED
RV+EV RNA NPH QL NAA+NON-PROBE: DETECTED
SARS-COV-2 RNA NPH QL NAA+NON-PROBE: NOT DETECTED

## 2024-04-15 PROCEDURE — 71046 X-RAY EXAM CHEST 2 VIEWS: CPT

## 2024-04-15 PROCEDURE — 3074F SYST BP LT 130 MM HG: CPT | Performed by: NURSE PRACTITIONER

## 2024-04-15 PROCEDURE — 3079F DIAST BP 80-89 MM HG: CPT | Performed by: NURSE PRACTITIONER

## 2024-04-15 PROCEDURE — 1123F ACP DISCUSS/DSCN MKR DOCD: CPT | Performed by: NURSE PRACTITIONER

## 2024-04-15 PROCEDURE — 99213 OFFICE O/P EST LOW 20 MIN: CPT | Performed by: NURSE PRACTITIONER

## 2024-04-15 RX ORDER — DEXTROMETHORPHAN HYDROBROMIDE AND PROMETHAZINE HYDROCHLORIDE 15; 6.25 MG/5ML; MG/5ML
5 SYRUP ORAL 4 TIMES DAILY PRN
Qty: 100 ML | Refills: 0 | Status: SHIPPED | OUTPATIENT
Start: 2024-04-15

## 2024-04-15 RX ORDER — AMOXICILLIN AND CLAVULANATE POTASSIUM 875; 125 MG/1; MG/1
1 TABLET, FILM COATED ORAL 2 TIMES DAILY
Qty: 20 TABLET | Refills: 0 | Status: SHIPPED | OUTPATIENT
Start: 2024-04-15 | End: 2024-04-25

## 2024-04-15 RX ORDER — DOXYCYCLINE HYCLATE 100 MG
100 TABLET ORAL 2 TIMES DAILY
Qty: 20 TABLET | Refills: 0 | Status: SHIPPED | OUTPATIENT
Start: 2024-04-15 | End: 2024-04-25

## 2024-04-15 RX ORDER — PREDNISONE 20 MG/1
TABLET ORAL
Qty: 13 TABLET | Refills: 0 | Status: SHIPPED | OUTPATIENT
Start: 2024-04-15 | End: 2024-04-23

## 2024-04-15 ASSESSMENT — ENCOUNTER SYMPTOMS
DIARRHEA: 0
CHEST TIGHTNESS: 1
SHORTNESS OF BREATH: 0
COUGH: 1
ABDOMINAL PAIN: 0
WHEEZING: 1
SORE THROAT: 0
NAUSEA: 0

## 2024-04-15 NOTE — PROGRESS NOTES
Mr.Darrell TERRENCE Thompson is a 73 y.o. male who presents today for  Chief Complaint   Patient presents with    Fever    Congestion     Says he is not any better. Says he had a 101 temp on Saturday night.        HPI:  Patient of Dr. Fang here for acute issue    I saw him on 4/9 and treated him for bronchitis with Z-Caleb, MDP which he completed.  He had minimal improvement.  Cough has persisted with some purulent sputum.  He started running a fever 2 days ago of 101 which persisted into yesterday but has been afebrile today.  He has underlying pulmonary fibrosis and is followed by [facility] pulmonology.  He has chronic wheeze and chest tightness which he feels has been fairly stable.  He has been doing breathing treatments twice daily.    He is taking Mucinex bid.  Tessalon prn, helps minimally.    Review of Systems   Constitutional:  Positive for fever. Negative for chills.   HENT:  Negative for congestion, ear pain and sore throat.    Respiratory:  Positive for cough, chest tightness and wheezing. Negative for shortness of breath.    Cardiovascular:  Negative for chest pain.   Gastrointestinal:  Negative for abdominal pain, diarrhea and nausea.   Musculoskeletal:  Negative for arthralgias and myalgias.   Skin:  Negative for rash.       Past Medical History:   Diagnosis Date    Hypertension     Pulmonary fibrosis (HCC)        Current Outpatient Medications   Medication Sig Dispense Refill    doxycycline hyclate (VIBRA-TABS) 100 MG tablet Take 1 tablet by mouth 2 times daily for 10 days 20 tablet 0    predniSONE (DELTASONE) 20 MG tablet Take 3 tablets by mouth daily for 2 days, THEN 2 tablets daily for 2 days, THEN 1 tablet daily for 2 days, THEN 0.5 tablets daily for 2 days. 13 tablet 0    promethazine-dextromethorphan (PROMETHAZINE-DM) 6.25-15 MG/5ML syrup Take 5 mLs by mouth 4 times daily as needed for Cough 100 mL 0    azithromycin (ZITHROMAX) 250 MG tablet 500mg on day 1 followed by 250mg on days 2 - 5 6 tablet 0

## 2024-04-19 DIAGNOSIS — I10 ESSENTIAL (PRIMARY) HYPERTENSION: ICD-10-CM

## 2024-04-19 RX ORDER — HYDROCHLOROTHIAZIDE 25 MG/1
TABLET ORAL
Qty: 90 TABLET | Refills: 3 | OUTPATIENT
Start: 2024-04-19

## 2024-04-26 ENCOUNTER — OFFICE VISIT (OUTPATIENT)
Dept: PRIMARY CARE CLINIC | Age: 74
End: 2024-04-26

## 2024-04-26 VITALS
TEMPERATURE: 98.9 F | DIASTOLIC BLOOD PRESSURE: 80 MMHG | OXYGEN SATURATION: 95 % | BODY MASS INDEX: 31.24 KG/M2 | HEART RATE: 97 BPM | WEIGHT: 224 LBS | SYSTOLIC BLOOD PRESSURE: 130 MMHG

## 2024-04-26 DIAGNOSIS — J20.9 ACUTE BRONCHITIS, UNSPECIFIED ORGANISM: Primary | ICD-10-CM

## 2024-04-26 DIAGNOSIS — J84.10 PULMONARY FIBROSIS (HCC): ICD-10-CM

## 2024-04-26 RX ORDER — ALBUTEROL SULFATE 2.5 MG/3ML
2.5 SOLUTION RESPIRATORY (INHALATION) EVERY 6 HOURS PRN
Qty: 30 EACH | Refills: 5 | Status: SHIPPED | OUTPATIENT
Start: 2024-04-26

## 2024-04-26 RX ORDER — ALBUTEROL SULFATE 90 UG/1
2 AEROSOL, METERED RESPIRATORY (INHALATION) 4 TIMES DAILY PRN
Qty: 18 G | Refills: 5 | Status: SHIPPED | OUTPATIENT
Start: 2024-04-26

## 2024-04-26 NOTE — PROGRESS NOTES
accessory muscles or intercostal retractions.  Neurological: alert.   Psychiatric: normal mood and affect. His behavior is normal.     Physical Exam    Lab Results   Component Value Date    WBC 7.3 02/26/2024    HGB 16.3 02/26/2024    HCT 47.9 02/26/2024    MCV 90.7 02/26/2024     02/26/2024     No results found for: \"LABA1C\"  Lab Results   Component Value Date    GLUF 81 06/01/2021    LDLCALC 91 12/13/2023    CREATININE 1.1 02/26/2024      Lab Results   Component Value Date     02/26/2024    K 3.9 02/26/2024     02/26/2024    CO2 28 02/26/2024    BUN 20 02/26/2024    CREATININE 1.1 02/26/2024    GLUCOSE 82 02/26/2024    CALCIUM 9.7 02/26/2024    PROT 7.9 02/26/2024    BILITOT 0.5 02/26/2024    ALKPHOS 97 02/26/2024    AST 25 02/26/2024    ALT 26 02/26/2024    LABGLOM >60 02/26/2024    GFRAA >59 06/07/2022                  Assessment Plan:   1. Acute bronchitis, unspecified organism    2. Pulmonary fibrosis (HCC)       1.  Symptoms appear to be back to baseline.  -Refilled albuterol for as needed use  Doses of medications listed below     -     If symptoms worsen or new symptoms develop,  return to clinic or go to ED.     Orders Placed This Encounter    albuterol (PROVENTIL) (2.5 MG/3ML) 0.083% nebulizer solution     Sig: Take 3 mLs by nebulization every 6 hours as needed for Wheezing     Dispense:  30 each     Refill:  5    albuterol sulfate HFA (VENTOLIN HFA) 108 (90 Base) MCG/ACT inhaler     Sig: Inhale 2 puffs into the lungs 4 times daily as needed for Wheezing     Dispense:  18 g     Refill:  5        Current Outpatient Medications   Medication Sig Dispense Refill    albuterol (PROVENTIL) (2.5 MG/3ML) 0.083% nebulizer solution Take 3 mLs by nebulization every 6 hours as needed for Wheezing 30 each 5    albuterol sulfate HFA (VENTOLIN HFA) 108 (90 Base) MCG/ACT inhaler Inhale 2 puffs into the lungs 4 times daily as needed for Wheezing 18 g 5    potassium & sodium phosphates (PHOS-NAK)

## 2024-05-20 DIAGNOSIS — I10 ESSENTIAL (PRIMARY) HYPERTENSION: ICD-10-CM

## 2024-05-21 RX ORDER — LOSARTAN POTASSIUM 100 MG/1
TABLET ORAL
Qty: 90 TABLET | Refills: 3 | OUTPATIENT
Start: 2024-05-21

## 2024-06-13 DIAGNOSIS — I10 ESSENTIAL (PRIMARY) HYPERTENSION: ICD-10-CM

## 2024-06-13 DIAGNOSIS — E78.01 FAMILIAL HYPERCHOLESTEROLEMIA: ICD-10-CM

## 2024-06-13 DIAGNOSIS — I10 ESSENTIAL (PRIMARY) HYPERTENSION: Primary | ICD-10-CM

## 2024-06-13 LAB
ALBUMIN SERPL-MCNC: 4.2 G/DL (ref 3.5–5.2)
ALP SERPL-CCNC: 86 U/L (ref 40–130)
ALT SERPL-CCNC: 26 U/L (ref 5–41)
ANION GAP SERPL CALCULATED.3IONS-SCNC: 11 MMOL/L (ref 7–19)
AST SERPL-CCNC: 33 U/L (ref 5–40)
BILIRUB SERPL-MCNC: 0.7 MG/DL (ref 0.2–1.2)
BUN SERPL-MCNC: 20 MG/DL (ref 8–23)
CALCIUM SERPL-MCNC: 9.4 MG/DL (ref 8.8–10.2)
CHLORIDE SERPL-SCNC: 99 MMOL/L (ref 98–111)
CHOLEST SERPL-MCNC: 148 MG/DL (ref 160–199)
CO2 SERPL-SCNC: 29 MMOL/L (ref 22–29)
CREAT SERPL-MCNC: 1.1 MG/DL (ref 0.5–1.2)
GLUCOSE SERPL-MCNC: 88 MG/DL (ref 74–109)
HDLC SERPL-MCNC: 36 MG/DL (ref 55–121)
LDLC SERPL CALC-MCNC: 101 MG/DL
POTASSIUM SERPL-SCNC: 4.1 MMOL/L (ref 3.5–5)
PROT SERPL-MCNC: 7.6 G/DL (ref 6.6–8.7)
SODIUM SERPL-SCNC: 139 MMOL/L (ref 136–145)
TRIGL SERPL-MCNC: 57 MG/DL (ref 0–149)

## 2024-06-17 ENCOUNTER — PROCEDURE VISIT (OUTPATIENT)
Dept: PULMONOLOGY | Facility: CLINIC | Age: 74
End: 2024-06-17
Payer: MEDICARE

## 2024-06-17 ENCOUNTER — OFFICE VISIT (OUTPATIENT)
Dept: PULMONOLOGY | Facility: CLINIC | Age: 74
End: 2024-06-17
Payer: MEDICARE

## 2024-06-17 VITALS
WEIGHT: 224 LBS | SYSTOLIC BLOOD PRESSURE: 130 MMHG | BODY MASS INDEX: 32.07 KG/M2 | OXYGEN SATURATION: 96 % | HEART RATE: 64 BPM | HEIGHT: 70 IN | DIASTOLIC BLOOD PRESSURE: 64 MMHG

## 2024-06-17 DIAGNOSIS — R59.0 MEDIASTINAL ADENOPATHY: ICD-10-CM

## 2024-06-17 DIAGNOSIS — J84.10 PULMONARY FIBROSIS: Primary | ICD-10-CM

## 2024-06-17 DIAGNOSIS — R91.8 LUNG INFILTRATE: ICD-10-CM

## 2024-06-17 PROCEDURE — 99214 OFFICE O/P EST MOD 30 MIN: CPT | Performed by: INTERNAL MEDICINE

## 2024-06-17 PROCEDURE — 3078F DIAST BP <80 MM HG: CPT | Performed by: INTERNAL MEDICINE

## 2024-06-17 PROCEDURE — 3075F SYST BP GE 130 - 139MM HG: CPT | Performed by: INTERNAL MEDICINE

## 2024-06-17 PROCEDURE — 94375 RESPIRATORY FLOW VOLUME LOOP: CPT | Performed by: INTERNAL MEDICINE

## 2024-06-17 PROCEDURE — 94729 DIFFUSING CAPACITY: CPT | Performed by: INTERNAL MEDICINE

## 2024-06-17 SDOH — HEALTH STABILITY: PHYSICAL HEALTH: ON AVERAGE, HOW MANY DAYS PER WEEK DO YOU ENGAGE IN MODERATE TO STRENUOUS EXERCISE (LIKE A BRISK WALK)?: 0 DAYS

## 2024-06-17 ASSESSMENT — LIFESTYLE VARIABLES
HOW MANY STANDARD DRINKS CONTAINING ALCOHOL DO YOU HAVE ON A TYPICAL DAY: PATIENT DOES NOT DRINK
HOW OFTEN DO YOU HAVE SIX OR MORE DRINKS ON ONE OCCASION: 1
HOW MANY STANDARD DRINKS CONTAINING ALCOHOL DO YOU HAVE ON A TYPICAL DAY: 0
HOW OFTEN DO YOU HAVE A DRINK CONTAINING ALCOHOL: NEVER
HOW OFTEN DO YOU HAVE A DRINK CONTAINING ALCOHOL: 1

## 2024-06-17 ASSESSMENT — PATIENT HEALTH QUESTIONNAIRE - PHQ9
SUM OF ALL RESPONSES TO PHQ QUESTIONS 1-9: 0
SUM OF ALL RESPONSES TO PHQ QUESTIONS 1-9: 0
2. FEELING DOWN, DEPRESSED OR HOPELESS: NOT AT ALL
1. LITTLE INTEREST OR PLEASURE IN DOING THINGS: NOT AT ALL
SUM OF ALL RESPONSES TO PHQ QUESTIONS 1-9: 0
SUM OF ALL RESPONSES TO PHQ9 QUESTIONS 1 & 2: 0
SUM OF ALL RESPONSES TO PHQ QUESTIONS 1-9: 0

## 2024-06-17 NOTE — PROCEDURES
Spirometry with Diffusion Capacity    Performed by: Candi Shaw, RRT  Authorized by: Nash Pisano MD     Pre Drug % Predicted    FVC: 62%   FEV1: 74%   FEF 25-75%: 141%   FEV1/FVC: 89%   DLCO: 68%   D/VAsb: 97%    Interpretation   Spirometry   Spirometry shows moderate restriction. midflow is normal.  Diffusion Capacity  The patient's diffusion capacity is mildly reduced.  Diffusion capacity is normal when corrected for alveolar volume.   Overall comments: DLCO has decreased and fvc has decreased slightly since prior study in 2022

## 2024-06-17 NOTE — PROGRESS NOTES
"Background:  Pt with pulmonary fibrosis, mod restr   Chief Complaint  Pulmonary fibrosis    Subjective    History of Present Illness     Reid Morrell is here for follow up with [facility] PULMONARY & CRITICAL CARE MEDICINE.  History of Present Illness  He follows up and feels fairly well,  no scans in over a year, previous ones showing findings warranting follow up.  No increased dyspnea or cough.  Insurance blocked us from following up scan last fall.       Tobacco Use: Low Risk  (6/17/2024)    Patient History     Smoking Tobacco Use: Never     Smokeless Tobacco Use: Never     Passive Exposure: Not on file      Current Outpatient Medications   Medication Instructions    amLODIPine (NORVASC) 10 mg, Oral, Daily    colestipol (COLESTID) 1 g, Oral, 2 Times Daily    famotidine (PEPCID) 20 mg, Oral, Take As Directed, When taking mobic<BR>Mon, Wed, Fri    hydroCHLOROthiazide 25 mg, Oral, Daily    losartan (COZAAR) 100 mg, Oral, Daily    meloxicam (MOBIC) 15 mg, Oral, Daily    Mucus Relief ER 1,200 mg, Oral, Every 12 Hours Scheduled    sildenafil (VIAGRA) 100 mg, Oral, Daily PRN    Ventolin  (90 Base) MCG/ACT inhaler No dose, route, or frequency recorded.      Objective     Vital Signs:   /64   Pulse 64   Ht 177.8 cm (70\")   Wt 102 kg (224 lb)   SpO2 96% Comment: RA  BMI 32.14 kg/m²   Physical Exam  Constitutional:       Appearance: Normal appearance. He is not ill-appearing or diaphoretic.   Eyes:      Extraocular Movements: Extraocular movements intact.   Pulmonary:      Effort: Pulmonary effort is normal. No respiratory distress.      Breath sounds: Rales present. No wheezing or rhonchi.   Neurological:      Mental Status: He is alert.        Result Review  Data Reviewed:  CT Angiogram Chest (03/12/2023 16:29)    1. No evidence of pulmonary thromboembolic disease. The thoracic aorta  and great vessels are normal in appearance.  2. Rather extensive right upper lobe airspace " consolidation with air  bronchograms. There is also bilateral lower lobe airspace disease with  diffuse groundglass opacity within both lungs. FINDINGS consistent with  bilateral pneumonia. There is superimposed pulmonary fibrosis within the  lower lobes. No effusions are present.  3. Mildly enlarged right paratracheal nodes may be reactive in nature.  No axillary adenopathy. Mild coronary calcifications are present.    CT Angiogram Chest (05/02/2023 13:13)   1. No CT evidence of pulmonary embolus. The pulmonary arteries are  dilated. There is cardiomegaly. Ascending thoracic aorta measures 3.6  cm. Aortic arch and descending thoracic aorta measures 3.1 and 2.9 cm  respectively.  2. Groundglass and consolidative lung infiltrates bilaterally. The  findings are likely infectious or inflammatory. There are patchy nodular  infiltrates also in the right upper lobe. No significant pleural  effusion is seen.  3. Mediastinal and right hilar lymphadenopathy appears to be decreased  compared to the previous exam. Lymph nodes are likely reactive.      PFT Values          8/10/2022    10:45 12/20/2022    16:00 6/17/2024    10:00   Pre Drug PFT Results   FVC 67 67 62   FEV1 76 76 74   FEF 25-75% 120 130 141   FEV1/FVC 86 85 89   Other Tests PFT Results   DLCO 80 83 68   D/VAsb 110 119 97                Assessment and Plan    Diagnoses and all orders for this visit:    1. Pulmonary fibrosis (Primary)  -     Spirometry with Diffusion Capacity  -     CT Chest Without Contrast Diagnostic; Future    2. Lung infiltrate  -     CT Chest Without Contrast Diagnostic; Future    3. Mediastinal adenopathy  -     CT Chest Without Contrast Diagnostic; Future    Will get ct scan to reevaluate fibrosis, infiltrate and adenopathy.  PFT show some drop in dlco.  So for multiple reasons a ct is indicated.  He wishes to get it done.  Scan ordered; await insurance company response to request.  NOTE the patient has pulmonary fibrosis, worsening PFT and  has prior hx lung infiltrate so I will be shocked if we get another denial.  Pt is a candidate for antifibrotic therapy in event of progression.    Follow Up   Return in about 6 months (around 12/17/2024).  Patient was given instructions and counseling regarding his condition or for health maintenance advice. Please see specific information pulled into the AVS if appropriate.    Electronically signed by Nash Pisano MD, 6/17/2024, 21:21 CDT

## 2024-06-20 ENCOUNTER — OFFICE VISIT (OUTPATIENT)
Dept: PRIMARY CARE CLINIC | Age: 74
End: 2024-06-20
Payer: MEDICARE

## 2024-06-20 VITALS
OXYGEN SATURATION: 96 % | HEART RATE: 60 BPM | BODY MASS INDEX: 31.5 KG/M2 | HEIGHT: 71 IN | SYSTOLIC BLOOD PRESSURE: 132 MMHG | WEIGHT: 225 LBS | DIASTOLIC BLOOD PRESSURE: 88 MMHG | TEMPERATURE: 97.7 F

## 2024-06-20 DIAGNOSIS — Z12.5 ENCOUNTER FOR PROSTATE CANCER SCREENING: ICD-10-CM

## 2024-06-20 DIAGNOSIS — R42 VERTIGO: ICD-10-CM

## 2024-06-20 DIAGNOSIS — Z00.00 ANNUAL PHYSICAL EXAM: Primary | ICD-10-CM

## 2024-06-20 DIAGNOSIS — I10 ESSENTIAL (PRIMARY) HYPERTENSION: ICD-10-CM

## 2024-06-20 DIAGNOSIS — R53.83 OTHER FATIGUE: ICD-10-CM

## 2024-06-20 PROCEDURE — G0439 PPPS, SUBSEQ VISIT: HCPCS | Performed by: FAMILY MEDICINE

## 2024-06-20 PROCEDURE — 99213 OFFICE O/P EST LOW 20 MIN: CPT | Performed by: FAMILY MEDICINE

## 2024-06-20 PROCEDURE — 1123F ACP DISCUSS/DSCN MKR DOCD: CPT | Performed by: FAMILY MEDICINE

## 2024-06-20 PROCEDURE — 3079F DIAST BP 80-89 MM HG: CPT | Performed by: FAMILY MEDICINE

## 2024-06-20 PROCEDURE — 3075F SYST BP GE 130 - 139MM HG: CPT | Performed by: FAMILY MEDICINE

## 2024-06-20 NOTE — PROGRESS NOTES
Medicare Annual Wellness Visit - Subsequent    Name: Cachorro Thompson Today’s Date: 2024   MRN: [id number] Sex: Male   Age: 73 y.o. Ethnicity: Non- / Non    : 1950 Race: White (non-)      Cachorro Thompson is here for   Chief Complaint   Patient presents with    Medicare AWV    Dizziness        Screenings for behavioral, psychosocial and functional/safety risks, and cognitive dysfunction are all negative except as indicated below. These results, as well as other patient data from the Health Risk Assessment form, are documented in Flowsheets linked to this Encounter.    No Known Allergies    Prior to Visit Medications    Medication Sig Taking? Authorizing Provider   albuterol (PROVENTIL) (2.5 MG/3ML) 0.083% nebulizer solution Take 3 mLs by nebulization every 6 hours as needed for Wheezing Yes Cachorro Fang MD   albuterol sulfate HFA (VENTOLIN HFA) 108 (90 Base) MCG/ACT inhaler Inhale 2 puffs into the lungs 4 times daily as needed for Wheezing Yes Cachorro Fang MD   amLODIPine (NORVASC) 10 MG tablet TAKE 1 TABLET BY MOUTH EVERY DAY Yes Cachorro Fang MD   hydroCHLOROthiazide (HYDRODIURIL) 25 MG tablet TAKE 1 TABLET BY MOUTH 1 TIME DAILY Yes Cachorro Fang MD   losartan (COZAAR) 100 MG tablet TAKE 1 TABLET BY MOUTH EVERY DAY Yes Cachorro Fang MD   meloxicam (MOBIC) 15 MG tablet TAKE 1 TABLET BY MOUTH ONCE A DAY  Patient taking differently:  Yes Cachorro Fang MD   sildenafil (VIAGRA) 100 MG tablet TAKE BY MOUTH 1/2 TO 1 TABLET EVERY DAY AS NEEDED Yes Cachorro Fang MD   guaiFENesin (MUCINEX) 600 MG extended release tablet Take 2 tablets by mouth 2 times daily Yes Antonio Rendon MD   famotidine (PEPCID) 20 MG tablet Take 1 tablet by mouth 2 times daily Yes Antonio Rendon MD   promethazine-dextromethorphan (PROMETHAZINE-DM) 6.25-15 MG/5ML syrup Take 5 mLs by mouth 4 times daily as needed for Cough  Patient not taking: Reported on 
    _______________________________________________________________    Note dictated using Dragon Dictation software  Sometimes this dictation software makes erroneous transcriptions.

## 2024-06-20 NOTE — PATIENT INSTRUCTIONS
2.  Pass a ball between your legs and above your head.  Sit down and then stand up. Repeat. Turn around in a [place] a different way each time you stand.  With someone close by to help you, try the above exercises with your eyes closed.  Exercise 5  In a room that is cleared of obstacles:  Walk to a corner of the room, turn to your right, and walk back to the starting point. Now, repeat and turn left.  Walk up and down a slope. Now try stairs.  While holding on to someone's arm, try these exercises with your eyes closed.  When should you call for help?  Watch closely for changes in your health, and be sure to contact your doctor if:    You do not get better as expected.   Where can you learn more?  Go to [email or web address] and sign in to your Pharmalink account. Enter A743 in the Search Health Information box to learn more about \"[place] Exercises for Vertigo: Care Instructions.\"     If you do not have an account, please click on the \"Sign Up Now\" link.  Current as of: May 12, 2017  Content Version: 11.6  © 4882-6476 [employer]. Care instructions adapted under license by [employer]. If you have questions about a medical condition or this instruction, always ask your healthcare professional. [employer] disclaims any warranty or liability for your use of this information.         Over the counter:   Meclizine 25 mg three times a day as needed.

## 2024-07-03 ENCOUNTER — HOSPITAL ENCOUNTER (OUTPATIENT)
Dept: CT IMAGING | Facility: HOSPITAL | Age: 74
Discharge: HOME | End: 2024-07-03
Admitting: INTERNAL MEDICINE
Payer: MEDICARE

## 2024-07-03 DIAGNOSIS — R59.0 MEDIASTINAL ADENOPATHY: ICD-10-CM

## 2024-07-03 DIAGNOSIS — R91.8 LUNG INFILTRATE: ICD-10-CM

## 2024-07-03 DIAGNOSIS — J84.10 PULMONARY FIBROSIS: ICD-10-CM

## 2024-07-03 PROCEDURE — 71250 CT THORAX DX C-: CPT

## 2024-07-05 ENCOUNTER — TELEPHONE (OUTPATIENT)
Dept: PULMONOLOGY | Facility: CLINIC | Age: 74
End: 2024-07-05
Payer: MEDICARE

## 2024-07-05 NOTE — TELEPHONE ENCOUNTER
----- Message from Nash Pisano sent at 7/5/2024  6:59 AM CDT -----  Let pt know this showed worsening scarring and we need to discuss that and consider new medicine.  Move up appointment to sometime soon with an opening, ok to see maribel

## 2024-07-05 NOTE — PROGRESS NOTES
Let pt know this showed worsening scarring and we need to discuss that and consider new medicine.  Move up appointment to sometime soon with an opening, ok to see aprn.

## 2024-07-05 NOTE — TELEPHONE ENCOUNTER
Patient notified of results and voiced understanding.  Patient scheduled to see [facility] on 07/15/2024.

## 2024-07-05 NOTE — TELEPHONE ENCOUNTER
Hub staff attempted to follow warm transfer process and was unsuccessful     Caller: Reid Morrell    Relationship to patient: Self    Best call back number: [phone]     Patient is needing: PATIENT RETURNED CALL.

## 2024-07-10 NOTE — PROGRESS NOTES
Chief Complaint  Pulmonary fibrosis    Subjective    History of Present Illness {  Problem List  Visit Diagnosis   Encounters  Notes  Medications  Labs  Result Review Imaging  Media     Reid Morrell presents to [facility] PULMONARY & CRITICAL CARE MEDICINE for:    History of Present Illness  Mr. Morrell is here for follow up and management of pulmonary fibrosis. He had a ct chest completed last month showing mild progression of fibrosis. PFT from this summer still showing moderate restriction however DLCO was reduced from prior PFT in 2022.  He tells me his breathing is about the same.  He gets short of breath with exertional activities.  He tells me the dyspnea is alleviated by rest.  He reports he was diagnosed with lupus at age 34 but this has never been active.  He had seen Dr. Bell in the past.  He has not seen a rheumatologist in several years.  He worked at [employer].       Prior to Admission medications    Medication Sig Start Date End Date Taking? Authorizing Provider   amLODIPine (NORVASC) 10 MG tablet Take 1 tablet by mouth Daily.    Provider, MD Darinel   colestipol (COLESTID) 1 g tablet Take 1 tablet by mouth 2 (Two) Times a Day.    Provider, MD Darinel   famotidine (PEPCID) 20 MG tablet Take 1 tablet by mouth Take As Directed. When taking mobic  Mon, Wed, Fri    ProviderDarinel MD   guaiFENesin (MUCINEX) 600 MG 12 hr tablet Take 2 tablets by mouth Every 12 (Twelve) Hours. 3/15/23   Claudia White APRN   hydrochlorothiazide (HYDRODIURIL) 25 MG tablet Take 1 tablet by mouth Daily.    Emergency, Nurse MARAL Yousif   losartan (COZAAR) 100 MG tablet Take 1 tablet by mouth Daily.    Emergency, Nurse Epic, RN   meloxicam (MOBIC) 15 MG tablet Take 1 tablet by mouth Daily.    Emergency, Nurse Nica RN   sildenafil (VIAGRA) 100 MG tablet Take 1 tablet by mouth Daily As Needed for Erectile Dysfunction.    Provider, MD Genoveva Tena  (90 Base)  "MCG/ACT inhaler  3/21/23   Provider, MD Darinel       Social History     Socioeconomic History    Marital status:    Tobacco Use    Smoking status: Never    Smokeless tobacco: Never   Vaping Use    Vaping status: Never Used   Substance and Sexual Activity    Alcohol use: No    Drug use: No    Sexual activity: Yes     Partners: Female     Birth control/protection: None       Objective   Vital Signs:   /82   Pulse 73   Ht 177.8 cm (70\")   Wt 103 kg (228 lb)   SpO2 97% Comment: RA  BMI 32.71 kg/m²     Physical Exam  Constitutional:       General: He is not in acute distress.  HENT:      Head: Normocephalic.      Nose: Nose normal.      Mouth/Throat:      Mouth: Mucous membranes are moist.   Eyes:      General: No scleral icterus.  Cardiovascular:      Rate and Rhythm: Normal rate.   Pulmonary:      Effort: No respiratory distress.      Breath sounds: Rales (bibasilar) present.   Abdominal:      General: There is no distension.   Neurological:      Mental Status: He is alert and oriented to person, place, and time.   Psychiatric:         Mood and Affect: Mood normal.         Behavior: Behavior is cooperative.        Result Review :{ Labs  Result Review  Imaging  Med Tab  Media :    PFT Values          8/10/2022    10:45 12/20/2022    16:00 6/17/2024    10:00   Pre Drug PFT Results   FVC 67 67 62   FEV1 76 76 74   FEF 25-75% 120 130 141   FEV1/FVC 86 85 89   Other Tests PFT Results   DLCO 80 83 68   D/VAsb 110 119 97         Results for orders placed in visit on 06/17/24    Spirometry with Diffusion Capacity    Narrative  Spirometry with Diffusion Capacity    Performed by: Candi Shaw, RRT  Authorized by: Nash Pisano MD  Pre Drug % Predicted  FVC: 62%  FEV1: 74%  FEF 25-75%: 141%  FEV1/FVC: 89%  DLCO: 68%  D/VAsb: 97%    Interpretation  Spirometry  Spirometry shows moderate restriction. midflow is normal.  Diffusion Capacity  The patient's diffusion capacity is mildly reduced. "  Diffusion capacity is normal when corrected for alveolar volume.  Overall comments: DLCO has decreased and fvc has decreased slightly since prior study in 2022      Results for orders placed in visit on 12/20/22    Pulmonary Function Test    Narrative  Pulmonary Function Test  Performed by: Chriss Faust CMA  Authorized by: Nash Pisano MD    Pre Drug % Predicted  FVC: 67%  FEV1: 76%  FEF 25-75%: 130%  FEV1/FVC: 85%  DLCO: 83%  D/VAsb: 119%    Interpretation  Spirometry  Spirometry shows moderate restriction. midflow is normal.  Diffusion Capacity  The patient's diffusion capacity is normal.  Diffusion capacity is normal when corrected for alveolar volume.    stable compared with most recent study  Electronically signed by Nash Pisano MD, 12/20/2022, 18:25 CST      Results for orders placed in visit on 08/10/22    Pulmonary Function Test    Narrative  Pulmonary Function Test  Performed by: Candi Shaw RRT  Authorized by: Nash Pisano MD    Pre Drug % Predicted  FVC: 67%  FEV1: 76%  FEF 25-75%: 120%  FEV1/FVC: 86%  DLCO: 80%  D/VAsb: 110%    Interpretation  Spirometry  Spirometry shows moderate restriction. midflow is normal.  Diffusion Capacity  The patient's diffusion capacity is normal.  Diffusion capacity is normal when corrected for alveolar volume.  Overall comments: svc and erv mildly reduced    Rest/Exercise Pulse Ox Values          7/15/2024    09:00   Rest/Exercise Pulse Ox Results   Rest room air SAT % 94   Exercise room air SAT % 88   Rest on O2 @ Liters 2   Rest on O2 SAT % 96   Exercise on O2 @ Liters 2   Exercise on O2 SAT % 95              CT Chest Without Contrast Diagnostic (07/03/2024 14:12)   My interpretation of imaging: Lung nodule stable.  Interstitial disease with mild progression.  Bibasilar honeycombing, traction bronchiectasis, mild peripheral reticulation        Assessment and Plan {CC Problem List  Visit Diagnosis  ROS  Review (Popup)  Health  [facility]  BestPractice  Medications  SmartSets  SnapShot Encounters  Media      Diagnoses and all orders for this visit:    1. Pulmonary fibrosis (Primary)  Comments:  Mild progression on CT.  Reduced DLCO compared to prior.  Symptomatically stable.  Check CTD labs.  Obtain echo.  Follow-up spirometry and DLCO in 3 months.  Orders:  -     Adult Transthoracic Echo Complete W/ Cont if Necessary Per Protocol; Future  -     Ambulatory Referral to Cardiology  -     CBC & Differential; Future  -     Hypersensitivity Pnuemonitis Profile; Future  -     Rheumatoid Factor; Future  -     HIRO With / DsDNA, RNP, Sjogrens A / B, Montero; Future  -     ANCA Panel; Future  -     C-reactive Protein; Future  -     Sedimentation Rate; Future  -     Walking Oximetry  -     Oxygen Therapy  -     Hepatic Function Panel; Future    2. Restrictive lung disease  Comments:  Follow-up spirometry and DLCO in 3 months.  Orders:  -     Adult Transthoracic Echo Complete W/ Cont if Necessary Per Protocol; Future  -     Ambulatory Referral to Cardiology  -     CBC & Differential; Future  -     Hypersensitivity Pnuemonitis Profile; Future  -     Rheumatoid Factor; Future  -     HIRO With / DsDNA, RNP, Sjogrens A / B, Montero; Future  -     ANCA Panel; Future  -     C-reactive Protein; Future  -     Sedimentation Rate; Future  -     Walking Oximetry    3. Coronary artery calcification  Comments:  Referral to cardiology.  Orders:  -     Ambulatory Referral to Cardiology    4. DENT (dyspnea on exertion)  Comments:  Obtain echocardiogram to assess for pulmonary hypertension signs in the setting of ILD.  Orders:  -     Adult Transthoracic Echo Complete W/ Cont if Necessary Per Protocol; Future          Patient with pulmonary fibrosis of unknown etiology.  IPF versus auto immune cause?  Workup as above.  We discussed Ofev.  Education provided.  He wants to hold off on this at this time.  If CTD labs abnormal would recommend referral to  rheumatology for further evaluation.  Walking oximetry did confirm the need for supplemental oxygen with exertional activities.  Orders placed.  Office follow-up in a few months with spirometry.  Call sooner if needed.    Romy Valera, APRN  7/15/2024  11:22 CDT    Follow Up {Instructions Charge Capture  Follow-up Communications   Return in about 3 months (around 10/15/2024) for or so with DOC K if at all available , spirometry and dlco .    Patient was given instructions and counseling regarding his condition or for health maintenance advice. Please see specific information pulled into the AVS if appropriate.

## 2024-07-15 ENCOUNTER — OFFICE VISIT (OUTPATIENT)
Dept: PULMONOLOGY | Facility: CLINIC | Age: 74
End: 2024-07-15
Payer: MEDICARE

## 2024-07-15 ENCOUNTER — LAB (OUTPATIENT)
Dept: LAB | Facility: HOSPITAL | Age: 74
End: 2024-07-15
Payer: MEDICARE

## 2024-07-15 VITALS
BODY MASS INDEX: 32.64 KG/M2 | HEIGHT: 70 IN | WEIGHT: 228 LBS | SYSTOLIC BLOOD PRESSURE: 132 MMHG | DIASTOLIC BLOOD PRESSURE: 82 MMHG | HEART RATE: 73 BPM | OXYGEN SATURATION: 97 %

## 2024-07-15 DIAGNOSIS — R06.09 DOE (DYSPNEA ON EXERTION): Chronic | ICD-10-CM

## 2024-07-15 DIAGNOSIS — J98.4 RESTRICTIVE LUNG DISEASE: ICD-10-CM

## 2024-07-15 DIAGNOSIS — I25.84 CORONARY ARTERY CALCIFICATION: ICD-10-CM

## 2024-07-15 DIAGNOSIS — J84.10 PULMONARY FIBROSIS: ICD-10-CM

## 2024-07-15 DIAGNOSIS — J98.4 RESTRICTIVE LUNG DISEASE: Chronic | ICD-10-CM

## 2024-07-15 DIAGNOSIS — J84.10 PULMONARY FIBROSIS: Primary | Chronic | ICD-10-CM

## 2024-07-15 DIAGNOSIS — I25.10 CORONARY ARTERY CALCIFICATION: ICD-10-CM

## 2024-07-15 LAB
ALBUMIN SERPL-MCNC: 4.2 G/DL (ref 3.5–5.2)
ALP SERPL-CCNC: 78 U/L (ref 39–117)
ALT SERPL W P-5'-P-CCNC: 20 U/L (ref 1–41)
AST SERPL-CCNC: 25 U/L (ref 1–40)
BASOPHILS # BLD AUTO: 0.02 10*3/MM3 (ref 0–0.2)
BASOPHILS NFR BLD AUTO: 0.3 % (ref 0–1.5)
BILIRUB CONJ SERPL-MCNC: <0.2 MG/DL (ref 0–0.3)
BILIRUB INDIRECT SERPL-MCNC: NORMAL MG/DL
BILIRUB SERPL-MCNC: 0.6 MG/DL (ref 0–1.2)
CHROMATIN AB SERPL-ACNC: 17.5 IU/ML (ref 0–14)
CRP SERPL-MCNC: <0.3 MG/DL (ref 0–0.5)
DEPRECATED RDW RBC AUTO: 43.7 FL (ref 37–54)
EOSINOPHIL # BLD AUTO: 0.09 10*3/MM3 (ref 0–0.4)
EOSINOPHIL NFR BLD AUTO: 1.4 % (ref 0.3–6.2)
ERYTHROCYTE [DISTWIDTH] IN BLOOD BY AUTOMATED COUNT: 13.2 % (ref 12.3–15.4)
ERYTHROCYTE [SEDIMENTATION RATE] IN BLOOD: 16 MM/HR (ref 0–20)
HCT VFR BLD AUTO: 48.7 % (ref 37.5–51)
HGB BLD-MCNC: 16 G/DL (ref 13–17.7)
IMM GRANULOCYTES # BLD AUTO: 0.01 10*3/MM3 (ref 0–0.05)
IMM GRANULOCYTES NFR BLD AUTO: 0.2 % (ref 0–0.5)
LYMPHOCYTES # BLD AUTO: 1.14 10*3/MM3 (ref 0.7–3.1)
LYMPHOCYTES NFR BLD AUTO: 18.3 % (ref 19.6–45.3)
MCH RBC QN AUTO: 29.8 PG (ref 26.6–33)
MCHC RBC AUTO-ENTMCNC: 32.9 G/DL (ref 31.5–35.7)
MCV RBC AUTO: 90.7 FL (ref 79–97)
MONOCYTES # BLD AUTO: 0.63 10*3/MM3 (ref 0.1–0.9)
MONOCYTES NFR BLD AUTO: 10.1 % (ref 5–12)
NEUTROPHILS NFR BLD AUTO: 4.35 10*3/MM3 (ref 1.7–7)
NEUTROPHILS NFR BLD AUTO: 69.7 % (ref 42.7–76)
NRBC BLD AUTO-RTO: 0 /100 WBC (ref 0–0.2)
PLATELET # BLD AUTO: 257 10*3/MM3 (ref 140–450)
PMV BLD AUTO: 8.9 FL (ref 6–12)
PROT SERPL-MCNC: 7.7 G/DL (ref 6–8.5)
RBC # BLD AUTO: 5.37 10*6/MM3 (ref 4.14–5.8)
WBC NRBC COR # BLD AUTO: 6.24 10*3/MM3 (ref 3.4–10.8)

## 2024-07-15 PROCEDURE — 86140 C-REACTIVE PROTEIN: CPT

## 2024-07-15 PROCEDURE — 86235 NUCLEAR ANTIGEN ANTIBODY: CPT

## 2024-07-15 PROCEDURE — 86037 ANCA TITER EACH ANTIBODY: CPT

## 2024-07-15 PROCEDURE — 83516 IMMUNOASSAY NONANTIBODY: CPT

## 2024-07-15 PROCEDURE — 86331 IMMUNODIFFUSION OUCHTERLONY: CPT

## 2024-07-15 PROCEDURE — 86606 ASPERGILLUS ANTIBODY: CPT

## 2024-07-15 PROCEDURE — 3075F SYST BP GE 130 - 139MM HG: CPT | Performed by: NURSE PRACTITIONER

## 2024-07-15 PROCEDURE — 80076 HEPATIC FUNCTION PANEL: CPT

## 2024-07-15 PROCEDURE — 86602 ANTINOMYCES ANTIBODY: CPT

## 2024-07-15 PROCEDURE — 1160F RVW MEDS BY RX/DR IN RCRD: CPT | Performed by: NURSE PRACTITIONER

## 2024-07-15 PROCEDURE — 85652 RBC SED RATE AUTOMATED: CPT

## 2024-07-15 PROCEDURE — 85025 COMPLETE CBC W/AUTO DIFF WBC: CPT

## 2024-07-15 PROCEDURE — 86609 BACTERIUM ANTIBODY: CPT

## 2024-07-15 PROCEDURE — 86671 FUNGUS NES ANTIBODY: CPT

## 2024-07-15 PROCEDURE — 99214 OFFICE O/P EST MOD 30 MIN: CPT | Performed by: NURSE PRACTITIONER

## 2024-07-15 PROCEDURE — 86038 ANTINUCLEAR ANTIBODIES: CPT

## 2024-07-15 PROCEDURE — 36415 COLL VENOUS BLD VENIPUNCTURE: CPT

## 2024-07-15 PROCEDURE — 86225 DNA ANTIBODY NATIVE: CPT

## 2024-07-15 PROCEDURE — 3079F DIAST BP 80-89 MM HG: CPT | Performed by: NURSE PRACTITIONER

## 2024-07-15 PROCEDURE — 86431 RHEUMATOID FACTOR QUANT: CPT

## 2024-07-15 PROCEDURE — 1159F MED LIST DOCD IN RCRD: CPT | Performed by: NURSE PRACTITIONER

## 2024-07-15 RX ORDER — ALBUTEROL SULFATE 2.5 MG/3ML
SOLUTION RESPIRATORY (INHALATION)
COMMUNITY
Start: 2024-07-11

## 2024-07-15 NOTE — PROCEDURES
Walking Oximetry    Performed by: Emerald Granger MA  Authorized by: Romy Valera, CANDACE    Rest room air SAT %:  94  Exercise room air SAT %:  88  Rest on O2 @ Liters:  2  SAT %:  96  Exercise on O2 @ Liters:  2  SAT %:  95

## 2024-07-16 DIAGNOSIS — R76.8 ANA POSITIVE: Primary | ICD-10-CM

## 2024-07-16 LAB
ANA SER QL: POSITIVE
C-ANCA TITR SER IF: NORMAL TITER
CENTROMERE B AB SER-ACNC: <0.2 AI (ref 0–0.9)
CHROMATIN AB SERPL-ACNC: <0.2 AI (ref 0–0.9)
DSDNA AB SER-ACNC: 2 IU/ML (ref 0–9)
ENA JO1 AB SER-ACNC: <0.2 AI (ref 0–0.9)
ENA RNP AB SER-ACNC: 1 AI (ref 0–0.9)
ENA SCL70 AB SER-ACNC: 0.2 AI (ref 0–0.9)
ENA SM AB SER-ACNC: <0.2 AI (ref 0–0.9)
ENA SS-A AB SER-ACNC: >8 AI (ref 0–0.9)
ENA SS-B AB SER-ACNC: <0.2 AI (ref 0–0.9)
Lab: ABNORMAL
MYELOPEROXIDASE AB SER IA-ACNC: <0.2 UNITS (ref 0–0.9)
P-ANCA ATYPICAL TITR SER IF: NORMAL TITER
P-ANCA TITR SER IF: NORMAL TITER
PROTEINASE3 AB SER IA-ACNC: <0.2 UNITS (ref 0–0.9)

## 2024-07-19 LAB
A FUMIGATUS IGG SER QL: NEGATIVE
A PULLULANS IGG SER QL: NEGATIVE
LACEYELLA SACCHARI AB SER QL ID: NEGATIVE
PIGEON SERUM IGG QL: NEGATIVE
S RECTIVIRGULA IGG SER QL ID: NEGATIVE
T VULGARIS IGG SER QL: NEGATIVE

## 2024-08-06 ENCOUNTER — OFFICE VISIT (OUTPATIENT)
Dept: CARDIOLOGY | Facility: CLINIC | Age: 74
End: 2024-08-06
Payer: MEDICARE

## 2024-08-06 VITALS
BODY MASS INDEX: 32.07 KG/M2 | WEIGHT: 224 LBS | HEART RATE: 61 BPM | HEIGHT: 70 IN | SYSTOLIC BLOOD PRESSURE: 136 MMHG | DIASTOLIC BLOOD PRESSURE: 72 MMHG | OXYGEN SATURATION: 98 % | RESPIRATION RATE: 18 BRPM

## 2024-08-06 DIAGNOSIS — J84.10 PULMONARY FIBROSIS: ICD-10-CM

## 2024-08-06 DIAGNOSIS — I10 ESSENTIAL HYPERTENSION: ICD-10-CM

## 2024-08-06 DIAGNOSIS — I25.10 CORONARY ARTERY CALCIFICATION SEEN ON CT SCAN: Primary | ICD-10-CM

## 2024-08-06 DIAGNOSIS — J96.11 CHRONIC RESPIRATORY FAILURE WITH HYPOXIA: ICD-10-CM

## 2024-08-06 PROCEDURE — 3078F DIAST BP <80 MM HG: CPT | Performed by: HOSPITALIST

## 2024-08-06 PROCEDURE — 3075F SYST BP GE 130 - 139MM HG: CPT | Performed by: HOSPITALIST

## 2024-08-06 PROCEDURE — 99204 OFFICE O/P NEW MOD 45 MIN: CPT | Performed by: HOSPITALIST

## 2024-08-06 PROCEDURE — 93000 ELECTROCARDIOGRAM COMPLETE: CPT | Performed by: HOSPITALIST

## 2024-08-06 RX ORDER — ATORVASTATIN CALCIUM 10 MG/1
10 TABLET, FILM COATED ORAL DAILY
Qty: 90 TABLET | Refills: 3 | Status: SHIPPED | OUTPATIENT
Start: 2024-08-06

## 2024-08-06 NOTE — PROGRESS NOTES
Reason For Visit:  Coronary artery calcification, possible pulmonary hypertension    Subjective        Reid Morrell is a 73 y.o. male with the below pertinent PMH who is referred for the above issues.    The patient follows with our pulmonology group and at his most recent visit he did endorse ongoing dyspnea on exertion.  He was scheduled for an echo (9/4/2024) and given coronary calcification on CT he was also referred to cardiology.    Today, the patient reports that he has chronic dyspnea on exertion related to pulmonary fibrosis but he does think that this is worsened over the last couple of years in the context of his pulmonary fibrosis worsening.  He previously was diagnosed with lupus many years ago but was never treated as it reportedly had not been active.  There is concern about rheumatologic issues based on his most recent labs, and he has been referred back to a rheumatologist.  He denies any exertional chest pain.  He has not had any consistent issues with significant lightheadedness.  He also denies palpitations, orthopnea, or significant peripheral edema other than occasional mild ankle swelling.  He does mention that he has oxygen to wear at home due to low oxygen with exertion.  He typically will check his oxygen level when he comes inside from gardening and finds it to be around 88% so he will use his oxygen briefly to bring it back up.    ROS: Pertinent findings are included above.    Cardiac Studies  Noncontrast CT chest 7/30/2024: I personally did a focused review of the study, which shows moderate coronary artery calcification mostly in the proximal and mid LAD.    Pertinent PMH  Pulmonary fibrosis with exertional hypoxia  Coronary artery calcification  Hypertension  Lupus diagnosed at age 34 but reportedly has not been active    Pertinent past medical, surgical, family, and social history were reviewed and updated in the EMR.      Current Outpatient Medications:     albuterol (PROVENTIL)  "(2.5 MG/3ML) 0.083% nebulizer solution, , Disp: , Rfl:     amLODIPine (NORVASC) 10 MG tablet, Take 1 tablet by mouth Daily., Disp: , Rfl:     colestipol (COLESTID) 1 g tablet, Take 1 tablet by mouth 2 (Two) Times a Day., Disp: , Rfl:     famotidine (PEPCID) 20 MG tablet, Take 1 tablet by mouth Take As Directed. When taking mobic Mon, Wed, Fri, Disp: , Rfl:     guaiFENesin (MUCINEX) 600 MG 12 hr tablet, Take 2 tablets by mouth Every 12 (Twelve) Hours., Disp: 20 tablet, Rfl: 0    hydrochlorothiazide (HYDRODIURIL) 25 MG tablet, Take 1 tablet by mouth Daily., Disp: , Rfl:     losartan (COZAAR) 100 MG tablet, Take 1 tablet by mouth Daily., Disp: , Rfl:     meloxicam (MOBIC) 15 MG tablet, Take 1 tablet by mouth Daily., Disp: , Rfl:     sildenafil (VIAGRA) 100 MG tablet, Take 1 tablet by mouth Daily As Needed for Erectile Dysfunction., Disp: , Rfl:     Ventolin  (90 Base) MCG/ACT inhaler, , Disp: , Rfl:      Objective   Vital Signs:  /72   Pulse 61   Resp 18   Ht 177.8 cm (70\")   Wt 102 kg (224 lb)   SpO2 98%   BMI 32.14 kg/m²   Estimated body mass index is 32.14 kg/m² as calculated from the following:    Height as of this encounter: 177.8 cm (70\").    Weight as of this encounter: 102 kg (224 lb).      Constitutional:       Appearance: Healthy appearance. Not in distress.   Neck:      Vascular: JVD normal.   Pulmonary:      Comments: Bilateral pulmonary crackles more prominent in the lower lobes, normal work of breathing  Cardiovascular:      Normal rate. Regular rhythm.      Murmurs: There is no murmur.      No gallop.  No click. No rub.   Edema:     Pretibial: bilateral trace edema of the pretibial area.  Abdominal:      General: There is no distension.      Palpations: Abdomen is soft.      Tenderness: There is no abdominal tenderness.   Skin:     General: Skin is warm and dry.   Neurological:      Mental Status: Alert and oriented to person, place and time.        Result Review :  The following data " was reviewed by: Marlon Pichardo MD on 08/06/2024:  CMP   CMP          6/13/2024    09:18 7/15/2024    10:38   CMP   Glucose 88        BUN 20        Creatinine 1.1        Sodium 139        Potassium 4.1        Chloride 99        Calcium 9.4        Total Protein 7.6     7.7    Albumin 4.2     4.2    Total Bilirubin 0.7     0.6    Alkaline Phosphatase 86     78    AST (SGOT) 33     25    ALT (SGPT) 26     20    Anion Gap 11           Details          This result is from an external source.             Lipid Panel   Lipid Panel          12/13/2023    10:31   Lipid Panel   Total Cholesterol 143       Triglycerides 84       HDL Cholesterol 35       LDL Cholesterol  91          Details          This result is from an external source.               Pro-BNP   most recently 1442 5/2/2023         ECG 12 Lead    Date/Time: 8/6/2024 9:24 AM  Performed by: Marlon Pichardo MD    Authorized by: Marlon Pichardo MD  Comparison: compared with previous ECG from 5/2/2023  Comparison to previous ECG: PVCs are no longer present  Rhythm: sinus rhythm  Rate: normal  BPM: 61  Conduction: non-specific intraventricular conduction delay  QRS axis: left  Other findings: poor R wave progression    Clinical impression: abnormal EKG            Assessment and Plan   Diagnoses and all orders for this visit:    1. Coronary artery calcification seen on CT scan (Primary)    2. Essential hypertension    3. Pulmonary fibrosis    4. Chronic respiratory failure with hypoxia    Other orders  -     atorvastatin (LIPITOR) 10 MG tablet; Take 1 tablet by mouth Daily.  Dispense: 90 tablet; Refill: 3  -     ECG 12 Lead      -She does have some dyspnea on exertion likely related to pulmonary fibrosis without clear angina.  Given his coronary calcifications on CT, I will start him on atorvastatin 10 mg daily.  - BP appears reasonably well-controlled.  For now, plan to continue HCTZ 25 mg daily, losartan 100 mg daily, amlodipine 10 mg daily.  - Echo pending to  evaluate for evidence of pulmonary hypertension.  He was educated on the potential pulmonary hypertension diagnosis and consideration for Tyvaso use should he have it.  He was counseled on trying to utilize oxygen consistently with exertion rather than after exertion to avoid exertional hypoxia that can worsen pulmonary hypertension.  We did also briefly discuss potential sleep apnea testing in the future should he be found to have pulmonary hypertension.      Follow Up   Return in about 5 weeks (around 9/10/2024).  Patient was given instructions and counseling regarding his condition or for health maintenance advice. Please see specific information pulled into the AVS if appropriate.       EMR Dragon/Transcription disclaimer: Much of this encounter note is an electronic transcription/translation of spoken language to printed text. The electronic translation of spoken language may permit erroneous, or at times, nonsensical words or phrases to be inadvertently transcribed; although I have reviewed the note for such errors, some may still exist.

## 2024-08-09 ENCOUNTER — PATIENT ROUNDING (BHMG ONLY) (OUTPATIENT)
Dept: CARDIOLOGY | Facility: CLINIC | Age: 74
End: 2024-08-09
Payer: MEDICARE

## 2024-08-09 NOTE — PROGRESS NOTES
A [facility] message has been sent to the patient for PATIENT ROUNDING with [facility].

## 2024-08-15 DIAGNOSIS — J84.10 PULMONARY FIBROSIS (HCC): ICD-10-CM

## 2024-08-15 RX ORDER — ALBUTEROL SULFATE 2.5 MG/3ML
SOLUTION RESPIRATORY (INHALATION)
Qty: 50 EACH | Refills: 5 | Status: SHIPPED | OUTPATIENT
Start: 2024-08-15

## 2024-09-04 ENCOUNTER — TELEPHONE (OUTPATIENT)
Dept: PULMONOLOGY | Facility: CLINIC | Age: 74
End: 2024-09-04
Payer: MEDICARE

## 2024-09-04 ENCOUNTER — HOSPITAL ENCOUNTER (OUTPATIENT)
Dept: CARDIOLOGY | Facility: HOSPITAL | Age: 74
Discharge: HOME | End: 2024-09-04
Admitting: NURSE PRACTITIONER
Payer: MEDICARE

## 2024-09-04 VITALS
HEIGHT: 70 IN | SYSTOLIC BLOOD PRESSURE: 136 MMHG | DIASTOLIC BLOOD PRESSURE: 72 MMHG | WEIGHT: 224 LBS | BODY MASS INDEX: 32.07 KG/M2

## 2024-09-04 DIAGNOSIS — J98.4 RESTRICTIVE LUNG DISEASE: Chronic | ICD-10-CM

## 2024-09-04 DIAGNOSIS — J84.10 PULMONARY FIBROSIS: Chronic | ICD-10-CM

## 2024-09-04 DIAGNOSIS — R06.09 DOE (DYSPNEA ON EXERTION): Chronic | ICD-10-CM

## 2024-09-04 LAB
BH CV ECHO LEFT VENTRICLE GLOBAL LONGITUDINAL STRAIN: -20 %
BH CV ECHO MEAS - AO MAX PG: 11.1 MMHG
BH CV ECHO MEAS - AO MEAN PG: 6.2 MMHG
BH CV ECHO MEAS - AO ROOT DIAM: 3.3 CM
BH CV ECHO MEAS - AO V2 MAX: 166.3 CM/SEC
BH CV ECHO MEAS - AO V2 VTI: 38.6 CM
BH CV ECHO MEAS - AVA(I,D): 2.8 CM2
BH CV ECHO MEAS - EDV(CUBED): 98.9 ML
BH CV ECHO MEAS - EDV(MOD-SP2): 143 ML
BH CV ECHO MEAS - EDV(MOD-SP4): 146 ML
BH CV ECHO MEAS - EF(MOD-SP2): 59.7 %
BH CV ECHO MEAS - EF(MOD-SP4): 56.9 %
BH CV ECHO MEAS - ESV(CUBED): 38.3 ML
BH CV ECHO MEAS - ESV(MOD-SP2): 57.6 ML
BH CV ECHO MEAS - ESV(MOD-SP4): 62.9 ML
BH CV ECHO MEAS - FS: 27.1 %
BH CV ECHO MEAS - IVS/LVPW: 0.95 CM
BH CV ECHO MEAS - IVSD: 1.18 CM
BH CV ECHO MEAS - LAT PEAK E' VEL: 11 CM/SEC
BH CV ECHO MEAS - LV DIASTOLIC VOL/BSA (35-75): 66.6 CM2
BH CV ECHO MEAS - LV MASS(C)D: 210.6 GRAMS
BH CV ECHO MEAS - LV MAX PG: 4 MMHG
BH CV ECHO MEAS - LV MEAN PG: 2.31 MMHG
BH CV ECHO MEAS - LV SYSTOLIC VOL/BSA (12-30): 28.7 CM2
BH CV ECHO MEAS - LV V1 MAX: 99.3 CM/SEC
BH CV ECHO MEAS - LV V1 VTI: 21.4 CM
BH CV ECHO MEAS - LVIDD: 4.6 CM
BH CV ECHO MEAS - LVIDS: 3.4 CM
BH CV ECHO MEAS - LVOT AREA: 5.1 CM2
BH CV ECHO MEAS - LVOT DIAM: 2.6 CM
BH CV ECHO MEAS - LVPWD: 1.25 CM
BH CV ECHO MEAS - MED PEAK E' VEL: 9 CM/SEC
BH CV ECHO MEAS - MV A MAX VEL: 92.5 CM/SEC
BH CV ECHO MEAS - MV DEC SLOPE: 251.3 CM/SEC2
BH CV ECHO MEAS - MV DEC TIME: 0.27 SEC
BH CV ECHO MEAS - MV E MAX VEL: 66.1 CM/SEC
BH CV ECHO MEAS - MV E/A: 0.71
BH CV ECHO MEAS - MV MAX PG: 4.4 MMHG
BH CV ECHO MEAS - MV MEAN PG: 1.56 MMHG
BH CV ECHO MEAS - MV V2 VTI: 38.8 CM
BH CV ECHO MEAS - MVA(VTI): 2.8 CM2
BH CV ECHO MEAS - PA V2 MAX: 115.8 CM/SEC
BH CV ECHO MEAS - PI END-D VEL: 140.9 CM/SEC
BH CV ECHO MEAS - QP/QS: 1.06
BH CV ECHO MEAS - RAP SYSTOLE: 3 MMHG
BH CV ECHO MEAS - RV MAX PG: 1.09 MMHG
BH CV ECHO MEAS - RV V1 MAX: 52.2 CM/SEC
BH CV ECHO MEAS - RV V1 VTI: 10.5 CM
BH CV ECHO MEAS - RVOT DIAM: 3.8 CM
BH CV ECHO MEAS - SV(LVOT): 109.9 ML
BH CV ECHO MEAS - SV(MOD-SP2): 85.4 ML
BH CV ECHO MEAS - SV(MOD-SP4): 83.1 ML
BH CV ECHO MEAS - SV(RVOT): 116.6 ML
BH CV ECHO MEAS - SVI(LVOT): 50.2 ML/M2
BH CV ECHO MEAS - SVI(MOD-SP2): 39 ML/M2
BH CV ECHO MEAS - SVI(MOD-SP4): 37.9 ML/M2
BH CV ECHO MEASUREMENTS AVERAGE E/E' RATIO: 6.61
BH CV XLRA - RV BASE: 4.4 CM

## 2024-09-04 PROCEDURE — 93356 MYOCRD STRAIN IMG SPCKL TRCK: CPT

## 2024-09-04 PROCEDURE — 93306 TTE W/DOPPLER COMPLETE: CPT

## 2024-09-04 NOTE — TELEPHONE ENCOUNTER
----- Message from Romy Valera sent at 9/4/2024  1:03 PM CDT -----  Let him know echo overall looks ok. Nothing to do for now, keep follow ups as planned

## 2024-09-10 ENCOUNTER — OFFICE VISIT (OUTPATIENT)
Dept: CARDIOLOGY | Facility: CLINIC | Age: 74
End: 2024-09-10
Payer: MEDICARE

## 2024-09-10 ENCOUNTER — PREP FOR SURGERY (OUTPATIENT)
Dept: OTHER | Facility: HOSPITAL | Age: 74
End: 2024-09-10
Payer: MEDICARE

## 2024-09-10 VITALS
HEART RATE: 62 BPM | HEIGHT: 70 IN | OXYGEN SATURATION: 96 % | WEIGHT: 219 LBS | SYSTOLIC BLOOD PRESSURE: 125 MMHG | BODY MASS INDEX: 31.35 KG/M2 | DIASTOLIC BLOOD PRESSURE: 78 MMHG

## 2024-09-10 DIAGNOSIS — R06.09 DOE (DYSPNEA ON EXERTION): ICD-10-CM

## 2024-09-10 DIAGNOSIS — I51.7 RIGHT VENTRICULAR DILATION: ICD-10-CM

## 2024-09-10 DIAGNOSIS — I25.10 CORONARY ARTERY CALCIFICATION SEEN ON CT SCAN: ICD-10-CM

## 2024-09-10 DIAGNOSIS — J84.10 PULMONARY FIBROSIS: Primary | ICD-10-CM

## 2024-09-10 DIAGNOSIS — I10 ESSENTIAL HYPERTENSION: ICD-10-CM

## 2024-09-10 DIAGNOSIS — R93.1 ABNORMAL FINDINGS ON DIAGNOSTIC IMAGING OF HEART AND CORONARY CIRCULATION: ICD-10-CM

## 2024-09-10 DIAGNOSIS — R06.02 SHORTNESS OF BREATH: ICD-10-CM

## 2024-09-10 DIAGNOSIS — J96.11 CHRONIC RESPIRATORY FAILURE WITH HYPOXIA: ICD-10-CM

## 2024-09-10 RX ORDER — SODIUM CHLORIDE 0.9 % (FLUSH) 0.9 %
10 SYRINGE (ML) INJECTION AS NEEDED
Status: CANCELLED | OUTPATIENT
Start: 2024-09-10

## 2024-09-10 RX ORDER — SODIUM CHLORIDE 0.9 % (FLUSH) 0.9 %
10 SYRINGE (ML) INJECTION EVERY 12 HOURS SCHEDULED
Status: CANCELLED | OUTPATIENT
Start: 2024-09-10

## 2024-09-10 RX ORDER — SODIUM CHLORIDE 9 MG/ML
40 INJECTION, SOLUTION INTRAVENOUS AS NEEDED
Status: CANCELLED | OUTPATIENT
Start: 2024-09-10

## 2024-09-10 NOTE — H&P (VIEW-ONLY)
Reason For Visit:  Shortness of breath, pulmonary fibrosis, coronary calcifications, hypertension    Subjective        Reid Morrell is a 73 y.o. male with the below pertinent PMH who presents for follow-up of the above issues.    Reid Morrell was most recently seen by me in clinic for initial evaluation 8/6/2024.  He had been referred for acute on chronic dyspnea on exertion.  He had an echo that was pending.  He was started on atorvastatin given heavy coronary artery calcifications on CT scan.    Today, the patient reports that he has been using his oxygen more since his last visit and does feel like it has been helpful for his exertional shortness of breath and activity tolerance.  Having said, he continues to have dyspnea on exertion.  He denies chest pain, palpitations, lightheadedness.  He mentions having leg swelling occasionally although no significant worsening/issues with this recently.    ROS: Pertinent findings are included above.    Cardiac Studies  Noncontrast CT chest 7/30/2024: I personally did a focused review of the study, which shows moderate coronary artery calcification mostly in the proximal and mid LAD.  Echo 9/4/2024: LVEF 56-60%, normal diastolic function, RV mildly dilated with normal systolic function, LA mildly dilated, no significant valvular stenosis or regurgitation     Pertinent PMH  Pulmonary fibrosis with exertional hypoxia  Coronary artery calcification  Hypertension  Lupus diagnosed at age 34 but reportedly has not been active    Pertinent past medical, surgical, family, and social history were reviewed.      Current Outpatient Medications:     albuterol (PROVENTIL) (2.5 MG/3ML) 0.083% nebulizer solution, , Disp: , Rfl:     amLODIPine (NORVASC) 10 MG tablet, Take 1 tablet by mouth Daily., Disp: , Rfl:     atorvastatin (LIPITOR) 10 MG tablet, Take 1 tablet by mouth Daily., Disp: 90 tablet, Rfl: 3    colestipol (COLESTID) 1 g tablet, Take 1 tablet by mouth 2 (Two) Times a  "Day., Disp: , Rfl:     famotidine (PEPCID) 20 MG tablet, Take 1 tablet by mouth Take As Directed. When taking mobic Mon, Wed, Fri, Disp: , Rfl:     guaiFENesin (MUCINEX) 600 MG 12 hr tablet, Take 2 tablets by mouth Every 12 (Twelve) Hours., Disp: 20 tablet, Rfl: 0    hydrochlorothiazide (HYDRODIURIL) 25 MG tablet, Take 1 tablet by mouth Daily., Disp: , Rfl:     losartan (COZAAR) 100 MG tablet, Take 1 tablet by mouth Daily., Disp: , Rfl:     meloxicam (MOBIC) 15 MG tablet, Take 1 tablet by mouth Daily., Disp: , Rfl:     sildenafil (VIAGRA) 100 MG tablet, Take 1 tablet by mouth Daily As Needed for Erectile Dysfunction., Disp: , Rfl:     Ventolin  (90 Base) MCG/ACT inhaler, , Disp: , Rfl:      Objective   Vital Signs:  /78   Pulse 62   Ht 177.8 cm (70\")   Wt 99.3 kg (219 lb)   SpO2 96%   BMI 31.42 kg/m²   Estimated body mass index is 31.42 kg/m² as calculated from the following:    Height as of this encounter: 177.8 cm (70\").    Weight as of this encounter: 99.3 kg (219 lb).      Constitutional:       Appearance: Healthy appearance. Not in distress.   Neck:      Vascular: JVD normal.   Pulmonary:      Comments: Bilateral lower lobe pulmonary crackles, normal work of breathing  Cardiovascular:      Normal rate. Regular rhythm.      Murmurs: There is a grade 1/6 systolic murmur.      No gallop.  No click. No rub.   Edema:     Peripheral edema absent.   Abdominal:      General: There is no distension.      Palpations: Abdomen is soft.      Tenderness: There is no abdominal tenderness.   Skin:     General: Skin is warm and dry.   Neurological:      Mental Status: Alert and oriented to person, place and time.        Result Review :             ECG 12 Lead    Date/Time: 9/10/2024 9:45 AM  Performed by: Marlon Pichardo MD    Authorized by: Marlon Pichardo MD  Comparison: compared with previous ECG from 8/6/2024  Comparison to previous ECG: IRBBB has replaced IVCD  Rhythm: sinus rhythm  Rate: normal  BPM: " 62  Conduction: incomplete right bundle branch block  QRS axis: left  Other findings: poor R wave progression    Clinical impression: abnormal EKG            Assessment and Plan   Diagnoses and all orders for this visit:    1. Pulmonary fibrosis (Primary)  2. Right ventricular dilation  3. Chronic respiratory failure with hypoxia  4. DENT (dyspnea on exertion)  -Still with some shortness of breath.  Echo unable to assess RVSP due to incomplete TR CW jet but did show some RV dilation that could be indicative of underlying pulmonary hypertension.  We discussed the potential role of Tyvaso and ILD associated pulmonary hypertension and improvement in time to clinical worsening.  Patient favor proceeding with RHC for further evaluation.  I will schedule this for Friday.    5. Coronary artery calcification seen on CT scan  -Continue atorvastatin 10 mg daily, which he has tolerated well since his last visit    6. Essential hypertension  - BP currently well-controlled  - Continue losartan 100 mg daily, HCTZ 25 mg daily, amlodipine 10 mg daily    Follow Up   Return in about 8 weeks (around 11/5/2024).  Patient was given instructions and counseling regarding his condition or for health maintenance advice. Please see specific information pulled into the AVS if appropriate.       EMR Dragon/Transcription disclaimer: Much of this encounter note is an electronic transcription/translation of spoken language to printed text. The electronic translation of spoken language may permit erroneous, or at times, nonsensical words or phrases to be inadvertently transcribed; although I have reviewed the note for such errors, some may still exist.

## 2024-09-10 NOTE — PROGRESS NOTES
Reason For Visit:  Shortness of breath, pulmonary fibrosis, coronary calcifications, hypertension    Subjective        Reid Morrell is a 73 y.o. male with the below pertinent PMH who presents for follow-up of the above issues.    Reid Morrell was most recently seen by me in clinic for initial evaluation 8/6/2024.  He had been referred for acute on chronic dyspnea on exertion.  He had an echo that was pending.  He was started on atorvastatin given heavy coronary artery calcifications on CT scan.    Today, the patient reports that he has been using his oxygen more since his last visit and does feel like it has been helpful for his exertional shortness of breath and activity tolerance.  Having said, he continues to have dyspnea on exertion.  He denies chest pain, palpitations, lightheadedness.  He mentions having leg swelling occasionally although no significant worsening/issues with this recently.    ROS: Pertinent findings are included above.    Cardiac Studies  Noncontrast CT chest 7/30/2024: I personally did a focused review of the study, which shows moderate coronary artery calcification mostly in the proximal and mid LAD.  Echo 9/4/2024: LVEF 56-60%, normal diastolic function, RV mildly dilated with normal systolic function, LA mildly dilated, no significant valvular stenosis or regurgitation     Pertinent PMH  Pulmonary fibrosis with exertional hypoxia  Coronary artery calcification  Hypertension  Lupus diagnosed at age 34 but reportedly has not been active    Pertinent past medical, surgical, family, and social history were reviewed.      Current Outpatient Medications:     albuterol (PROVENTIL) (2.5 MG/3ML) 0.083% nebulizer solution, , Disp: , Rfl:     amLODIPine (NORVASC) 10 MG tablet, Take 1 tablet by mouth Daily., Disp: , Rfl:     atorvastatin (LIPITOR) 10 MG tablet, Take 1 tablet by mouth Daily., Disp: 90 tablet, Rfl: 3    colestipol (COLESTID) 1 g tablet, Take 1 tablet by mouth 2 (Two) Times a  "Day., Disp: , Rfl:     famotidine (PEPCID) 20 MG tablet, Take 1 tablet by mouth Take As Directed. When taking mobic Mon, Wed, Fri, Disp: , Rfl:     guaiFENesin (MUCINEX) 600 MG 12 hr tablet, Take 2 tablets by mouth Every 12 (Twelve) Hours., Disp: 20 tablet, Rfl: 0    hydrochlorothiazide (HYDRODIURIL) 25 MG tablet, Take 1 tablet by mouth Daily., Disp: , Rfl:     losartan (COZAAR) 100 MG tablet, Take 1 tablet by mouth Daily., Disp: , Rfl:     meloxicam (MOBIC) 15 MG tablet, Take 1 tablet by mouth Daily., Disp: , Rfl:     sildenafil (VIAGRA) 100 MG tablet, Take 1 tablet by mouth Daily As Needed for Erectile Dysfunction., Disp: , Rfl:     Ventolin  (90 Base) MCG/ACT inhaler, , Disp: , Rfl:      Objective   Vital Signs:  /78   Pulse 62   Ht 177.8 cm (70\")   Wt 99.3 kg (219 lb)   SpO2 96%   BMI 31.42 kg/m²   Estimated body mass index is 31.42 kg/m² as calculated from the following:    Height as of this encounter: 177.8 cm (70\").    Weight as of this encounter: 99.3 kg (219 lb).      Constitutional:       Appearance: Healthy appearance. Not in distress.   Neck:      Vascular: JVD normal.   Pulmonary:      Comments: Bilateral lower lobe pulmonary crackles, normal work of breathing  Cardiovascular:      Normal rate. Regular rhythm.      Murmurs: There is a grade 1/6 systolic murmur.      No gallop.  No click. No rub.   Edema:     Peripheral edema absent.   Abdominal:      General: There is no distension.      Palpations: Abdomen is soft.      Tenderness: There is no abdominal tenderness.   Skin:     General: Skin is warm and dry.   Neurological:      Mental Status: Alert and oriented to person, place and time.        Result Review :             ECG 12 Lead    Date/Time: 9/10/2024 9:45 AM  Performed by: Maroln Pichardo MD    Authorized by: Marlon Pichardo MD  Comparison: compared with previous ECG from 8/6/2024  Comparison to previous ECG: IRBBB has replaced IVCD  Rhythm: sinus rhythm  Rate: normal  BPM: " 62  Conduction: incomplete right bundle branch block  QRS axis: left  Other findings: poor R wave progression    Clinical impression: abnormal EKG            Assessment and Plan   Diagnoses and all orders for this visit:    1. Pulmonary fibrosis (Primary)  2. Right ventricular dilation  3. Chronic respiratory failure with hypoxia  4. DENT (dyspnea on exertion)  -Still with some shortness of breath.  Echo unable to assess RVSP due to incomplete TR CW jet but did show some RV dilation that could be indicative of underlying pulmonary hypertension.  We discussed the potential role of Tyvaso and ILD associated pulmonary hypertension and improvement in time to clinical worsening.  Patient favor proceeding with [facility] for further evaluation.  I will schedule this for Friday.    5. Coronary artery calcification seen on CT scan  -Continue atorvastatin 10 mg daily, which he has tolerated well since his last visit    6. Essential hypertension  - BP currently well-controlled  - Continue losartan 100 mg daily, HCTZ 25 mg daily, amlodipine 10 mg daily    Follow Up   Return in about 8 weeks (around 11/5/2024).  Patient was given instructions and counseling regarding his condition or for health maintenance advice. Please see specific information pulled into the AVS if appropriate.       EMR Dragon/Transcription disclaimer: Much of this encounter note is an electronic transcription/translation of spoken language to printed text. The electronic translation of spoken language may permit erroneous, or at times, nonsensical words or phrases to be inadvertently transcribed; although I have reviewed the note for such errors, some may still exist.

## 2024-09-13 ENCOUNTER — HOSPITAL ENCOUNTER (OUTPATIENT)
Facility: HOSPITAL | Age: 74
Setting detail: HOSPITAL OUTPATIENT SURGERY
Discharge: HOME | End: 2024-09-13
Attending: HOSPITALIST | Admitting: HOSPITALIST
Payer: MEDICARE

## 2024-09-13 VITALS
WEIGHT: 223.6 LBS | SYSTOLIC BLOOD PRESSURE: 149 MMHG | OXYGEN SATURATION: 91 % | HEIGHT: 71 IN | DIASTOLIC BLOOD PRESSURE: 87 MMHG | BODY MASS INDEX: 31.3 KG/M2 | RESPIRATION RATE: 10 BRPM | HEART RATE: 62 BPM

## 2024-09-13 DIAGNOSIS — R93.1 ABNORMAL FINDINGS ON DIAGNOSTIC IMAGING OF HEART AND CORONARY CIRCULATION: ICD-10-CM

## 2024-09-13 DIAGNOSIS — J84.10 PULMONARY FIBROSIS: ICD-10-CM

## 2024-09-13 DIAGNOSIS — R06.02 SHORTNESS OF BREATH: ICD-10-CM

## 2024-09-13 LAB
ALBUMIN SERPL-MCNC: 4 G/DL (ref 3.5–5.2)
ALBUMIN/GLOB SERPL: 1.1 G/DL
ALP SERPL-CCNC: 89 U/L (ref 39–117)
ALT SERPL W P-5'-P-CCNC: 23 U/L (ref 1–41)
ANION GAP SERPL CALCULATED.3IONS-SCNC: 11 MMOL/L (ref 5–15)
ARTERIAL PATENCY WRIST A: NORMAL
AST SERPL-CCNC: 27 U/L (ref 1–40)
ATMOSPHERIC PRESS: 751 MMHG
ATMOSPHERIC PRESS: NORMAL MM[HG]
BASE EXCESS BLDA CALC-SCNC: NORMAL MMOL/L
BASE EXCESS BLDV CALC-SCNC: 4.4 MMOL/L (ref 0–2)
BASE EXCESS BLDV CALC-SCNC: 4.5 MMOL/L (ref 0–2)
BASE EXCESS BLDV CALC-SCNC: 4.7 MMOL/L (ref 0–2)
BASE EXCESS BLDV CALC-SCNC: 4.9 MMOL/L (ref 0–2)
BDY SITE: ABNORMAL
BDY SITE: NORMAL
BILIRUB SERPL-MCNC: 0.7 MG/DL (ref 0–1.2)
BODY TEMPERATURE: 37
BODY TEMPERATURE: NORMAL
BUN SERPL-MCNC: 19 MG/DL (ref 8–23)
BUN/CREAT SERPL: 18.6 (ref 7–25)
CA-I BLD-MCNC: 4.42 MG/DL (ref 4.6–5.4)
CA-I BLD-MCNC: NORMAL MG/DL
CALCIUM SPEC-SCNC: 9 MG/DL (ref 8.6–10.5)
CHLORIDE SERPL-SCNC: 102 MMOL/L (ref 98–107)
CO2 SERPL-SCNC: 27 MMOL/L (ref 22–29)
COHGB MFR BLD: 1.3 % (ref 0–5)
COHGB MFR BLD: 1.3 % (ref 0–5)
COHGB MFR BLD: 1.4 % (ref 0–5)
COHGB MFR BLD: 1.4 % (ref 0–5)
COHGB MFR BLD: NORMAL %
CREAT SERPL-MCNC: 1.02 MG/DL (ref 0.76–1.27)
DEPRECATED RDW RBC AUTO: 41.2 FL (ref 37–54)
EGFRCR SERPLBLD CKD-EPI 2021: 77.1 ML/MIN/1.73
ERYTHROCYTE [DISTWIDTH] IN BLOOD BY AUTOMATED COUNT: 12.9 % (ref 12.3–15.4)
GLOBULIN UR ELPH-MCNC: 3.5 GM/DL
GLUCOSE SERPL-MCNC: 109 MG/DL (ref 65–99)
HCO3 BLDA-SCNC: NORMAL MMOL/L
HCO3 BLDV-SCNC: 30.4 MMOL/L (ref 22–28)
HCO3 BLDV-SCNC: 30.6 MMOL/L (ref 22–28)
HCO3 BLDV-SCNC: 30.9 MMOL/L (ref 22–28)
HCO3 BLDV-SCNC: 31.1 MMOL/L (ref 22–28)
HCT VFR BLD AUTO: 47.2 % (ref 37.5–51)
HCT VFR BLD CALC: NORMAL %
HGB BLD-MCNC: 16.2 G/DL (ref 13–17.7)
HGB BLDA-MCNC: 15.7 G/DL (ref 14–18)
HGB BLDA-MCNC: 15.8 G/DL (ref 14–18)
HGB BLDA-MCNC: 15.8 G/DL (ref 14–18)
HGB BLDA-MCNC: 15.9 G/DL (ref 14–18)
HGB BLDA-MCNC: NORMAL G/DL
Lab: ABNORMAL
Lab: ABNORMAL
Lab: NORMAL
MCH RBC QN AUTO: 30.1 PG (ref 26.6–33)
MCHC RBC AUTO-ENTMCNC: 34.3 G/DL (ref 31.5–35.7)
MCV RBC AUTO: 87.6 FL (ref 79–97)
METHGB BLD QL: 0.3 % (ref 0–3)
METHGB BLD QL: 0.4 % (ref 0–3)
METHGB BLD QL: NORMAL
MODALITY: ABNORMAL
MODALITY: NORMAL
NT-PROBNP SERPL-MCNC: 99.5 PG/ML (ref 0–900)
OXYHGB MFR BLDV: 73.6 % (ref 60–85)
OXYHGB MFR BLDV: 73.6 % (ref 60–85)
OXYHGB MFR BLDV: 73.7 % (ref 60–85)
OXYHGB MFR BLDV: 73.8 % (ref 60–85)
OXYHGB MFR BLDV: NORMAL %
PCO2 BLDA: NORMAL MM[HG]
PCO2 BLDV: 48.8 MM HG (ref 41–51)
PCO2 BLDV: 49.7 MM HG (ref 41–51)
PCO2 BLDV: 50.2 MM HG (ref 41–51)
PCO2 BLDV: 50.3 MM HG (ref 41–51)
PCO2 TEMP ADJ BLD: NORMAL MM[HG]
PH BLDA: NORMAL [PH]
PH BLDV: 7.4 PH UNITS (ref 7.32–7.42)
PH, TEMP CORRECTED: NORMAL
PLATELET # BLD AUTO: 248 10*3/MM3 (ref 140–450)
PMV BLD AUTO: 8.8 FL (ref 6–12)
PO2 BLDA: NORMAL MM[HG]
PO2 BLDV: 39.6 MM HG (ref 27–53)
PO2 BLDV: 39.8 MM HG (ref 27–53)
PO2 BLDV: 39.9 MM HG (ref 27–53)
PO2 BLDV: 39.9 MM HG (ref 27–53)
PO2 TEMP ADJ BLD: NORMAL MM[HG]
POTASSIUM BLDA-SCNC: NORMAL MMOL/L
POTASSIUM BLDV-SCNC: 3.4 MMOL/L (ref 3.5–5.2)
POTASSIUM BLDV-SCNC: 3.4 MMOL/L (ref 3.5–5.2)
POTASSIUM BLDV-SCNC: 3.5 MMOL/L (ref 3.5–5.2)
POTASSIUM BLDV-SCNC: 3.5 MMOL/L (ref 3.5–5.2)
POTASSIUM SERPL-SCNC: 3.6 MMOL/L (ref 3.5–5.2)
PROT SERPL-MCNC: 7.5 G/DL (ref 6–8.5)
RBC # BLD AUTO: 5.39 10*6/MM3 (ref 4.14–5.8)
SAO2 % BLDCOA: NORMAL %
SAO2 % BLDCOV: 74.9 % (ref 45–75)
SAO2 % BLDCOV: 74.9 % (ref 45–75)
SAO2 % BLDCOV: 75 % (ref 45–75)
SAO2 % BLDCOV: 75.1 % (ref 45–75)
SODIUM BLDA-SCNC: NORMAL MMOL/L
SODIUM BLDV-SCNC: 141 MMOL/L (ref 136–145)
SODIUM BLDV-SCNC: 142 MMOL/L (ref 136–145)
SODIUM SERPL-SCNC: 140 MMOL/L (ref 136–145)
VENTILATOR MODE: ABNORMAL
VENTILATOR MODE: NORMAL
WBC NRBC COR # BLD AUTO: 5.76 10*3/MM3 (ref 3.4–10.8)

## 2024-09-13 PROCEDURE — 99153 MOD SED SAME PHYS/QHP EA: CPT | Performed by: HOSPITALIST

## 2024-09-13 PROCEDURE — C1751 CATH, INF, PER/CENT/MIDLINE: HCPCS | Performed by: HOSPITALIST

## 2024-09-13 PROCEDURE — 80053 COMPREHEN METABOLIC PANEL: CPT | Performed by: HOSPITALIST

## 2024-09-13 PROCEDURE — 25010000002 FENTANYL CITRATE (PF) 50 MCG/ML SOLUTION: Performed by: HOSPITALIST

## 2024-09-13 PROCEDURE — 82805 BLOOD GASES W/O2 SATURATION: CPT

## 2024-09-13 PROCEDURE — 25010000002 MIDAZOLAM HCL (PF) 5 MG/5ML SOLUTION: Performed by: HOSPITALIST

## 2024-09-13 PROCEDURE — 83880 ASSAY OF NATRIURETIC PEPTIDE: CPT | Performed by: HOSPITALIST

## 2024-09-13 PROCEDURE — 93451 RIGHT HEART CATH: CPT | Performed by: HOSPITALIST

## 2024-09-13 PROCEDURE — 85027 COMPLETE CBC AUTOMATED: CPT | Performed by: HOSPITALIST

## 2024-09-13 PROCEDURE — 82820 HEMOGLOBIN-OXYGEN AFFINITY: CPT | Performed by: HOSPITALIST

## 2024-09-13 PROCEDURE — 82805 BLOOD GASES W/O2 SATURATION: CPT | Performed by: HOSPITALIST

## 2024-09-13 PROCEDURE — 82820 HEMOGLOBIN-OXYGEN AFFINITY: CPT

## 2024-09-13 PROCEDURE — 93463 DRUG ADMIN & HEMODYNMIC MEAS: CPT | Performed by: HOSPITALIST

## 2024-09-13 PROCEDURE — 99152 MOD SED SAME PHYS/QHP 5/>YRS: CPT | Performed by: HOSPITALIST

## 2024-09-13 PROCEDURE — C1894 INTRO/SHEATH, NON-LASER: HCPCS | Performed by: HOSPITALIST

## 2024-09-13 PROCEDURE — 94799 UNLISTED PULMONARY SVC/PX: CPT

## 2024-09-13 RX ORDER — LIDOCAINE HYDROCHLORIDE 20 MG/ML
INJECTION, SOLUTION INFILTRATION; PERINEURAL
Status: DISCONTINUED | OUTPATIENT
Start: 2024-09-13 | End: 2024-09-13 | Stop reason: HOSPADM

## 2024-09-13 RX ORDER — FENTANYL CITRATE 50 UG/ML
INJECTION, SOLUTION INTRAMUSCULAR; INTRAVENOUS
Status: DISCONTINUED | OUTPATIENT
Start: 2024-09-13 | End: 2024-09-13 | Stop reason: HOSPADM

## 2024-09-13 RX ORDER — MIDAZOLAM HYDROCHLORIDE 5 MG/5ML
INJECTION, SOLUTION INTRAMUSCULAR; INTRAVENOUS
Status: DISCONTINUED | OUTPATIENT
Start: 2024-09-13 | End: 2024-09-13 | Stop reason: HOSPADM

## 2024-09-13 RX ORDER — SODIUM CHLORIDE 0.9 % (FLUSH) 0.9 %
10 SYRINGE (ML) INJECTION EVERY 12 HOURS SCHEDULED
Status: DISCONTINUED | OUTPATIENT
Start: 2024-09-13 | End: 2024-09-13 | Stop reason: HOSPADM

## 2024-09-13 RX ORDER — SODIUM CHLORIDE 9 MG/ML
40 INJECTION, SOLUTION INTRAVENOUS AS NEEDED
Status: DISCONTINUED | OUTPATIENT
Start: 2024-09-13 | End: 2024-09-13 | Stop reason: HOSPADM

## 2024-09-13 RX ORDER — SODIUM CHLORIDE 0.9 % (FLUSH) 0.9 %
10 SYRINGE (ML) INJECTION AS NEEDED
Status: DISCONTINUED | OUTPATIENT
Start: 2024-09-13 | End: 2024-09-13 | Stop reason: HOSPADM

## 2024-09-13 RX ORDER — NITRIC ACID 65% TO 70%
1 LIQUID (ML) MISCELLANEOUS ONCE
Status: COMPLETED | OUTPATIENT
Start: 2024-09-13 | End: 2024-09-13

## 2024-09-13 RX ADMIN — Medication 1 EACH: at 10:55

## 2024-09-13 NOTE — Clinical Note
A 7 fr sheath was successfully inserted using micropuncture technique with ultrasound guidance into the right internal jugular vein. Sheath insertion not delayed.

## 2024-09-13 NOTE — INTERVAL H&P NOTE
H&P reviewed. The patient was examined and there are no changes to the H&P.  Proceeding with [facility] as planned; risks/benefits of the procedure were discussed with the patient and informed consent obtained.

## 2024-09-13 NOTE — SIGNIFICANT NOTE
09/13/24 1055   Inhaled Nitric Oxide (Jeff)   $ Monitor During Nitric Use yes   Therapy Started (Jeff) 1055   Therapy Stopped (Jeff) 1115   Set Nitric Oxide (ppm) 20   Measured Nitric Oxide (ppm) 19   Delivery Method (Jeff) nasal cannula

## 2024-09-13 NOTE — Clinical Note
Manual pressure applied to vessel. Manual pressure was held by Augusto. Manual pressure was held for 15 min. Hemostasis achieved successfully.

## 2024-10-03 ENCOUNTER — OFFICE VISIT (OUTPATIENT)
Dept: PRIMARY CARE CLINIC | Age: 74
End: 2024-10-03
Payer: MEDICARE

## 2024-10-03 ENCOUNTER — HOSPITAL ENCOUNTER (OUTPATIENT)
Dept: GENERAL RADIOLOGY | Age: 74
Discharge: HOME | End: 2024-10-03
Payer: MEDICARE

## 2024-10-03 VITALS
SYSTOLIC BLOOD PRESSURE: 126 MMHG | OXYGEN SATURATION: 94 % | TEMPERATURE: 100.1 F | HEART RATE: 95 BPM | DIASTOLIC BLOOD PRESSURE: 74 MMHG

## 2024-10-03 DIAGNOSIS — R50.9 FEVER, UNSPECIFIED FEVER CAUSE: ICD-10-CM

## 2024-10-03 DIAGNOSIS — R05.1 ACUTE COUGH: ICD-10-CM

## 2024-10-03 DIAGNOSIS — R05.1 ACUTE COUGH: Primary | ICD-10-CM

## 2024-10-03 PROCEDURE — 71046 X-RAY EXAM CHEST 2 VIEWS: CPT

## 2024-10-03 PROCEDURE — 96372 THER/PROPH/DIAG INJ SC/IM: CPT | Performed by: FAMILY MEDICINE

## 2024-10-03 PROCEDURE — 3078F DIAST BP <80 MM HG: CPT | Performed by: FAMILY MEDICINE

## 2024-10-03 PROCEDURE — 1123F ACP DISCUSS/DSCN MKR DOCD: CPT | Performed by: FAMILY MEDICINE

## 2024-10-03 PROCEDURE — 3074F SYST BP LT 130 MM HG: CPT | Performed by: FAMILY MEDICINE

## 2024-10-03 PROCEDURE — 87428 SARSCOV & INF VIR A&B AG IA: CPT | Performed by: FAMILY MEDICINE

## 2024-10-03 PROCEDURE — 99213 OFFICE O/P EST LOW 20 MIN: CPT | Performed by: FAMILY MEDICINE

## 2024-10-03 RX ORDER — CEFDINIR 300 MG/1
300 CAPSULE ORAL 2 TIMES DAILY
Qty: 20 CAPSULE | Refills: 0 | Status: SHIPPED | OUTPATIENT
Start: 2024-10-03 | End: 2024-10-13

## 2024-10-03 RX ORDER — DEXAMETHASONE SODIUM PHOSPHATE 10 MG/ML
10 INJECTION INTRAMUSCULAR; INTRAVENOUS ONCE
Status: COMPLETED | OUTPATIENT
Start: 2024-10-03 | End: 2024-10-03

## 2024-10-03 RX ADMIN — DEXAMETHASONE SODIUM PHOSPHATE 10 MG: 10 INJECTION INTRAMUSCULAR; INTRAVENOUS at 14:23

## 2024-10-03 ASSESSMENT — ENCOUNTER SYMPTOMS
SHORTNESS OF BREATH: 1
DIARRHEA: 0
COUGH: 1
CONSTIPATION: 0
NAUSEA: 0
ANAL BLEEDING: 0
CHEST TIGHTNESS: 0
SINUS PAIN: 1
RHINORRHEA: 1
ABDOMINAL PAIN: 0
SINUS PRESSURE: 1

## 2024-10-03 NOTE — PROGRESS NOTES
tablet by mouth 2 times daily (Patient not taking: Reported on 6/20/2024) 180 tablet 3    B Complex-C (SUPER B COMPLEX PO) Take by mouth       Current Facility-Administered Medications   Medication Dose Route Frequency Provider Last Rate Last Admin    dexAMETHasone (DECADRON) injection 10 mg  10 mg IntraMUSCular Once            No follow-ups on file.     Discussed use, benefit, and side effects of prescribed medications.         History, Medications, Allergies     No Known Allergies  _______________________________________________________________  Past Medical History:   Diagnosis Date    Hypertension     Pulmonary fibrosis (HCC)      Past Surgical History:   Procedure Laterality Date    CHOLECYSTECTOMY      TONSILLECTOMY        Family History   Problem Relation Age of Onset    High Blood Pressure Mother     Diabetes Mother     High Blood Pressure Father     Heart Disease Father     Diabetes Father     Social History     Tobacco Use    Smoking status: Never    Smokeless tobacco: Never   Substance Use Topics    Alcohol use: No        _______________________________________________________________    Note dictated using Dragon Dictation software  Sometimes this dictation software makes erroneous transcriptions.

## 2024-10-08 NOTE — PROGRESS NOTES
Chief Complaint  Pulmonary fibrosis    Subjective    History of Present Illness {CC  Problem List  Visit Diagnosis   Encounters  Notes  Medications  Labs  Result Review Imaging  Media     Reid Morrell presents to [facility] for:    History of Present Illness  Mr. Morrell is here for follow up and management of pulmonary fibrosis. He had a ct chest completed this summer showing mild progression of fibrosis. PFT from this summer still showing moderate restriction however DLCO was reduced from prior PFT in 2022. Unfortunately we were not able to complete spirometry and DLCO today due to recent viral pneumonia 2 weeks ago.  He was treated at [facility] for influenza.  He did qualify for supplemental oxygen in July 2024.  He has been keeping track of his oxygen saturations at home which have been scanned into media.  Oxygen saturation when he is exerting is ranging from 87 to 89% on room air.  He is using supplemental oxygen at 2 L/min as needed with exertion.  He does feel like his breathing was gradually worsening prior to becoming ill a couple of weeks ago.  He is getting more short of breath easier.  He has followed up with cardiology and had a right heart cath completed in September 2024 which did not meet full criteria for PAH.  He has follow-up with rheumatology next month.  He does tell me he is experiencing daytime drowsiness, frequent naps when he sits still he always nods off and snoring.       Prior to Admission medications    Medication Sig Start Date End Date Taking? Authorizing Provider   albuterol (PROVENTIL) (2.5 MG/3ML) 0.083% nebulizer solution  7/11/24   ProviderDarinel MD   amLODIPine (NORVASC) 10 MG tablet Take 1 tablet by mouth Daily.    ProviderDarinel MD   atorvastatin (LIPITOR) 10 MG tablet Take 1 tablet by mouth Daily. 8/6/24   Marlon Pichardo MD   colestipol (COLESTID) 1 g tablet Take 1 tablet by mouth 2 (Two)  "Times a Day.    Darinel Orourke MD   famotidine (PEPCID) 20 MG tablet Take 1 tablet by mouth Take As Directed. When taking mobic  Mon, Wed, Fri    Darinel Orourke MD   guaiFENesin (MUCINEX) 600 MG 12 hr tablet Take 2 tablets by mouth Every 12 (Twelve) Hours. 3/15/23   Claudia White APRN   hydrochlorothiazide (HYDRODIURIL) 25 MG tablet Take 1 tablet by mouth Daily.    Emergency, Nurse MARAL Yousif   losartan (COZAAR) 100 MG tablet Take 1 tablet by mouth Daily.    Emergency, Nurse Epic, RN   meloxicam (MOBIC) 15 MG tablet Take 1 tablet by mouth Daily.    Emergency, Nurse MARAL Yousif   sildenafil (VIAGRA) 100 MG tablet Take 1 tablet by mouth Daily As Needed for Erectile Dysfunction.    Darinel Orourke MD   Ventolin  (90 Base) MCG/ACT inhaler  3/21/23   Darinel Orourke MD       Social History     Socioeconomic History    Marital status:    Tobacco Use    Smoking status: Never     Passive exposure: Never    Smokeless tobacco: Never   Vaping Use    Vaping status: Never Used   Substance and Sexual Activity    Alcohol use: No    Drug use: No    Sexual activity: Yes     Partners: Female     Birth control/protection: None       Objective   Vital Signs:   /82   Pulse 79   Ht 180.3 cm (71\")   Wt 101 kg (222 lb 9.6 oz)   SpO2 92% Comment: RA  BMI 31.05 kg/m²     Physical Exam  Constitutional:       General: He is not in acute distress.  HENT:      Head: Normocephalic.      Nose: Nose normal.      Mouth/Throat:      Mouth: Mucous membranes are moist.   Eyes:      General: No scleral icterus.  Cardiovascular:      Rate and Rhythm: Normal rate.   Pulmonary:      Effort: No respiratory distress.      Breath sounds: Rales (bibasilar) present.   Abdominal:      General: There is no distension.   Neurological:      Mental Status: He is alert and oriented to person, place, and time.   Psychiatric:         Mood and Affect: Mood normal.         Behavior: Behavior is cooperative.        Result " Review :{ Labs  Result Review  Imaging  Med Tab  Media :    PFT Values          12/20/2022    16:00 6/17/2024    10:00   Pre Drug PFT Results   FVC 67 62   FEV1 76 74   FEF 25-75% 130 141   FEV1/FVC 85 89   Other Tests PFT Results   DLCO 83 68   D/VAsb 119 97     My interpretation of the PFT : Moderate restriction, reduced DLCO    Results for orders placed in visit on 06/17/24    Spirometry with Diffusion Capacity    Narrative  Spirometry with Diffusion Capacity    Performed by: Candi Shaw, RRT  Authorized by: Nash Pisano MD  Pre Drug % Predicted  FVC: 62%  FEV1: 74%  FEF 25-75%: 141%  FEV1/FVC: 89%  DLCO: 68%  D/VAsb: 97%    Interpretation  Spirometry  Spirometry shows moderate restriction. midflow is normal.  Diffusion Capacity  The patient's diffusion capacity is mildly reduced.  Diffusion capacity is normal when corrected for alveolar volume.  Overall comments: DLCO has decreased and fvc has decreased slightly since prior study in 2022      Results for orders placed in visit on 12/20/22    Pulmonary Function Test    Narrative  Pulmonary Function Test  Performed by: Chriss Faust CMA  Authorized by: Nash Pisano MD    Pre Drug % Predicted  FVC: 67%  FEV1: 76%  FEF 25-75%: 130%  FEV1/FVC: 85%  DLCO: 83%  D/VAsb: 119%    Interpretation  Spirometry  Spirometry shows moderate restriction. midflow is normal.  Diffusion Capacity  The patient's diffusion capacity is normal.  Diffusion capacity is normal when corrected for alveolar volume.    stable compared with most recent study  Electronically signed by Nash Pisano MD, 12/20/2022, 18:25 CST      Results for orders placed in visit on 08/10/22    Pulmonary Function Test    Narrative  Pulmonary Function Test  Performed by: Candi Shaw, RRT  Authorized by: Nash Pisano MD    Pre Drug % Predicted  FVC: 67%  FEV1: 76%  FEF 25-75%: 120%  FEV1/FVC: 86%  DLCO: 80%  D/VAsb: 110%    Interpretation  Spirometry  Spirometry  shows moderate restriction. midflow is normal.  Diffusion Capacity  The patient's diffusion capacity is normal.  Diffusion capacity is normal when corrected for alveolar volume.  Overall comments: svc and erv mildly reduced    Rest/Exercise Pulse Ox Values          7/15/2024    09:00   Rest/Exercise Pulse Ox Results   Rest room air SAT % 94   Exercise room air SAT % 88   Rest on O2 @ Liters 2   Rest on O2 SAT % 96   Exercise on O2 @ Liters 2   Exercise on O2 SAT % 95              CT Chest Without Contrast Diagnostic (07/03/2024 14:12)     My interpretation of labs: Lung nodule, stable.  Interstitial lung disease with mild progression compared to prior imaging.  Bibasilar honeycombing, traction bronchiectasis, mild peripheral reticulation      Assessment and Plan {CC Problem List  Visit Diagnosis  ROS  Review (Popup)  [employer] Maintenance  Quality  BestPractice  Medications  SmartSets  SnapShot Encounters  Media      Diagnoses and all orders for this visit:    1. Pulmonary fibrosis (Primary)  Comments:  Progressive.  Initiate Ofev.  Orders:  -     Overnight Sleep Oximetry Study; Future    2. Restrictive lung disease  Comments:  Unable to complete spirometry today due to recent illness.  Slightly worse over the summer compared to prior.  Will follow-up FVL in 1 month    3. Chronic respiratory failure with hypoxia  Comments:  Continue supplemental oxygen at 2 L/min with exertion for which he is compliant and benefiting          Patient with progressive pulmonary fibrosis of unknown etiology.  IPF versus autoimmune cause.  CTD labs completed at last visit and he has been referred to rheumatology for further evaluation.  We did discuss Ofev over the summer and again today.  Given symptom progression, worsening DLCO at last visit and need for supplemental oxygen now would recommend initiation of Ofev.  Will obtain hepatic panel 1 month after initiation of Ofev and have office follow-up at that time.  Baseline  hepatic panel collected in July 2024.  Continue supplemental oxygen as needed with exertional activities for which he is compliant and benefiting.  Call sooner if needed.    Romy Valera, APRN  10/15/2024  09:36 CDT    Follow Up {Instructions Charge Capture  Follow-up Communications   Return in about 1 month (around 11/15/2024).    Patient was given instructions and counseling regarding his condition or for health maintenance advice. Please see specific information pulled into the AVS if appropriate.

## 2024-10-15 ENCOUNTER — PROCEDURE VISIT (OUTPATIENT)
Dept: PULMONOLOGY | Facility: CLINIC | Age: 74
End: 2024-10-15
Payer: MEDICARE

## 2024-10-15 ENCOUNTER — NURSE ONLY (OUTPATIENT)
Dept: PRIMARY CARE CLINIC | Age: 74
End: 2024-10-15
Payer: MEDICARE

## 2024-10-15 ENCOUNTER — OFFICE VISIT (OUTPATIENT)
Dept: PULMONOLOGY | Facility: CLINIC | Age: 74
End: 2024-10-15
Payer: MEDICARE

## 2024-10-15 VITALS
BODY MASS INDEX: 31.16 KG/M2 | OXYGEN SATURATION: 92 % | DIASTOLIC BLOOD PRESSURE: 82 MMHG | HEIGHT: 71 IN | WEIGHT: 222.6 LBS | HEART RATE: 79 BPM | SYSTOLIC BLOOD PRESSURE: 128 MMHG

## 2024-10-15 DIAGNOSIS — J98.4 RESTRICTIVE LUNG DISEASE: Chronic | ICD-10-CM

## 2024-10-15 DIAGNOSIS — J96.11 CHRONIC RESPIRATORY FAILURE WITH HYPOXIA: Chronic | ICD-10-CM

## 2024-10-15 DIAGNOSIS — Z23 NEED FOR INFLUENZA VACCINATION: Primary | ICD-10-CM

## 2024-10-15 DIAGNOSIS — J98.4 RESTRICTIVE LUNG DISEASE: Primary | ICD-10-CM

## 2024-10-15 DIAGNOSIS — J84.10 PULMONARY FIBROSIS: Primary | Chronic | ICD-10-CM

## 2024-10-15 PROCEDURE — G0008 ADMIN INFLUENZA VIRUS VAC: HCPCS | Performed by: FAMILY MEDICINE

## 2024-10-15 PROCEDURE — 99214 OFFICE O/P EST MOD 30 MIN: CPT | Performed by: NURSE PRACTITIONER

## 2024-10-15 PROCEDURE — 90653 IIV ADJUVANT VACCINE IM: CPT | Performed by: FAMILY MEDICINE

## 2024-10-15 PROCEDURE — 1160F RVW MEDS BY RX/DR IN RCRD: CPT | Performed by: NURSE PRACTITIONER

## 2024-10-15 PROCEDURE — 3079F DIAST BP 80-89 MM HG: CPT | Performed by: NURSE PRACTITIONER

## 2024-10-15 PROCEDURE — 1159F MED LIST DOCD IN RCRD: CPT | Performed by: NURSE PRACTITIONER

## 2024-10-15 PROCEDURE — 3074F SYST BP LT 130 MM HG: CPT | Performed by: NURSE PRACTITIONER

## 2024-10-17 DIAGNOSIS — J84.10 PULMONARY FIBROSIS: Chronic | ICD-10-CM

## 2024-10-18 DIAGNOSIS — G47.33 OSA (OBSTRUCTIVE SLEEP APNEA): Primary | ICD-10-CM

## 2024-10-21 NOTE — PROGRESS NOTES
Test results discussed with patient.  Patient voiced understanding.  Patient is aware to wear his oxygen and that a sleep study order has been placed.

## 2024-10-24 ENCOUNTER — TELEPHONE (OUTPATIENT)
Dept: PULMONOLOGY | Facility: CLINIC | Age: 74
End: 2024-10-24
Payer: MEDICARE

## 2024-11-12 ENCOUNTER — OFFICE VISIT (OUTPATIENT)
Dept: CARDIOLOGY | Facility: CLINIC | Age: 74
End: 2024-11-12
Payer: MEDICARE

## 2024-11-12 VITALS
HEART RATE: 61 BPM | WEIGHT: 220 LBS | SYSTOLIC BLOOD PRESSURE: 126 MMHG | HEIGHT: 71 IN | OXYGEN SATURATION: 92 % | DIASTOLIC BLOOD PRESSURE: 74 MMHG | BODY MASS INDEX: 30.8 KG/M2

## 2024-11-12 DIAGNOSIS — I10 ESSENTIAL HYPERTENSION: ICD-10-CM

## 2024-11-12 DIAGNOSIS — I25.10 CORONARY ARTERY CALCIFICATION SEEN ON CT SCAN: Primary | ICD-10-CM

## 2024-11-12 DIAGNOSIS — R06.02 SHORTNESS OF BREATH: ICD-10-CM

## 2024-11-12 PROCEDURE — 3078F DIAST BP <80 MM HG: CPT | Performed by: HOSPITALIST

## 2024-11-12 PROCEDURE — 93000 ELECTROCARDIOGRAM COMPLETE: CPT | Performed by: HOSPITALIST

## 2024-11-12 PROCEDURE — 99214 OFFICE O/P EST MOD 30 MIN: CPT | Performed by: HOSPITALIST

## 2024-11-12 PROCEDURE — 3074F SYST BP LT 130 MM HG: CPT | Performed by: HOSPITALIST

## 2024-11-12 RX ORDER — NINTEDANIB 100 MG/1
100 CAPSULE ORAL 2 TIMES DAILY
COMMUNITY
Start: 2024-10-30

## 2024-11-12 NOTE — PROGRESS NOTES
Reason For Visit:  Dyspnea on exertion, coronary calcification, hypertension    Subjective        Reid Morrell is a 74 y.o. male with the below pertinent PMH who presents for follow-up of the above issues.    Reid Morrell was most recently seen by me in clinic 9/10/2024 at which time he reported stable dyspnea on exertion.  After discussion of options, he favored proceeding with RHC to assess for ILD associated pulmonary hypertension..    Today, the patient reports that he has been reasonably stable.  His RHC did not show clear evidence of pulmonary hypertension.  He subsequently followed up with pulmonology who has started Ofev.  He has been on it for about 2 weeks and states that he has tolerated it pretty well for the most part; he had diarrhea on 1 occasion.  He does continue to have stable dyspnea on exertion.  He denies exertional chest pain and also has not had significant issues with palpitations, lightheadedness, or peripheral edema.    ROS: Pertinent findings are included above.    Cardiac Studies  Noncontrast CT chest 7/30/2024: I personally did a focused review of the study, which shows moderate coronary artery calcification mostly in the proximal and mid LAD.  Echo 9/4/2024: LVEF 56-60%, normal diastolic function, RV mildly dilated with normal systolic function, LA mildly dilated, no significant valvular stenosis or regurgitation  RHC 9/13/2024: 1. Mildly elevated pulmonary artery and right ventricular systolic pressure not meeting full criteria for pulmonary arterial hypertension given normal PVR. 2. RA (mean) 5, RV (s/edp): 42/6, PA (s/d/mean) 42/17/25, PCWP (mean) 12, Qian CO/CI 7.15/3.26 with PVR 1.8 RICCI, TD CO/CI 7.21/3.29 with PVR 1.8 RICCI. 3. PA (s/d/mean) 41/17/25, PCWP (mean) 14, Qian CO/CI 7.55/3.44 with PVR 1.5 RICCI, TD CO/CI 7.14/3.25 with PVR 1.5 RICCI.     Pertinent PMH  Pulmonary fibrosis with exertional hypoxia  Coronary artery calcification  Hypertension  Lupus diagnosed at age 34 but  "reportedly has not been active    Pertinent past medical, surgical, family, and social history were reviewed.      Current Outpatient Medications:     albuterol (PROVENTIL) (2.5 MG/3ML) 0.083% nebulizer solution, , Disp: , Rfl:     amLODIPine (NORVASC) 10 MG tablet, Take 1 tablet by mouth Daily., Disp: , Rfl:     atorvastatin (LIPITOR) 10 MG tablet, Take 1 tablet by mouth Daily., Disp: 90 tablet, Rfl: 3    colestipol (COLESTID) 1 g tablet, Take 1 tablet by mouth 2 (Two) Times a Day., Disp: , Rfl:     famotidine (PEPCID) 20 MG tablet, Take 1 tablet by mouth 2 (Two) Times a Day. When taking mobic Mon, Wed, Fri, Disp: , Rfl:     guaiFENesin (MUCINEX) 600 MG 12 hr tablet, Take 2 tablets by mouth Every 12 (Twelve) Hours., Disp: 20 tablet, Rfl: 0    hydrochlorothiazide (HYDRODIURIL) 25 MG tablet, Take 1 tablet by mouth Daily., Disp: , Rfl:     losartan (COZAAR) 100 MG tablet, Take 1 tablet by mouth Daily., Disp: , Rfl:     meloxicam (MOBIC) 15 MG tablet, Take 1 tablet by mouth Daily. (Patient taking differently: Take 1 tablet by mouth Every Other Day.), Disp: , Rfl:     O2 (OXYGEN), Inhale 2 L/min As Needed. Legacy, Disp: , Rfl:     Ofev 100 MG capsule, Take 1 capsule by mouth 2 (Two) Times a Day., Disp: , Rfl:     sildenafil (VIAGRA) 100 MG tablet, Take 1 tablet by mouth Daily As Needed for Erectile Dysfunction., Disp: , Rfl:     Ventolin  (90 Base) MCG/ACT inhaler, , Disp: , Rfl:      Objective   Vital Signs:  /74   Pulse 61   Ht 180.3 cm (71\")   Wt 99.8 kg (220 lb)   SpO2 92%   BMI 30.68 kg/m²   Estimated body mass index is 30.68 kg/m² as calculated from the following:    Height as of this encounter: 180.3 cm (71\").    Weight as of this encounter: 99.8 kg (220 lb).      Constitutional:       Appearance: Healthy appearance. Not in distress.   Neck:      Vascular: JVD normal.   Pulmonary:      Comments: Bilateral lower lobe predominant pulmonary crackles with normal work of breathing  Cardiovascular:    "   Normal rate. Regular rhythm.      Murmurs: There is no murmur.      No gallop.  No click. No rub.   Edema:     Peripheral edema absent.   Abdominal:      General: There is no distension.      Palpations: Abdomen is soft.      Tenderness: There is no abdominal tenderness.   Skin:     General: Skin is warm and dry.   Neurological:      Mental Status: Alert and oriented to person, place and time.        Result Review :             ECG 12 Lead    Date/Time: 11/12/2024 10:01 AM  Performed by: Marlon Pichardo MD    Authorized by: Marlon Pichardo MD  Comparison: compared with previous ECG from 9/10/2024  Comparison to previous ECG: IVCD has replaced iRBBB  Rhythm: sinus rhythm  Rate: normal  BPM: 61  Conduction: non-specific intraventricular conduction delay  QRS axis: left    Clinical impression: abnormal EKG            Assessment and Plan   Diagnoses and all orders for this visit:    1. Coronary artery calcification seen on CT scan (Primary)  -     Comprehensive Metabolic Panel; Future  -     Adult Stress Echo W/ Cont or Stress Agent if Necessary Per Protocol; Future    2. Shortness of breath  -     Adult Stress Echo W/ Cont or Stress Agent if Necessary Per Protocol; Future    3. Essential hypertension    Other orders  -     ECG 12 Lead      BP well-controlled.  Dyspnea stable.  We did discuss that his shortness of breath is likely related to his underlying lung disease.  That being said, he does have moderate coronary calcification in the LAD distribution, and we discussed the possibility that his dyspnea could be an anginal equivalent.  We discussed risks/benefits of monitoring for worsening symptoms versus proceeding with a stress test.  The patient elected to proceed with a stress test, so we will get 1 scheduled.  In the meantime, continue atorvastatin 10 mg daily, amlodipine 10 mg daily, losartan 100 mg daily, HCTZ 25 mg daily.  I am ordering a CMP that he will have collected in the next month.    Follow Up    Return in about 6 months (around 5/12/2025).  Patient was given instructions and counseling regarding his condition or for health maintenance advice. Please see specific information pulled into the AVS if appropriate.       EMR Dragon/Transcription disclaimer: Much of this encounter note is an electronic transcription/translation of spoken language to printed text. The electronic translation of spoken language may permit erroneous, or at times, nonsensical words or phrases to be inadvertently transcribed; although I have reviewed the note for such errors, some may still exist.

## 2024-11-19 ENCOUNTER — LAB (OUTPATIENT)
Dept: LAB | Facility: HOSPITAL | Age: 74
End: 2024-11-19
Payer: MEDICARE

## 2024-11-19 ENCOUNTER — TELEPHONE (OUTPATIENT)
Dept: CARDIOLOGY | Facility: CLINIC | Age: 74
End: 2024-11-19
Payer: MEDICARE

## 2024-11-19 DIAGNOSIS — I25.10 CORONARY ARTERY CALCIFICATION SEEN ON CT SCAN: ICD-10-CM

## 2024-11-19 LAB
ALBUMIN SERPL-MCNC: 4 G/DL (ref 3.5–5.2)
ALBUMIN/GLOB SERPL: 1.1 G/DL
ALP SERPL-CCNC: 86 U/L (ref 39–117)
ALT SERPL W P-5'-P-CCNC: 26 U/L (ref 1–41)
ANION GAP SERPL CALCULATED.3IONS-SCNC: 10 MMOL/L (ref 5–15)
AST SERPL-CCNC: 28 U/L (ref 1–40)
BILIRUB SERPL-MCNC: 1 MG/DL (ref 0–1.2)
BUN SERPL-MCNC: 22 MG/DL (ref 8–23)
BUN/CREAT SERPL: 20.8 (ref 7–25)
CALCIUM SPEC-SCNC: 9 MG/DL (ref 8.6–10.5)
CHLORIDE SERPL-SCNC: 98 MMOL/L (ref 98–107)
CO2 SERPL-SCNC: 29 MMOL/L (ref 22–29)
CREAT SERPL-MCNC: 1.06 MG/DL (ref 0.76–1.27)
EGFRCR SERPLBLD CKD-EPI 2021: 73.2 ML/MIN/1.73
GLOBULIN UR ELPH-MCNC: 3.6 GM/DL
GLUCOSE SERPL-MCNC: 98 MG/DL (ref 65–99)
POTASSIUM SERPL-SCNC: 3.7 MMOL/L (ref 3.5–5.2)
PROT SERPL-MCNC: 7.6 G/DL (ref 6–8.5)
SODIUM SERPL-SCNC: 137 MMOL/L (ref 136–145)

## 2024-11-19 PROCEDURE — 36415 COLL VENOUS BLD VENIPUNCTURE: CPT

## 2024-11-19 PROCEDURE — 80053 COMPREHEN METABOLIC PANEL: CPT

## 2024-11-19 NOTE — PROGRESS NOTES
Chief Complaint  Pulmonary fibrosis    Subjective    History of Present Illness {CC  Problem List  Visit Diagnosis   Encounters  Notes  Medications  Labs  Result Review Imaging  Media     Reid Morrell presents to [facility] for:    History of Present Illness  Mr. Morrell is here for follow up and management of pulmonary fibrosis. He had a ct chest completed this summer showing mild progression of fibrosis. PFT from this summer still showing moderate restriction however DLCO was reduced from prior PFT in 2022. He did qualify for supplemental oxygen in July 2024. He continues on 2l of oxygen for which he is compliant and benefiting. He has followed up with cardiology and had a right heart cath completed in September 2024 which did not meet full criteria for PAH. He also follows with rheumatology.  Saw Dr. Martinez last week and tells me he took a lot of blood and is keeping Sjogren's within his differential diagnoses.  He started Ofev in October 2024.  He tells me he is doing well with Ofev.  He had an episode of diarrhea 1 time.  Otherwise this has been well-tolerated.  He feels like his breathing is stable.       Prior to Admission medications    Medication Sig Start Date End Date Taking? Authorizing Provider   albuterol (PROVENTIL) (2.5 MG/3ML) 0.083% nebulizer solution  7/11/24   Darinel Orourke MD   amLODIPine (NORVASC) 10 MG tablet Take 1 tablet by mouth Daily.    Darinel Orourke MD   atorvastatin (LIPITOR) 10 MG tablet Take 1 tablet by mouth Daily. 8/6/24   Marlon Pichardo MD   colestipol (COLESTID) 1 g tablet Take 1 tablet by mouth 2 (Two) Times a Day.    Darinel Orourke MD   famotidine (PEPCID) 20 MG tablet Take 1 tablet by mouth 2 (Two) Times a Day. When taking mobic  Mon, Wed, Fri    Darinel Orourke MD   guaiFENesin (MUCINEX) 600 MG 12 hr tablet Take 2 tablets by mouth Every 12 (Twelve) Hours. 3/15/23   Christopher  "CANDACE Chiu   hydrochlorothiazide (HYDRODIURIL) 25 MG tablet Take 1 tablet by mouth Daily.    Emergency, Nurse MARAL Yousif   losartan (COZAAR) 100 MG tablet Take 1 tablet by mouth Daily.    Emergency, Nurse Epic, RN   meloxicam (MOBIC) 15 MG tablet Take 1 tablet by mouth Daily.  Patient taking differently: Take 1 tablet by mouth Every Other Day.    Emergency, Nurse MARAL Yousif   O2 (OXYGEN) Inhale 2 L/min As Needed. Legacy    ProviderDarinel MD   Ofev 100 MG capsule Take 1 capsule by mouth 2 (Two) Times a Day. 10/30/24   Darinel Orourke MD   sildenafil (VIAGRA) 100 MG tablet Take 1 tablet by mouth Daily As Needed for Erectile Dysfunction.    ProviderDarinel MD   Ventolin  (90 Base) MCG/ACT inhaler  3/21/23   ProviderDarinel MD       Social History     Socioeconomic History    Marital status:    Tobacco Use    Smoking status: Never     Passive exposure: Never    Smokeless tobacco: Never   Vaping Use    Vaping status: Never Used   Substance and Sexual Activity    Alcohol use: No    Drug use: No    Sexual activity: Yes     Partners: Female     Birth control/protection: None       Objective   Vital Signs:   /70   Pulse 76   Ht 180.3 cm (71\")   Wt 99.3 kg (219 lb)   SpO2 92% Comment: R/A  BMI 30.54 kg/m²     Physical Exam  Constitutional:       General: He is not in acute distress.  HENT:      Head: Normocephalic.      Nose: Nose normal.      Mouth/Throat:      Mouth: Mucous membranes are moist.   Eyes:      General: No scleral icterus.  Cardiovascular:      Rate and Rhythm: Normal rate.   Pulmonary:      Effort: No respiratory distress.      Breath sounds: Rales (bibasilar) present.   Abdominal:      General: There is no distension.   Neurological:      Mental Status: He is alert and oriented to person, place, and time.   Psychiatric:         Mood and Affect: Mood normal.         Behavior: Behavior is cooperative.        Result Review :{ Labs  Result Review  Imaging  Med " Tab  Media :    PFT Values          12/20/2022    16:00 6/17/2024    10:00   Pre Drug PFT Results   FVC 67 62   FEV1 76 74   FEF 25-75% 130 141   FEV1/FVC 85 89   Other Tests PFT Results   DLCO 83 68   D/VAsb 119 97         Results for orders placed in visit on 06/17/24    Spirometry with Diffusion Capacity    Narrative  Spirometry with Diffusion Capacity    Performed by: Candi Shaw, RRT  Authorized by: Nash Pisano MD  Pre Drug % Predicted  FVC: 62%  FEV1: 74%  FEF 25-75%: 141%  FEV1/FVC: 89%  DLCO: 68%  D/VAsb: 97%    Interpretation  Spirometry  Spirometry shows moderate restriction. midflow is normal.  Diffusion Capacity  The patient's diffusion capacity is mildly reduced.  Diffusion capacity is normal when corrected for alveolar volume.  Overall comments: DLCO has decreased and fvc has decreased slightly since prior study in 2022      Results for orders placed in visit on 12/20/22    Pulmonary Function Test    Narrative  Pulmonary Function Test  Performed by: Chriss Faust CMA  Authorized by: Nash Pisano MD    Pre Drug % Predicted  FVC: 67%  FEV1: 76%  FEF 25-75%: 130%  FEV1/FVC: 85%  DLCO: 83%  D/VAsb: 119%    Interpretation  Spirometry  Spirometry shows moderate restriction. midflow is normal.  Diffusion Capacity  The patient's diffusion capacity is normal.  Diffusion capacity is normal when corrected for alveolar volume.    stable compared with most recent study  Electronically signed by Nash Pisano MD, 12/20/2022, 18:25 CST      Results for orders placed in visit on 08/10/22    Pulmonary Function Test    Narrative  Pulmonary Function Test  Performed by: Candi Shaw, DENYS  Authorized by: Nash Pisano MD    Pre Drug % Predicted  FVC: 67%  FEV1: 76%  FEF 25-75%: 120%  FEV1/FVC: 86%  DLCO: 80%  D/VAsb: 110%    Interpretation  Spirometry  Spirometry shows moderate restriction. midflow is normal.  Diffusion Capacity  The patient's diffusion capacity is normal.   Diffusion capacity is normal when corrected for alveolar volume.  Overall comments: svc and erv mildly reduced    Rest/Exercise Pulse Ox Values          7/15/2024    09:00   Rest/Exercise Pulse Ox Results   Rest room air SAT % 94   Exercise room air SAT % 88   Rest on O2 @ Liters 2   Rest on O2 SAT % 96   Exercise on O2 @ Liters 2   Exercise on O2 SAT % 95                    Assessment and Plan {CC Problem List  Visit Diagnosis  ROS  Review (Popup)  Health Maintenance  Quality  BestPractice  Medications  SmartSets  SnapShot Encounters  Media      Diagnoses and all orders for this visit:    1. Pulmonary fibrosis (Primary)  Comments:  Continue Ofev.  Continue supplemental oxygen for which he is compliant and benefiting.  Albuterol as needed.  Orders:  -     Hepatic Function Panel    2. Restrictive lung disease  Comments:  Secondary to pulmonary fibrosis.  Continue workup with rheumatology    3. Chronic respiratory failure with hypoxia  Comments:  Continue supplemental oxygen for which he is compliant and benefiting.        Plan as above.  Office follow-up as scheduled with Dr. Pisano in a few months.  Call sooner if needed.    Romy Valera, APRN  11/25/2024  10:47 CST    Follow Up {Instructions Charge Capture  Follow-up Communications   Return for Next scheduled follow up.    Patient was given instructions and counseling regarding his condition or for health maintenance advice. Please see specific information pulled into the AVS if appropriate.

## 2024-11-19 NOTE — TELEPHONE ENCOUNTER
----- Message from Marlon Pichardo sent at 11/19/2024 11:23 AM CST -----  Please notify the patient of his overall stable and reassuring lab results.  Thank you!

## 2024-11-19 NOTE — TELEPHONE ENCOUNTER
The patient is called and notified of his lab result and he voices understanding.  Ashwini Garces MA

## 2024-11-25 ENCOUNTER — OFFICE VISIT (OUTPATIENT)
Dept: PULMONOLOGY | Facility: CLINIC | Age: 74
End: 2024-11-25
Payer: MEDICARE

## 2024-11-25 VITALS
OXYGEN SATURATION: 92 % | BODY MASS INDEX: 30.66 KG/M2 | HEART RATE: 76 BPM | SYSTOLIC BLOOD PRESSURE: 134 MMHG | HEIGHT: 71 IN | WEIGHT: 219 LBS | DIASTOLIC BLOOD PRESSURE: 70 MMHG

## 2024-11-25 DIAGNOSIS — J96.11 CHRONIC RESPIRATORY FAILURE WITH HYPOXIA: Chronic | ICD-10-CM

## 2024-11-25 DIAGNOSIS — J98.4 RESTRICTIVE LUNG DISEASE: Chronic | ICD-10-CM

## 2024-11-25 DIAGNOSIS — J84.10 PULMONARY FIBROSIS: Primary | Chronic | ICD-10-CM

## 2024-11-25 PROCEDURE — 1159F MED LIST DOCD IN RCRD: CPT | Performed by: NURSE PRACTITIONER

## 2024-11-25 PROCEDURE — 99214 OFFICE O/P EST MOD 30 MIN: CPT | Performed by: NURSE PRACTITIONER

## 2024-11-25 PROCEDURE — 3075F SYST BP GE 130 - 139MM HG: CPT | Performed by: NURSE PRACTITIONER

## 2024-11-25 PROCEDURE — 3078F DIAST BP <80 MM HG: CPT | Performed by: NURSE PRACTITIONER

## 2024-11-25 PROCEDURE — 1160F RVW MEDS BY RX/DR IN RCRD: CPT | Performed by: NURSE PRACTITIONER

## 2024-11-26 ENCOUNTER — TELEPHONE (OUTPATIENT)
Dept: PULMONOLOGY | Facility: CLINIC | Age: 74
End: 2024-11-26
Payer: MEDICARE

## 2024-11-26 LAB
ALBUMIN SERPL-MCNC: 4.2 G/DL (ref 3.8–4.8)
ALP SERPL-CCNC: 85 IU/L (ref 44–121)
ALT SERPL-CCNC: 30 IU/L (ref 0–44)
AST SERPL-CCNC: 34 IU/L (ref 0–40)
BILIRUB DIRECT SERPL-MCNC: 0.33 MG/DL (ref 0–0.4)
BILIRUB SERPL-MCNC: 0.7 MG/DL (ref 0–1.2)
PROT SERPL-MCNC: 7.3 G/DL (ref 6–8.5)

## 2024-11-26 NOTE — TELEPHONE ENCOUNTER
Caller: Reid Morrell    Relationship to patient: Self    Best call back number:     [phone] (Mobile)       Patient is needing: PT RETURNING A CALL TO [facility]

## 2024-12-02 ENCOUNTER — HOSPITAL ENCOUNTER (OUTPATIENT)
Dept: CARDIOLOGY | Facility: HOSPITAL | Age: 74
Discharge: HOME | End: 2024-12-02
Admitting: HOSPITALIST
Payer: MEDICARE

## 2024-12-02 VITALS — HEIGHT: 71 IN | HEART RATE: 65 BPM | WEIGHT: 218.92 LBS | BODY MASS INDEX: 30.65 KG/M2

## 2024-12-02 DIAGNOSIS — I25.10 CORONARY ARTERY CALCIFICATION SEEN ON CT SCAN: ICD-10-CM

## 2024-12-02 DIAGNOSIS — R06.02 SHORTNESS OF BREATH: ICD-10-CM

## 2024-12-02 PROCEDURE — 25010000002 DOBUTAMINE PER 250 MG: Performed by: HOSPITALIST

## 2024-12-02 PROCEDURE — 93350 STRESS TTE ONLY: CPT

## 2024-12-02 PROCEDURE — 25010000002 ATROPINE SULFATE: Performed by: HOSPITALIST

## 2024-12-02 PROCEDURE — 25510000001 PERFLUTREN 6.52 MG/ML SUSPENSION: Performed by: HOSPITALIST

## 2024-12-02 PROCEDURE — 93017 CV STRESS TEST TRACING ONLY: CPT

## 2024-12-02 RX ORDER — METOPROLOL TARTRATE 1 MG/ML
5 INJECTION, SOLUTION INTRAVENOUS ONCE
Status: COMPLETED | OUTPATIENT
Start: 2024-12-02 | End: 2024-12-02

## 2024-12-02 RX ORDER — DOBUTAMINE HYDROCHLORIDE 100 MG/100ML
10 INJECTION INTRAVENOUS
Status: DISCONTINUED | OUTPATIENT
Start: 2024-12-02 | End: 2024-12-03 | Stop reason: HOSPADM

## 2024-12-02 RX ADMIN — ATROPINE SULFATE 1 MG: 0.1 INJECTION INTRAVENOUS at 11:23

## 2024-12-02 RX ADMIN — DOBUTAMINE HYDROCHLORIDE 10 MCG/KG/MIN: 100 INJECTION INTRAVENOUS at 10:58

## 2024-12-02 RX ADMIN — METOPROLOL TARTRATE 5 MG: 5 INJECTION INTRAVENOUS at 11:33

## 2024-12-02 RX ADMIN — PERFLUTREN 8.48 MG: 6.52 INJECTION, SUSPENSION INTRAVENOUS at 10:57

## 2024-12-03 LAB
BH CV ECHO MEAS - EDV(MOD-SP2): 150.1 ML
BH CV ECHO MEAS - EDV(MOD-SP4): 165.3 ML
BH CV ECHO MEAS - EF(MOD-SP2): 48.4 %
BH CV ECHO MEAS - EF(MOD-SP4): 49.2 %
BH CV ECHO MEAS - ESV(MOD-SP2): 77.4 ML
BH CV ECHO MEAS - ESV(MOD-SP4): 83.9 ML
BH CV ECHO MEAS - SV(MOD-SP2): 72.7 ML
BH CV ECHO MEAS - SV(MOD-SP4): 81.4 ML
BH CV STRESS BP STAGE 1: NORMAL
BH CV STRESS BP STAGE 2: NORMAL
BH CV STRESS BP STAGE 3: NORMAL
BH CV STRESS DOB - ATROPINE STAGE 3: 1
BH CV STRESS DOSE DOBUTAMINE STAGE 1: 10
BH CV STRESS DOSE DOBUTAMINE STAGE 2: 20
BH CV STRESS DOSE DOBUTAMINE STAGE 3: 30
BH CV STRESS DURATION MIN STAGE 1: 3
BH CV STRESS DURATION MIN STAGE 2: 2
BH CV STRESS DURATION MIN STAGE 3: 4
BH CV STRESS DURATION SEC STAGE 1: 0
BH CV STRESS DURATION SEC STAGE 2: 0
BH CV STRESS DURATION SEC STAGE 3: 9
BH CV STRESS HR STAGE 1: 60
BH CV STRESS HR STAGE 2: 80
BH CV STRESS HR STAGE 3: 160
BH CV STRESS PROTOCOL 1: NORMAL
BH CV STRESS RECOVERY BP: NORMAL MMHG
BH CV STRESS RECOVERY HR: 96 BPM
BH CV STRESS STAGE 1: 1
BH CV STRESS STAGE 2: 2
BH CV STRESS STAGE 3: 3
MAXIMAL PREDICTED HEART RATE: 145 BPM
PERCENT MAX PREDICTED HR: 110.34 %
STRESS BASELINE BP: NORMAL MMHG
STRESS BASELINE HR: 56 BPM
STRESS PERCENT HR: 130 %
STRESS POST EXERCISE DUR MIN: 10 MIN
STRESS POST EXERCISE DUR SEC: 9 SEC
STRESS POST PEAK BP: NORMAL MMHG
STRESS POST PEAK HR: 160 BPM
STRESS TARGET HR: 123 BPM

## 2024-12-04 ENCOUNTER — TELEPHONE (OUTPATIENT)
Dept: CARDIOLOGY | Facility: CLINIC | Age: 74
End: 2024-12-04
Payer: MEDICARE

## 2024-12-04 DIAGNOSIS — I49.9 CARDIAC ARRHYTHMIA, UNSPECIFIED CARDIAC ARRHYTHMIA TYPE: Primary | ICD-10-CM

## 2024-12-04 NOTE — TELEPHONE ENCOUNTER
----- Message from Marlon Pichardo sent at 12/4/2024  8:19 AM CST -----  Please notify Mr. Morrell of his stress echo result, which does show no clear evidence of ischemia.  He had several arrhythmias during the study although this is somewhat nonspecific in the context of dobutamine stress given that dobutamine can be arrhythmogenic.  That being said, his ECG at the end of the study did appear to demonstrate atrial fibrillation.  Given the findings, I would like to have him come back in for an ECG and to have him wear a cardiac monitor for a better assessment of potential arrhythmias.  I would also like for him to have a sooner appointment in clinic with me or Deven in the next few weeks to discuss the results and whether or not to start any new medications given the arrhythmia findings (in particular atrial fibrillation).  I am placing an order for the ECG and monitor if you could help him schedule a time to come by and get those done.  Could you also make sure he is feeling okay and see if he is having any issues with palpitations/tachycardia since the stress test?  Thank you!

## 2024-12-04 NOTE — TELEPHONE ENCOUNTER
The patient is called and notified of his stress echo result and the need for additional testing with EKG and holter monitor.  He is scheduled with Andrade 11/6/24 between 9-11AM.  He will schedule his sooner f/u appointment then.  He has not felt any arrhythmias and has felt great. He voices understanding of all.  Ashwini Garces MA

## 2024-12-09 DIAGNOSIS — I49.9 CARDIAC ARRHYTHMIA, UNSPECIFIED CARDIAC ARRHYTHMIA TYPE: ICD-10-CM

## 2024-12-20 ENCOUNTER — OFFICE VISIT (OUTPATIENT)
Dept: PRIMARY CARE CLINIC | Age: 74
End: 2024-12-20

## 2024-12-20 VITALS
DIASTOLIC BLOOD PRESSURE: 86 MMHG | BODY MASS INDEX: 31.1 KG/M2 | SYSTOLIC BLOOD PRESSURE: 122 MMHG | WEIGHT: 223 LBS | HEART RATE: 66 BPM | TEMPERATURE: 97.4 F | OXYGEN SATURATION: 95 %

## 2024-12-20 DIAGNOSIS — J84.10 PULMONARY FIBROSIS (HCC): ICD-10-CM

## 2024-12-20 DIAGNOSIS — N52.9 ERECTILE DYSFUNCTION, UNSPECIFIED ERECTILE DYSFUNCTION TYPE: ICD-10-CM

## 2024-12-20 DIAGNOSIS — I10 ESSENTIAL (PRIMARY) HYPERTENSION: Primary | ICD-10-CM

## 2024-12-20 RX ORDER — NINTEDANIB 100 MG/1
200 CAPSULE ORAL DAILY
COMMUNITY

## 2024-12-20 ASSESSMENT — ENCOUNTER SYMPTOMS
SHORTNESS OF BREATH: 1
ABDOMINAL PAIN: 0
DIARRHEA: 0
CHEST TIGHTNESS: 0
NAUSEA: 0
CONSTIPATION: 0
COUGH: 0
ANAL BLEEDING: 0

## 2024-12-20 NOTE — PROGRESS NOTES
[facility]  [address]  [address]  [place] [address]  Dept: [phone]  Dept Fax: [phone]  Loc: [phone]    aCchorro Thompson is a 74 y.o. male who presents today for his medical conditions/complaints as noted below.  Cachorro Thompson is here for 6 Month Follow-Up      Patient History:      Coronary artery calcifications.  -[facility] cardiology  - July 3, 2024: CT chest without contrast: Coronary artery calcifications and cardiomegaly  -September 4, 2024: 2D echocardiogram: Ejection fraction 56 to 60%.  Left ventricular diastolic function was normal.  Mildly dilated right ventricle.     Interstitial lung disease  -[facility] pulmonology: Dr. Josemanuel Villanueva  - July 3, 2024: CT chest without contrast: Interstitial lung disease.  Honeycomb appearance in both lower lobes.  Small pulmonary nodule stable since 2022.        pthx           Subjective:   CC:  Here today to discuss the following:    Hypertension  Compliant with medications.  No adverse effects from medication.  No lightheadedness, palpitations, or chest pain.    Continues to have challenges with his erectile dysfunction.  Does not feel the Viagra is helping.    Recent stress test was negative but he had an episode of atrial fibrillation afterwards.  He just turned in his cardiac monitor today.  Has had no chest pain palpitations or shortness of breath beyond his baseline.    Review of Systems   Constitutional:  Negative for chills and fever.   HENT:  Negative for congestion.    Respiratory:  Positive for shortness of breath. Negative for cough and chest tightness.    Cardiovascular:  Negative for chest pain, palpitations and leg swelling.   Gastrointestinal:  Negative for abdominal pain, anal bleeding, constipation, diarrhea and nausea.   Genitourinary:  Negative for difficulty urinating.   Psychiatric/Behavioral: Negative.       Objective:   /86   Pulse 66   Temp 97.4 °F (36.3 °C)

## 2024-12-20 NOTE — PATIENT INSTRUCTIONS
Labs before next appointment:  Go to room 405 for labs prior to next visit.   Be sure to fast for at least 10 hours prior to laboratory appointment.   No appointment is necessary for laboratory work as the orders have already been placed.    Lab opens at 6:30 am and closes at 4:30 pm.

## 2024-12-31 ENCOUNTER — TELEPHONE (OUTPATIENT)
Dept: CARDIOLOGY | Facility: CLINIC | Age: 74
End: 2024-12-31
Payer: MEDICARE

## 2024-12-31 RX ORDER — METOPROLOL SUCCINATE 25 MG/1
12.5 TABLET, EXTENDED RELEASE ORAL EVERY EVENING
Qty: 30 TABLET | Refills: 5 | Status: SHIPPED | OUTPATIENT
Start: 2024-12-31

## 2024-12-31 NOTE — TELEPHONE ENCOUNTER
----- Message from Marlon Pichardo sent at 12/31/2024  9:01 AM CST -----  Please notify Mr. Morrell of his cardiac monitor result.  The study did show 2% atrial fibrillation with somewhat higher HR when in atrial fibrillation.  Based on the results, I would like to start him on Toprol-XL 12.5 mg daily to help control arrhythmias and HR.  Additionally, given the atrial fibrillation I would like to start him on Eliquis.  I have sent a prescription for both of these medications to his pharmacy.  Could you please have him scheduled for a follow-up appointment with me or Deven in the next few weeks to try to further discuss the results.  Thank you!

## 2024-12-31 NOTE — TELEPHONE ENCOUNTER
The patient is called and notified of his holter result and the recommendation to begin the Eliquis 5mg BID and the Metoprolol 12.5mg QD.  He is in agreement and is scheduled a f/u appointment with KAYLA Chavira.  Ashwini Garces MA

## 2025-01-03 ENCOUNTER — TELEPHONE (OUTPATIENT)
Dept: CARDIOLOGY | Facility: CLINIC | Age: 75
End: 2025-01-03
Payer: MEDICARE

## 2025-01-03 ENCOUNTER — TELEPHONE (OUTPATIENT)
Dept: PULMONOLOGY | Facility: CLINIC | Age: 75
End: 2025-01-03
Payer: MEDICARE

## 2025-01-03 NOTE — TELEPHONE ENCOUNTER
Patient called stating that Dr. Pichardo put him on esbriet and he was reading on his OFEV that she should notify his doctor, that he should notify us.           Should he take the esbriet while on ofev or vice versa?

## 2025-01-03 NOTE — TELEPHONE ENCOUNTER
I wonder if he means Eliquis?     I do not see where he was started on Esbriet. Lanie is ok with his OFEV

## 2025-01-03 NOTE — TELEPHONE ENCOUNTER
I called pt to let him know about the results of the heart monitor.  Not knowing that Ashwini already called the pt and told him that we were starting him on the Eliquis, he expressed concern that his pulmonologist gave him Ofev and the precautions on this medication stated he should not take a blood thinner.  I told pt to call his pulmonologist to see if he can stop the medication since the Eliquis will prevent a stroke.  He said he will call but wanted Dr. Pichardo aware of this.  Andrade Gallegos, CMA

## 2025-01-08 NOTE — TELEPHONE ENCOUNTER
I called the pt and let him know that Dr. Pichardo had reached out to the pulmonologist.  Pt stated that he got a call from them yesterday and was told it was ok to start the Eliquis.  Andrade Gallegos, CMA

## 2025-01-08 NOTE — TELEPHONE ENCOUNTER
Marlon Pichardo MD [place], Joni M, [facility]  Caller: Unspecified (5 days ago,  3:11 PM)  Thank you for letting me know.  I do think it will be important to communicate with pulmonology to try to coordinate a good plan for him.  I have also reached out to his pulmonologist regarding starting Eliquis and utilization of this with Ofev.

## 2025-01-13 ENCOUNTER — HOSPITAL ENCOUNTER (OUTPATIENT)
Dept: SLEEP CENTER | Age: 75
Discharge: HOME | End: 2025-01-15
Payer: MEDICARE

## 2025-01-13 PROCEDURE — 95810 POLYSOM 6/> YRS 4/> PARAM: CPT

## 2025-01-14 NOTE — PROGRESS NOTES
[facility]  [address] [place] [address]  Phone [phone] Fax [phone]       Polysomnography Report  Patient Name Cachorro Thompson Account Number [id number]    1950 Referring Provider VALDEZ Corona   Age/ Gender 74 years/M Interpreting physician Milla Carbone MD,[facility]   Neck circumference/  Mallampati classification 17.0 in/class 4 Night Technician Luana Caballero, RPSGT   Gordon score 15/24 Scoring Technician Lesly Christiansen, RPSGT   Height 71.0 in Indications for the test excessive daytime somnolence   Weight 219.0 lbs Test Diagnostic Polysomnogram   BMI 30.5 Date of test 2025     Procedure  A Diagnostic Polysomnogram was conducted on the night of 2025.  The study was performed and scored per AASM guidelines.  The following were monitored: frontal, central, and occipital EEG, electrooculogram (EOG), submentalis EMG, nasal and oral airflow, intranasal pressure, thoracic plethysmography, abdominal plethysmography, anterior tibialis EMG, electrocardiogram, body position, and positive airway pressure (PAP).  Arterial oxygen saturation was monitored with a pulse oximeter.  The study was scored utilizing 30 second epochs.  Hypopneas were scored using per AASM definition VIII, D, 1B.    Sleep Scoring Data  Lights out 8:47:17 PM Sleep latency 44.1 min Time in N1 28.0 min N1% 8.8%   Lights on 4:06:11 AM WASO 77.8 min Time in N2 230.5 min N2% 72.7%   TIB/.9 min Sleep efficiency 80.4% Time in N3 0.0 min N3% 0.0%   .0 min REM latency 71.5 min Time in R 58.5 min R% 18.5%     Respiratory Events Summary   NREM REM Total   Hypopnea index 4.6 27.7 9.1   Apnea index 0.0 0.0 0.0   RERA index 0.0 0.0 0.0   AHI 4.6 27.7 9.1   RDI (AHI + RERA index) 4.6 27.7 9.1     Respiratory Events by Sleep Stage   Obstructive Apneas OA Index Central Apneas CA Index Mixed Apneas MA Index   Hypopneas H Index   RERAs   R Index   NREM 0 0.0 0 0.0 0 0.0

## 2025-01-14 NOTE — PROGRESS NOTES
[facility]  [address] [place]  [address]  Phone [phone] Fax [phone]     Sleep Study Technician Review    Patient Name:  Cachorro Thompson  :   1950  Referring Provider: Mya Wheatley APRN - CNP    Brief History:  Cachorro Thompson is a 74 y.o. male with a history of  EDS, Pulmonary fibrosis and Snoring who has been referred for a sleep study. He had a ct chest completed this summer showing mild progression of fibrosis. PFT from this summer still showing moderate restriction however DLCO was reduced from prior PFT in . He did qualify for supplemental oxygen in 2024. He continues on 2l of oxygen for which he is compliant and benefiting. He has followed up with cardiology and had a right heart cath completed in 2024 which did not meet full criteria for PAH. He also follows with rheumatology. Saw Dr. Archer last week and tells me he took a lot of blood and is keeping Sjogren's within his differential diagnoses. He started Ofev in 2024. He tells me he is doing well with Ofev. He had an episode of diarrhea 1 time. Otherwise this has been well-tolerated. He feels like his breathing is stable.    [facility] Fall Risk Assessment  Have you fallen in the past year? YES[] NO[x]  Do you feel unsteady when standing or walking? YES[] NO[x]  Are you worried about falling? YES[] NO[x]   aFall Risk screening requirement has been met  Not at risk for falls.    Height:   5' 11\"  Weight:  219 lbs  BMI: 30.5  Neck Circ: 17\"  Mallampati 4  ESS: 15/24    Type of Study: PSG  Time Stage Position Snore Hypopnea Obs Apnea Tre Apnea PAP O2   2100 Awake Left No No No No  RA   2200 2 Right Yes No No No  RA   2300 REM Right No Yes No No  RA   2400 2 Left No No No No  RA   0100 2 Left No No No No 2 lpm RA   0200 2 Right No No No No 2 lpm RA   0300 REM Left No No No No 2 lpm RA   0400 2 Left No No No No 2 lpm RA   0415 Awake Supine No No No No 2 lpm RA

## 2025-01-15 ENCOUNTER — TELEPHONE (OUTPATIENT)
Dept: PULMONOLOGY | Facility: CLINIC | Age: 75
End: 2025-01-15
Payer: MEDICARE

## 2025-01-15 DIAGNOSIS — J84.10 PULMONARY FIBROSIS: Primary | ICD-10-CM

## 2025-01-15 NOTE — TELEPHONE ENCOUNTER
Please let him know it is time for some labs. I will go ahead and check a blood count as well since he started the blood thinner. Orders have been placed and he go to outpatient lab to have those drawn. Thank you. Keep follow up next month as planned with Dr. Pisano.

## 2025-01-16 ENCOUNTER — OFFICE VISIT (OUTPATIENT)
Dept: CARDIOLOGY | Facility: CLINIC | Age: 75
End: 2025-01-16
Payer: MEDICARE

## 2025-01-16 ENCOUNTER — LAB (OUTPATIENT)
Dept: LAB | Facility: HOSPITAL | Age: 75
End: 2025-01-16
Payer: MEDICARE

## 2025-01-16 VITALS
HEART RATE: 63 BPM | DIASTOLIC BLOOD PRESSURE: 80 MMHG | BODY MASS INDEX: 32.07 KG/M2 | WEIGHT: 224 LBS | HEIGHT: 70 IN | SYSTOLIC BLOOD PRESSURE: 138 MMHG

## 2025-01-16 DIAGNOSIS — I10 ESSENTIAL HYPERTENSION: ICD-10-CM

## 2025-01-16 DIAGNOSIS — R06.02 SHORTNESS OF BREATH: ICD-10-CM

## 2025-01-16 DIAGNOSIS — J84.10 PULMONARY FIBROSIS: ICD-10-CM

## 2025-01-16 DIAGNOSIS — I25.10 CORONARY ARTERY CALCIFICATION SEEN ON CT SCAN: ICD-10-CM

## 2025-01-16 DIAGNOSIS — I48.0 PAROXYSMAL ATRIAL FIBRILLATION: Primary | ICD-10-CM

## 2025-01-16 DIAGNOSIS — I47.29 NSVT (NONSUSTAINED VENTRICULAR TACHYCARDIA): ICD-10-CM

## 2025-01-16 LAB
ALBUMIN SERPL-MCNC: 4.1 G/DL (ref 3.5–5.2)
ALP SERPL-CCNC: 82 U/L (ref 39–117)
ALT SERPL W P-5'-P-CCNC: 26 U/L (ref 1–41)
AST SERPL-CCNC: 29 U/L (ref 1–40)
BASOPHILS # BLD AUTO: 0.01 10*3/MM3 (ref 0–0.2)
BASOPHILS NFR BLD AUTO: 0.2 % (ref 0–1.5)
BILIRUB CONJ SERPL-MCNC: 0.3 MG/DL (ref 0–0.3)
BILIRUB INDIRECT SERPL-MCNC: 0.4 MG/DL
BILIRUB SERPL-MCNC: 0.7 MG/DL (ref 0–1.2)
DEPRECATED RDW RBC AUTO: 44.1 FL (ref 37–54)
EOSINOPHIL # BLD AUTO: 0.1 10*3/MM3 (ref 0–0.4)
EOSINOPHIL NFR BLD AUTO: 1.6 % (ref 0.3–6.2)
ERYTHROCYTE [DISTWIDTH] IN BLOOD BY AUTOMATED COUNT: 13.5 % (ref 12.3–15.4)
HCT VFR BLD AUTO: 47.9 % (ref 37.5–51)
HGB BLD-MCNC: 15.7 G/DL (ref 13–17.7)
IMM GRANULOCYTES # BLD AUTO: 0.01 10*3/MM3 (ref 0–0.05)
IMM GRANULOCYTES NFR BLD AUTO: 0.2 % (ref 0–0.5)
LYMPHOCYTES # BLD AUTO: 1.46 10*3/MM3 (ref 0.7–3.1)
LYMPHOCYTES NFR BLD AUTO: 23.3 % (ref 19.6–45.3)
MCH RBC QN AUTO: 29.3 PG (ref 26.6–33)
MCHC RBC AUTO-ENTMCNC: 32.8 G/DL (ref 31.5–35.7)
MCV RBC AUTO: 89.5 FL (ref 79–97)
MONOCYTES # BLD AUTO: 0.54 10*3/MM3 (ref 0.1–0.9)
MONOCYTES NFR BLD AUTO: 8.6 % (ref 5–12)
NEUTROPHILS NFR BLD AUTO: 4.15 10*3/MM3 (ref 1.7–7)
NEUTROPHILS NFR BLD AUTO: 66.1 % (ref 42.7–76)
NRBC BLD AUTO-RTO: 0 /100 WBC (ref 0–0.2)
PLATELET # BLD AUTO: 264 10*3/MM3 (ref 140–450)
PMV BLD AUTO: 8.7 FL (ref 6–12)
PROT SERPL-MCNC: 7.8 G/DL (ref 6–8.5)
RBC # BLD AUTO: 5.35 10*6/MM3 (ref 4.14–5.8)
WBC NRBC COR # BLD AUTO: 6.27 10*3/MM3 (ref 3.4–10.8)

## 2025-01-16 PROCEDURE — 3075F SYST BP GE 130 - 139MM HG: CPT

## 2025-01-16 PROCEDURE — 85025 COMPLETE CBC W/AUTO DIFF WBC: CPT

## 2025-01-16 PROCEDURE — 99214 OFFICE O/P EST MOD 30 MIN: CPT

## 2025-01-16 PROCEDURE — 36415 COLL VENOUS BLD VENIPUNCTURE: CPT

## 2025-01-16 PROCEDURE — 80076 HEPATIC FUNCTION PANEL: CPT

## 2025-01-16 PROCEDURE — 93000 ELECTROCARDIOGRAM COMPLETE: CPT

## 2025-01-16 PROCEDURE — 3079F DIAST BP 80-89 MM HG: CPT

## 2025-01-16 RX ORDER — COLESTIPOL HYDROCHLORIDE 1 G/1
1 TABLET ORAL
COMMUNITY

## 2025-01-16 NOTE — PROGRESS NOTES
Reason For Visit:  .Coronary artery calcification seen on CT scan (2 mo f/u - holter & stress echo results)     Subjective        Reid Morrell is a 74 y.o. male with the below pertinent PMH who presents for follow-up of the above issues.    Patient last saw Dr. Pichardo 11/12/2024.  Had recently started Ofev with pulmonology.  Patient had stress test which was low risk for ischemia.  Subsequently had NSVT during stress test and therefore Holter monitor was ordered.  See full results below.  Patient was subsequently started on beta-blocker and anticoagulation for atrial fibrillation present.    Since starting the beta-blocker and Eliquis has been doing reasonably well.  Has not noticed any significant palpitations or fluttering.  Main complaints are worsening dizziness and lightheadedness.  No near syncope or falls.  He is having lab work after this appointment to check his blood counts and hepatic function since starting Ofev.    ROS: Pertinent findings as noted above    Cardiac Studies  Noncontrast CT chest 7/30/2024: I personally did a focused review of the study, which shows moderate coronary artery calcification mostly in the proximal and mid LAD.  Echo 9/4/2024: LVEF 56-60%, normal diastolic function, RV mildly dilated with normal systolic function, LA mildly dilated, no significant valvular stenosis or regurgitation  RHC 9/13/2024: 1. Mildly elevated pulmonary artery and right ventricular systolic pressure not meeting full criteria for pulmonary arterial hypertension given normal PVR. 2. RA (mean) 5, RV (s/edp): 42/6, PA (s/d/mean) 42/17/25, PCWP (mean) 12, Qian CO/CI 7.15/3.26 with PVR 1.8 RICCI, TD CO/CI 7.21/3.29 with PVR 1.8 RICCI. 3. PA (s/d/mean) 41/17/25, PCWP (mean) 14, Qian CO/CI 7.55/3.44 with PVR 1.5 RICCI, TD CO/CI 7.14/3.25 with PVR 1.5 RICCI.  Holter 12/6/2024: 2% atrial fibrillation burden with elevated heart rates while in A-fib.  40 episodes of NSVT.     Pertinent PMH  Pulmonary fibrosis with exertional  "hypoxia  Coronary artery calcification  Hypertension  Lupus diagnosed at age 34 but reportedly has not been active    Pertinent past medical, surgical, family, and social history were reviewed.      Current Outpatient Medications:     albuterol (PROVENTIL) (2.5 MG/3ML) 0.083% nebulizer solution, , Disp: , Rfl:     amLODIPine (NORVASC) 10 MG tablet, Take 1 tablet by mouth Daily., Disp: , Rfl:     apixaban (ELIQUIS) 5 MG tablet tablet, Take 1 tablet by mouth 2 (Two) Times a Day., Disp: 60 tablet, Rfl: 11    atorvastatin (LIPITOR) 10 MG tablet, Take 1 tablet by mouth Daily., Disp: 90 tablet, Rfl: 3    colestipol (COLESTID) 1 g tablet, Take 1 tablet by mouth., Disp: , Rfl:     famotidine (PEPCID) 20 MG tablet, Take 1 tablet by mouth 2 (Two) Times a Day. When taking mobic Mon, Wed, Fri, Disp: , Rfl:     guaiFENesin (MUCINEX) 600 MG 12 hr tablet, Take 2 tablets by mouth Every 12 (Twelve) Hours., Disp: 20 tablet, Rfl: 0    hydrochlorothiazide (HYDRODIURIL) 25 MG tablet, Take 1 tablet by mouth Daily., Disp: , Rfl:     losartan (COZAAR) 100 MG tablet, Take 1 tablet by mouth Daily., Disp: , Rfl:     meloxicam (MOBIC) 15 MG tablet, Take 1 tablet by mouth Daily. (Patient taking differently: Take 1 tablet by mouth Every Other Day.), Disp: , Rfl:     metoprolol succinate XL (TOPROL-XL) 25 MG 24 hr tablet, Take 0.5 tablets by mouth Every Evening., Disp: 30 tablet, Rfl: 5    O2 (OXYGEN), Inhale 2 L/min As Needed. Legacy, Disp: , Rfl:     Ofev 100 MG capsule, Take 1 capsule by mouth 2 (Two) Times a Day., Disp: , Rfl:     SILDENAFIL CITRATE PO, Take 20 mg by mouth Daily As Needed for Erectile Dysfunction., Disp: , Rfl:     Ventolin  (90 Base) MCG/ACT inhaler, , Disp: , Rfl:      Objective   Vital Signs:  /80   Pulse 63   Ht 177.8 cm (70\")   Wt 102 kg (224 lb)   BMI 32.14 kg/m²   Estimated body mass index is 32.14 kg/m² as calculated from the following:    Height as of this encounter: 177.8 cm (70\").    Weight as of " this encounter: 102 kg (224 lb).      Constitutional:       Appearance: Healthy appearance. Not in distress.   Neck:      Vascular: JVD normal.   Pulmonary:      Effort: Pulmonary effort is normal.      Breath sounds: Normal breath sounds. No wheezing. No rhonchi. No rales.   Cardiovascular:      PMI at left midclavicular line. Normal rate. Regular rhythm. Normal S1. Normal S2.       Murmurs: There is no murmur.      No gallop.  No click. No rub.   Edema:     Peripheral edema absent.   Musculoskeletal: Normal range of motion.      Cervical back: Normal range of motion. Skin:     General: Skin is warm and dry.   Neurological:      Mental Status: Alert and oriented to person, place and time.        Result Review :  The following data was reviewed by: CANDACE Norwood on 01/16/2025:  CMP   CMP          9/13/2024    07:50 9/13/2024    10:53 9/13/2024    11:03 9/13/2024    11:05 11/19/2024    08:52 11/25/2024    09:45   CMP   Glucose 109     98     BUN 19     22     Creatinine 1.02     1.06     EGFR 77.1     73.2     Sodium 140  141  142  141     141  137     Potassium 3.6  3.5  3.4  3.5     3.4  3.7     Chloride 102     98     Calcium 9.0     9.0     Total Protein      7.3    Total Protein 7.5     7.6     Albumin 4.0     4.0  4.2    Globulin 3.5     3.6     Total Bilirubin 0.7     1.0  0.7    Alkaline Phosphatase 89     86  85    AST (SGOT) 27     28  34    ALT (SGPT) 23     26  30    Albumin/Globulin Ratio 1.1     1.1     BUN/Creatinine Ratio 18.6     20.8     Anion Gap 11.0     10.0       CBC   CBC          2/26/2024    08:56 7/15/2024    10:38 9/13/2024    07:50   CBC   WBC 7.3     6.24  5.76    RBC 5.28     5.37  5.39    Hemoglobin 16.3     16.0  16.2    Hematocrit 47.9     48.7  47.2    MCV 90.7     90.7  87.6    MCH 30.9     29.8  30.1    MCHC 34.0     32.9  34.3    RDW 13.5     13.2  12.9    Platelets 277     257  248       Details          This result is from an external source.             Lipid Panel    BMP   BMP          6/13/2024    09:18 9/13/2024    07:50 9/13/2024    10:53 9/13/2024    11:03 9/13/2024    11:05 11/19/2024    08:52   BMP   BUN 20     19     22    Creatinine 1.1     1.02     1.06    Sodium 139     140  141  142  141     141  137    Potassium 4.1     3.6  3.5  3.4  3.5     3.4  3.7    Chloride 99     102     98    CO2 29     27.0     29.0    Calcium 9.4     9.0     9.0       Details          This result is from an external source.             Data reviewed : Cardiology studies echo      ECG 12 Lead    Date/Time: 1/16/2025 10:15 AM  Performed by: Deven Chavira APRN    Authorized by: Deven Chavira APRN  Comparison: compared with previous ECG from 12/9/2024  Comparison to previous ECG: Sinus bradycardia has replaced normal sinus rhythm; possible left posterior fascicular block  Rhythm: sinus rhythm  Rate: bradycardic  Conduction: left posterior fascicular block  QRS axis: right    Clinical impression: abnormal EKG       Results for orders placed during the hospital encounter of 12/02/24    Adult Stress Echo W/ Cont or Stress Agent if Necessary Per Protocol    Interpretation Summary    There is no significant echocardiographic or ECG evidence of ischemia.    NSVT was noted at peak stress and in early recovery.    Several episodes of SVT were noted during stress and recovery including an episode requiring IV metoprolol.    Rhythm at the end of the study appears to be atrial fibrillation.    Left ventricular systolic function is borderline at rest with calculated EF 50%.         Assessment and Plan   Diagnoses and all orders for this visit:    1. Paroxysmal atrial fibrillation (Primary)  2. NSVT (nonsustained ventricular tachycardia)  -Given Holter monitor findings will refer patient to electrophysiology for further discussion of management of his arrhythmias  - Continue Toprol-XL 12.5 mg daily  - Continue Eliquis 5 mg twice daily    3. Coronary artery calcification seen on CT scan  4.  Essential hypertension  -No clear anginal symptoms as of late  - No need for aspirin given that the patient is on Eliquis  - Continue Lipitor 10 mg daily  - Patient now on 4 antihypertensives and feeling slightly dizzy and lightheaded.  Recommend patient routinely check blood pressure at home and notify us with finding    5. Pulmonary fibrosis  6. Shortness of breath  -Stable dyspnea on exertion at this time  - Follow with pulmonology and recently started on Ofev           I spent 2 minutes on the separately reported service of EKG interpretation. This time is not included in the time used to support the E/M service also reported today.      Follow Up   Return for Next scheduled follow up.  Patient was given instructions and counseling regarding his condition or for health maintenance advice. Please see specific information pulled into the AVS if appropriate.       Part of this note may be an electronic transcription/translation of spoken language to printed text using the Dragon Dictation System.

## 2025-01-17 ENCOUNTER — TELEPHONE (OUTPATIENT)
Dept: PULMONOLOGY | Facility: CLINIC | Age: 75
End: 2025-01-17
Payer: MEDICARE

## 2025-01-17 DIAGNOSIS — I10 ESSENTIAL (PRIMARY) HYPERTENSION: ICD-10-CM

## 2025-01-17 RX ORDER — HYDROCHLOROTHIAZIDE 25 MG/1
TABLET ORAL
Qty: 90 TABLET | Refills: 1 | Status: SHIPPED | OUTPATIENT
Start: 2025-01-17

## 2025-01-17 NOTE — TELEPHONE ENCOUNTER
----- Message from Romy Valera sent at 1/17/2025  7:22 AM CST -----  Let patient know this looked ok. Nothing else to do for now, keep follow up as planned.

## 2025-01-18 DIAGNOSIS — G47.33 OSA (OBSTRUCTIVE SLEEP APNEA): Primary | ICD-10-CM

## 2025-01-20 DIAGNOSIS — G47.33 OSA (OBSTRUCTIVE SLEEP APNEA): ICD-10-CM

## 2025-01-26 SDOH — ECONOMIC STABILITY: FOOD INSECURITY: WITHIN THE PAST 12 MONTHS, THE FOOD YOU BOUGHT JUST DIDN'T LAST AND YOU DIDN'T HAVE MONEY TO GET MORE.: NEVER TRUE

## 2025-01-26 SDOH — ECONOMIC STABILITY: INCOME INSECURITY: IN THE LAST 12 MONTHS, WAS THERE A TIME WHEN YOU WERE NOT ABLE TO PAY THE MORTGAGE OR RENT ON TIME?: NO

## 2025-01-26 SDOH — ECONOMIC STABILITY: FOOD INSECURITY: WITHIN THE PAST 12 MONTHS, YOU WORRIED THAT YOUR FOOD WOULD RUN OUT BEFORE YOU GOT MONEY TO BUY MORE.: NEVER TRUE

## 2025-01-26 SDOH — ECONOMIC STABILITY: TRANSPORTATION INSECURITY
IN THE PAST 12 MONTHS, HAS THE LACK OF TRANSPORTATION KEPT YOU FROM MEDICAL APPOINTMENTS OR FROM GETTING MEDICATIONS?: NO

## 2025-01-26 ASSESSMENT — PATIENT HEALTH QUESTIONNAIRE - PHQ9
SUM OF ALL RESPONSES TO PHQ9 QUESTIONS 1 & 2: 0
SUM OF ALL RESPONSES TO PHQ QUESTIONS 1-9: 0
1. LITTLE INTEREST OR PLEASURE IN DOING THINGS: NOT AT ALL
SUM OF ALL RESPONSES TO PHQ QUESTIONS 1-9: 0
1. LITTLE INTEREST OR PLEASURE IN DOING THINGS: NOT AT ALL
SUM OF ALL RESPONSES TO PHQ QUESTIONS 1-9: 0
1. LITTLE INTEREST OR PLEASURE IN DOING THINGS: NOT AT ALL
SUM OF ALL RESPONSES TO PHQ9 QUESTIONS 1 & 2: 0
2. FEELING DOWN, DEPRESSED OR HOPELESS: NOT AT ALL
SUM OF ALL RESPONSES TO PHQ QUESTIONS 1-9: 0
SUM OF ALL RESPONSES TO PHQ9 QUESTIONS 1 & 2: 0
SUM OF ALL RESPONSES TO PHQ QUESTIONS 1-9: 0
2. FEELING DOWN, DEPRESSED OR HOPELESS: NOT AT ALL
SUM OF ALL RESPONSES TO PHQ QUESTIONS 1-9: 0
2. FEELING DOWN, DEPRESSED OR HOPELESS: NOT AT ALL
SUM OF ALL RESPONSES TO PHQ QUESTIONS 1-9: 0
SUM OF ALL RESPONSES TO PHQ QUESTIONS 1-9: 0

## 2025-01-27 DIAGNOSIS — Z12.5 ENCOUNTER FOR PROSTATE CANCER SCREENING: ICD-10-CM

## 2025-01-27 DIAGNOSIS — R53.83 OTHER FATIGUE: ICD-10-CM

## 2025-01-27 DIAGNOSIS — I10 ESSENTIAL (PRIMARY) HYPERTENSION: ICD-10-CM

## 2025-01-27 LAB
ALBUMIN SERPL-MCNC: 4.3 G/DL (ref 3.5–5.2)
ALP SERPL-CCNC: 88 U/L (ref 40–129)
ALT SERPL-CCNC: 24 U/L (ref 5–41)
ANION GAP SERPL CALCULATED.3IONS-SCNC: 8 MMOL/L (ref 8–16)
AST SERPL-CCNC: 25 U/L (ref 5–40)
BASOPHILS # BLD: 0 K/UL (ref 0–0.2)
BASOPHILS NFR BLD: 0.4 % (ref 0–1)
BILIRUB SERPL-MCNC: 0.8 MG/DL (ref 0.2–1.2)
BUN SERPL-MCNC: 14 MG/DL (ref 8–23)
CALCIUM SERPL-MCNC: 9.2 MG/DL (ref 8.8–10.2)
CHLORIDE SERPL-SCNC: 97 MMOL/L (ref 98–107)
CO2 SERPL-SCNC: 32 MMOL/L (ref 22–29)
CREAT SERPL-MCNC: 1 MG/DL (ref 0.7–1.2)
EOSINOPHIL # BLD: 0.1 K/UL (ref 0–0.6)
EOSINOPHIL NFR BLD: 2.2 % (ref 0–5)
ERYTHROCYTE [DISTWIDTH] IN BLOOD BY AUTOMATED COUNT: 13.5 % (ref 11.5–14.5)
GLUCOSE SERPL-MCNC: 97 MG/DL (ref 70–99)
HCT VFR BLD AUTO: 49 % (ref 42–52)
HGB BLD-MCNC: 16 G/DL (ref 14–18)
IMM GRANULOCYTES # BLD: 0 K/UL
LYMPHOCYTES # BLD: 1.3 K/UL (ref 1.1–4.5)
LYMPHOCYTES NFR BLD: 24.5 % (ref 20–40)
MCH RBC QN AUTO: 30 PG (ref 27–31)
MCHC RBC AUTO-ENTMCNC: 32.7 G/DL (ref 33–37)
MCV RBC AUTO: 91.8 FL (ref 80–94)
MONOCYTES # BLD: 0.5 K/UL (ref 0–0.9)
MONOCYTES NFR BLD: 9.7 % (ref 0–10)
NEUTROPHILS # BLD: 3.5 K/UL (ref 1.5–7.5)
NEUTS SEG NFR BLD: 63 % (ref 50–65)
PLATELET # BLD AUTO: 242 K/UL (ref 130–400)
PMV BLD AUTO: 9.3 FL (ref 9.4–12.4)
POTASSIUM SERPL-SCNC: 3.8 MMOL/L (ref 3.5–5.1)
PROT SERPL-MCNC: 7.8 G/DL (ref 6.4–8.3)
PSA SERPL-MCNC: 1.64 NG/ML (ref 0–4)
RBC # BLD AUTO: 5.34 M/UL (ref 4.7–6.1)
SODIUM SERPL-SCNC: 137 MMOL/L (ref 136–145)
T4 FREE SERPL-MCNC: 1.19 NG/DL (ref 0.93–1.7)
TSH SERPL DL<=0.005 MIU/L-ACNC: 3.5 UIU/ML (ref 0.27–4.2)
WBC # BLD AUTO: 5.5 K/UL (ref 4.8–10.8)

## 2025-01-28 ENCOUNTER — OFFICE VISIT (OUTPATIENT)
Dept: PRIMARY CARE CLINIC | Age: 75
End: 2025-01-28

## 2025-01-28 VITALS
OXYGEN SATURATION: 94 % | DIASTOLIC BLOOD PRESSURE: 80 MMHG | BODY MASS INDEX: 31.08 KG/M2 | TEMPERATURE: 97.5 F | SYSTOLIC BLOOD PRESSURE: 138 MMHG | HEART RATE: 60 BPM | WEIGHT: 222 LBS | HEIGHT: 71 IN

## 2025-01-28 DIAGNOSIS — Z13.220 LIPID SCREENING: ICD-10-CM

## 2025-01-28 DIAGNOSIS — N52.9 ERECTILE DYSFUNCTION, UNSPECIFIED ERECTILE DYSFUNCTION TYPE: ICD-10-CM

## 2025-01-28 DIAGNOSIS — I10 ESSENTIAL (PRIMARY) HYPERTENSION: ICD-10-CM

## 2025-01-28 DIAGNOSIS — Z00.00 ANNUAL PHYSICAL EXAM: Primary | ICD-10-CM

## 2025-01-28 DIAGNOSIS — K91.89 POSTCHOLECYSTECTOMY DIARRHEA: ICD-10-CM

## 2025-01-28 DIAGNOSIS — Z12.5 ENCOUNTER FOR PROSTATE CANCER SCREENING: ICD-10-CM

## 2025-01-28 DIAGNOSIS — R19.7 POSTCHOLECYSTECTOMY DIARRHEA: ICD-10-CM

## 2025-01-28 RX ORDER — AMLODIPINE BESYLATE 10 MG/1
TABLET ORAL
Qty: 90 TABLET | Refills: 3 | Status: SHIPPED | OUTPATIENT
Start: 2025-01-28

## 2025-01-28 RX ORDER — COLESTIPOL HYDROCHLORIDE 1 G/1
1 TABLET ORAL 2 TIMES DAILY
Qty: 180 TABLET | Refills: 3 | Status: SHIPPED | OUTPATIENT
Start: 2025-01-28

## 2025-01-28 RX ORDER — TADALAFIL 20 MG/1
20 TABLET ORAL DAILY PRN
Qty: 15 TABLET | Refills: 5 | Status: SHIPPED | OUTPATIENT
Start: 2025-01-28

## 2025-01-28 RX ORDER — LOSARTAN POTASSIUM 100 MG/1
TABLET ORAL
Qty: 90 TABLET | Refills: 3 | Status: SHIPPED | OUTPATIENT
Start: 2025-01-28

## 2025-01-28 NOTE — PROGRESS NOTES
[facility]  [address]  [address]  [place] [address]  Dept: [phone]  Dept Fax: [phone]  Loc: [phone]    Cachorro Thompson is a 74 y.o. male who presents today for his medical conditions/complaints as noted below.  Cachorro Thompson is here for Medicare AWV         Patient History:      Coronary artery calcifications.  -[facility] cardiology  - July 3, 2024: CT chest without contrast: Coronary artery calcifications and cardiomegaly  -September 4, 2024: 2D echocardiogram: Ejection fraction 56 to 60%.  Left ventricular diastolic function was normal.  Mildly dilated right ventricle.     Interstitial lung disease/pulmonary fibrosis  -[facility] pulmonology: Dr. Josemanuel Villanueva  - July 3, 2024: CT chest without contrast: Interstitial lung disease.  Honeycomb appearance in both lower lobes.  Small pulmonary nodule stable since 2022.    Mild obstructive sleep apnea  -Sleep study:  - - January 13, 2025: Mild obstructive sleep apnea.  Subjective hypersomnia        pthx        Subjective:   CC:  Here today to discuss the following:      January 16, 2025  -Seen by [facility] cardiology for history of paroxysmal atrial fibrillation.  Also with nonsustained ventricular tachycardia.  - -Referred to electrophysiology for evaluation of nonsustained ventricular tachycardia.  Continue with Toprol-XL and Eliquis.    Hypertension  Compliant with medications.  No adverse effects from medication.  No lightheadedness, palpitations, or chest pain.      He has a history of postcholecystectomy diarrhea.  He had been taking Colestid as needed.  Overall he was satisfied with Colestid till he started ofev for pulmonary fibrosis.  He is now having more loose stools and wants a refill of his colestipol    He has a history of erectile dysfunction.  He is requesting prescription of Cialis.  He has taken Viagra in the past but would like to try Cialis 
Habits/Nutrition  On average, how many days per week do you engage in moderate to strenuous exercise (like a brisk walk)?: 0 days  On average, how many minutes do you engage in exercise at this level?: 0 min  Do you eat balanced/healthy meals regularly?: (!) No  Have you seen the dentist within the past year?: Yes  Body mass index is 30.96 kg/m².  Health Habits/Nutrition Interventions:  - Recommend healthy lifestyle    Hearing/Vision  Have you had an eye exam within the past year?: Yes  Do you have difficulty driving, watching TV, or doing any of your daily activities because of your eyesight?: No  Do you or your family notice any trouble with your hearing that hasn't been managed with hearing aids?: No  Hearing/Vision Interventions:  Not indicated  -     Safety  Do you have working smoke detectors?: Yes  Do you have any tripping hazards - loose or unsecured carpets or rugs?: No  Do you have non-slip mats or non-slip surfaces or shower bars or grab bars in your shower or bathtub?: Yes  Do you always fasten your seatbelt when you are in a car?: Yes  Safety Interventions:  Not indicated  -     ADLs  In the past 7 days, did you need help from others to perform any of the following everyday activities: Eating, dressing, grooming, bathing, toileting, or walking/balance?: No  In the past 7 days, did you need help from others to take care of any of the following: Laundry, housekeeping, banking/finances, shopping, telephone use, food preparation, transportation, or taking medications?: No  ADL Interventions:  Not indicated  - - -     Personalized Preventive Plan   Current Health Maintenance Status  Immunization History   Administered Date(s) Administered    COVID-19, MODERNA BLUE border, Primary or Immunocompromised, (age 12y+), IM, 100 mcg/0.5mL 01/20/2021, 02/19/2021    Influenza Virus Vaccine 10/12/2010, 10/10/2011, 10/15/2012, 10/23/2013, 11/24/2014, 11/11/2015, 11/14/2016    Influenza, FLUAD, (age 65 y+), IM, Quadv,

## 2025-02-04 DIAGNOSIS — J98.4 OTHER DISORDERS OF LUNG: ICD-10-CM

## 2025-02-04 DIAGNOSIS — J84.10 PULMONARY FIBROSIS, UNSPECIFIED: ICD-10-CM

## 2025-02-04 RX ORDER — NINTEDANIB 100 MG/1
CAPSULE ORAL
Qty: 60 CAPSULE | Refills: 3 | Status: SHIPPED | OUTPATIENT
Start: 2025-02-04

## 2025-02-04 NOTE — TELEPHONE ENCOUNTER
Rx Refill Note  Requested Prescriptions     Pending Prescriptions Disp Refills    Ofev 100 MG capsule [Pharmacy Med Name: OFEV  100MG  Capsule] 60 capsule      Sig: TAKE 1 CAPSULE BY MOUTH TWICE A DAY 12 HOURS APART WITH FOOD      Last office visit with prescribing clinician: 11/25/2024   Last telemedicine visit with prescribing clinician: Visit date not found   Next office visit with prescribing clinician: Visit date not found                         Would you like a call back once the refill request has been completed: [] Yes [] No    If the office needs to give you a call back, can they leave a voicemail: [] Yes [] No    Alivia Muñoz MA  02/04/25, 07:59 CST

## 2025-02-17 ENCOUNTER — OFFICE VISIT (OUTPATIENT)
Dept: PULMONOLOGY | Facility: CLINIC | Age: 75
End: 2025-02-17
Payer: MEDICARE

## 2025-02-17 VITALS
WEIGHT: 224 LBS | HEART RATE: 61 BPM | BODY MASS INDEX: 32.07 KG/M2 | SYSTOLIC BLOOD PRESSURE: 126 MMHG | DIASTOLIC BLOOD PRESSURE: 74 MMHG | OXYGEN SATURATION: 100 % | HEIGHT: 70 IN

## 2025-02-17 DIAGNOSIS — J84.10 PULMONARY FIBROSIS: Primary | ICD-10-CM

## 2025-02-17 PROBLEM — R09.02 HYPOXIA: Status: RESOLVED | Noted: 2023-03-12 | Resolved: 2025-02-17

## 2025-02-17 PROCEDURE — 3078F DIAST BP <80 MM HG: CPT | Performed by: INTERNAL MEDICINE

## 2025-02-17 PROCEDURE — 3074F SYST BP LT 130 MM HG: CPT | Performed by: INTERNAL MEDICINE

## 2025-02-17 PROCEDURE — 99214 OFFICE O/P EST MOD 30 MIN: CPT | Performed by: INTERNAL MEDICINE

## 2025-02-17 NOTE — PROGRESS NOTES
"Background:  Pt with pulmonary fibrosis, mod restr. Skpgrens, ofev started 10/2024, héctor   Chief Complaint  Pulmonary Fibrosis    Subjective    History of Present Illness     Reid Morrell is here for follow up with [facility].  History of Present Illness  He has been to sleep lab at [facility] and cpap was recommended.  He is using cpap with oxygen at night, compliant and benefiting.  He remains on ofev.  He has had some diarrhea better with cutting back on dairy and tx with colestipol.  Breathing is better overall.     Tobacco Use: Low Risk  (2/17/2025)    Patient History     Smoking Tobacco Use: Never     Smokeless Tobacco Use: Never     Passive Exposure: Never      Current Outpatient Medications   Medication Instructions    albuterol (PROVENTIL) (2.5 MG/3ML) 0.083% nebulizer solution     amLODIPine (NORVASC) 10 mg, Daily    apixaban (ELIQUIS) 5 mg, Oral, 2 Times Daily    atorvastatin (LIPITOR) 10 mg, Oral, Daily    colestipol (COLESTID) 1 g, Oral, 2 Times Daily    famotidine (PEPCID) 20 mg, 2 Times Daily    hydroCHLOROthiazide 25 mg, Daily    losartan (COZAAR) 100 mg, Daily    meloxicam (MOBIC) 15 mg, Daily    metoprolol succinate XL (TOPROL-XL) 12.5 mg, Oral, Every Evening    Mucus Relief ER 1,200 mg, Oral, Every 12 Hours Scheduled    Nintedanib Esylate (Ofev) 100 MG capsule TAKE 1 CAPSULE BY MOUTH TWICE A DAY 12 HOURS APART WITH FOOD    O2 (OXYGEN) 2 L/min, As Needed    SILDENAFIL CITRATE PO 20 mg, Daily PRN    Ventolin  (90 Base) MCG/ACT inhaler No dose, route, or frequency recorded.      Objective     Vital Signs:   /74   Pulse 61   Ht 177.8 cm (70\")   Wt 102 kg (224 lb)   SpO2 100% Comment: RA  BMI 32.14 kg/m²   Physical Exam  Constitutional:       Appearance: Normal appearance. He is not ill-appearing or diaphoretic.   Eyes:      Extraocular Movements: Extraocular movements intact.   Pulmonary:      Effort: Pulmonary effort is normal. " No respiratory distress.      Breath sounds: Rales present. No wheezing or rhonchi.   Skin:     Coloration: Skin is not cyanotic.      Nails: There is no clubbing.   Neurological:      Mental Status: He is alert.        Result Review  Data Reviewed:         PFT Values          6/17/2024    10:00   Pre Drug PFT Results   FVC 62   FEV1 74   FEF 25-75% 141   FEV1/FVC 89   Other Tests PFT Results   DLCO 68   D/VAsb 97         COMPREHENSIVE METABOLIC PANEL (01/27/2025 09:47 EST)   CBC WITH AUTO DIFFERENTIAL (01/27/2025 09:47 EST)    Hepatic Function Panel (01/16/2025 10:44)   Assessment and Plan    Diagnoses and all orders for this visit:    1. Pulmonary fibrosis (Primary)    He appears to be responding to ofev, a high risk medication which requires monitoring  Follow up 6 mwt next  Continue cpap for héctor, rxed at [facility].  Labs from Dr. Islas from 1/2025 are reviewed, normal transaminases, cbc ok.  Other liver panel results ok.  Recheck next visit    Follow Up   No follow-ups on file.  Patient was given instructions and counseling regarding his condition or for health maintenance advice. Please see specific information pulled into the AVS if appropriate.    Electronically signed by Nash Pisano MD, 2/17/2025, 11:24 CST

## 2025-02-26 DIAGNOSIS — J84.10 PULMONARY FIBROSIS (HCC): ICD-10-CM

## 2025-02-27 RX ORDER — ALBUTEROL SULFATE 0.83 MG/ML
SOLUTION RESPIRATORY (INHALATION)
Qty: 50 EACH | Refills: 5 | Status: SHIPPED | OUTPATIENT
Start: 2025-02-27

## 2025-03-28 DIAGNOSIS — J84.10 PULMONARY FIBROSIS (HCC): ICD-10-CM

## 2025-03-28 RX ORDER — ALBUTEROL SULFATE 0.83 MG/ML
SOLUTION RESPIRATORY (INHALATION)
Qty: 30 ML | Refills: 5 | Status: SHIPPED | OUTPATIENT
Start: 2025-03-28

## 2025-04-21 DIAGNOSIS — I10 ESSENTIAL (PRIMARY) HYPERTENSION: ICD-10-CM

## 2025-04-21 RX ORDER — HYDROCHLOROTHIAZIDE 25 MG/1
25 TABLET ORAL DAILY
Qty: 90 TABLET | Refills: 1 | OUTPATIENT
Start: 2025-04-21

## 2025-04-23 ENCOUNTER — OFFICE VISIT (OUTPATIENT)
Dept: PULMONOLOGY | Age: 75
End: 2025-04-23
Payer: MEDICARE

## 2025-04-23 VITALS
WEIGHT: 221 LBS | TEMPERATURE: 97.9 F | RESPIRATION RATE: 20 BRPM | HEART RATE: 60 BPM | HEIGHT: 71 IN | OXYGEN SATURATION: 93 % | BODY MASS INDEX: 30.94 KG/M2 | SYSTOLIC BLOOD PRESSURE: 146 MMHG | DIASTOLIC BLOOD PRESSURE: 81 MMHG

## 2025-04-23 DIAGNOSIS — I10 ESSENTIAL (PRIMARY) HYPERTENSION: ICD-10-CM

## 2025-04-23 DIAGNOSIS — E66.9 OBESITY (BMI 30-39.9): ICD-10-CM

## 2025-04-23 DIAGNOSIS — J84.10 PULMONARY FIBROSIS (HCC): ICD-10-CM

## 2025-04-23 DIAGNOSIS — G47.33 MILD OBSTRUCTIVE SLEEP APNEA: Primary | ICD-10-CM

## 2025-04-23 DIAGNOSIS — G47.10 HYPERSOMNIA: ICD-10-CM

## 2025-04-23 PROCEDURE — 99204 OFFICE O/P NEW MOD 45 MIN: CPT | Performed by: INTERNAL MEDICINE

## 2025-04-23 PROCEDURE — 1123F ACP DISCUSS/DSCN MKR DOCD: CPT | Performed by: INTERNAL MEDICINE

## 2025-04-23 PROCEDURE — 3077F SYST BP >= 140 MM HG: CPT | Performed by: INTERNAL MEDICINE

## 2025-04-23 PROCEDURE — 3079F DIAST BP 80-89 MM HG: CPT | Performed by: INTERNAL MEDICINE

## 2025-04-23 PROCEDURE — 1159F MED LIST DOCD IN RCRD: CPT | Performed by: INTERNAL MEDICINE

## 2025-04-23 RX ORDER — METOPROLOL SUCCINATE 25 MG/1
12.5 TABLET, EXTENDED RELEASE ORAL EVERY EVENING
COMMUNITY
Start: 2024-12-31

## 2025-04-23 RX ORDER — ATORVASTATIN CALCIUM 10 MG
1 TABLET ORAL DAILY
COMMUNITY
Start: 2024-08-06

## 2025-04-23 ASSESSMENT — ENCOUNTER SYMPTOMS
ABDOMINAL DISTENTION: 0
CHEST TIGHTNESS: 0
ANAL BLEEDING: 0
WHEEZING: 0
BACK PAIN: 0
ABDOMINAL PAIN: 0
SHORTNESS OF BREATH: 1
APNEA: 1
COUGH: 0
RHINORRHEA: 0

## 2025-04-23 NOTE — PROGRESS NOTES
Pulmonary and Sleep Medicine    Cachorro Thompson (:  1950) is a 74 y.o. male,New patient, here for evaluation of the following chief complaint(s):  New Patient (NP- ROMA/Sleep Study completed on 2025./Compliance report-2025./Pt has a hx of Pulmonary Fibrosis and is being followed by . Pt uses 2L of oxygen nocturnally and occasionally uses a POC. )      Referring physician:  Mya Wheatley, Aprn - Cnp  [address] [place] [address]     ASSESSMENT/PLAN:  1. Mild obstructive sleep apnea.  Apnea-hypopnea index 9 events per hour on sleep study done on 2025  2. Obesity (BMI 30-39.9)  3. Hypersomnia  4. Essential (primary) hypertension  5. Pulmonary fibrosis (HCC).  Treated by Dr. Josemanuel Villanueva pulmonary at [facility].        Continue current management with the CPAP he is compliant with the CPAP.  That the CPAP helps.  The patient is concerned that he possibly has recurrent disease but he does not have follow-up with rheumatology.  He did have elevated SSA Ro antibody.       Milla Quezada MD, [facility], [facility]    Return in about 6 months (around 10/23/2025).    SUBJECTIVE/OBJECTIVE:        Patient is here for evaluation of obstructive sleep apnea.  He underwent sleep study in the sleep lab in 2025.  At that time he had apnea-hypopnea index of 9 events per hour according to my interpretation.  The patient is known to have atrial fibrillation treated by cardiology.  It was his cardiologist recommendation that he should get checked for sleep apnea.  The patient was started on CPAP.  His CPAP compliance data was reviewed.  My interpretation of the data is that he is using the CPAP on average about 7 hours a night.  His apnea-hypopnea index while on the CPAP is 0.3 events per hour.  He feels that the CPAP is helping him significantly.  He is not having any significant issues with CPAP.  He does have pulmonary fibrosis for which he is followed by Dr. Villanueva at [facility].  Currently he

## 2025-05-01 RX ORDER — ATORVASTATIN CALCIUM 10 MG/1
10 TABLET, FILM COATED ORAL DAILY
Qty: 90 TABLET | Refills: 3 | Status: SHIPPED | OUTPATIENT
Start: 2025-05-01

## 2025-05-05 DIAGNOSIS — M25.562 CHRONIC PAIN OF BOTH KNEES: ICD-10-CM

## 2025-05-05 DIAGNOSIS — M25.561 CHRONIC PAIN OF BOTH KNEES: ICD-10-CM

## 2025-05-05 DIAGNOSIS — G89.29 CHRONIC PAIN OF BOTH KNEES: ICD-10-CM

## 2025-05-05 RX ORDER — MELOXICAM 15 MG/1
15 TABLET ORAL DAILY
Qty: 90 TABLET | Refills: 3 | Status: SHIPPED | OUTPATIENT
Start: 2025-05-05

## 2025-05-13 ENCOUNTER — LAB (OUTPATIENT)
Dept: LAB | Facility: HOSPITAL | Age: 75
End: 2025-05-13
Payer: MEDICARE

## 2025-05-13 ENCOUNTER — OFFICE VISIT (OUTPATIENT)
Dept: CARDIOLOGY | Facility: CLINIC | Age: 75
End: 2025-05-13
Payer: MEDICARE

## 2025-05-13 VITALS
SYSTOLIC BLOOD PRESSURE: 140 MMHG | OXYGEN SATURATION: 92 % | HEIGHT: 70 IN | BODY MASS INDEX: 31.78 KG/M2 | WEIGHT: 222 LBS | DIASTOLIC BLOOD PRESSURE: 82 MMHG | HEART RATE: 54 BPM

## 2025-05-13 DIAGNOSIS — I25.10 CORONARY ARTERY CALCIFICATION SEEN ON CT SCAN: ICD-10-CM

## 2025-05-13 DIAGNOSIS — I10 ESSENTIAL HYPERTENSION: ICD-10-CM

## 2025-05-13 DIAGNOSIS — I48.0 PAROXYSMAL ATRIAL FIBRILLATION: Primary | ICD-10-CM

## 2025-05-13 DIAGNOSIS — I47.29 NSVT (NONSUSTAINED VENTRICULAR TACHYCARDIA): ICD-10-CM

## 2025-05-13 RX ORDER — TRIAMTERENE AND HYDROCHLOROTHIAZIDE 37.5; 25 MG/1; MG/1
1 TABLET ORAL DAILY
Qty: 90 TABLET | Refills: 3 | Status: SHIPPED | OUTPATIENT
Start: 2025-05-13

## 2025-05-13 NOTE — PROGRESS NOTES
Reason For Visit:  Hypertension, coronary artery calcification, paroxysmal atrial fibrillation    Subjective        Reid Morrell is a 74 y.o. male with the below pertinent PMH who presents for follow-up of the above issues.    Reid Morrell was most recently seen in cardiology clinic 1/16/2025 at which time he reported doing well since starting his beta-blocker and Eliquis.  He was referred to electrophysiology (appointment scheduled for 5/23/2025).  No significant changes were made to his medications.    Today, the patient ReDS that he has been pretty stable.  He has not been aware of any palpitations.  He has unchanged dyspnea on exertion related to pulmonary fibrosis.  He denies any exertional chest pain.  He also reports that his prior lightheadedness resolved; this was only present for a few days.  He has mild leg swelling that is unchanged.  He has been checking his BP periodically at home and notes that readings are typically 120s/130s/70s and occasionally DBP around 80.    ROS: Pertinent findings are included above.    Cardiac Studies  Noncontrast CT chest 7/30/2024: I personally did a focused review of the study, which shows moderate coronary artery calcification mostly in the proximal and mid LAD.  Echo 9/4/2024: LVEF 56-60%, normal diastolic function, RV mildly dilated with normal systolic function, LA mildly dilated, no significant valvular stenosis or regurgitation  RHC 9/13/2024: 1. Mildly elevated pulmonary artery and right ventricular systolic pressure not meeting full criteria for pulmonary arterial hypertension given normal PVR. 2. RA (mean) 5, RV (s/edp): 42/6, PA (s/d/mean) 42/17/25, PCWP (mean) 12, Qian CO/CI 7.15/3.26 with PVR 1.8 RICCI, TD CO/CI 7.21/3.29 with PVR 1.8 RICCI. 3. PA (s/d/mean) 41/17/25, PCWP (mean) 14, Qina CO/CI 7.55/3.44 with PVR 1.5 RICCI, TD CO/CI 7.14/3.25 with PVR 1.5 RICCI.  Stress echo 12/3/2024: No significant echocardiographic or ECG evidence of ischemia.  NSVT was noted  at peak stress and in early recovery.  Several episodes of SVT were noted during stress and recovery including an episode requiring IV metoprolol.  Rhythm at the end of the study appears to be atrial fibrillation.  LV systolic function borderline at rest with calculated EF 50%.  Holter 12/6/2024: 2% atrial fibrillation burden with elevated heart rates while in A-fib.  40 episodes of NSVT.     Pertinent PMH  Pulmonary fibrosis with exertional hypoxia  Paroxysmal Atrial Fibrillation  NSVT  Coronary artery calcification  Hypertension  Lupus diagnosed at age 34 but reportedly has not been active    Pertinent past medical, surgical, family, and social history were reviewed.      Current Outpatient Medications:     albuterol (PROVENTIL) (2.5 MG/3ML) 0.083% nebulizer solution, , Disp: , Rfl:     amLODIPine (NORVASC) 10 MG tablet, Take 1 tablet by mouth Daily., Disp: , Rfl:     apixaban (ELIQUIS) 5 MG tablet tablet, Take 1 tablet by mouth 2 (Two) Times a Day., Disp: 60 tablet, Rfl: 11    atorvastatin (LIPITOR) 10 MG tablet, TAKE 1 TABLET BY MOUTH EVERY DAY, Disp: 90 tablet, Rfl: 3    colestipol (COLESTID) 1 g tablet, Take 1 tablet by mouth 2 (Two) Times a Day., Disp: , Rfl:     famotidine (PEPCID) 20 MG tablet, Take 1 tablet by mouth 2 (Two) Times a Day. When taking mobic Mon, Wed, Fri, Disp: , Rfl:     guaiFENesin (MUCINEX) 600 MG 12 hr tablet, Take 2 tablets by mouth Every 12 (Twelve) Hours., Disp: 20 tablet, Rfl: 0    hydrochlorothiazide (HYDRODIURIL) 25 MG tablet, Take 1 tablet by mouth Daily., Disp: , Rfl:     losartan (COZAAR) 100 MG tablet, Take 1 tablet by mouth Daily., Disp: , Rfl:     meloxicam (MOBIC) 15 MG tablet, Take 1 tablet by mouth Daily. (Patient taking differently: Take 1 tablet by mouth Every Other Day.), Disp: , Rfl:     metoprolol succinate XL (TOPROL-XL) 25 MG 24 hr tablet, Take 0.5 tablets by mouth Every Evening., Disp: 30 tablet, Rfl: 5    Nintedanib Esylate (Ofev) 100 MG capsule, TAKE 1 CAPSULE BY  "MOUTH TWICE A DAY 12 HOURS APART WITH FOOD, Disp: 60 capsule, Rfl: 3    O2 (OXYGEN), Inhale 2 L/min As Needed. Legacy, Disp: , Rfl:     Ventolin  (90 Base) MCG/ACT inhaler, , Disp: , Rfl:      Objective   Vital Signs:  /82   Pulse 54   Ht 177.8 cm (70\")   Wt 101 kg (222 lb)   SpO2 92%   BMI 31.85 kg/m²   Estimated body mass index is 31.85 kg/m² as calculated from the following:    Height as of this encounter: 177.8 cm (70\").    Weight as of this encounter: 101 kg (222 lb).      Constitutional:       Appearance: Healthy appearance. Not in distress.   Neck:      Vascular: JVD normal.   Pulmonary:      Comments: Bilateral pulmonary crackles with normal work of breathing  Cardiovascular:      Normal rate. Regular rhythm.      Murmurs: There is no murmur.      No gallop.  No click. No rub.   Edema:     Pretibial: bilateral trace edema of the pretibial area.  Abdominal:      General: There is no distension.      Palpations: Abdomen is soft.      Tenderness: There is no abdominal tenderness.   Skin:     General: Skin is warm and dry.   Neurological:      Mental Status: Alert and oriented to person, place and time.        Result Review :  The following data was reviewed by: Marlon Pichardo MD on 05/13/2025:  CMP   CMP          11/25/2024    09:45 1/16/2025    10:44 1/27/2025    08:47   CMP   Glucose   97       BUN   14       Creatinine   1.0       Sodium   137       Potassium   3.8       Chloride   97       Calcium   9.2       Total Protein 7.3  7.8  7.8       Albumin 4.2  4.1  4.3       Total Bilirubin 0.7  0.7  0.8       Alkaline Phosphatase 85  82  88       AST (SGOT) 34  29  25       ALT (SGPT) 30  26  24       Anion Gap   8          Details          This result is from an external source.                    ECG 12 Lead    Date/Time: 5/13/2025 11:06 AM  Performed by: Marlon Pichardo MD    Authorized by: Marlon Pichardo MD  Comparison: compared with previous ECG from 1/16/2025  Comparison to previous " ECG: PVC now present  Rhythm: sinus rhythm  Ectopy: multifocal PVCs  Rate: bradycardic  BPM: 54  Conduction: non-specific intraventricular conduction delay  QRS axis: left    Clinical impression: abnormal EKG            Assessment and Plan   Diagnoses and all orders for this visit:    1. Paroxysmal atrial fibrillation (Primary)    2. NSVT (nonsustained ventricular tachycardia)    3. Coronary artery calcification seen on CT scan    4. Essential hypertension  -     Basic Metabolic Panel; Future    Other orders  -     triamterene-hydrochlorothiazide (MAXZIDE-25) 37.5-25 MG per tablet; Take 1 tablet by mouth Daily.  Dispense: 90 tablet; Refill: 3  -     ECG 12 Lead      -BP reasonably well-controlled although is borderline.  Historically he has had relatively lower potassium levels.  Symptomatically appears to be stable and doing well.  - Transition from HCTZ to triamterene/HCTZ 37.5/25 mg daily  - Continue losartan 100 mg daily, amlodipine 10 mg daily, Toprol-XL 12.5 mg daily, Eliquis 5 mg twice daily, atorvastatin 10 mg daily  - Scheduled with EP next week  - Obtaining a BMP today      Follow Up   Return in about 4 months (around 9/13/2025).  Patient was given instructions and counseling regarding his condition or for health maintenance advice. Please see specific information pulled into the AVS if appropriate.       EMR Dragon/Transcription disclaimer: Much of this encounter note is an electronic transcription/translation of spoken language to printed text. The electronic translation of spoken language may permit erroneous, or at times, nonsensical words or phrases to be inadvertently transcribed; although I have reviewed the note for such errors, some may still exist.

## 2025-05-14 ENCOUNTER — RESULTS FOLLOW-UP (OUTPATIENT)
Dept: CARDIOLOGY | Facility: CLINIC | Age: 75
End: 2025-05-14
Payer: MEDICARE

## 2025-05-14 ENCOUNTER — LAB (OUTPATIENT)
Dept: LAB | Facility: HOSPITAL | Age: 75
End: 2025-05-14
Payer: MEDICARE

## 2025-05-14 LAB
ANION GAP SERPL CALCULATED.3IONS-SCNC: 9.6 MMOL/L (ref 5–15)
BUN SERPL-MCNC: 19 MG/DL (ref 8–23)
BUN/CREAT SERPL: 17.6 (ref 7–25)
CALCIUM SPEC-SCNC: 9 MG/DL (ref 8.6–10.5)
CHLORIDE SERPL-SCNC: 100 MMOL/L (ref 98–107)
CO2 SERPL-SCNC: 29.4 MMOL/L (ref 22–29)
CREAT SERPL-MCNC: 1.08 MG/DL (ref 0.76–1.27)
EGFRCR SERPLBLD CKD-EPI 2021: 71.6 ML/MIN/1.73
GLUCOSE SERPL-MCNC: 100 MG/DL (ref 65–99)
POTASSIUM SERPL-SCNC: 3.6 MMOL/L (ref 3.5–5.2)
SODIUM SERPL-SCNC: 139 MMOL/L (ref 136–145)

## 2025-05-14 PROCEDURE — 36415 COLL VENOUS BLD VENIPUNCTURE: CPT

## 2025-05-14 PROCEDURE — 80048 BASIC METABOLIC PNL TOTAL CA: CPT

## 2025-05-19 DIAGNOSIS — J98.4 OTHER DISORDERS OF LUNG: ICD-10-CM

## 2025-05-19 DIAGNOSIS — J84.10 PULMONARY FIBROSIS, UNSPECIFIED: ICD-10-CM

## 2025-05-19 RX ORDER — NINTEDANIB 100 MG/1
CAPSULE ORAL
Qty: 60 CAPSULE | Refills: 3 | Status: SHIPPED | OUTPATIENT
Start: 2025-05-19

## 2025-05-19 NOTE — TELEPHONE ENCOUNTER
Rx Refill Note  Requested Prescriptions     Pending Prescriptions Disp Refills    Ofev 100 MG capsule [Pharmacy Med Name: OFEV  100MG  Capsule] 60 capsule 3     Sig: TAKE 1 CAPSULE BY MOUTH TWICE A DAY 12 HOURS APART WITH FOOD      Last office visit with prescribing clinician: 11/25/2024   Last telemedicine visit with prescribing clinician: Visit date not found   Next office visit with prescribing clinician: 6/16/2025                         Would you like a call back once the refill request has been completed: [] Yes [] No    If the office needs to give you a call back, can they leave a voicemail: [] Yes [] No    Beena Bailon MA  05/19/25, 07:58 CDT

## 2025-05-23 ENCOUNTER — OFFICE VISIT (OUTPATIENT)
Dept: CARDIOLOGY | Facility: CLINIC | Age: 75
End: 2025-05-23
Payer: MEDICARE

## 2025-05-23 VITALS
SYSTOLIC BLOOD PRESSURE: 110 MMHG | HEART RATE: 62 BPM | BODY MASS INDEX: 31.5 KG/M2 | WEIGHT: 220 LBS | DIASTOLIC BLOOD PRESSURE: 70 MMHG | HEIGHT: 70 IN

## 2025-05-23 DIAGNOSIS — I48.0 PAROXYSMAL ATRIAL FIBRILLATION: Primary | ICD-10-CM

## 2025-05-23 DIAGNOSIS — I49.3 PVC (PREMATURE VENTRICULAR CONTRACTION): ICD-10-CM

## 2025-05-23 RX ORDER — DILTIAZEM HYDROCHLORIDE 120 MG/1
120 CAPSULE, EXTENDED RELEASE ORAL DAILY
Qty: 90 CAPSULE | Refills: 3 | Status: SHIPPED | OUTPATIENT
Start: 2025-05-23

## 2025-05-23 NOTE — PROGRESS NOTES
"EP NEW PATIENT VISIT    Chief Complaint  Atrial Fibrillation    Subjective        History of Present Illness    EP Problems:  1.  Paroxysmal atrial fibrillation  2.  NSVT    Cardiology Problems:  1.  Hypertension    Medical Problems:  1.  Pulmonary fibrosis  2.  Lupus    History of Present Illness  The patient is a 74-year-old male who presents to the clinic for atrial fibrillation.    He was diagnosed with atrial fibrillation by Dr. Pichardo. He reports experiencing shortness of breath on a few occasions, which he attributes to his cardiac condition. He describes a sensation of heart fluttering, although these episodes are infrequent. He expresses interest in understanding the potential benefits of ablation therapy, given that his EKGs have consistently shown premature ventricular contractions (PVCs). He is currently on Ofev for pulmonary hypertension, a medication he has been taking for less than a year. He did not report any issues prior to starting this medication. He is scheduled for an exercise stress test next month. He also uses an albuterol inhaler, but its usage is minimal.    MEDICATIONS  Current: Ofev, albuterol, metoprolol      Objective   Vital Signs:  /70   Pulse 62   Ht 177.8 cm (70\")   Wt 99.8 kg (220 lb)   BMI 31.57 kg/m²   Estimated body mass index is 31.57 kg/m² as calculated from the following:    Height as of this encounter: 177.8 cm (70\").    Weight as of this encounter: 99.8 kg (220 lb).      Physical Exam     Physical Exam  The patient is a well-appearing man in no acute distress.  Lungs are clear to auscultation bilaterally with scant bibasilar crackles. Normal work of breathing.  Legs are warm and well perfused.    Result Review :  The following data was reviewed by: Nupur Smith MD on 05/23/2025:  CMP          1/16/2025    10:44 1/27/2025    08:47 5/14/2025    09:13   CMP   Glucose  97     100    BUN  14     19    Creatinine  1.0     1.08    EGFR   71.6    Sodium  137     139  "   Potassium  3.8     3.6    Chloride  97     100    Calcium  9.2     9.0    Total Protein 7.8  7.8        Albumin 4.1  4.3        Total Bilirubin 0.7  0.8        Alkaline Phosphatase 82  88        AST (SGOT) 29  25        ALT (SGPT) 26  24        BUN/Creatinine Ratio   17.6    Anion Gap  8     9.6       Details          This result is from an external source.             CBC          9/13/2024    07:50 1/16/2025    10:44 1/27/2025    08:47   CBC   WBC 5.76  6.27  5.5       RBC 5.39  5.35  5.34       Hemoglobin 16.2  15.7  16       Hematocrit 47.2  47.9  49       MCV 87.6  89.5  91.8       MCH 30.1  29.3  30       MCHC 34.3  32.8  32.7       RDW 12.9  13.5  13.5       Platelets 248  264  242          Details          This result is from an external source.             TSH          1/27/2025    08:47   TSH   TSH 3.5          Details          This result is from an external source.                        Assessment and Plan   Diagnoses and all orders for this visit:    1. Paroxysmal atrial fibrillation (Primary)    2. PVC (premature ventricular contraction)    Other orders  -     dilTIAZem XR (DILACOR XR) 120 MG 24 hr capsule; Take 1 capsule by mouth Daily.  Dispense: 90 capsule; Refill: 3        Assessment & Plan  1. Atrial Fibrillation, proxysmal  - Diagnosed with atrial fibrillation, a moderate-risk rhythm disorder  - Discussed potential benefits and risks of early treatment, including medication management and ablation therapy  - For now, given relatively limited symptoms, he would like to hold off on any procedures for now and reassess with a holter monitor in 3 months  - Advised to monitor symptoms and report any worsening of atrial fibrillation  - Contact clinic for reassessment if symptoms worsen significantly before follow-up  - Discontinue metoprolol and start diltiazem once daily due to concerns about bronchoconstriction affecting lungs    2. Premature Ventricular Contractions (PVCs)  - PVC burden is 3.7%,  considered low risk  - No aggressive treatment warranted currently  - Discussed possibility of PVCs being a side effect of Ofev, but no definitive data supports this  - Heart function remains normal, no immediate need for PVC ablation  - Advised to continue monitoring symptoms and report any significant changes                 Follow Up   Return in about 3 months (around 8/23/2025).  Patient was given instructions and counseling regarding his condition or for health maintenance advice. Please see specific information pulled into the AVS if appropriate.     Part of this note may be an electronic transcription/translation of spoken language to printed text using the Dragon Dictation System.    Patient or patient representative verbalized consent for the use of Ambient Listening during the visit with  Nupur Smith MD for chart documentation. 5/24/2025  22:30 CDT

## 2025-05-23 NOTE — PATIENT INSTRUCTIONS
1.  3 month follow up  2.  Stop metoprolol and start diltiazem instead  3.  Repeat holter in 3 months

## 2025-05-24 PROBLEM — I48.0 PAROXYSMAL ATRIAL FIBRILLATION: Status: ACTIVE | Noted: 2025-05-24

## 2025-05-24 PROBLEM — I49.3 PVC (PREMATURE VENTRICULAR CONTRACTION): Status: ACTIVE | Noted: 2025-05-24

## 2025-06-10 NOTE — PROGRESS NOTES
Chief Complaint  Pulmonary fibrosis    Subjective    History of Present Illness {CC  Problem List  Visit Diagnosis   Encounters  Notes  Medications  Labs  Result Review Imaging  Media     Reid Morrell presents to [facility]ULMON[facility]IC[facility] for:    History of Present Illness  Mr. Morrell is here for follow up and management of pulmonary fibrosis. He started Ofev in October 2024.  Feels like his breathing is overall stable.  He gets short of breath with exertional activities.  He has been riding bike and walking.  6-minute walk test completed showing need for 3 L with exertion over 5 minutes.    He had a ct chest completed this summer showing mild progression of fibrosis. PFT from this summer still showing moderate restriction however DLCO was reduced from prior PFT in 2022. He did qualify for supplemental oxygen in July 2024. He continues on 2l of oxygen for which he is compliant and benefiting. He has followed up with cardiology and had a right heart cath completed in September 2024 which did not meet full criteria for PAH. He also follows with rheumatology.  Saw Dr. Martinez and tells me he took a lot of blood and is keeping Sjogren's within his differential diagnoses.  Patient tells me he has not had follow-up with Dr. Martinez office.       Prior to Admission medications    Medication Sig Start Date End Date Taking? Authorizing Provider   albuterol (PROVENTIL) (2.5 MG/3ML) 0.083% nebulizer solution  7/11/24   ProviderDarinel MD   amLODIPine (NORVASC) 10 MG tablet Take 1 tablet by mouth Daily.    Provider, MD Darinel   apixaban (ELIQUIS) 5 MG tablet tablet Take 1 tablet by mouth 2 (Two) Times a Day. 12/31/24   Marlon Pichardo MD   atorvastatin (LIPITOR) 10 MG tablet TAKE 1 TABLET BY MOUTH EVERY DAY 5/1/25   Deven Chavira APRN   colestipol (COLESTID) 1 g tablet Take 1 tablet by mouth 2 (Two) Times a Day.    ProviderDarinel MD   dilTIAZem XR  "(DILACOR XR) 120 MG 24 hr capsule Take 1 capsule by mouth Daily. 5/23/25   Nupur Smith MD   famotidine (PEPCID) 20 MG tablet Take 1 tablet by mouth 2 (Two) Times a Day. When taking mobic  Mon, Wed, Fri    Darinel Orourke MD   guaiFENesin (MUCINEX) 600 MG 12 hr tablet Take 2 tablets by mouth Every 12 (Twelve) Hours. 3/15/23   Claudia White APRN   losartan (COZAAR) 100 MG tablet Take 1 tablet by mouth Daily.    Emergency, Nurse Epic, RN   meloxicam (MOBIC) 15 MG tablet Take 1 tablet by mouth Daily.  Patient taking differently: Take 1 tablet by mouth Every Other Day.    Emergency, Nurse Nica RN   O2 (OXYGEN) Inhale 2 L/min As Needed. [facility]    ProviderDarinel MD   Ofev 100 MG capsule TAKE 1 CAPSULE BY MOUTH TWICE A DAY 12 HOURS APART WITH FOOD 5/19/25   Romy Valera APRN   triamterene-hydrochlorothiazide (MAXZIDE-25) 37.5-25 MG per tablet Take 1 tablet by mouth Daily. 5/13/25   Marlon Pichardo MD   Ventolin  (90 Base) MCG/ACT inhaler  3/21/23   Provider, MD Darinel       Social History     Socioeconomic History    Marital status:    Tobacco Use    Smoking status: Never     Passive exposure: Never    Smokeless tobacco: Never   Vaping Use    Vaping status: Never Used   Substance and Sexual Activity    Alcohol use: No    Drug use: No    Sexual activity: Yes     Partners: Female     Birth control/protection: None       Objective   Vital Signs:   /80   Pulse 53   Ht 177.8 cm (70\")   Wt 98.4 kg (217 lb)   SpO2 93% Comment: 2L PD  BMI 31.14 kg/m²     Physical Exam  Constitutional:       General: He is not in acute distress.     Interventions: Nasal cannula in place.   HENT:      Head: Normocephalic.      Nose: Nose normal.      Mouth/Throat:      Mouth: Mucous membranes are moist.   Eyes:      General: No scleral icterus.  Cardiovascular:      Rate and Rhythm: Normal rate.   Pulmonary:      Effort: No respiratory distress.      Breath sounds: Rales present.   Abdominal:      " General: There is no distension.   Neurological:      Mental Status: He is alert and oriented to person, place, and time.   Psychiatric:         Mood and Affect: Mood normal.         Behavior: Behavior is cooperative.        Result Review :{ Labs  Result Review  Imaging  Med Tab  Media :    PFT Values          6/17/2024    10:00 6/16/2025    14:00   Pre Drug PFT Results   FVC 62 65   FEV1 74 77   FEF 25-75% 141 139   FEV1/FVC 89 88   Other Tests PFT Results   DLCO 68 60   D/VAsb 97 90     My interpretation of the PFT : Moderate restriction    Results for orders placed in visit on 06/16/25    Spirometry with Diffusion Capacity    Narrative  Spirometry with Diffusion Capacity    Performed by: Chriss Faust CMA  Authorized by: Romy Valera APRN  Pre Drug % Predicted  FVC: 65%  FEV1: 77%  FEF 25-75%: 139%  FEV1/FVC: 88%  DLCO: 60%  D/VAsb: 90%    Interpretation  Spirometry  Spirometry shows moderate restriction. midflow is normal.  Review of FVL curve  Patient's effort is normal.  Diffusion Capacity  The patient's diffusion capacity is moderately reduced.  Diffusion capacity is normal when corrected for alveolar volume.      Results for orders placed in visit on 06/17/24    Spirometry with Diffusion Capacity    Narrative  Spirometry with Diffusion Capacity    Performed by: Candi Shaw, RRT  Authorized by: Nash Pisano MD  Pre Drug % Predicted  FVC: 62%  FEV1: 74%  FEF 25-75%: 141%  FEV1/FVC: 89%  DLCO: 68%  D/VAsb: 97%    Interpretation  Spirometry  Spirometry shows moderate restriction. midflow is normal.  Diffusion Capacity  The patient's diffusion capacity is mildly reduced.  Diffusion capacity is normal when corrected for alveolar volume.  Overall comments: DLCO has decreased and fvc has decreased slightly since prior study in 2022      Results for orders placed in visit on 12/20/22    Pulmonary Function Test    Narrative  Pulmonary Function Test  Performed by: Chriss Faust  CMA  Authorized by: Nash Pisano MD    Pre Drug % Predicted  FVC: 67%  FEV1: 76%  FEF 25-75%: 130%  FEV1/FVC: 85%  DLCO: 83%  D/VAsb: 119%    Interpretation  Spirometry  Spirometry shows moderate restriction. midflow is normal.  Diffusion Capacity  The patient's diffusion capacity is normal.  Diffusion capacity is normal when corrected for alveolar volume.    stable compared with most recent study  Electronically signed by Nash Pisano MD, 12/20/2022, 18:25 CST    Rest/Exercise Pulse Ox Values          7/15/2024    09:00   Rest/Exercise Pulse Ox Results   Rest room air SAT % 94   Exercise room air SAT % 88   Rest on O2 @ Liters 2   Rest on O2 SAT % 96   Exercise on O2 @ Liters 2   Exercise on O2 SAT % 95            Reid Morrell  reports that he has never smoked. He has never been exposed to tobacco smoke. He has never used smokeless tobacco.               Assessment and Plan {CC Problem List  Visit Diagnosis  ROS  Review (Popup)  [facility] Maintenance  Quality  BestPractice  Medications  SmartSets  SnapShot Encounters  Media      Diagnoses and all orders for this visit:    1. Pulmonary fibrosis (Primary)  Comments:  Spirometry stable x 1 year.  DLCO reduced.  Continue Ofev.  Obtain hepatic panel today  Orders:  -     Spirometry with Diffusion Capacity  -     Hepatic Function Panel    2. Chronic respiratory failure with hypoxia  Comments:  Continue supplemental oxygen for which he is compliant and benefiting.  6-minute walk test completed and reviewed showing need for 3 L with exertion > 5min    3. Restrictive lung disease  Comments:  Secondary to pulmonary fibrosis    4. MALLORIE (obstructive sleep apnea)  Comments:  Continue PAP with sleep for which he is compliant and benefiting.  Following with [facility] sleep          Plan as above.  Office follow-up in 3 months or sooner if needed.    Romy Valera, APRN  6/16/2025  14:40 CDT    Follow Up {Instructions Charge Capture  Follow-up  Communications   Return in about 3 months (around 9/16/2025) for 6 mo appt with veto dalal .    Patient was given instructions and counseling regarding his condition or for health maintenance advice. Please see specific information pulled into the AVS if appropriate.

## 2025-06-13 ENCOUNTER — OFFICE VISIT (OUTPATIENT)
Dept: PULMONOLOGY | Facility: CLINIC | Age: 75
End: 2025-06-13
Payer: MEDICARE

## 2025-06-13 DIAGNOSIS — J84.10 PULMONARY FIBROSIS, UNSPECIFIED: Primary | ICD-10-CM

## 2025-06-13 NOTE — PROCEDURES
6 Minute Walk Test    Performed by: Chriss Faust CMA  Authorized by: Romy Valera APRN    General:     - Medical History Checked      - Medical clearance provided for the patient to participate in exercise testing      Contraindications:    - No contraindications identified       Exercise Details     Supplemental oxygen:  CONTINUOUS    Mobility aid:  NA    Hallway: Total Feet:  832    Miles:  0.16    Meters:  253.59    Rest, Exercise, Recovery Readings    Rest     BP: 122/60    SAT: 96%    O2: RA    HR: 56    RPE: 1   Finish     BP: 128/70    SAT: 95%    O2: 3L    HR: 68    RPE: 3   Recovery 1     BP: 120/60    SAT: 98%    O2: 2L    HR: 53    RPE:  1   Recovery 2     BP:  120/60    SAT:  98%    O2: 2L    HR:  50    RPE: 0    Minute by Minute Recordings    1st Minute     SAT: 92%    O2: RA    HR: 90    RPE:  3   2nd minute     SAT: 86%    O2: RA    HR: 72    RPE: 1    Notes: STOPPED AND TURNED ON 02   3rd minute     SAT: 90%    O2: 2L    HR: 54    RPE: 1   4th minute     SAT: 97%    O2: 2L    HR: 60    RPE: 2   5th minute     SAT: 93%    O2: 2L    HR: 93    RPE: 2   6th minute     SAT: 88%    O2: 3L    HR: 83    RPE: 3    Notes: STOPPED AND TURNED UP 02 TO 3L    Was test terminated?: No        Technician:  Chriss ISAAC       Overall notes:  Patient walked in the hallway for his six minute walk. He walked a total of 832 feet or 253.59 meters. His oxygen was titrated up to 3L at minute six.  [facility]

## 2025-06-16 ENCOUNTER — PROCEDURE VISIT (OUTPATIENT)
Dept: PULMONOLOGY | Facility: CLINIC | Age: 75
End: 2025-06-16
Payer: MEDICARE

## 2025-06-16 ENCOUNTER — RESULTS FOLLOW-UP (OUTPATIENT)
Dept: PULMONOLOGY | Facility: CLINIC | Age: 75
End: 2025-06-16

## 2025-06-16 ENCOUNTER — OFFICE VISIT (OUTPATIENT)
Dept: PULMONOLOGY | Facility: CLINIC | Age: 75
End: 2025-06-16
Payer: MEDICARE

## 2025-06-16 ENCOUNTER — TELEPHONE (OUTPATIENT)
Dept: PULMONOLOGY | Facility: CLINIC | Age: 75
End: 2025-06-16

## 2025-06-16 VITALS
SYSTOLIC BLOOD PRESSURE: 118 MMHG | OXYGEN SATURATION: 93 % | BODY MASS INDEX: 31.07 KG/M2 | DIASTOLIC BLOOD PRESSURE: 80 MMHG | HEART RATE: 53 BPM | WEIGHT: 217 LBS | HEIGHT: 70 IN

## 2025-06-16 DIAGNOSIS — J84.10 PULMONARY FIBROSIS: Primary | Chronic | ICD-10-CM

## 2025-06-16 DIAGNOSIS — J98.4 RESTRICTIVE LUNG DISEASE: Chronic | ICD-10-CM

## 2025-06-16 DIAGNOSIS — G47.33 OSA (OBSTRUCTIVE SLEEP APNEA): Chronic | ICD-10-CM

## 2025-06-16 DIAGNOSIS — J84.10 PULMONARY FIBROSIS: Primary | ICD-10-CM

## 2025-06-16 DIAGNOSIS — J96.11 CHRONIC RESPIRATORY FAILURE WITH HYPOXIA: Chronic | ICD-10-CM

## 2025-06-16 PROCEDURE — 1159F MED LIST DOCD IN RCRD: CPT | Performed by: NURSE PRACTITIONER

## 2025-06-16 PROCEDURE — 1160F RVW MEDS BY RX/DR IN RCRD: CPT | Performed by: NURSE PRACTITIONER

## 2025-06-16 PROCEDURE — 3079F DIAST BP 80-89 MM HG: CPT | Performed by: NURSE PRACTITIONER

## 2025-06-16 PROCEDURE — 99214 OFFICE O/P EST MOD 30 MIN: CPT | Performed by: NURSE PRACTITIONER

## 2025-06-16 PROCEDURE — 3074F SYST BP LT 130 MM HG: CPT | Performed by: NURSE PRACTITIONER

## 2025-06-16 PROCEDURE — 94375 RESPIRATORY FLOW VOLUME LOOP: CPT | Performed by: NURSE PRACTITIONER

## 2025-06-16 PROCEDURE — 94729 DIFFUSING CAPACITY: CPT | Performed by: NURSE PRACTITIONER

## 2025-06-16 NOTE — PROCEDURES
Spirometry with Diffusion Capacity    Performed by: Chriss Faust CMA  Authorized by: Romy Valera APRN     Pre Drug % Predicted    FVC: 65%   FEV1: 77%   FEF 25-75%: 139%   FEV1/FVC: 88%   DLCO: 60%   D/VAsb: 90%    Interpretation   Spirometry   Spirometry shows moderate restriction. midflow is normal.  Review of FVL curve   Patient's effort is normal.   Diffusion Capacity  The patient's diffusion capacity is moderately reduced.  Diffusion capacity is normal when corrected for alveolar volume.

## 2025-06-16 NOTE — TELEPHONE ENCOUNTER
----- Message from Romy Valera sent at 6/16/2025  2:51 PM CDT -----  Can we please try to obtain notes from Dr. Martinez office

## 2025-06-17 ENCOUNTER — RESULTS FOLLOW-UP (OUTPATIENT)
Dept: PULMONOLOGY | Facility: CLINIC | Age: 75
End: 2025-06-17
Payer: MEDICARE

## 2025-06-17 LAB
ALBUMIN SERPL-MCNC: 4.2 G/DL (ref 3.8–4.8)
ALP SERPL-CCNC: 78 IU/L (ref 44–121)
ALT SERPL-CCNC: 34 IU/L (ref 0–44)
AST SERPL-CCNC: 35 IU/L (ref 0–40)
BILIRUB DIRECT SERPL-MCNC: 0.25 MG/DL (ref 0–0.4)
BILIRUB SERPL-MCNC: 0.6 MG/DL (ref 0–1.2)
PROT SERPL-MCNC: 7.2 G/DL (ref 6–8.5)

## 2025-07-18 ENCOUNTER — HOSPITAL ENCOUNTER (INPATIENT)
Facility: HOSPITAL | Age: 75
LOS: 2 days | Discharge: HOME | DRG: 177 | End: 2025-07-20
Attending: FAMILY MEDICINE | Admitting: INTERNAL MEDICINE
Payer: MEDICARE

## 2025-07-18 ENCOUNTER — OFFICE VISIT (OUTPATIENT)
Age: 75
End: 2025-07-18

## 2025-07-18 ENCOUNTER — APPOINTMENT (OUTPATIENT)
Dept: CT IMAGING | Facility: HOSPITAL | Age: 75
DRG: 177 | End: 2025-07-18
Payer: MEDICARE

## 2025-07-18 VITALS
SYSTOLIC BLOOD PRESSURE: 100 MMHG | RESPIRATION RATE: 26 BRPM | OXYGEN SATURATION: 95 % | WEIGHT: 208 LBS | HEART RATE: 89 BPM | DIASTOLIC BLOOD PRESSURE: 64 MMHG | BODY MASS INDEX: 29.12 KG/M2 | TEMPERATURE: 99.1 F | HEIGHT: 71 IN

## 2025-07-18 DIAGNOSIS — R06.02 SHORTNESS OF BREATH: Primary | ICD-10-CM

## 2025-07-18 DIAGNOSIS — R06.09 EXERTIONAL DYSPNEA: Primary | ICD-10-CM

## 2025-07-18 DIAGNOSIS — J81.0 ACUTE PULMONARY EDEMA: ICD-10-CM

## 2025-07-18 DIAGNOSIS — R13.10 DYSPHAGIA, UNSPECIFIED TYPE: ICD-10-CM

## 2025-07-18 DIAGNOSIS — J84.10 PULMONARY FIBROSIS: ICD-10-CM

## 2025-07-18 DIAGNOSIS — J18.9 PNEUMONIA OF LEFT LOWER LOBE DUE TO INFECTIOUS ORGANISM: ICD-10-CM

## 2025-07-18 DIAGNOSIS — J84.10 PULMONARY FIBROSIS (HCC): ICD-10-CM

## 2025-07-18 DIAGNOSIS — R50.9 FEVER, UNSPECIFIED FEVER CAUSE: ICD-10-CM

## 2025-07-18 PROBLEM — Z79.01 CHRONIC ANTICOAGULATION: Status: ACTIVE | Noted: 2025-07-18

## 2025-07-18 PROBLEM — J96.21 ACUTE ON CHRONIC RESPIRATORY FAILURE WITH HYPOXIA: Status: ACTIVE | Noted: 2025-07-18

## 2025-07-18 PROBLEM — U07.1 COVID-19: Status: ACTIVE | Noted: 2025-07-18

## 2025-07-18 LAB
ALBUMIN SERPL-MCNC: 3.9 G/DL (ref 3.5–5.2)
ALBUMIN/GLOB SERPL: 1 G/DL
ALP SERPL-CCNC: 69 U/L (ref 39–117)
ALT SERPL W P-5'-P-CCNC: 24 U/L (ref 1–41)
ANION GAP SERPL CALCULATED.3IONS-SCNC: 11 MMOL/L (ref 5–15)
AST SERPL-CCNC: 25 U/L (ref 1–40)
B PARAPERT DNA SPEC QL NAA+PROBE: NOT DETECTED
B PERT DNA SPEC QL NAA+PROBE: NOT DETECTED
BASOPHILS # BLD AUTO: 0.01 10*3/MM3 (ref 0–0.2)
BASOPHILS NFR BLD AUTO: 0.1 % (ref 0–1.5)
BILIRUB SERPL-MCNC: 0.7 MG/DL (ref 0–1.2)
BUN SERPL-MCNC: 24.6 MG/DL (ref 8–23)
BUN/CREAT SERPL: 21.2 (ref 7–25)
C PNEUM DNA NPH QL NAA+NON-PROBE: NOT DETECTED
CALCIUM SPEC-SCNC: 9 MG/DL (ref 8.6–10.5)
CHLORIDE SERPL-SCNC: 99 MMOL/L (ref 98–107)
CO2 SERPL-SCNC: 27 MMOL/L (ref 22–29)
CREAT SERPL-MCNC: 1.16 MG/DL (ref 0.76–1.27)
CRP SERPL-MCNC: 6.04 MG/DL (ref 0–0.5)
D-LACTATE SERPL-SCNC: 1.2 MMOL/L (ref 0.5–2)
DEPRECATED RDW RBC AUTO: 49.1 FL (ref 37–54)
EGFRCR SERPLBLD CKD-EPI 2021: 65.7 ML/MIN/1.73
EOSINOPHIL # BLD AUTO: 0.01 10*3/MM3 (ref 0–0.4)
EOSINOPHIL NFR BLD AUTO: 0.1 % (ref 0.3–6.2)
ERYTHROCYTE [DISTWIDTH] IN BLOOD BY AUTOMATED COUNT: 14.1 % (ref 12.3–15.4)
FLUAV SUBTYP SPEC NAA+PROBE: NOT DETECTED
FLUBV RNA NPH QL NAA+NON-PROBE: NOT DETECTED
GEN 5 1HR TROPONIN T REFLEX: 26 NG/L
GLOBULIN UR ELPH-MCNC: 3.8 GM/DL
GLUCOSE SERPL-MCNC: 88 MG/DL (ref 65–99)
HADV DNA SPEC NAA+PROBE: NOT DETECTED
HCOV 229E RNA SPEC QL NAA+PROBE: NOT DETECTED
HCOV HKU1 RNA SPEC QL NAA+PROBE: NOT DETECTED
HCOV NL63 RNA SPEC QL NAA+PROBE: NOT DETECTED
HCOV OC43 RNA SPEC QL NAA+PROBE: NOT DETECTED
HCT VFR BLD AUTO: 45 % (ref 37.5–51)
HGB BLD-MCNC: 15.1 G/DL (ref 13–17.7)
HMPV RNA NPH QL NAA+NON-PROBE: NOT DETECTED
HPIV1 RNA ISLT QL NAA+PROBE: NOT DETECTED
HPIV2 RNA SPEC QL NAA+PROBE: NOT DETECTED
HPIV3 RNA NPH QL NAA+PROBE: NOT DETECTED
HPIV4 P GENE NPH QL NAA+PROBE: NOT DETECTED
IMM GRANULOCYTES # BLD AUTO: 0.02 10*3/MM3 (ref 0–0.05)
IMM GRANULOCYTES NFR BLD AUTO: 0.3 % (ref 0–0.5)
L PNEUMO1 AG UR QL IA: NEGATIVE
LYMPHOCYTES # BLD AUTO: 0.84 10*3/MM3 (ref 0.7–3.1)
LYMPHOCYTES NFR BLD AUTO: 11.3 % (ref 19.6–45.3)
M PNEUMO IGG SER IA-ACNC: NOT DETECTED
MAGNESIUM SERPL-MCNC: 2.2 MG/DL (ref 1.6–2.4)
MCH RBC QN AUTO: 31.6 PG (ref 26.6–33)
MCHC RBC AUTO-ENTMCNC: 33.6 G/DL (ref 31.5–35.7)
MCV RBC AUTO: 94.1 FL (ref 79–97)
MONOCYTES # BLD AUTO: 0.86 10*3/MM3 (ref 0.1–0.9)
MONOCYTES NFR BLD AUTO: 11.5 % (ref 5–12)
NEUTROPHILS NFR BLD AUTO: 5.71 10*3/MM3 (ref 1.7–7)
NEUTROPHILS NFR BLD AUTO: 76.7 % (ref 42.7–76)
NRBC BLD AUTO-RTO: 0 /100 WBC (ref 0–0.2)
NT-PROBNP SERPL-MCNC: 255.8 PG/ML (ref 0–1800)
PLATELET # BLD AUTO: 196 10*3/MM3 (ref 140–450)
PMV BLD AUTO: 9.2 FL (ref 6–12)
POTASSIUM SERPL-SCNC: 3.6 MMOL/L (ref 3.5–5.2)
PROCALCITONIN SERPL-MCNC: 0.06 NG/ML (ref 0–0.25)
PROT SERPL-MCNC: 7.7 G/DL (ref 6–8.5)
RBC # BLD AUTO: 4.78 10*6/MM3 (ref 4.14–5.8)
RHINOVIRUS RNA SPEC NAA+PROBE: NOT DETECTED
RSV RNA NPH QL NAA+NON-PROBE: NOT DETECTED
S PNEUM AG SPEC QL LA: NEGATIVE
SARS-COV-2 RNA RESP QL NAA+PROBE: DETECTED
SODIUM SERPL-SCNC: 137 MMOL/L (ref 136–145)
TROPONIN T % DELTA: -4
TROPONIN T NUMERIC DELTA: -1 NG/L
TROPONIN T SERPL HS-MCNC: 27 NG/L
WBC NRBC COR # BLD AUTO: 7.45 10*3/MM3 (ref 3.4–10.8)

## 2025-07-18 PROCEDURE — 80053 COMPREHEN METABOLIC PANEL: CPT | Performed by: FAMILY MEDICINE

## 2025-07-18 PROCEDURE — 63710000001 DEXAMETHASONE PER 0.25 MG

## 2025-07-18 PROCEDURE — 93005 ELECTROCARDIOGRAM TRACING: CPT | Performed by: FAMILY MEDICINE

## 2025-07-18 PROCEDURE — 83735 ASSAY OF MAGNESIUM: CPT | Performed by: FAMILY MEDICINE

## 2025-07-18 PROCEDURE — 0202U NFCT DS 22 TRGT SARS-COV-2: CPT

## 2025-07-18 PROCEDURE — 25010000002 FUROSEMIDE PER 20 MG: Performed by: FAMILY MEDICINE

## 2025-07-18 PROCEDURE — 94799 UNLISTED PULMONARY SVC/PX: CPT

## 2025-07-18 PROCEDURE — 86140 C-REACTIVE PROTEIN: CPT

## 2025-07-18 PROCEDURE — 87449 NOS EACH ORGANISM AG IA: CPT

## 2025-07-18 PROCEDURE — 25010000002 AZITHROMYCIN PER 500 MG

## 2025-07-18 PROCEDURE — 93010 ELECTROCARDIOGRAM REPORT: CPT | Performed by: INTERNAL MEDICINE

## 2025-07-18 PROCEDURE — 71275 CT ANGIOGRAPHY CHEST: CPT

## 2025-07-18 PROCEDURE — 84484 ASSAY OF TROPONIN QUANT: CPT | Performed by: FAMILY MEDICINE

## 2025-07-18 PROCEDURE — 94760 N-INVAS EAR/PLS OXIMETRY 1: CPT

## 2025-07-18 PROCEDURE — 85025 COMPLETE CBC W/AUTO DIFF WBC: CPT | Performed by: FAMILY MEDICINE

## 2025-07-18 PROCEDURE — 87040 BLOOD CULTURE FOR BACTERIA: CPT

## 2025-07-18 PROCEDURE — 83880 ASSAY OF NATRIURETIC PEPTIDE: CPT | Performed by: FAMILY MEDICINE

## 2025-07-18 PROCEDURE — 25810000003 SODIUM CHLORIDE 0.9 % SOLUTION 250 ML FLEX CONT

## 2025-07-18 PROCEDURE — 87205 SMEAR GRAM STAIN: CPT

## 2025-07-18 PROCEDURE — 83605 ASSAY OF LACTIC ACID: CPT

## 2025-07-18 PROCEDURE — 25510000001 IOPAMIDOL PER 1 ML: Performed by: FAMILY MEDICINE

## 2025-07-18 PROCEDURE — 99285 EMERGENCY DEPT VISIT HI MDM: CPT | Performed by: FAMILY MEDICINE

## 2025-07-18 PROCEDURE — 87641 MR-STAPH DNA AMP PROBE: CPT

## 2025-07-18 PROCEDURE — 36415 COLL VENOUS BLD VENIPUNCTURE: CPT

## 2025-07-18 PROCEDURE — 84145 PROCALCITONIN (PCT): CPT

## 2025-07-18 RX ORDER — ALBUTEROL SULFATE 0.83 MG/ML
2.5 SOLUTION RESPIRATORY (INHALATION) EVERY 6 HOURS PRN
Status: DISCONTINUED | OUTPATIENT
Start: 2025-07-18 | End: 2025-07-20 | Stop reason: HOSPADM

## 2025-07-18 RX ORDER — ATORVASTATIN CALCIUM 10 MG/1
10 TABLET, FILM COATED ORAL DAILY
Status: DISCONTINUED | OUTPATIENT
Start: 2025-07-19 | End: 2025-07-20 | Stop reason: HOSPADM

## 2025-07-18 RX ORDER — ONDANSETRON 2 MG/ML
4 INJECTION INTRAMUSCULAR; INTRAVENOUS EVERY 6 HOURS PRN
Status: DISCONTINUED | OUTPATIENT
Start: 2025-07-18 | End: 2025-07-20 | Stop reason: HOSPADM

## 2025-07-18 RX ORDER — SODIUM CHLORIDE 0.9 % (FLUSH) 0.9 %
10 SYRINGE (ML) INJECTION AS NEEDED
Status: DISCONTINUED | OUTPATIENT
Start: 2025-07-18 | End: 2025-07-20 | Stop reason: HOSPADM

## 2025-07-18 RX ORDER — BISACODYL 10 MG
10 SUPPOSITORY, RECTAL RECTAL DAILY PRN
Status: DISCONTINUED | OUTPATIENT
Start: 2025-07-18 | End: 2025-07-20 | Stop reason: HOSPADM

## 2025-07-18 RX ORDER — GUAIFENESIN 600 MG/1
600 TABLET, EXTENDED RELEASE ORAL EVERY 12 HOURS SCHEDULED
Status: DISCONTINUED | OUTPATIENT
Start: 2025-07-18 | End: 2025-07-20 | Stop reason: HOSPADM

## 2025-07-18 RX ORDER — ONDANSETRON 4 MG/1
4 TABLET, ORALLY DISINTEGRATING ORAL EVERY 6 HOURS PRN
Status: DISCONTINUED | OUTPATIENT
Start: 2025-07-18 | End: 2025-07-20 | Stop reason: HOSPADM

## 2025-07-18 RX ORDER — IOPAMIDOL 755 MG/ML
100 INJECTION, SOLUTION INTRAVASCULAR
Status: COMPLETED | OUTPATIENT
Start: 2025-07-18 | End: 2025-07-18

## 2025-07-18 RX ORDER — NITROGLYCERIN 0.4 MG/1
0.4 TABLET SUBLINGUAL
Status: DISCONTINUED | OUTPATIENT
Start: 2025-07-18 | End: 2025-07-20 | Stop reason: HOSPADM

## 2025-07-18 RX ORDER — ACETAMINOPHEN 325 MG/1
650 TABLET ORAL EVERY 6 HOURS PRN
Status: DISCONTINUED | OUTPATIENT
Start: 2025-07-18 | End: 2025-07-20 | Stop reason: HOSPADM

## 2025-07-18 RX ORDER — FUROSEMIDE 10 MG/ML
40 INJECTION INTRAMUSCULAR; INTRAVENOUS ONCE
Status: COMPLETED | OUTPATIENT
Start: 2025-07-18 | End: 2025-07-18

## 2025-07-18 RX ORDER — DILTIAZEM HYDROCHLORIDE 120 MG/1
120 CAPSULE, COATED, EXTENDED RELEASE ORAL
Status: DISCONTINUED | OUTPATIENT
Start: 2025-07-19 | End: 2025-07-20 | Stop reason: HOSPADM

## 2025-07-18 RX ORDER — ARFORMOTEROL TARTRATE 15 UG/2ML
15 SOLUTION RESPIRATORY (INHALATION)
Status: DISCONTINUED | OUTPATIENT
Start: 2025-07-18 | End: 2025-07-18

## 2025-07-18 RX ORDER — TRIAMTERENE AND HYDROCHLOROTHIAZIDE 37.5; 25 MG/1; MG/1
1 TABLET ORAL DAILY
Status: DISCONTINUED | OUTPATIENT
Start: 2025-07-19 | End: 2025-07-20 | Stop reason: HOSPADM

## 2025-07-18 RX ORDER — FAMOTIDINE 20 MG/1
20 TABLET, FILM COATED ORAL 2 TIMES DAILY
Status: DISCONTINUED | OUTPATIENT
Start: 2025-07-18 | End: 2025-07-20 | Stop reason: HOSPADM

## 2025-07-18 RX ORDER — SODIUM CHLORIDE 9 MG/ML
40 INJECTION, SOLUTION INTRAVENOUS AS NEEDED
Status: DISCONTINUED | OUTPATIENT
Start: 2025-07-18 | End: 2025-07-20 | Stop reason: HOSPADM

## 2025-07-18 RX ORDER — SODIUM CHLORIDE 0.9 % (FLUSH) 0.9 %
10 SYRINGE (ML) INJECTION EVERY 12 HOURS SCHEDULED
Status: DISCONTINUED | OUTPATIENT
Start: 2025-07-18 | End: 2025-07-20 | Stop reason: HOSPADM

## 2025-07-18 RX ORDER — AMOXICILLIN 250 MG
2 CAPSULE ORAL 2 TIMES DAILY PRN
Status: DISCONTINUED | OUTPATIENT
Start: 2025-07-18 | End: 2025-07-20 | Stop reason: HOSPADM

## 2025-07-18 RX ORDER — BISACODYL 5 MG/1
5 TABLET, DELAYED RELEASE ORAL DAILY PRN
Status: DISCONTINUED | OUTPATIENT
Start: 2025-07-18 | End: 2025-07-20 | Stop reason: HOSPADM

## 2025-07-18 RX ORDER — POLYETHYLENE GLYCOL 3350 17 G/17G
17 POWDER, FOR SOLUTION ORAL DAILY PRN
Status: DISCONTINUED | OUTPATIENT
Start: 2025-07-18 | End: 2025-07-20 | Stop reason: HOSPADM

## 2025-07-18 RX ADMIN — NIRMATRELVIR AND RITONAVIR 3 TABLET: KIT at 21:37

## 2025-07-18 RX ADMIN — Medication 10 ML: at 21:37

## 2025-07-18 RX ADMIN — FAMOTIDINE 20 MG: 20 TABLET, FILM COATED ORAL at 21:37

## 2025-07-18 RX ADMIN — AZITHROMYCIN MONOHYDRATE 500 MG: 500 INJECTION, POWDER, LYOPHILIZED, FOR SOLUTION INTRAVENOUS at 17:21

## 2025-07-18 RX ADMIN — GUAIFENESIN 600 MG: 600 TABLET, EXTENDED RELEASE ORAL at 21:37

## 2025-07-18 RX ADMIN — IOPAMIDOL 100 ML: 755 INJECTION, SOLUTION INTRAVENOUS at 13:35

## 2025-07-18 RX ADMIN — ACETAMINOPHEN 650 MG: 325 TABLET ORAL at 17:21

## 2025-07-18 RX ADMIN — FUROSEMIDE 40 MG: 10 INJECTION, SOLUTION INTRAVENOUS at 15:32

## 2025-07-18 RX ADMIN — APIXABAN 2.5 MG: 2.5 TABLET, FILM COATED ORAL at 21:37

## 2025-07-18 RX ADMIN — DEXAMETHASONE 6 MG: 2 TABLET ORAL at 18:36

## 2025-07-18 NOTE — ED PROVIDER NOTES
Subjective   History of Present Illness    Patient states that he was seen at a walk-in clinic today.  Was told that he has fluid buildup in his lungs.  He states that since Wednesday has been feeling short of breath.  He has been having fevers at home daily.  He has a cough.  He has yellow sputum.  Patient states that he has no abdominal pain.  Patient denies any headache or dizziness.  Patient denies any other symptoms at this time.        Review of Systems   All other systems reviewed and are negative.      Past Medical History:   Diagnosis Date    Abnormal ECG     PVC    Anemia     Arthritis     Asthma     Atrial fibrillation     During two week heart monitor    Chronic idiopathic fibrosing alveolitis     Colon polyp     Erythematous condition     Hypertension     Interstitial lung disease     Liver disease     CANNON (nonalcoholic steatohepatitis)     Pneumonia     Pulmonary fibrosis     Shortness of breath     Sleep apnea        No Known Allergies    Past Surgical History:   Procedure Laterality Date    CARDIAC CATHETERIZATION N/A 09/13/2024    Procedure: Right Heart Cath;  Surgeon: Marlon Pichardo MD;  Location: [facility] CATH INVASIVE LOCATION;  Service: Cardiology;  Laterality: N/A;    CARDIAC CATHETERIZATION  9/13/2024    CATARACT EXTRACTION, BILATERAL      Summer of 2019    CHOLECYSTECTOMY      COLONOSCOPY  03/05/2013    COLONOSCOPY N/A 03/16/2018    Procedure: COLONOSCOPY WITH ANESTHESIA;  Surgeon: Jemal Batista MD;  Location: [facility] ENDOSCOPY;  Service: Gastroenterology    COLONOSCOPY N/A 11/07/2023    Procedure: COLONOSCOPY WITH ANESTHESIA;  Surgeon: Jemal Batista MD;  Location: [facility] ENDOSCOPY;  Service: Gastroenterology;  Laterality: N/A;  preop hx of polyps  postop polyps   PCP  Rich Islas    ENDOSCOPY AND COLONOSCOPY  06/24/1995    TONSILLECTOMY         Family History   Problem Relation Age of Onset    Colon polyps Father     Hypertension Father     Heart disease Father     Heart failure  Father     Heart attack Father     Diabetes Mother     Colon cancer Neg Hx        Social History     Socioeconomic History    Marital status:    Tobacco Use    Smoking status: Never     Passive exposure: Never    Smokeless tobacco: Never   Vaping Use    Vaping status: Never Used   Substance and Sexual Activity    Alcohol use: No    Drug use: No    Sexual activity: Yes     Partners: Female     Birth control/protection: None           Objective   Physical Exam  Vitals and nursing note reviewed.   Constitutional:       Appearance: He is well-developed.   HENT:      Head: Normocephalic and atraumatic.   Eyes:      Extraocular Movements: Extraocular movements intact.      Pupils: Pupils are equal, round, and reactive to light.   Cardiovascular:      Rate and Rhythm: Normal rate and regular rhythm.      Heart sounds: Normal heart sounds.   Pulmonary:      Effort: Pulmonary effort is normal.      Breath sounds: Rhonchi present.   Abdominal:      General: Bowel sounds are normal.      Palpations: Abdomen is soft.      Tenderness: There is no abdominal tenderness.   Musculoskeletal:      Right lower leg: No tenderness. No edema.      Left lower leg: No tenderness. No edema.   Skin:     General: Skin is warm and dry.   Neurological:      General: No focal deficit present.      Mental Status: He is alert and oriented to person, place, and time.   Psychiatric:         Mood and Affect: Mood normal.         Behavior: Behavior normal.         Procedures           ED Course  ED Course as of 07/18/25 1506   Fri Jul 18, 2025   1213 EKG rate 62  Normal sinus rhythm  No STEMI [RP]      ED Course User Index  [RP] Aguila James MD                                                     Lab Results (last 24 hours)       Procedure Component Value Units Date/Time    CBC & Differential [[id number]]  (Abnormal) Collected: 07/18/25 1213    Specimen: Blood Updated: 07/18/25 1232    Narrative:      The following orders were created for panel  order CBC & Differential.  Procedure                               Abnormality         Status                     ---------                               -----------         ------                     CBC Auto Differential[[id number]]        Abnormal            Final result                 Please view results for these tests on the individual orders.    Comprehensive Metabolic Panel [[id number]]  (Abnormal) Collected: 07/18/25 1213    Specimen: Blood Updated: 07/18/25 1300     Glucose 88 mg/dL      BUN 24.6 mg/dL      Creatinine 1.16 mg/dL      Sodium 137 mmol/L      Potassium 3.6 mmol/L      Chloride 99 mmol/L      CO2 27.0 mmol/L      Calcium 9.0 mg/dL      Total Protein 7.7 g/dL      Albumin 3.9 g/dL      ALT (SGPT) 24 U/L      AST (SGOT) 25 U/L      Alkaline Phosphatase 69 U/L      Total Bilirubin 0.7 mg/dL      Globulin 3.8 gm/dL      A/G Ratio 1.0 g/dL      BUN/Creatinine Ratio 21.2     Anion Gap 11.0 mmol/L      eGFR 65.7 mL/min/1.73     Narrative:      GFR Categories in Chronic Kidney Disease (CKD)              GFR Category          GFR (mL/min/1.73)    Interpretation  G1                    90 or greater        Normal or high (1)  G2                    60-89                Mild decrease (1)  G3a                   45-59                Mild to moderate decrease  G3b                   30-44                Moderate to severe decrease  G4                    15-29                Severe decrease  G5                    14 or less           Kidney failure    (1)In the absence of evidence of kidney disease, neither GFR category G1 or G2 fulfill the criteria for CKD.    eGFR calculation 2021 CKD-EPI creatinine equation, which does not include race as a factor    High Sensitivity Troponin T [[id number]]  (Abnormal) Collected: 07/18/25 1213    Specimen: Blood Updated: 07/18/25 1254     HS Troponin T 27 ng/L     Narrative:      High Sensitive Troponin T Reference Range:  <14.0 ng/L- Negative Female for AMI  <22.0 ng/L-  Negative Male for AMI  >=14 - Abnormal Female indicating possible myocardial injury.  >=22 - Abnormal Male indicating possible myocardial injury.   Clinicians would have to utilize clinical acumen, EKG, Troponin, and serial changes to determine if it is an Acute Myocardial Infarction or myocardial injury due to an underlying chronic condition.         BNP [[id number]]  (Normal) Collected: 07/18/25 1213    Specimen: Blood Updated: 07/18/25 1254     proBNP 255.8 pg/mL     Narrative:      This assay is used as an aid in the diagnosis of individuals suspected of having heart failure. It can be used as an aid in the diagnosis of acute decompensated heart failure (ADHF) in patients presenting with signs and symptoms of ADHF to the emergency department (ED). In addition, NT-proBNP of <300 pg/mL indicates ADHF is not likely.    Age Range Result Interpretation  NT-proBNP Concentration (pg/mL:      <50             Positive            >450                   Gray                 300-450                    Negative             <300    50-75           Positive            >900                  Gray                300-900                  Negative            <300      >75             Positive            >1800                  Gray                300-1800                  Negative            <300    Magnesium [[id number]]  (Normal) Collected: 07/18/25 1213    Specimen: Blood Updated: 07/18/25 1300     Magnesium 2.2 mg/dL     CBC Auto Differential [[id number]]  (Abnormal) Collected: 07/18/25 1213    Specimen: Blood Updated: 07/18/25 1232     WBC 7.45 10*3/mm3      RBC 4.78 10*6/mm3      Hemoglobin 15.1 g/dL      Hematocrit 45.0 %      MCV 94.1 fL      MCH 31.6 pg      MCHC 33.6 g/dL      RDW 14.1 %      RDW-SD 49.1 fl      MPV 9.2 fL      Platelets 196 10*3/mm3      Neutrophil % 76.7 %      Lymphocyte % 11.3 %      Monocyte % 11.5 %      Eosinophil % 0.1 %      Basophil % 0.1 %      Immature Grans % 0.3 %      Neutrophils, Absolute 5.71  "10*3/mm3      Lymphocytes, Absolute 0.84 10*3/mm3      Monocytes, Absolute 0.86 10*3/mm3      Eosinophils, Absolute 0.01 10*3/mm3      Basophils, Absolute 0.01 10*3/mm3      Immature Grans, Absolute 0.02 10*3/mm3      nRBC 0.0 /100 WBC     High Sensitivity Troponin T 1Hr [[id number]]  (Abnormal) Collected: 07/18/25 1351    Specimen: Blood Updated: 07/18/25 1428     HS Troponin T 26 ng/L      Troponin T Numeric Delta -1 ng/L      Troponin T % Delta -4    Narrative:      High Sensitive Troponin T Reference Range:  <14.0 ng/L- Negative Female for AMI  <22.0 ng/L- Negative Male for AMI  >=14 - Abnormal Female indicating possible myocardial injury.  >=22 - Abnormal Male indicating possible myocardial injury.   Clinicians would have to utilize clinical acumen, EKG, Troponin, and serial changes to determine if it is an Acute Myocardial Infarction or myocardial injury due to an underlying chronic condition.               CT Angiogram Chest Pulmonary Embolism   Final Result   1.  No evidence of pulmonary embolus.   2.  Underlying pulmonary fibrosis with basal predominant reticulation,   honeycombing and traction bronchiectasis. Superimposed bilateral   dependent-predominant groundglass and consolidative \"airspace disease\"   appears to be mostly a dependent atelectasis given the poor chest   expansion/low lung volumes. Differential would include pulmonary edema   which would have a very similar CT appearance. No pleural effusion.       This report was signed and finalized on 7/18/2025 2:12 PM by Dr Usama Andrade.            Medications   sodium chloride 0.9 % flush 10 mL (has no administration in time range)   furosemide (LASIX) injection 40 mg (has no administration in time range)   iopamidol (ISOVUE-370) 76 % injection 100 mL (100 mL Intravenous Given 7/18/25 1335)       Medical Decision Making  74-year-old male found to be short of breath.  Requiring more oxygen than he normally is on.  Normally on 2 L.  Requiring 3 L.  " Shortness of breath with exertion.  Possible pulmonary edema seen on the CT scan of his chest.  Patient was given IV Lasix here in the emergency room.  Case was discussed with Opal FALK with the hospitalist group.  She has accepted the patient under their group service.    Problems Addressed:  Acute pulmonary edema: complicated acute illness or injury  Pulmonary fibrosis: complicated acute illness or injury  Shortness of breath: complicated acute illness or injury    Amount and/or Complexity of Data Reviewed  Labs: ordered. Decision-making details documented in ED Course.  Radiology: ordered. Decision-making details documented in ED Course.  ECG/medicine tests: ordered. Decision-making details documented in ED Course.    Risk  Prescription drug management.  Decision regarding hospitalization.        Final diagnoses:   Shortness of breath   Acute pulmonary edema   Pulmonary fibrosis       ED Disposition  ED Disposition       ED Disposition   Decision to Admit    Condition   --    Comment   Level of Care: Remote Telemetry [26]   Diagnosis: Shortness of breath [786.05.ICD-9-CM]   Admitting Physician: TRUONG VELÁSQUEZ [[id number]]   Attending Physician: TRUONG VELÁSQUEZ [[id number]]   Certification: I Certify That Inpatient Hospital Services Are Medically Necessary For Greater Than 2 Midnights                 No follow-up provider specified.       Medication List      No changes were made to your prescriptions during this visit.            Aguila James MD  07/18/25 3182

## 2025-07-18 NOTE — PLAN OF CARE
Problem: Adult Inpatient Plan of Care  Goal: Plan of Care Review  Outcome: Progressing  Flowsheets (Taken 7/18/2025 1750)  Outcome Evaluation: Admitted to 3C from ER. Temp 102.7, APRN notified, Tylenol given. IV abx infusing per order. Covid +, Enhanced isolation precautions. 3L, O2, NC. Tolerating reg diet. Alert and Oriented. VSS, safety maintained.

## 2025-07-18 NOTE — H&P
[facility] Medicine Services  HISTORY AND PHYSICAL    Date of Admission: 7/18/2025  Primary Care Physician: Reid Islas MD    Subjective   Primary Historian: Patient    Chief Complaint: Fevers and increased shortness of breath    History of Present Illness  Reid Morrell is a 74-year-old male with a past medical history of anemia, arthritis, asthma, atrial fibrillation on chronic Eliquis, chronic idiopathic fibrosing alveolitis, hypertension, CANNON, obstructive sleep apnea on home CPAP, home oxygen at 2 L, hypertension and haemophilus influenza.  Patient presented to [facility] emergency department per request of urgent care after presenting with shortness of breath and febrile episodes x 2 days.  Tmax 102.1.  Patient has complaints of fevers, chills, productive sputum, shortness of breath with exertion and at rest, rhinorrhea and congestion.  No complaints of nausea, vomiting or diarrhea.  No complaints of chest pain or palpitations.  Initial ED workup for COPD exacerbation, elevated D-dimer, CTA revealed no evidence of pulmonary embolism, underlying pulmonary fibrosis with possible differential for pulmonary edema patient was given a one-time dose of 40 mg IV Lasix and hospitalist service was requested for admission.    Upon my assessment patient immediately appeared to be significantly more ill than an average COPD exacerbation, patient was diaphoretic, significant congestion, cough and a fever of 101 suggesting possible viral illness.  Patient was requiring 3 L of oxygen increased from baseline of 2 L due to hypoxia with oxygen saturations of 88%.  Stat respiratory PCR, lactic, CRP, procalcitonin, blood cultures were obtained revealing positive for COVID-19.  Patient will be placed on Paxlovid, Decadron, aggressive pulmonary hygiene and continue to monitor very closely.  Patient and family made aware of diagnosis all questions were answered to the best my ability and  they are in agreement for admission and treatment.    Additional information pertinent upon assessment of patient's medication list he has been taking Norvasc, Cardizem, Maxide, and Cozaar, all prescribed by different physicians.  Patient will resume Cardizem and Maxide per EP/cardiology team.  Patient and spouse made aware of error and will be discharged on proper medication list and dosages.      Review of Systems   Constitutional:  Positive for chills, fatigue and fever.   HENT:  Positive for congestion and rhinorrhea.    Respiratory:  Positive for shortness of breath.    Cardiovascular:  Negative for chest pain.   Gastrointestinal:  Negative for diarrhea, nausea and vomiting.   Genitourinary:  Negative for difficulty urinating.   Neurological:  Positive for weakness.      Otherwise complete ROS reviewed and negative except as mentioned in the HPI.    Past Medical History:   Past Medical History:   Diagnosis Date    Abnormal ECG     PVC    Anemia     Arthritis     Asthma     Atrial fibrillation     During two week heart monitor    Chronic idiopathic fibrosing alveolitis     Colon polyp     Erythematous condition     Hypertension     Interstitial lung disease     Liver disease     CANNON (nonalcoholic steatohepatitis)     Pneumonia     Pulmonary fibrosis     Shortness of breath     Sleep apnea      Past Surgical History:  Past Surgical History:   Procedure Laterality Date    CARDIAC CATHETERIZATION N/A 09/13/2024    Procedure: Right Heart Cath;  Surgeon: Marlon Pichardo MD;  Location: [facility] CATH INVASIVE LOCATION;  Service: Cardiology;  Laterality: N/A;    CARDIAC CATHETERIZATION  9/13/2024    CATARACT EXTRACTION, BILATERAL      Summer of 2019    CHOLECYSTECTOMY      COLONOSCOPY  03/05/2013    COLONOSCOPY N/A 03/16/2018    Procedure: COLONOSCOPY WITH ANESTHESIA;  Surgeon: Jemal Batista MD;  Location: [facility] ENDOSCOPY;  Service: Gastroenterology    COLONOSCOPY N/A 11/07/2023    Procedure: COLONOSCOPY WITH  ANESTHESIA;  Surgeon: Jemal Batista MD;  Location: [facility] ENDOSCOPY;  Service: Gastroenterology;  Laterality: N/A;  preop hx of polyps  postop polyps   PCP  Rich Islas    ENDOSCOPY AND COLONOSCOPY  06/24/1995    TONSILLECTOMY       Social History:  reports that he has never smoked. He has never been exposed to tobacco smoke. He has never used smokeless tobacco. He reports that he does not drink alcohol and does not use drugs.    Family History: family history includes Colon polyps in his father; Diabetes in his mother; Heart attack in his father; Heart disease in his father; Heart failure in his father; Hypertension in his father.       Allergies:  No Known Allergies    Medications:  Prior to Admission medications    Medication Sig Start Date End Date Taking? Authorizing Provider   albuterol (PROVENTIL) (2.5 MG/3ML) 0.083% nebulizer solution  7/11/24  Yes Darinel Orourke MD   amLODIPine (NORVASC) 10 MG tablet Take 1 tablet by mouth Daily.   Yes ProviderDarinel MD   apixaban (ELIQUIS) 5 MG tablet tablet Take 1 tablet by mouth 2 (Two) Times a Day. 12/31/24  Yes Marlon Pichardo MD   atorvastatin (LIPITOR) 10 MG tablet TAKE 1 TABLET BY MOUTH EVERY DAY 5/1/25  Yes Deven Chavira APRN   colestipol (COLESTID) 1 g tablet Take 1 tablet by mouth 2 (Two) Times a Day.   Yes ProviderDarinel MD   dilTIAZem XR (DILACOR XR) 120 MG 24 hr capsule Take 1 capsule by mouth Daily. 5/23/25  Yes Nupur Smith MD   famotidine (PEPCID) 20 MG tablet Take 1 tablet by mouth 2 (Two) Times a Day. When taking mobic  Mon, Wed, Fri   Yes ProviderDarinel MD   guaiFENesin (MUCINEX) 600 MG 12 hr tablet Take 2 tablets by mouth Every 12 (Twelve) Hours. 3/15/23  Yes Claudia White APRN   losartan (COZAAR) 100 MG tablet Take 1 tablet by mouth Daily.   Yes Emergency, Nurse Epic, RN   meloxicam (MOBIC) 15 MG tablet Take 1 tablet by mouth Daily.  Patient taking differently: Take 1 tablet by mouth Every Other Day.   Yes  "Emergency, Nurse Epic, RN   O2 (OXYGEN) Inhale 2 L/min As Needed. Legacy   Yes ProviderDarinel MD   Ofev 100 MG capsule TAKE 1 CAPSULE BY MOUTH TWICE A DAY 12 HOURS APART WITH FOOD 5/19/25  Yes Romy Valera APRN   triamterene-hydrochlorothiazide (MAXZIDE-25) 37.5-25 MG per tablet Take 1 tablet by mouth Daily. 5/13/25  Yes Marlon Pichardo MD   Ventolin  (90 Base) MCG/ACT inhaler  3/21/23   Provider, MD Darinel     I have utilized all available immediate resources to obtain, update, or review the patient's current medications (including all prescriptions, over-the-counter products, herbals, cannabis/cannabidiol products, and vitamin/mineral/dietary (nutritional) supplements).    Results for orders placed during the hospital encounter of 12/02/24    Adult Stress Echo W/ Cont or Stress Agent if Necessary Per Protocol 12/03/2024  1:17 PM    Interpretation Summary    There is no significant echocardiographic or ECG evidence of ischemia.    NSVT was noted at peak stress and in early recovery.    Several episodes of SVT were noted during stress and recovery including an episode requiring IV metoprolol.    Rhythm at the end of the study appears to be atrial fibrillation.    Left ventricular systolic function is borderline at rest with calculated EF 50%.       Objective     Vital Signs: /69 (BP Location: Left arm, Patient Position: Lying)   Pulse 76   Temp (!) 102.7 °F (39.3 °C) (Oral)   Resp 16   Ht 180.3 cm (71\")   Wt 94.3 kg (208 lb)   SpO2 94%   BMI 29.01 kg/m²   Physical Exam  Vitals and nursing note reviewed.   Constitutional:       General: He is not in acute distress.     Appearance: He is ill-appearing and toxic-appearing.      Interventions: Nasal cannula in place.      Comments: 3 L   HENT:      Head: Normocephalic and atraumatic.      Nose: Congestion and rhinorrhea present.      Right Turbinates: Swollen.      Left Turbinates: Swollen.   Eyes:      Pupils: Pupils are equal, round, " and reactive to light.   Cardiovascular:      Rate and Rhythm: Normal rate and regular rhythm.      Pulses: Normal pulses.      Heart sounds: Normal heart sounds.   Pulmonary:      Effort: No tachypnea, accessory muscle usage or respiratory distress.      Breath sounds: Examination of the right-middle field reveals rhonchi. Examination of the left-middle field reveals rhonchi. Examination of the right-lower field reveals rhonchi and rales. Examination of the left-lower field reveals rhonchi. Rhonchi and rales present.   Abdominal:      General: Bowel sounds are normal. There is no distension.      Tenderness: There is no abdominal tenderness.   Musculoskeletal:         General: Normal range of motion.      Cervical back: Normal range of motion.   Skin:     General: Skin is warm.      Capillary Refill: Capillary refill takes less than 2 seconds.      Coloration: Skin is pale.   Neurological:      General: No focal deficit present.      Mental Status: He is oriented to person, place, and time.   Psychiatric:         Mood and Affect: Mood normal.         Behavior: Behavior normal. Behavior is cooperative.         Thought Content: Thought content normal.         Judgment: Judgment normal.        Results Reviewed:  Microbiology Results (last 10 days)       Procedure Component Value - Date/Time    Respiratory Panel PCR w/COVID-19(SARS-CoV-2) MEGHA/TOVA/TEREZA/PAD/COR/DONELL In-House, NP Swab in UTM/VTM, 2 HR TAT - Swab, Nasopharynx [[id number]]  (Abnormal) Collected: 07/18/25 1558    Lab Status: Final result Specimen: Swab from Nasopharynx Updated: 07/18/25 0145     ADENOVIRUS, PCR Not Detected     Coronavirus 229E Not Detected     Coronavirus HKU1 Not Detected     Coronavirus NL63 Not Detected     Coronavirus OC43 Not Detected     COVID19 Detected     Human Metapneumovirus Not Detected     Human Rhinovirus/Enterovirus Not Detected     Influenza A PCR Not Detected     Influenza B PCR Not Detected     Parainfluenza Virus 1 Not  Detected     Parainfluenza Virus 2 Not Detected     Parainfluenza Virus 3 Not Detected     Parainfluenza Virus 4 Not Detected     RSV, PCR Not Detected     Bordetella pertussis pcr Not Detected     Bordetella parapertussis PCR Not Detected     Chlamydophila pneumoniae PCR Not Detected     Mycoplasma pneumo by PCR Not Detected    Narrative:      In the setting of a positive respiratory panel with a viral infection PLUS a negative procalcitonin without other underlying concern for bacterial infection, consider observing off antibiotics or discontinuation of antibiotics and continue supportive care. If the respiratory panel is positive for atypical bacterial infection (Bordetella pertussis, Chlamydophila pneumoniae, or Mycoplasma pneumoniae), consider antibiotic de-escalation to target atypical bacterial infection.           Lab Results (last 24 hours)       Procedure Component Value Units Date/Time    High Sensitivity Troponin T 1Hr [[id number]]  (Abnormal) Collected: 07/18/25 1351    Specimen: Blood Updated: 07/18/25 1428     HS Troponin T 26 ng/L      Troponin T Numeric Delta -1 ng/L      Troponin T % Delta -4            Comprehensive Metabolic Panel [[id number]]  (Abnormal) Collected: 07/18/25 1213    Specimen: Blood Updated: 07/18/25 1300     Glucose 88 mg/dL      BUN 24.6 mg/dL      Creatinine 1.16 mg/dL      Sodium 137 mmol/L      Potassium 3.6 mmol/L      Chloride 99 mmol/L      CO2 27.0 mmol/L      Calcium 9.0 mg/dL      Total Protein 7.7 g/dL      Albumin 3.9 g/dL      ALT (SGPT) 24 U/L      AST (SGOT) 25 U/L      Alkaline Phosphatase 69 U/L      Total Bilirubin 0.7 mg/dL      Globulin 3.8 gm/dL      A/G Ratio 1.0 g/dL      BUN/Creatinine Ratio 21.2     Anion Gap 11.0 mmol/L      eGFR 65.7 mL/min/1.73             Magnesium [[id number]]  (Normal) Collected: 07/18/25 1213    Specimen: Blood Updated: 07/18/25 1300     Magnesium 2.2 mg/dL     High Sensitivity Troponin T [[id number]]  (Abnormal) Collected: 07/18/25  1213    Specimen: Blood Updated: 07/18/25 1254     HS Troponin T 27 ng/L             BNP [[id number]]  (Normal) Collected: 07/18/25 1213    Specimen: Blood Updated: 07/18/25 1254     proBNP 255.8 pg/mL             CBC & Differential [[id number]]  (Abnormal) Collected: 07/18/25 1213    Specimen: Blood Updated: 07/18/25 1232            CBC Auto Differential [[id number]]  (Abnormal) Collected: 07/18/25 1213    Specimen: Blood Updated: 07/18/25 1232     WBC 7.45 10*3/mm3      RBC 4.78 10*6/mm3      Hemoglobin 15.1 g/dL      Hematocrit 45.0 %      MCV 94.1 fL      MCH 31.6 pg      MCHC 33.6 g/dL      RDW 14.1 %      RDW-SD 49.1 fl      MPV 9.2 fL      Platelets 196 10*3/mm3      Neutrophil % 76.7 %      Lymphocyte % 11.3 %      Monocyte % 11.5 %      Eosinophil % 0.1 %      Basophil % 0.1 %      Immature Grans % 0.3 %      Neutrophils, Absolute 5.71 10*3/mm3      Lymphocytes, Absolute 0.84 10*3/mm3      Monocytes, Absolute 0.86 10*3/mm3      Eosinophils, Absolute 0.01 10*3/mm3      Basophils, Absolute 0.01 10*3/mm3      Immature Grans, Absolute 0.02 10*3/mm3      nRBC 0.0 /100 WBC           Imaging Results (Last 24 Hours)       Procedure Component Value Units Date/Time    CT Angiogram Chest Pulmonary Embolism [[id number]] Collected: 07/18/25 1356     Updated: 07/18/25 1416    Narrative:      CT ANGIOGRAM CHEST PULMONARY EMBOLISM-       HISTORY: Pulmonary Embolism      COMPARISON: Chest CT dated 7/3/2024     DOSE LENGTH PRODUCT: 277.21 mGy.cm. Automated exposure control was also  utilized to decrease patient radiation dose.     TECHNIQUE: Helical tomographic images of the chest were obtained after  the administration of intravenous contrast following angiogram protocol.  Additionally, 3D and multiplanar reformatted images were provided.       FINDINGS:    Pulmonary arteries: There is adequate enhancement of the pulmonary  arteries to evaluate for central and segmental pulmonary emboli. There  are no filling defects within the  "main, lobar, segmental or visualized  subsegmental pulmonary arteries. The pulmonary arteries are enlarged,  consistent with pulmonary arterial hypertension.     AORTA AND GREAT VESSELS: The aorta is not well opacified but  demonstrates no aneurysmal dilation. No calcified plaque in the arch.     VISUALIZED NECK BASE: The imaged portion of the base of the neck appears  unremarkable.     LUNGS: Known underlying pulmonary fibrosis with basal predominant  reticulation, honeycombing and traction bronchiectasis. On today's exam  there are new superimposed groundglass and consolidative infiltrates. In  particular the dependent lung fields, previously demonstrating  honeycombing, appear consolidated on today's exam. I believe there is an  element of atelectasis as the lungs are poorly expanded, only down to  the sixth-seventh ribs. There is no pleural effusion. The airways are  clear.     No lung emphysema appreciated.     HEART:The heart is normal in size. There is no pericardial effusion.  Coronary calcifications present.     MEDIASTINUM AND LYMPH NODEs: Slightly prominent hilar and mediastinal  lymph nodes appear stable and are considered benign.     SKELETAL AND SOFT TISSUES: Chest wall soft tissues are unremarkable. No  acute bony abnormality.     UPPER ABDOMEN: The imaged portion of the upper abdomen demonstrates no  acute process.       Impression:      1.  No evidence of pulmonary embolus.  2.  Underlying pulmonary fibrosis with basal predominant reticulation,  honeycombing and traction bronchiectasis. Superimposed bilateral  dependent-predominant groundglass and consolidative \"airspace disease\"  appears to be mostly a dependent atelectasis given the poor chest  expansion/low lung volumes. Differential would include pulmonary edema  which would have a very similar CT appearance. No pleural effusion.     This report was signed and finalized on 7/18/2025 2:12 PM by Dr Usama Andrade.               Assessment / Plan "   Assessment:   Active Hospital Problems    Diagnosis     **COVID-19     Acute on chronic respiratory failure with hypoxia     Chronic anticoagulation     Paroxysmal atrial fibrillation     Essential hypertension     Pulmonary fibrosis     CANNON (nonalcoholic steatohepatitis)        Treatment Plan  The patient will be admitted to Dr. Pablo's service here at [facility].     COVID-19/acute on chronic respiratory failure with hypoxia/pulmonary fibrosis  - Initiate Paxlovid per protocol, greater than 2 years since last immunization, decrease Eliquis to 2.5 mg and hold Lipitor.  - Initiate Decadron 6 mg p.o. x 10 days.  - Continuous pulse oximetry, aggressive pulmonary hygiene, OPEP, incentive spirometry, continue home CPAP with 2 L nightly, continue supplemental oxygen, notify provider of any oxygenation needs greater than 4 L.    Paroxysmal atrial fibrillation/chronic anticoagulation  - Initiate cardiac telemetry.  - Continue home Cardizem.  - Continues Eliquis however due to Paxlovid use and recommendations for decreasing by half will continue at 2.5 mg.    Essential hypertension  - Initiate Q4 vital signs.  - Continue home Cardizem and Maxide.  Discontinue Norvasc and Cozaar (duplicate medications)    CANNON  - Currently stable, Lipitor on hold due to Paxlovid.  - Daily CMP    Reviewed and restarted home medications as appropriate.    VTE prophylaxis with home Eliquis  Labs in a.m.    Medical Decision Making  1 acute, high complexity, unchanged  1 acute on chronic, high complexity, unchanged  5 chronic, moderate complexity, unchanged    Number and Complexity of problems: 7  Differential Diagnosis: None    Conditions and Status        Condition is unchanged.     [facility] Data  External documents reviewed: [employer]  Cardiac tracing (EKG, telemetry) interpretation: 7/18/2025 EKG per cardiology reviewed  Radiology interpretation: 7/18/2025 CT of the chest per radiology reviewed  Labs reviewed: 7/18/2025  reviewed  Any tests that were considered but not ordered: None     Decision rules/scores evaluated (example [id number], Wells, etc): None     Discussed with: Dr. Pablo, patient and his wife Jaimie     Care Planning  Shared decision making: Dr. Pablo, patient and his wife Jaimie  Code status and discussions: Full code per patient    Disposition  Social Determinants of Health that impact treatment or disposition: Not determined at this time  Estimated length of stay is 2+ days.     I confirmed that the patient's advanced care plan is present, code status is documented, and a surrogate decision maker is listed in the patient's medical record.     The patient's surrogate decision maker is his wife Jaimie.     The patient was seen and examined by me on 7/18/2025 at 1502.    Electronically signed by ENEIDA Nicholas, 07/18/25, 17:28 CDT.

## 2025-07-18 NOTE — PROGRESS NOTES
Pt report called to DILMA Morris at [facility]. Pt going POV, and is awaiting ride.   
rhinorrhea (mild) present.      Mouth/Throat:      Pharynx: Oropharynx is clear.   Eyes:      Conjunctiva/sclera: Conjunctivae normal.   Cardiovascular:      Rate and Rhythm: Normal rate and regular rhythm.      Heart sounds: No murmur heard.  Pulmonary:      Effort: Respiratory distress (mild on exertion) present.      Breath sounds: Decreased air movement present. Rales (both lower lung field, L>R) present.   Abdominal:      Tenderness: There is no abdominal tenderness.   Musculoskeletal:      Cervical back: Neck supple.   Lymphadenopathy:      Cervical: No cervical adenopathy.   Skin:     General: Skin is warm and dry.   Neurological:      Mental Status: He is alert and oriented to person, place, and time.   Psychiatric:         Behavior: Behavior normal.             An electronic signature was used to authenticate this note.    Nery Kamara MD

## 2025-07-19 PROBLEM — D72.825 BANDEMIA: Status: ACTIVE | Noted: 2025-07-19

## 2025-07-19 LAB
ALBUMIN SERPL-MCNC: 3.5 G/DL (ref 3.5–5.2)
ALBUMIN/GLOB SERPL: 0.9 G/DL
ALP SERPL-CCNC: 61 U/L (ref 39–117)
ALT SERPL W P-5'-P-CCNC: 23 U/L (ref 1–41)
ANION GAP SERPL CALCULATED.3IONS-SCNC: 12 MMOL/L (ref 5–15)
AST SERPL-CCNC: 21 U/L (ref 1–40)
BASOPHILS # BLD MANUAL: 0 10*3/MM3 (ref 0–0.2)
BASOPHILS NFR BLD MANUAL: 0 % (ref 0–1.5)
BILIRUB SERPL-MCNC: 0.6 MG/DL (ref 0–1.2)
BUN SERPL-MCNC: 25.5 MG/DL (ref 8–23)
BUN/CREAT SERPL: 25.2 (ref 7–25)
CALCIUM SPEC-SCNC: 8.5 MG/DL (ref 8.6–10.5)
CHLORIDE SERPL-SCNC: 98 MMOL/L (ref 98–107)
CHOLEST SERPL-MCNC: 83 MG/DL (ref 0–200)
CO2 SERPL-SCNC: 24 MMOL/L (ref 22–29)
CREAT SERPL-MCNC: 1.01 MG/DL (ref 0.76–1.27)
DEPRECATED RDW RBC AUTO: 47.8 FL (ref 37–54)
EGFRCR SERPLBLD CKD-EPI 2021: 77.6 ML/MIN/1.73
EOSINOPHIL # BLD MANUAL: 0 10*3/MM3 (ref 0–0.4)
EOSINOPHIL NFR BLD MANUAL: 0 % (ref 0.3–6.2)
ERYTHROCYTE [DISTWIDTH] IN BLOOD BY AUTOMATED COUNT: 14.1 % (ref 12.3–15.4)
GLOBULIN UR ELPH-MCNC: 3.7 GM/DL
GLUCOSE SERPL-MCNC: 140 MG/DL (ref 65–99)
HBA1C MFR BLD: 5.3 % (ref 4.8–5.6)
HCT VFR BLD AUTO: 44.1 % (ref 37.5–51)
HDLC SERPL-MCNC: 41 MG/DL (ref 40–60)
HGB BLD-MCNC: 14.7 G/DL (ref 13–17.7)
LDLC SERPL CALC-MCNC: 29 MG/DL (ref 0–100)
LDLC/HDLC SERPL: 0.79 {RATIO}
LYMPHOCYTES # BLD MANUAL: 0.33 10*3/MM3 (ref 0.7–3.1)
LYMPHOCYTES NFR BLD MANUAL: 1 % (ref 5–12)
MAGNESIUM SERPL-MCNC: 2.4 MG/DL (ref 1.6–2.4)
MCH RBC QN AUTO: 30.8 PG (ref 26.6–33)
MCHC RBC AUTO-ENTMCNC: 33.3 G/DL (ref 31.5–35.7)
MCV RBC AUTO: 92.3 FL (ref 79–97)
MONOCYTES # BLD: 0.05 10*3/MM3 (ref 0.1–0.9)
MRSA DNA SPEC QL NAA+PROBE: NORMAL
NEUTROPHILS # BLD AUTO: 4.23 10*3/MM3 (ref 1.7–7)
NEUTROPHILS NFR BLD MANUAL: 68 % (ref 42.7–76)
NEUTS BAND NFR BLD MANUAL: 23 % (ref 0–5)
NEUTS VAC BLD QL SMEAR: ABNORMAL
PLASMA CELL PREC NFR BLD MANUAL: 1 % (ref 0–0)
PLAT MORPH BLD: NORMAL
PLATELET # BLD AUTO: 195 10*3/MM3 (ref 140–450)
PMV BLD AUTO: 9.4 FL (ref 6–12)
POLYCHROMASIA BLD QL SMEAR: ABNORMAL
POTASSIUM SERPL-SCNC: 4.1 MMOL/L (ref 3.5–5.2)
PROT SERPL-MCNC: 7.2 G/DL (ref 6–8.5)
RBC # BLD AUTO: 4.78 10*6/MM3 (ref 4.14–5.8)
SODIUM SERPL-SCNC: 134 MMOL/L (ref 136–145)
TRIGL SERPL-MCNC: 49 MG/DL (ref 0–150)
VARIANT LYMPHS NFR BLD MANUAL: 3 % (ref 19.6–45.3)
VARIANT LYMPHS NFR BLD MANUAL: 4 % (ref 0–5)
VLDLC SERPL-MCNC: 13 MG/DL (ref 5–40)
WBC NRBC COR # BLD AUTO: 4.65 10*3/MM3 (ref 3.4–10.8)

## 2025-07-19 PROCEDURE — 80061 LIPID PANEL: CPT

## 2025-07-19 PROCEDURE — 85027 COMPLETE CBC AUTOMATED: CPT

## 2025-07-19 PROCEDURE — 94799 UNLISTED PULMONARY SVC/PX: CPT

## 2025-07-19 PROCEDURE — 80053 COMPREHEN METABOLIC PANEL: CPT

## 2025-07-19 PROCEDURE — 85007 BL SMEAR W/DIFF WBC COUNT: CPT

## 2025-07-19 PROCEDURE — 92610 EVALUATE SWALLOWING FUNCTION: CPT

## 2025-07-19 PROCEDURE — 94664 DEMO&/EVAL PT USE INHALER: CPT

## 2025-07-19 PROCEDURE — 94761 N-INVAS EAR/PLS OXIMETRY MLT: CPT

## 2025-07-19 PROCEDURE — 94640 AIRWAY INHALATION TREATMENT: CPT

## 2025-07-19 PROCEDURE — 25010000002 PIPERACILLIN SOD-TAZOBACTAM PER 1 G: Performed by: INTERNAL MEDICINE

## 2025-07-19 PROCEDURE — 83036 HEMOGLOBIN GLYCOSYLATED A1C: CPT

## 2025-07-19 PROCEDURE — 83735 ASSAY OF MAGNESIUM: CPT

## 2025-07-19 PROCEDURE — 63710000001 DEXAMETHASONE PER 0.25 MG

## 2025-07-19 PROCEDURE — 25010000002 PIPERACILLIN SOD-TAZOBACTAM PER 1 G

## 2025-07-19 RX ORDER — ATORVASTATIN CALCIUM 10 MG/1
10 TABLET, FILM COATED ORAL DAILY
COMMUNITY

## 2025-07-19 RX ADMIN — PIPERACILLIN AND TAZOBACTAM 4.5 G: 4; .5 INJECTION, POWDER, FOR SOLUTION INTRAVENOUS at 22:33

## 2025-07-19 RX ADMIN — FAMOTIDINE 20 MG: 20 TABLET, FILM COATED ORAL at 20:15

## 2025-07-19 RX ADMIN — TRIAMTERENE AND HYDROCHLOROTHIAZIDE 1 TABLET: 37.5; 25 TABLET ORAL at 10:02

## 2025-07-19 RX ADMIN — NIRMATRELVIR AND RITONAVIR 3 TABLET: KIT at 20:15

## 2025-07-19 RX ADMIN — PIPERACILLIN AND TAZOBACTAM 4.5 G: 4; .5 INJECTION, POWDER, FOR SOLUTION INTRAVENOUS at 15:19

## 2025-07-19 RX ADMIN — NIRMATRELVIR AND RITONAVIR 3 TABLET: KIT at 10:02

## 2025-07-19 RX ADMIN — GUAIFENESIN 600 MG: 600 TABLET, EXTENDED RELEASE ORAL at 20:15

## 2025-07-19 RX ADMIN — IPRATROPIUM BROMIDE 2 PUFF: 17 AEROSOL, METERED RESPIRATORY (INHALATION) at 10:43

## 2025-07-19 RX ADMIN — APIXABAN 2.5 MG: 2.5 TABLET, FILM COATED ORAL at 10:03

## 2025-07-19 RX ADMIN — IPRATROPIUM BROMIDE 2 PUFF: 17 AEROSOL, METERED RESPIRATORY (INHALATION) at 14:40

## 2025-07-19 RX ADMIN — FAMOTIDINE 20 MG: 20 TABLET, FILM COATED ORAL at 10:02

## 2025-07-19 RX ADMIN — DILTIAZEM HYDROCHLORIDE 120 MG: 120 CAPSULE, EXTENDED RELEASE ORAL at 10:03

## 2025-07-19 RX ADMIN — APIXABAN 2.5 MG: 2.5 TABLET, FILM COATED ORAL at 20:15

## 2025-07-19 RX ADMIN — IPRATROPIUM BROMIDE 2 PUFF: 17 AEROSOL, METERED RESPIRATORY (INHALATION) at 06:39

## 2025-07-19 RX ADMIN — DEXAMETHASONE 6 MG: 2 TABLET ORAL at 10:02

## 2025-07-19 RX ADMIN — IPRATROPIUM BROMIDE 2 PUFF: 17 AEROSOL, METERED RESPIRATORY (INHALATION) at 18:34

## 2025-07-19 RX ADMIN — Medication 10 ML: at 10:08

## 2025-07-19 RX ADMIN — Medication 10 ML: at 20:16

## 2025-07-19 RX ADMIN — GUAIFENESIN 600 MG: 600 TABLET, EXTENDED RELEASE ORAL at 10:03

## 2025-07-19 RX ADMIN — PIPERACILLIN AND TAZOBACTAM 4.5 G: 4; .5 INJECTION, POWDER, FOR SOLUTION INTRAVENOUS at 10:02

## 2025-07-19 NOTE — PLAN OF CARE
Goal Outcome Evaluation:  Plan of Care Reviewed With: patient           Outcome Evaluation: See note    Anticipated Discharge Disposition (SLP): home          SLP Swallowing Diagnosis: swallow WFL/no suspected pharyngeal impairment (07/19/25 0800)

## 2025-07-19 NOTE — PROGRESS NOTES
Patient Name: Reid Morrell  Date of Admission: 7/18/2025  Today's Date: 07/19/25  Length of Stay: 1  Primary Care Physician: Reid Islas MD    Subjective   Chief Complaint: Shortness of breath and fevers    Today:   This morning patient is resting in bed on his home dose of 2 L of oxygen with no family at bedside.  Patient is alert and oriented and able to participate in assessment.  Patient states since his fever has broken and he feels significantly improved.  His congestion and rhinorrhea has additionally improved overnight.  Continues to have no complaints of chest pain, nausea, vomiting or diarrhea.  Patient was made aware of new developing bandemia and plan of care regarding changing of antibiotics.  Patient will remain on Paxlovid, Decadron and will continue to follow closely for any worsening signs or symptoms of sepsis    New developing bandemia, initiated Zosyn for broader coverage given patient's history of pulmonary fibrosis, COVID-19 and increasing the risk of superimposed bacterial infection and greater than 23% bandemia suggest possibility of developing serious infection.      Documented weights    07/18/25 1153   Weight: 94.3 kg (208 lb)          Intake/Output Summary (Last 24 hours) at 7/19/2025 0631  Last data filed at 7/18/2025 2122  Gross per 24 hour   Intake --   Output 100 ml   Net -100 ml       Results for orders placed during the hospital encounter of 12/02/24    Adult Stress Echo W/ Cont or Stress Agent if Necessary Per Protocol 12/03/2024  1:17 PM    Interpretation Summary    There is no significant echocardiographic or ECG evidence of ischemia.    NSVT was noted at peak stress and in early recovery.    Several episodes of SVT were noted during stress and recovery including an episode requiring IV metoprolol.    Rhythm at the end of the study appears to be atrial fibrillation.    Left ventricular systolic function is borderline at rest with calculated EF 50%.       Review of  Systems   Constitutional:  Positive for fatigue. Negative for chills and fever.   HENT:  Positive for congestion and rhinorrhea.    Respiratory:  Positive for shortness of breath.    Neurological:  Positive for weakness.      All pertinent negatives and positives are as above. All other systems have been reviewed and are negative unless otherwise stated.     Objective    Temp:  [98.2 °F (36.8 °C)-102.7 °F (39.3 °C)] 98.8 °F (37.1 °C)  Heart Rate:  [59-76] 69  Resp:  [16-19] 16  BP: (109-132)/(64-80) 111/67  Physical Exam  Vitals and nursing note reviewed.   Constitutional:       General: He is not in acute distress.     Appearance: He is ill-appearing      Interventions: Nasal cannula in place.      Comments: 2 L   HENT:      Head: Normocephalic and atraumatic.      Nose: Improved congestion and rhinorrhea present.      Right Turbinates: Swollen.      Left Turbinates: Swollen.   Eyes:      Pupils: Pupils are equal, round, and reactive to light.   Cardiovascular:      Rate and Rhythm: Normal rate and regular rhythm.      Pulses: Normal pulses.      Heart sounds: Normal heart sounds.   Pulmonary:      Effort: No tachypnea, accessory muscle usage or respiratory distress.      Breath sounds: Examination of the right-middle field reveals rhonchi. Examination of the left-middle field reveals rhonchi. Examination of the right-lower field reveals rhonchi and rales. Examination of the left-lower field reveals rhonchi. Rhonchi and rales present.   Abdominal:      General: Bowel sounds are normal. There is no distension.      Tenderness: There is no abdominal tenderness.   Musculoskeletal:         General: Normal range of motion.      Cervical back: Normal range of motion.   Skin:     General: Skin is warm.      Capillary Refill: Capillary refill takes less than 2 seconds.      Coloration: Skin is pale.   Neurological:      General: No focal deficit present.      Mental Status: He is oriented to person, place, and time.    Psychiatric:         Mood and Affect: Mood normal.         Behavior: Behavior normal. Behavior is cooperative.         Thought Content: Thought content normal.         Judgment: Judgment normal.       Results Review:  I have reviewed the labs, radiology results, and diagnostic studies.    Laboratory Data:   Results from last 7 days   Lab Units 07/19/25  0306 07/18/25  1213   WBC 10*3/mm3 4.65 7.45   HEMOGLOBIN g/dL 14.7 15.1   HEMATOCRIT % 44.1 45.0   PLATELETS 10*3/mm3 195 196        Results from last 7 days   Lab Units 07/19/25  0306 07/18/25  1213   SODIUM mmol/L 134* 137   POTASSIUM mmol/L 4.1 3.6   CHLORIDE mmol/L 98 99   CO2 mmol/L 24.0 27.0   BUN mg/dL 25.5* 24.6*   CREATININE mg/dL 1.01 1.16   CALCIUM mg/dL 8.5* 9.0   BILIRUBIN mg/dL 0.6 0.7   ALK PHOS U/L 61 69   ALT (SGPT) U/L 23 24   AST (SGOT) U/L 21 25   GLUCOSE mg/dL 140* 88       Culture Data:     Microbiology Results (last 10 days)       Procedure Component Value - Date/Time    MRSA Screen, PCR (Inpatient) - Swab, Nares [[id number]]  (Normal) Collected: 07/18/25 2326    Lab Status: Final result Specimen: Swab from Nares Updated: 07/19/25 0057     MRSA PCR No MRSA Detected    Narrative:      The negative predictive value of this diagnostic test is high and should only be used to consider de-escalating anti-MRSA therapy. A positive result may indicate colonization with MRSA and must be correlated clinically.    Legionella Antigen, Urine - Urine, Urine, Clean Catch [[id number]]  (Normal) Collected: 07/18/25 2320    Lab Status: Final result Specimen: Urine, Clean Catch Updated: 07/18/25 2350     LEGIONELLA ANTIGEN, URINE Negative    S. Pneumo Ag Urine or CSF - Urine, Urine, Clean Catch [[id number]]  (Normal) Collected: 07/18/25 2320    Lab Status: Final result Specimen: Urine, Clean Catch Updated: 07/18/25 2350     Strep Pneumo Ag Negative    Respiratory Panel PCR w/COVID-19(SARS-CoV-2) MEGHA/TOVA/TEREZA/PAD/COR/DONELL In-House, NP Swab in UT/VTM, 2 HR TAT -  Swab, Nasopharynx [[id number]]  (Abnormal) Collected: 07/18/25 1558    Lab Status: Final result Specimen: Swab from Nasopharynx Updated: 07/18/25 1655     ADENOVIRUS, PCR Not Detected     Coronavirus 229E Not Detected     Coronavirus HKU1 Not Detected     Coronavirus NL63 Not Detected     Coronavirus OC43 Not Detected     COVID19 Detected     Human Metapneumovirus Not Detected     Human Rhinovirus/Enterovirus Not Detected     Influenza A PCR Not Detected     Influenza B PCR Not Detected     Parainfluenza Virus 1 Not Detected     Parainfluenza Virus 2 Not Detected     Parainfluenza Virus 3 Not Detected     Parainfluenza Virus 4 Not Detected     RSV, PCR Not Detected     Bordetella pertussis pcr Not Detected     Bordetella parapertussis PCR Not Detected     Chlamydophila pneumoniae PCR Not Detected     Mycoplasma pneumo by PCR Not Detected    Narrative:      In the setting of a positive respiratory panel with a viral infection PLUS a negative procalcitonin without other underlying concern for bacterial infection, consider observing off antibiotics or discontinuation of antibiotics and continue supportive care. If the respiratory panel is positive for atypical bacterial infection (Bordetella pertussis, Chlamydophila pneumoniae, or Mycoplasma pneumoniae), consider antibiotic de-escalation to target atypical bacterial infection.             Radiology Data:   Imaging Results (Last 7 Days)       Procedure Component Value Units Date/Time    CT Angiogram Chest Pulmonary Embolism [[id number]] Collected: 07/18/25 1356     Updated: 07/18/25 1416    Narrative:      CT ANGIOGRAM CHEST PULMONARY EMBOLISM-       HISTORY: Pulmonary Embolism      COMPARISON: Chest CT dated 7/3/2024     DOSE LENGTH PRODUCT: 277.21 mGy.cm. Automated exposure control was also  utilized to decrease patient radiation dose.     TECHNIQUE: Helical tomographic images of the chest were obtained after  the administration of intravenous contrast following  "angiogram protocol.  Additionally, 3D and multiplanar reformatted images were provided.       FINDINGS:    Pulmonary arteries: There is adequate enhancement of the pulmonary  arteries to evaluate for central and segmental pulmonary emboli. There  are no filling defects within the main, lobar, segmental or visualized  subsegmental pulmonary arteries. The pulmonary arteries are enlarged,  consistent with pulmonary arterial hypertension.     AORTA AND GREAT VESSELS: The aorta is not well opacified but  demonstrates no aneurysmal dilation. No calcified plaque in the arch.     VISUALIZED NECK BASE: The imaged portion of the base of the neck appears  unremarkable.     LUNGS: Known underlying pulmonary fibrosis with basal predominant  reticulation, honeycombing and traction bronchiectasis. On today's exam  there are new superimposed groundglass and consolidative infiltrates. In  particular the dependent lung fields, previously demonstrating  honeycombing, appear consolidated on today's exam. I believe there is an  element of atelectasis as the lungs are poorly expanded, only down to  the sixth-seventh ribs. There is no pleural effusion. The airways are  clear.     No lung emphysema appreciated.     HEART:The heart is normal in size. There is no pericardial effusion.  Coronary calcifications present.     MEDIASTINUM AND LYMPH NODEs: Slightly prominent hilar and mediastinal  lymph nodes appear stable and are considered benign.     SKELETAL AND SOFT TISSUES: Chest wall soft tissues are unremarkable. No  acute bony abnormality.     UPPER ABDOMEN: The imaged portion of the upper abdomen demonstrates no  acute process.       Impression:      1.  No evidence of pulmonary embolus.  2.  Underlying pulmonary fibrosis with basal predominant reticulation,  honeycombing and traction bronchiectasis. Superimposed bilateral  dependent-predominant groundglass and consolidative \"airspace disease\"  appears to be mostly a dependent " atelectasis given the poor chest  expansion/low lung volumes. Differential would include pulmonary edema  which would have a very similar CT appearance. No pleural effusion.     This report was signed and finalized on 7/18/2025 2:12 PM by Dr Usama Andrade.                I have reviewed the patient's current medications.     apixaban, 2.5 mg, Oral, BID  [Held by provider] atorvastatin, 10 mg, Oral, Daily  azithromycin, 500 mg, Intravenous, Q24H  dexAMETHasone, 6 mg, Oral, Daily  dilTIAZem CD, 120 mg, Oral, Q24H  famotidine, 20 mg, Oral, BID  guaiFENesin, 600 mg, Oral, Q12H  ipratropium, 2 puff, Inhalation, 4x Daily - RT  Nirmatrelvir & Ritonavir (300mg/100mg), 3 tablet, Oral, BID  sodium chloride, 10 mL, Intravenous, Q12H  triamterene-hydrochlorothiazide, 1 tablet, Oral, Daily            Assessment/Plan   Assessment  Active Hospital Problems    Diagnosis     **COVID-19     Bandemia     Acute on chronic respiratory failure with hypoxia     Chronic anticoagulation     Paroxysmal atrial fibrillation     Essential hypertension     Pulmonary fibrosis     CANNON (nonalcoholic steatohepatitis)        Treatment Plan  COVID-19/acute on chronic respiratory failure with hypoxia/pulmonary fibrosis/bandemia  - Continue Paxlovid per protocol, greater than 2 years since last immunization, decrease Eliquis to 2.5 mg and hold Lipitor.  - Continue Decadron 6 mg p.o. x 10 days.  - Due to developing bandemia will initiate Zosyn due to patient's pseudomonal risk with pulmonary fibrosis, continue to monitor with daily CBC  - Continuous pulse oximetry, aggressive pulmonary hygiene, OPEP, incentive spirometry, continue home CPAP with 2 L nightly, continue supplemental oxygen, notify provider of any oxygenation needs greater than 4 L.     Paroxysmal atrial fibrillation/chronic anticoagulation  - Continue cardiac telemetry.  - Continue home Cardizem.  - Continues Eliquis however due to Paxlovid use and recommendations for decreasing by half  will continue at 2.5 mg.     Essential hypertension  - Continue Q4 vital signs.  - Continue home Cardizem and Maxide.  Discontinue Norvasc and Cozaar (duplicate medications)     CANNON  - Currently stable, Lipitor on hold due to Paxlovid.  - Daily CMP     VTE prophylaxis with home Eliquis  Labs in a.m.     Medical Decision Making  1 acute, high complexity, improving  1 acute on chronic, high complexity, unchanged  5 chronic, moderate complexity, unchanged     Number and Complexity of problems: 7  Differential Diagnosis: None     Conditions and Status        Condition is unchanged.     [facility] Data  External documents reviewed: [employer] EHR  Cardiac tracing (EKG, telemetry) interpretation: 7/18/2025 EKG per cardiology reviewed  Radiology interpretation: 7/18/2025 CT of the chest per radiology reviewed  Labs reviewed: 7/19/2025 reviewed  Any tests that were considered but not ordered: None     Decision rules/scores evaluated (example [id number], Wells, etc): None     Discussed with: Dr. Myrick, patient and his wife Jaimie     Care Planning  Shared decision making: Dr. Myrick, patient and his wife Jaimie  Code status and discussions: Full code per patient  Surrogate Decision Maker his wife Jaimie    Disposition  Social Determinants of Health that impact treatment or disposition: None determined at this time  I expect the patient to be discharged to home in 1-2 days.     Electronically signed by ENEIDA Nicholas, 07/19/25, 06:31 CDT.

## 2025-07-19 NOTE — THERAPY DISCHARGE NOTE
Acute Care - Speech Language Pathology   Swallow Initial Evaluation/Discharge [facility]     Patient Name: Reid Morrell  : 1950  MRN: [id number]  Today's Date: 2025               Admit Date: 2025    SPEECH-LANGUAGE PATHOLOGY EVALUATION - SWALLOW  Subjective: The patient was seen on this date for a Clinical Swallow evaluation.  Patient was alert and cooperative.  Significant history: COVID, acute on chronic respiratory failure, pulmonary fibrosis, PAF, essential HTN, CANNON.   Objective: Textures given included thin liquid, puree consistency, and regular consistency.  Assessment: Difficulties were noted with thin liquid.  Observations:  Mild throat clear 1x with thin, however his vocal quality remained clear and no other overt s/s of aspiration were observed. Good mastication and clearance of residue with the regular solid.  SLP Findings:  Patient presents with functional swallow, without esophageal component.   Recommendations: Diet Textures: thin liquid, regular consistency food.  Medications should be taken whole with thin liquids.   Recommended Strategies: Upright for PO, small bites and sips, and alternate liquids and solids. Oral care before breakfast, after all meals and PRN.  Dysphagia therapy is not recommended.   Brittani Hurtado, CCC-SLP 2025 08:30 CDT     Visit Dx:    ICD-10-CM ICD-9-CM   1. Shortness of breath  R06.02 786.05   2. Acute pulmonary edema  J81.0 518.4   3. Pulmonary fibrosis  J84.10 515   4. Dysphagia, unspecified type  R13.10 787.20     Patient Active Problem List   Diagnosis    CANNON (nonalcoholic steatohepatitis)    Hx of colonic polyp    Family hx colonic polyps    Pulmonary fibrosis    Bilateral pneumonia    Obesity (BMI 30-39.9)    Sepsis due to pneumonia    Hyperlactatemia    Essential hypertension    Hypokalemia    Elevated troponin level not due to acute coronary syndrome    Abnormal findings on diagnostic imaging of heart and coronary circulation     Paroxysmal atrial fibrillation    PVC (premature ventricular contraction)    Acute on chronic respiratory failure with hypoxia    Chronic anticoagulation    COVID-19    Bandemia     Past Medical History:   Diagnosis Date    Abnormal ECG     PVC    Anemia     Arthritis     Asthma     Atrial fibrillation     During two week heart monitor    Chronic idiopathic fibrosing alveolitis     Colon polyp     Erythematous condition     Hypertension     Interstitial lung disease     Liver disease     CANNON (nonalcoholic steatohepatitis)     Pneumonia     Pulmonary fibrosis     Shortness of breath     Sleep apnea      Past Surgical History:   Procedure Laterality Date    CARDIAC CATHETERIZATION N/A 09/13/2024    Procedure: Right Heart Cath;  Surgeon: Marlon Pichardo MD;  Location: [facility] CATH INVASIVE LOCATION;  Service: Cardiology;  Laterality: N/A;    CARDIAC CATHETERIZATION  9/13/2024    CATARACT EXTRACTION, BILATERAL      Summer of 2019    CHOLECYSTECTOMY      COLONOSCOPY  03/05/2013    COLONOSCOPY N/A 03/16/2018    Procedure: COLONOSCOPY WITH ANESTHESIA;  Surgeon: Jemal Batista MD;  Location: [facility] ENDOSCOPY;  Service: Gastroenterology    COLONOSCOPY N/A 11/07/2023    Procedure: COLONOSCOPY WITH ANESTHESIA;  Surgeon: Jemal Batista MD;  Location: [facility] ENDOSCOPY;  Service: Gastroenterology;  Laterality: N/A;  preop hx of polyps  postop polyps   PCP  Rich Islas    ENDOSCOPY AND COLONOSCOPY  06/24/1995    TONSILLECTOMY         SLP Recommendation and Plan  SLP Swallowing Diagnosis: swallow WFL/no suspected pharyngeal impairment (07/19/25 0800)  SLP Diet Recommendation: regular textures, thin liquids (07/19/25 0800)     Monitor for Signs of Aspiration: yes, cough, gurgly voice, throat clearing, pneumonia (07/19/25 0800)     Swallow Criteria for Skilled Therapeutic Interventions Met: no problems identified which require skilled intervention (07/19/25 0800)  Anticipated Discharge Disposition (SLP): home (07/19/25  0829)     Therapy Frequency (Swallow): evaluation only (07/19/25 0800)  Predicted Duration Therapy Intervention (Days): until discharge (07/19/25 0800)           Anticipated Discharge Disposition (SLP): home (07/19/25 0829)           Reason for Discharge: other (see comments) (WFL) (07/19/25 0829)                Outcome Evaluation: See note (07/19/25 0826)    SWALLOW EVALUATION (Last 72 Hours)       SLP Adult Swallow Evaluation       Row Name 07/19/25 0800                   Rehab Evaluation    Document Type discharge evaluation/summary  -MB        Subjective Information no complaints  -MB        Patient Observations alert;cooperative  -MB        Patient/Family/Caregiver Comments/Observations No family present  -MB           General Information    Patient Profile Reviewed yes  -MB        Pertinent History Of Current Problem COVID, acute on chronic respiratory failure, pulmonary fibrosis, PAF, essential HTN, CANNON.  -MB        Current Method of Nutrition regular textures;thin liquids  -MB        Precautions/Limitations, Vision WFL with corrective lenses  -MB        Precautions/Limitations, Hearing WFL;for purposes of eval  -MB        Prior Level of Function-Communication WFL  -MB        Prior Level of Function-Swallowing no diet consistency restrictions  -MB        Plans/Goals Discussed with patient  -MB        Barriers to Rehab none identified  -MB        Patient's Goals for Discharge --  To maintain regular diet  -MB           Pain    Additional Documentation Pain Scale: FACES Pre/Post-Treatment (Group)  -MB           Pain Scale: FACES Pre/Post-Treatment    Pain: FACES Scale, Pretreatment 0-->no hurt  -MB           Oral Motor Structure and Function    Oral Lesions or Structural Abnormalities and/or variants Short uvula  -MB        Dentition Assessment natural, present and adequate  -MB        Secretion Management WNL/WFL  -MB        Mucosal Quality moist, healthy  -MB           Oral Musculature and Cranial Nerve  Assessment    Oral Motor General Assessment velar impairment  -MB        Velar Impairment, Detail, Cranial Nerves X, XI (Vagus and Accessory) reduced velar strength;other (see comments)  on the right  -MB           General Eating/Swallowing Observations    Respiratory Support Currently in Use nasal cannula  -MB        Eating/Swallowing Skills fed by SLP;self-fed  -MB        Positioning During Eating upright in bed  -MB        Utensils Used spoon;straw  -MB        Consistencies Trialed regular textures;pureed;thin liquids  -MB           Clinical Swallow Eval    Oral Prep Phase WFL  -MB        Oral Transit WFL  -MB        Oral Residue WFL  -MB        Pharyngeal Phase suspected pharyngeal impairment  -MB        Esophageal Phase unremarkable  -MB        Clinical Swallow Evaluation Summary See note  -MB           Pharyngeal Phase Concerns    Pharyngeal Phase Concerns throat clear  -MB        Throat Clear thin  -MB           SLP Evaluation Clinical Impression    SLP Swallowing Diagnosis swallow WFL/no suspected pharyngeal impairment  -MB        Functional Impact no impact on function  -MB        Swallow Criteria for Skilled Therapeutic Interventions Met no problems identified which require skilled intervention  -MB           Recommendations    Therapy Frequency (Swallow) evaluation only  -MB        Predicted Duration Therapy Intervention (Days) until discharge  -MB        SLP Diet Recommendation regular textures;thin liquids  -MB        Recommended Precautions and Strategies upright posture during/after eating;small bites of food and sips of liquid;alternate between small bites of food and sips of liquid;general aspiration precautions  -MB        Oral Care Recommendations Oral Care BID/PRN  -MB        SLP Rec. for Method of Medication Administration meds whole;with thin liquids  -MB        Monitor for Signs of Aspiration yes;cough;gurgly voice;throat clearing;pneumonia  -MB        Anticipated Discharge Disposition (SLP)  home  -MB                  User Key  (r) = Recorded By, (t) = Taken By, (c) = Cosigned By      Initials Name Effective Dates    Brittani Elder CCC-SLP 02/03/23 -                     EDUCATION  The patient has been educated in the following areas:   Dysphagia (Swallowing Impairment).                   Time Calculation:    Time Calculation- SLP       Row Name 07/19/25 0830             Time Calculation- SLP    SLP Start Time 0800  -MB      SLP Stop Time 0830  -MB      SLP Time Calculation (min) 30 min  -MB      SLP Received On 07/19/25  -MB         Untimed Charges    60898-DC Eval Oral Pharyng Swallow Minutes 30  -MB         Total Minutes    Untimed Charges Total Minutes 30  -MB       Total Minutes 30  -MB                User Key  (r) = Recorded By, (t) = Taken By, (c) = Cosigned By      Initials Name Provider Type    Brittani Elder CCC-SLP Speech and Language Pathologist                    Therapy Charges for Today       Code Description Service Date Service Provider Modifiers Qty    [id number] HC ST EVAL ORAL PHARYNG SWALLOW 2 7/19/2025 Brittani Hurtado CCC-SLP GN 1                 SLP Discharge Summary  Anticipated Discharge Disposition (SLP): home  Reason for Discharge: other (see comments) (WFL)  Progress Toward Achieving Short/long Term Goals: other (see comments) (WFL)  Discharge Destination: other (see comments) (Still in acute care)    CAMERON Simmons  7/19/2025

## 2025-07-19 NOTE — PLAN OF CARE
Goal Outcome Evaluation:  Plan of Care Reviewed With: patient      Progress: improving  Outcome Evaluation: SB/S54-63; AAOx4. Able to make needs known. IV intact and patent. O2@3L BNC while awake. Bipap at HS. Isolation maintained. Denies pain. Bed low and locked with call light in reach.

## 2025-07-19 NOTE — PLAN OF CARE
Goal Outcome Evaluation:                 Patient alert and oriented x4. Ambulating in room independently. No pain at this time. Shortness of air noted. Oxygen needed intermittently.

## 2025-07-19 NOTE — PROGRESS NOTES
[facility] Clinical Pharmacy Services: Piperacillin-Tazobactam Consult  Reid Morrell is a 74 y.o. male who has been consulted to dose Piperacillin-tazobactam (Zosyn) for Bacteremia.    Current Antimicrobial Therapy:  Nirmatrelvir & Ritonavir (300mg/100mg) - 20 x 150 MG & 10 x 100MG  piperacillin-tazobactam (ZOSYN) 4.5 g IVPB in 100 mL NS MBP (CD)    Relevant clinical data and objective history reviewed:  Wt: 94.3 kg (208 lb); BMI: Body mass index is 29.01 kg/m².    Temp Readings from Last 1 Encounters:   07/19/25 98.8 °F (37.1 °C) (Oral)        Estimated Creatinine Clearance: 74.1 mL/min (by C-G formula based on SCr of 1.01 mg/dL).    Results from last 7 days   Lab Units 07/19/25  0306 07/18/25  1351 07/18/25  1213   CREATININE mg/dL 1.01  --  1.16   WBC 10*3/mm3 4.65  --  7.45   PROCALCITONIN ng/mL  --  0.06  --        Microbiology Culture Results:  Culture results from last 30 days   Lab 07/18/25  2326   MRSAPCR No MRSA Detected       Assessment/Plan:  Initiate Piperacillin-tazobactam 4.5 g IV extended infusion every 8 hours  Pharmacy will continue to monitor renal function, clinical status and culture results and adjust the dose and/or frequency as needed.    Reginaldo Hlot, PharmD  7/19/2025  06:35 CDT

## 2025-07-20 VITALS
HEIGHT: 71 IN | RESPIRATION RATE: 14 BRPM | TEMPERATURE: 97.6 F | BODY MASS INDEX: 29.12 KG/M2 | WEIGHT: 208 LBS | DIASTOLIC BLOOD PRESSURE: 65 MMHG | SYSTOLIC BLOOD PRESSURE: 112 MMHG | OXYGEN SATURATION: 96 % | HEART RATE: 50 BPM

## 2025-07-20 LAB
ALBUMIN SERPL-MCNC: 3.5 G/DL (ref 3.5–5.2)
ALBUMIN/GLOB SERPL: 1 G/DL
ALP SERPL-CCNC: 55 U/L (ref 39–117)
ALT SERPL W P-5'-P-CCNC: 20 U/L (ref 1–41)
ANION GAP SERPL CALCULATED.3IONS-SCNC: 11 MMOL/L (ref 5–15)
AST SERPL-CCNC: 17 U/L (ref 1–40)
BACTERIA SPEC RESP CULT: NORMAL
BASOPHILS # BLD AUTO: 0.01 10*3/MM3 (ref 0–0.2)
BASOPHILS NFR BLD AUTO: 0.2 % (ref 0–1.5)
BILIRUB SERPL-MCNC: 0.5 MG/DL (ref 0–1.2)
BUN SERPL-MCNC: 30.7 MG/DL (ref 8–23)
BUN/CREAT SERPL: 26 (ref 7–25)
CALCIUM SPEC-SCNC: 8.6 MG/DL (ref 8.6–10.5)
CHLORIDE SERPL-SCNC: 99 MMOL/L (ref 98–107)
CO2 SERPL-SCNC: 26 MMOL/L (ref 22–29)
CREAT SERPL-MCNC: 1.18 MG/DL (ref 0.76–1.27)
DEPRECATED RDW RBC AUTO: 45.5 FL (ref 37–54)
EGFRCR SERPLBLD CKD-EPI 2021: 64.3 ML/MIN/1.73
EOSINOPHIL # BLD AUTO: 0 10*3/MM3 (ref 0–0.4)
EOSINOPHIL NFR BLD AUTO: 0 % (ref 0.3–6.2)
ERYTHROCYTE [DISTWIDTH] IN BLOOD BY AUTOMATED COUNT: 13.5 % (ref 12.3–15.4)
GLOBULIN UR ELPH-MCNC: 3.5 GM/DL
GLUCOSE SERPL-MCNC: 138 MG/DL (ref 65–99)
GRAM STN SPEC: NORMAL
HCT VFR BLD AUTO: 40.9 % (ref 37.5–51)
HGB BLD-MCNC: 14 G/DL (ref 13–17.7)
IMM GRANULOCYTES # BLD AUTO: 0.01 10*3/MM3 (ref 0–0.05)
IMM GRANULOCYTES NFR BLD AUTO: 0.2 % (ref 0–0.5)
LYMPHOCYTES # BLD AUTO: 0.7 10*3/MM3 (ref 0.7–3.1)
LYMPHOCYTES NFR BLD AUTO: 13.8 % (ref 19.6–45.3)
MCH RBC QN AUTO: 31.2 PG (ref 26.6–33)
MCHC RBC AUTO-ENTMCNC: 34.2 G/DL (ref 31.5–35.7)
MCV RBC AUTO: 91.1 FL (ref 79–97)
MONOCYTES # BLD AUTO: 0.24 10*3/MM3 (ref 0.1–0.9)
MONOCYTES NFR BLD AUTO: 4.7 % (ref 5–12)
NEUTROPHILS NFR BLD AUTO: 4.12 10*3/MM3 (ref 1.7–7)
NEUTROPHILS NFR BLD AUTO: 81.1 % (ref 42.7–76)
NRBC BLD AUTO-RTO: 0 /100 WBC (ref 0–0.2)
PLATELET # BLD AUTO: 208 10*3/MM3 (ref 140–450)
PMV BLD AUTO: 8.9 FL (ref 6–12)
POTASSIUM SERPL-SCNC: 4.3 MMOL/L (ref 3.5–5.2)
PROT SERPL-MCNC: 7 G/DL (ref 6–8.5)
QT INTERVAL: 380 MS
QTC INTERVAL: 385 MS
RBC # BLD AUTO: 4.49 10*6/MM3 (ref 4.14–5.8)
SODIUM SERPL-SCNC: 136 MMOL/L (ref 136–145)
WBC NRBC COR # BLD AUTO: 5.08 10*3/MM3 (ref 3.4–10.8)

## 2025-07-20 PROCEDURE — 63710000001 DEXAMETHASONE PER 0.25 MG

## 2025-07-20 PROCEDURE — 94799 UNLISTED PULMONARY SVC/PX: CPT

## 2025-07-20 PROCEDURE — 94664 DEMO&/EVAL PT USE INHALER: CPT

## 2025-07-20 PROCEDURE — 25010000002 PIPERACILLIN SOD-TAZOBACTAM PER 1 G: Performed by: INTERNAL MEDICINE

## 2025-07-20 PROCEDURE — 80053 COMPREHEN METABOLIC PANEL: CPT

## 2025-07-20 PROCEDURE — 94761 N-INVAS EAR/PLS OXIMETRY MLT: CPT

## 2025-07-20 PROCEDURE — 85025 COMPLETE CBC W/AUTO DIFF WBC: CPT

## 2025-07-20 RX ORDER — DEXAMETHASONE 6 MG/1
6 TABLET ORAL DAILY
Qty: 7 TABLET | Refills: 0 | Status: SHIPPED | OUTPATIENT
Start: 2025-07-21 | End: 2025-07-28

## 2025-07-20 RX ADMIN — IPRATROPIUM BROMIDE 2 PUFF: 17 AEROSOL, METERED RESPIRATORY (INHALATION) at 06:15

## 2025-07-20 RX ADMIN — FAMOTIDINE 20 MG: 20 TABLET, FILM COATED ORAL at 09:31

## 2025-07-20 RX ADMIN — PIPERACILLIN AND TAZOBACTAM 4.5 G: 4; .5 INJECTION, POWDER, FOR SOLUTION INTRAVENOUS at 06:29

## 2025-07-20 RX ADMIN — DEXAMETHASONE 6 MG: 2 TABLET ORAL at 09:30

## 2025-07-20 RX ADMIN — Medication 10 ML: at 09:31

## 2025-07-20 RX ADMIN — GUAIFENESIN 600 MG: 600 TABLET, EXTENDED RELEASE ORAL at 09:31

## 2025-07-20 RX ADMIN — NIRMATRELVIR AND RITONAVIR 3 TABLET: KIT at 09:31

## 2025-07-20 RX ADMIN — IPRATROPIUM BROMIDE 2 PUFF: 17 AEROSOL, METERED RESPIRATORY (INHALATION) at 10:50

## 2025-07-20 RX ADMIN — TRIAMTERENE AND HYDROCHLOROTHIAZIDE 1 TABLET: 37.5; 25 TABLET ORAL at 09:30

## 2025-07-20 RX ADMIN — APIXABAN 2.5 MG: 2.5 TABLET, FILM COATED ORAL at 09:31

## 2025-07-20 RX ADMIN — DILTIAZEM HYDROCHLORIDE 120 MG: 120 CAPSULE, EXTENDED RELEASE ORAL at 09:31

## 2025-07-20 NOTE — PLAN OF CARE
Goal Outcome Evaluation:  Plan of Care Reviewed With: patient      Progress: improving  Outcome Evaluation: SB/S54-63; AAOx4. Able to make needs known. O2@3L BNC intact. Bipap @HS. IV intact and patent. IV ATB. No c/o SOB. Denies pain. Bed low and locked with call light in reach. Isolation continues.

## 2025-07-20 NOTE — PLAN OF CARE
Goal Outcome Evaluation:                    Patient discharged with information. IV D/C without issue. Family transporting with oxygen tank being used from home.

## 2025-07-20 NOTE — DISCHARGE SUMMARY
[facility] Medicine Services  DISCHARGE SUMMARY       Date of Admission: 7/18/2025  Date of Discharge:  7/20/2025  Primary Care Physician: Reid Islas MD    Presenting Problem/History of Present Illness:  Shortness of breath and fevers    Final Discharge Diagnoses:  Active Hospital Problems    Diagnosis     **COVID-19     Bandemia     Acute on chronic respiratory failure with hypoxia     Chronic anticoagulation     Paroxysmal atrial fibrillation     Essential hypertension     Pulmonary fibrosis     CANNON (nonalcoholic steatohepatitis)        Consults: None    Procedures Performed: None    Pertinent Test Results:   Results for orders placed during the hospital encounter of 09/04/24    Adult Transthoracic Echo Complete W/ Cont if Necessary Per Protocol    Interpretation Summary    Left ventricular systolic function is normal. Left ventricular ejection fraction appears to be 56 - 60%.    Left ventricular diastolic function was normal.    The right ventricular cavity is mildly dilated with normal systolic function.    The left atrial cavity is mildly dilated.    There is no significant (more than mild) valvular stenosis or regurgitation.      Imaging Results (All)       Procedure Component Value Units Date/Time    CT Angiogram Chest Pulmonary Embolism [[id number]] Collected: 07/18/25 1356     Updated: 07/18/25 1416    Narrative:      CT ANGIOGRAM CHEST PULMONARY EMBOLISM-       HISTORY: Pulmonary Embolism      COMPARISON: Chest CT dated 7/3/2024     DOSE LENGTH PRODUCT: 277.21 mGy.cm. Automated exposure control was also  utilized to decrease patient radiation dose.     TECHNIQUE: Helical tomographic images of the chest were obtained after  the administration of intravenous contrast following angiogram protocol.  Additionally, 3D and multiplanar reformatted images were provided.       FINDINGS:    Pulmonary arteries: There is adequate enhancement of the pulmonary  arteries  "to evaluate for central and segmental pulmonary emboli. There  are no filling defects within the main, lobar, segmental or visualized  subsegmental pulmonary arteries. The pulmonary arteries are enlarged,  consistent with pulmonary arterial hypertension.     AORTA AND GREAT VESSELS: The aorta is not well opacified but  demonstrates no aneurysmal dilation. No calcified plaque in the arch.     VISUALIZED NECK BASE: The imaged portion of the base of the neck appears  unremarkable.     LUNGS: Known underlying pulmonary fibrosis with basal predominant  reticulation, honeycombing and traction bronchiectasis. On today's exam  there are new superimposed groundglass and consolidative infiltrates. In  particular the dependent lung fields, previously demonstrating  honeycombing, appear consolidated on today's exam. I believe there is an  element of atelectasis as the lungs are poorly expanded, only down to  the sixth-seventh ribs. There is no pleural effusion. The airways are  clear.     No lung emphysema appreciated.     HEART:The heart is normal in size. There is no pericardial effusion.  Coronary calcifications present.     MEDIASTINUM AND LYMPH NODEs: Slightly prominent hilar and mediastinal  lymph nodes appear stable and are considered benign.     SKELETAL AND SOFT TISSUES: Chest wall soft tissues are unremarkable. No  acute bony abnormality.     UPPER ABDOMEN: The imaged portion of the upper abdomen demonstrates no  acute process.       Impression:      1.  No evidence of pulmonary embolus.  2.  Underlying pulmonary fibrosis with basal predominant reticulation,  honeycombing and traction bronchiectasis. Superimposed bilateral  dependent-predominant groundglass and consolidative \"airspace disease\"  appears to be mostly a dependent atelectasis given the poor chest  expansion/low lung volumes. Differential would include pulmonary edema  which would have a very similar CT appearance. No pleural effusion.     This report was " signed and finalized on 7/18/2025 2:12 PM by Dr Usama Andrade.             LAB RESULTS:      Lab 07/20/25  0330 07/19/25  0306 07/18/25  1616 07/18/25  1351 07/18/25  1213   WBC 5.08 4.65  --   --  7.45   HEMOGLOBIN 14.0 14.7  --   --  15.1   HEMATOCRIT 40.9 44.1  --   --  45.0   PLATELETS 208 195  --   --  196   NEUTROS ABS 4.12 4.23  --   --  5.71   IMMATURE GRANS (ABS) 0.01  --   --   --  0.02   LYMPHS ABS 0.70  --   --   --  0.84   MONOS ABS 0.24  --   --   --  0.86   EOS ABS 0.00 0.00  --   --  0.01   MCV 91.1 92.3  --   --  94.1   CRP  --   --   --  6.04*  --    PROCALCITONIN  --   --   --  0.06  --    LACTATE  --   --  1.2  --   --          Lab 07/20/25  0330 07/19/25  0306 07/18/25  1213   SODIUM 136 134* 137   POTASSIUM 4.3 4.1 3.6   CHLORIDE 99 98 99   CO2 26.0 24.0 27.0   ANION GAP 11.0 12.0 11.0   BUN 30.7* 25.5* 24.6*   CREATININE 1.18 1.01 1.16   EGFR 64.3 77.6 65.7   GLUCOSE 138* 140* 88   CALCIUM 8.6 8.5* 9.0   MAGNESIUM  --  2.4 2.2   HEMOGLOBIN A1C  --  5.30  --          Lab 07/20/25  0330 07/19/25  0306 07/18/25  1213   TOTAL PROTEIN 7.0 7.2 7.7   ALBUMIN 3.5 3.5 3.9   GLOBULIN 3.5 3.7 3.8   ALT (SGPT) 20 23 24   AST (SGOT) 17 21 25   BILIRUBIN 0.5 0.6 0.7   ALK PHOS 55 61 69         Lab 07/18/25  1351 07/18/25  1213   PROBNP  --  255.8   HSTROP T 26* 27*         Lab 07/19/25  0306   CHOLESTEROL 83   LDL CHOL 29   HDL CHOL 41   TRIGLYCERIDES 49             Brief Urine Lab Results       None          Microbiology Results (last 10 days)       Procedure Component Value - Date/Time    MRSA Screen, PCR (Inpatient) - Swab, Nares [[id number]]  (Normal) Collected: 07/18/25 2322    Lab Status: Final result Specimen: Swab from Nares Updated: 07/19/25 0057     MRSA PCR No MRSA Detected    Narrative:      The negative predictive value of this diagnostic test is high and should only be used to consider de-escalating anti-MRSA therapy. A positive result may indicate colonization with MRSA and must be correlated  clinically.    Legionella Antigen, Urine - Urine, Urine, Clean Catch [[id number]]  (Normal) Collected: 07/18/25 2320    Lab Status: Final result Specimen: Urine, Clean Catch Updated: 07/18/25 2350     LEGIONELLA ANTIGEN, URINE Negative    S. Pneumo Ag Urine or CSF - Urine, Urine, Clean Catch [[id number]]  (Normal) Collected: 07/18/25 2320    Lab Status: Final result Specimen: Urine, Clean Catch Updated: 07/18/25 2350     Strep Pneumo Ag Negative    Respiratory Culture - Sputum, Cough [[id number]] Collected: 07/18/25 1837    Lab Status: Preliminary result Specimen: Sputum from Cough Updated: 07/19/25 1441     Respiratory Culture Rejected     Gram Stain Rare (1+) WBCs per low power field      No Epithelial cells per low power field      Rare (1+) Gram positive cocci      Rare (1+) Gram negative bacilli    Narrative:      Specimen rejected due to oropharyngeal contamination. Please reorder and recollect specimen if clinically necessary.    Blood Culture - Blood, Hand, Left [[id number]]  (Normal) Collected: 07/18/25 1616    Lab Status: Preliminary result Specimen: Blood from Hand, Left Updated: 07/19/25 1631     Blood Culture No growth at 24 hours    Blood Culture - Blood, Arm, Right [[id number]]  (Normal) Collected: 07/18/25 1607    Lab Status: Preliminary result Specimen: Blood from Arm, Right Updated: 07/19/25 1631     Blood Culture No growth at 24 hours    Respiratory Panel PCR w/COVID-19(SARS-CoV-2) MEGHA/TOVA/TEREZA/PAD/COR/DONELL In-House, NP Swab in UTM/VTM, 2 HR TAT - Swab, Nasopharynx [[id number]]  (Abnormal) Collected: 07/18/25 1558    Lab Status: Final result Specimen: Swab from Nasopharynx Updated: 07/18/25 1655     ADENOVIRUS, PCR Not Detected     Coronavirus 229E Not Detected     Coronavirus HKU1 Not Detected     Coronavirus NL63 Not Detected     Coronavirus OC43 Not Detected     COVID19 Detected     Human Metapneumovirus Not Detected     Human Rhinovirus/Enterovirus Not Detected     Influenza A PCR Not Detected      Influenza B PCR Not Detected     Parainfluenza Virus 1 Not Detected     Parainfluenza Virus 2 Not Detected     Parainfluenza Virus 3 Not Detected     Parainfluenza Virus 4 Not Detected     RSV, PCR Not Detected     Bordetella pertussis pcr Not Detected     Bordetella parapertussis PCR Not Detected     Chlamydophila pneumoniae PCR Not Detected     Mycoplasma pneumo by PCR Not Detected    Narrative:      In the setting of a positive respiratory panel with a viral infection PLUS a negative procalcitonin without other underlying concern for bacterial infection, consider observing off antibiotics or discontinuation of antibiotics and continue supportive care. If the respiratory panel is positive for atypical bacterial infection (Bordetella pertussis, Chlamydophila pneumoniae, or Mycoplasma pneumoniae), consider antibiotic de-escalation to target atypical bacterial infection.          Microbiology Results (last 10 days)       Procedure Component Value - Date/Time    MRSA Screen, PCR (Inpatient) - Swab, Nares [[id number]]  (Normal) Collected: 07/18/25 2326    Lab Status: Final result Specimen: Swab from Nares Updated: 07/19/25 0057     MRSA PCR No MRSA Detected    Narrative:      The negative predictive value of this diagnostic test is high and should only be used to consider de-escalating anti-MRSA therapy. A positive result may indicate colonization with MRSA and must be correlated clinically.    Legionella Antigen, Urine - Urine, Urine, Clean Catch [[id number]]  (Normal) Collected: 07/18/25 2320    Lab Status: Final result Specimen: Urine, Clean Catch Updated: 07/18/25 2350     LEGIONELLA ANTIGEN, URINE Negative    S. Pneumo Ag Urine or CSF - Urine, Urine, Clean Catch [[id number]]  (Normal) Collected: 07/18/25 2320    Lab Status: Final result Specimen: Urine, Clean Catch Updated: 07/18/25 2350     Strep Pneumo Ag Negative    Respiratory Culture - Sputum, Cough [[id number]] Collected: 07/18/25 1837    Lab Status: Preliminary  result Specimen: Sputum from Cough Updated: 07/19/25 1441     Respiratory Culture Rejected     Gram Stain Rare (1+) WBCs per low power field      No Epithelial cells per low power field      Rare (1+) Gram positive cocci      Rare (1+) Gram negative bacilli    Narrative:      Specimen rejected due to oropharyngeal contamination. Please reorder and recollect specimen if clinically necessary.    Blood Culture - Blood, Hand, Left [[id number]]  (Normal) Collected: 07/18/25 1616    Lab Status: Preliminary result Specimen: Blood from Hand, Left Updated: 07/19/25 1631     Blood Culture No growth at 24 hours    Blood Culture - Blood, Arm, Right [[id number]]  (Normal) Collected: 07/18/25 1607    Lab Status: Preliminary result Specimen: Blood from Arm, Right Updated: 07/19/25 1631     Blood Culture No growth at 24 hours    Respiratory Panel PCR w/COVID-19(SARS-CoV-2) MEGHA/TOVA/TEREZA/PAD/COR/DONELL In-House, NP Swab in UTM/VTM, 2 HR TAT - Swab, Nasopharynx [[id number]]  (Abnormal) Collected: 07/18/25 1558    Lab Status: Final result Specimen: Swab from Nasopharynx Updated: 07/18/25 1655     ADENOVIRUS, PCR Not Detected     Coronavirus 229E Not Detected     Coronavirus HKU1 Not Detected     Coronavirus NL63 Not Detected     Coronavirus OC43 Not Detected     COVID19 Detected     Human Metapneumovirus Not Detected     Human Rhinovirus/Enterovirus Not Detected     Influenza A PCR Not Detected     Influenza B PCR Not Detected     Parainfluenza Virus 1 Not Detected     Parainfluenza Virus 2 Not Detected     Parainfluenza Virus 3 Not Detected     Parainfluenza Virus 4 Not Detected     RSV, PCR Not Detected     Bordetella pertussis pcr Not Detected     Bordetella parapertussis PCR Not Detected     Chlamydophila pneumoniae PCR Not Detected     Mycoplasma pneumo by PCR Not Detected    Narrative:      In the setting of a positive respiratory panel with a viral infection PLUS a negative procalcitonin without other underlying concern for bacterial  infection, consider observing off antibiotics or discontinuation of antibiotics and continue supportive care. If the respiratory panel is positive for atypical bacterial infection (Bordetella pertussis, Chlamydophila pneumoniae, or Mycoplasma pneumoniae), consider antibiotic de-escalation to target atypical bacterial infection.             Documented weights    07/18/25 1153   Weight: 94.3 kg (208 lb)        Hospital Course: Reid Morrell is a 74-year-old male with a past medical history of anemia, arthritis, asthma, atrial fibrillation on chronic Eliquis, chronic idiopathic fibrosing alveolitis, hypertension, CANNON, obstructive sleep apnea on home CPAP, home oxygen at 2 L, hypertension and haemophilus influenza.  Patient presented to [facility] emergency department per request of urgent care after presenting with shortness of breath and febrile episodes x 2 days.  Tmax 102.1.  Patient has complaints of fevers, chills, productive sputum, shortness of breath with exertion and at rest, rhinorrhea and congestion.  No complaints of nausea, vomiting or diarrhea.  No complaints of chest pain or palpitations.  Initial ED workup for COPD exacerbation, elevated D-dimer, CTA revealed no evidence of pulmonary embolism, underlying pulmonary fibrosis with possible differential for pulmonary edema patient was given a one-time dose of 40 mg IV Lasix and hospitalist service was requested for admission.     Upon my assessment patient immediately appeared to be significantly more ill than an average COPD exacerbation, patient was diaphoretic, significant congestion, cough and a fever of 101 suggesting possible viral illness.  Patient was requiring 3 L of oxygen increased from baseline of 2 L due to hypoxia with oxygen saturations of 88%.  Stat respiratory PCR, lactic, CRP, procalcitonin, blood cultures were obtained revealing positive for COVID-19.  Patient will be placed on Paxlovid, Decadron, aggressive pulmonary hygiene and continue  to monitor very closely.  Patient and family made aware of diagnosis all questions were answered to the best my ability and they are in agreement for admission and treatment.    COVID-19/acute on chronic respiratory failure with hypoxia/pulmonary fibrosis/bandemia, patient was admitted intermediately placed on Paxlovid per protocol, Lipitor was placed on hold and Eliquis decreased to 2.5 mg.  Decadron 6 mg p.o. x 10 days.  After developing a bandemia in the first 24 hours initiated Zosyn due to patient's pseudomonal risk with pulmonary fibrosis.  This morning patient's symptoms have continued to completely resolve other than mild congestion with rhinorrhea, no febrile episodes x 48 hours patient is back on his home dose of 2 L of oxygen.  Bandemia has resolved and WBC remains within normal limits.  Patient was instructed to continue Paxlovid and Decadron per protocol.  Extensive discussion regarding patient's decreased Eliquis until 7/26/2025 when he may resume full 5 mg dose and resumption of Lipitor.  He was instructed to return to the hospital for any new or worsening fevers, shortness of breath or decreased level of consciousness.    Essential hypertension, upon initially assessing patient's home medication list he has been taking Norvasc, Cardizem, Maxide, and Cozaar all prescribed by different physicians.  We had a lengthy discussion and patient was placed on Cardizem and Maxide on admission due to EP/cardiology team.  We had an additional extensive discussion today regarding his need to continue only those 2 medications and to make sure that his PCP is aware who is prescribed the other 2.  Vital signs have remained stable throughout hospitalization on cardiology directed hypertensive regimen.    This morning patient is resting comfortably in a chair on his home dose of 2 L of oxygen with no family at bedside.  Patient is alert and oriented able to participate in assessment and discharge planning.  Patient  "states he is ready to go home he feels back to his old self again, he states he still has mild rhinorrhea with congestion however significantly improved.  Patient has continued to have no episodes of nausea, vomiting or diarrhea.  We discussed patient's medication regimen, changes to his Eliquis and Lipitor, discontinuation of Norvasc and Cozaar as well as close follow-up with PCP.  All questions were answered to the best my ability and he is in agreement for discharge home today.    Patient has reached the maximum benefit of hospitalization and is stable for discharge  Patient has been evaluated today 07/20/25 and is stable for discharge.       Physical Exam on Discharge:  /65 (BP Location: Left arm, Patient Position: Sitting)   Pulse 52   Temp 97.6 °F (36.4 °C) (Oral)   Resp 14   Ht 180.3 cm (71\")   Wt 94.3 kg (208 lb)   SpO2 98%   BMI 29.01 kg/m²   Physical Exam  Vitals and nursing note reviewed.   Constitutional:       General: He is not in acute distress.     Appearance: Normal appearance, nontoxic.     Interventions: Nasal cannula in place.      Comments: 2 L-Home dose  HENT:      Head: Normocephalic and atraumatic.      Nose: Improved congestion and rhinorrhea present.      Eyes:      Pupils: Pupils are equal, round, and reactive to light.   Cardiovascular:      Rate and Rhythm: Normal rate and regular rhythm.      Pulses: Normal pulses.      Heart sounds: Normal heart sounds.   Pulmonary:      Effort: No tachypnea, accessory muscle usage or respiratory distress.      Breath sounds: Examination of the right-middle field reveals rhonchi. Examination of the left-middle field reveals rhonchi. Examination of the right-lower field reveals rhonchi and rales. Examination of the left-lower field reveals rhonchi.   Abdominal:      General: Bowel sounds are normal. There is no distension.      Tenderness: There is no abdominal tenderness.   Musculoskeletal:         General: Normal range of motion.      " Cervical back: Normal range of motion.   Skin:     General: Skin is warm.      Capillary Refill: Capillary refill takes less than 2 seconds.      Coloration: Skin is pale.   Neurological:      General: No focal deficit present.      Mental Status: He is oriented to person, place, and time.   Psychiatric:         Mood and Affect: Mood normal.         Behavior: Behavior normal. Behavior is cooperative.         Thought Content: Thought content normal.         Judgment: Judgment normal.        Condition on Discharge: Stable for discharge home    Discharge Disposition:  Home or Self Care    Discharge Medications:     Discharge Medications        PAUSE taking these medications        Instructions Start Date   apixaban 5 MG tablet tablet  Wait to take this until: July 26, 2025  Continue Eliquis at half tablet until 7/26/2025  Commonly known as: ELIQUIS   5 mg, Oral, 2 Times Daily  You also have another medication with the same name that you may need to continue taking.      atorvastatin 10 MG tablet  Wait to take this until: July 29, 2025  Commonly known as: LIPITOR   10 mg, Daily             New Medications        Instructions Start Date   amoxicillin-clavulanate 875-125 MG per tablet  Commonly known as: AUGMENTIN   1 tablet, Oral, 2 Times Daily   Start Date: July 21, 2025     dexAMETHasone 6 MG tablet  Commonly known as: DECADRON   6 mg, Oral, Daily   Start Date: July 21, 2025     Nirmatrelvir & Ritonavir (300mg/100mg)  Commonly known as: PAXLOVID   3 tablets, Oral, 2 Times Daily             Changes to Medications        Instructions Start Date   apixaban 2.5 MG tablet tablet  Commonly known as: ELIQUIS  What changed: Another medication with the same name was paused. Ask your nurse or doctor if you should take this medication.   2.5 mg, Oral, 2 Times Daily, Then resume full dose Eliquis on 7/26/2025             Continue These Medications        Instructions Start Date   colestipol 1 g tablet  Commonly known as:  COLESTID   1 g, 2 Times Daily      dilTIAZem  MG 24 hr capsule  Commonly known as: DILACOR XR   120 mg, Oral, Daily      famotidine 20 MG tablet  Commonly known as: PEPCID   20 mg, Oral, 2 Times Daily      meloxicam 15 MG tablet  Commonly known as: MOBIC   15 mg, Daily      Mucus Relief  MG 12 hr tablet  Generic drug: guaiFENesin   1,200 mg, Oral, Every 12 Hours Scheduled      Nintedanib Esylate 100 MG capsule   1 capsule, Oral, Every 12 Hours      triamterene-hydrochlorothiazide 37.5-25 MG per tablet  Commonly known as: MAXZIDE-25   1 tablet, Oral, Daily      Ventolin  (90 Base) MCG/ACT inhaler  Generic drug: albuterol sulfate HFA   2 puffs, Inhalation, Every 4 Hours PRN      albuterol (2.5 MG/3ML) 0.083% nebulizer solution  Commonly known as: PROVENTIL   Take 2.5 mg by nebulization Every 4 (Four) Hours As Needed for Wheezing or Shortness of Air.             Stop These Medications      amLODIPine 10 MG tablet  Commonly known as: NORVASC     losartan 100 MG tablet  Commonly known as: COZAAR              Discharge Diet: Advance diet as tolerated    Activity at Discharge:   Activity Instructions       Activity as Tolerated              Discharge Instructions:   1.  Patient was instructed to return to medical attention for any new or worsening chest pain, shortness of breath, fevers or decreased level of consciousness.  2.  Patient was instructed to follow-up with PCP in 1 week.  3.  Patient was instructed verbally and with discharge instructions regarding all new medications and medication changes.  To include proper antihypertensive regimen, Norvasc and Cozaar discontinued and patient was placed on cardiology regimen only.    Follow-up Appointments:   Future Appointments   Date Time Provider Department Center   8/25/2025  9:30 AM Alexis Lewis APRN [facility]   9/15/2025 12:45 PM Marlon Pichardo MD [facility]   9/16/2025 10:15 AM Romy Valera APRN [facility]   12/16/2025 10:45 AM  Nash Pisano MD [facility]       Test Results Pending at Discharge: None    Electronically signed by Amanda Pearl, ENEIDA, 07/20/25, 09:22 CDT.    Time: 45 minutes.

## 2025-07-21 ENCOUNTER — TELEPHONE (OUTPATIENT)
Dept: PRIMARY CARE CLINIC | Age: 75
End: 2025-07-21

## 2025-07-21 ENCOUNTER — READMISSION MANAGEMENT (OUTPATIENT)
Dept: CALL CENTER | Facility: HOSPITAL | Age: 75
End: 2025-07-21
Payer: MEDICARE

## 2025-07-21 NOTE — OUTREACH NOTE
Prep Survey      Flowsheet Row Responses   [facility] facility patient discharged from? [place]   Is LACE score < 7 ? No   Eligibility Readm Mgmt   Discharge diagnosis Fevers and increased shortness of breath   Does the patient have one of the following disease processes/diagnoses(primary or secondary)? Other   Does the patient have Home health ordered? No   Is there a DME ordered? No   Prep survey completed? Yes            HELADIO POST - Registered Nurse

## 2025-07-21 NOTE — PAYOR COMM NOTE
"Reid Morrell (74 y.o. Male)   [id number]     New INPT Request  Admit 7/18    [facility]     Npi  [id number]       admit 7/18  d/c 7/20         Date of Birth   1950    Social Security Number       Address   [address] [place] [address]    Home Phone   [phone]    MRN   [id number]       Nondenominational   Amish    Marital Status                               Admission Date   7/18/2025    Admission Type   Emergency    Admitting Provider   Arnie Myrick DO    Attending Provider       Department, Room/Bed   [facility] 3C, 370/1       Discharge Date   7/20/2025    Discharge Disposition   Home or Self Care    Discharge Destination                                 Attending Provider: (none)   Allergies: No Known Allergies    Isolation: None   Infection: COVID (confirmed) (07/18/25)   Code Status: Prior    Ht: 180.3 cm (71\")   Wt: 94.3 kg (208 lb)    Admission Cmt: None   Principal Problem: COVID-19 [U07.1]                   Active Insurance as of 7/18/2025       Primary Coverage       Payor Plan Insurance Group Employer/Plan Group    ANTHEM MEDICARE REPLACEMENT ANTHEM MEDICARE ADVANTAGE PPO [id number]       Payor Plan Address Payor Plan Phone Number Payor Plan Fax Number Effective Dates    [phone]  1/1/2015 - None Entered    [facility] [id number]         Subscriber Name Subscriber Birth Date Member ID       REID MORRELL 1950 [id number]                     Emergency Contacts        (Rel.) Home Phone Work Phone Mobile Phone    Jaimie Morrell (Spouse) -- -- [phone]                 History & Physical        , CANDACE Patel at 07/18/25 1502              [facility] Hospital Medicine Services  HISTORY AND PHYSICAL    Date of Admission: 7/18/2025  Primary Care Physician: Reid Islas MD    Subjective   Primary Historian: Patient    Chief Complaint: Fevers and increased " shortness of breath    History of Present Illness  Reid Morrell is a 74-year-old male with a past medical history of anemia, arthritis, asthma, atrial fibrillation on chronic Eliquis, chronic idiopathic fibrosing alveolitis, hypertension, CANNON, obstructive sleep apnea on home CPAP, home oxygen at 2 L, hypertension and haemophilus influenza.  Patient presented to [facility] emergency department per request of urgent care after presenting with shortness of breath and febrile episodes x 2 days.  Tmax 102.1.  Patient has complaints of fevers, chills, productive sputum, shortness of breath with exertion and at rest, rhinorrhea and congestion.  No complaints of nausea, vomiting or diarrhea.  No complaints of chest pain or palpitations.  Initial ED workup for COPD exacerbation, elevated D-dimer, CTA revealed no evidence of pulmonary embolism, underlying pulmonary fibrosis with possible differential for pulmonary edema patient was given a one-time dose of 40 mg IV Lasix and hospitalist service was requested for admission.    Upon my assessment patient immediately appeared to be significantly more ill than an average COPD exacerbation, patient was diaphoretic, significant congestion, cough and a fever of 101 suggesting possible viral illness.  Patient was requiring 3 L of oxygen increased from baseline of 2 L due to hypoxia with oxygen saturations of 88%.  Stat respiratory PCR, lactic, CRP, procalcitonin, blood cultures were obtained revealing positive for COVID-19.  Patient will be placed on Paxlovid, Decadron, aggressive pulmonary hygiene and continue to monitor very closely.  Patient and family made aware of diagnosis all questions were answered to the best my ability and they are in agreement for admission and treatment.    Additional information pertinent upon assessment of patient's medication list he has been taking Norvasc, Cardizem, Maxide, and Cozaar, all prescribed by different physicians.  Patient will resume  Cardizem and Maxide per EP/cardiology team.  Patient and spouse made aware of error and will be discharged on proper medication list and dosages.      Review of Systems   Constitutional:  Positive for chills, fatigue and fever.   HENT:  Positive for congestion and rhinorrhea.    Respiratory:  Positive for shortness of breath.    Cardiovascular:  Negative for chest pain.   Gastrointestinal:  Negative for diarrhea, nausea and vomiting.   Genitourinary:  Negative for difficulty urinating.   Neurological:  Positive for weakness.      Otherwise complete ROS reviewed and negative except as mentioned in the HPI.    Past Medical History:   Past Medical History:   Diagnosis Date    Abnormal ECG     PVC    Anemia     Arthritis     Asthma     Atrial fibrillation     During two week heart monitor    Chronic idiopathic fibrosing alveolitis     Colon polyp     Erythematous condition     Hypertension     Interstitial lung disease     Liver disease     CANNON (nonalcoholic steatohepatitis)     Pneumonia     Pulmonary fibrosis     Shortness of breath     Sleep apnea      Past Surgical History:  Past Surgical History:   Procedure Laterality Date    CARDIAC CATHETERIZATION N/A 09/13/2024    Procedure: Right Heart Cath;  Surgeon: Marlon Pichardo MD;  Location: [facility] CATH INVASIVE LOCATION;  Service: Cardiology;  Laterality: N/A;    CARDIAC CATHETERIZATION  9/13/2024    CATARACT EXTRACTION, BILATERAL      Summer of 2019    CHOLECYSTECTOMY      COLONOSCOPY  03/05/2013    COLONOSCOPY N/A 03/16/2018    Procedure: COLONOSCOPY WITH ANESTHESIA;  Surgeon: Jemal Batista MD;  Location: [facility] ENDOSCOPY;  Service: Gastroenterology    COLONOSCOPY N/A 11/07/2023    Procedure: COLONOSCOPY WITH ANESTHESIA;  Surgeon: Jemal Batista MD;  Location: [facility] ENDOSCOPY;  Service: Gastroenterology;  Laterality: N/A;  preop hx of polyps  postop polyps   PCP  Rich Islas    ENDOSCOPY AND COLONOSCOPY  06/24/1995    TONSILLECTOMY       Social History:   reports that he has never smoked. He has never been exposed to tobacco smoke. He has never used smokeless tobacco. He reports that he does not drink alcohol and does not use drugs.    Family History: family history includes Colon polyps in his father; Diabetes in his mother; Heart attack in his father; Heart disease in his father; Heart failure in his father; Hypertension in his father.       Allergies:  No Known Allergies    Medications:  Prior to Admission medications    Medication Sig Start Date End Date Taking? Authorizing Provider   albuterol (PROVENTIL) (2.5 MG/3ML) 0.083% nebulizer solution  7/11/24  Yes Darinel Orourke MD   amLODIPine (NORVASC) 10 MG tablet Take 1 tablet by mouth Daily.   Yes Darinel Orourke MD   apixaban (ELIQUIS) 5 MG tablet tablet Take 1 tablet by mouth 2 (Two) Times a Day. 12/31/24  Yes Marlon Pichardo MD   atorvastatin (LIPITOR) 10 MG tablet TAKE 1 TABLET BY MOUTH EVERY DAY 5/1/25  Yes Deven Chavira APRN   colestipol (COLESTID) 1 g tablet Take 1 tablet by mouth 2 (Two) Times a Day.   Yes ProviderDarinel MD   dilTIAZem XR (DILACOR XR) 120 MG 24 hr capsule Take 1 capsule by mouth Daily. 5/23/25  Yes Nupur Smith MD   famotidine (PEPCID) 20 MG tablet Take 1 tablet by mouth 2 (Two) Times a Day. When taking mobic  Mon, Wed, Fri   Yes Darinel Orourke MD   guaiFENesin (MUCINEX) 600 MG 12 hr tablet Take 2 tablets by mouth Every 12 (Twelve) Hours. 3/15/23  Yes Claudia White APRN   losartan (COZAAR) 100 MG tablet Take 1 tablet by mouth Daily.   Yes Emergency, Nurse Epic, RN   meloxicam (MOBIC) 15 MG tablet Take 1 tablet by mouth Daily.  Patient taking differently: Take 1 tablet by mouth Every Other Day.   Yes Emergency, Nurse MARAL Yousif   O2 (OXYGEN) Inhale 2 L/min As Needed. Legacy   Yes Darinel Orourke MD   Ofev 100 MG capsule TAKE 1 CAPSULE BY MOUTH TWICE A DAY 12 HOURS APART WITH FOOD 5/19/25  Yes Romy Valera APRN   triamterene-hydrochlorothiazide  "(MAXZIDE-25) 37.5-25 MG per tablet Take 1 tablet by mouth Daily. 5/13/25  Yes Marlon Pichardo MD   Ventolin  (90 Base) MCG/ACT inhaler  3/21/23   Provider, MD Darinel     I have utilized all available immediate resources to obtain, update, or review the patient's current medications (including all prescriptions, over-the-counter products, herbals, cannabis/cannabidiol products, and vitamin/mineral/dietary (nutritional) supplements).    Results for orders placed during the hospital encounter of 12/02/24    Adult Stress Echo W/ Cont or Stress Agent if Necessary Per Protocol 12/03/2024  1:17 PM    Interpretation Summary    There is no significant echocardiographic or ECG evidence of ischemia.    NSVT was noted at peak stress and in early recovery.    Several episodes of SVT were noted during stress and recovery including an episode requiring IV metoprolol.    Rhythm at the end of the study appears to be atrial fibrillation.    Left ventricular systolic function is borderline at rest with calculated EF 50%.       Objective     Vital Signs: /69 (BP Location: Left arm, Patient Position: Lying)   Pulse 76   Temp (!) 102.7 °F (39.3 °C) (Oral)   Resp 16   Ht 180.3 cm (71\")   Wt 94.3 kg (208 lb)   SpO2 94%   BMI 29.01 kg/m²   Physical Exam  Vitals and nursing note reviewed.   Constitutional:       General: He is not in acute distress.     Appearance: He is ill-appearing and toxic-appearing.      Interventions: Nasal cannula in place.      Comments: 3 L   HENT:      Head: Normocephalic and atraumatic.      Nose: Congestion and rhinorrhea present.      Right Turbinates: Swollen.      Left Turbinates: Swollen.   Eyes:      Pupils: Pupils are equal, round, and reactive to light.   Cardiovascular:      Rate and Rhythm: Normal rate and regular rhythm.      Pulses: Normal pulses.      Heart sounds: Normal heart sounds.   Pulmonary:      Effort: No tachypnea, accessory muscle usage or respiratory distress.     "  Breath sounds: Examination of the right-middle field reveals rhonchi. Examination of the left-middle field reveals rhonchi. Examination of the right-lower field reveals rhonchi and rales. Examination of the left-lower field reveals rhonchi. Rhonchi and rales present.   Abdominal:      General: Bowel sounds are normal. There is no distension.      Tenderness: There is no abdominal tenderness.   Musculoskeletal:         General: Normal range of motion.      Cervical back: Normal range of motion.   Skin:     General: Skin is warm.      Capillary Refill: Capillary refill takes less than 2 seconds.      Coloration: Skin is pale.   Neurological:      General: No focal deficit present.      Mental Status: He is oriented to person, place, and time.   Psychiatric:         Mood and Affect: Mood normal.         Behavior: Behavior normal. Behavior is cooperative.         Thought Content: Thought content normal.         Judgment: Judgment normal.        Results Reviewed:  Microbiology Results (last 10 days)       Procedure Component Value - Date/Time    Respiratory Panel PCR w/COVID-19(SARS-CoV-2) [facility] In-House, NP Swab in UTM/VTM, 2 HR TAT - Swab, Nasopharynx [[id number]]  (Abnormal) Collected: 07/18/25 1558    Lab Status: Final result Specimen: Swab from Nasopharynx Updated: 07/18/25 0480     ADENOVIRUS, PCR Not Detected     Coronavirus 229E Not Detected     Coronavirus HKU1 Not Detected     Coronavirus NL63 Not Detected     Coronavirus OC43 Not Detected     COVID19 Detected     Human Metapneumovirus Not Detected     Human Rhinovirus/Enterovirus Not Detected     Influenza A PCR Not Detected     Influenza B PCR Not Detected     Parainfluenza Virus 1 Not Detected     Parainfluenza Virus 2 Not Detected     Parainfluenza Virus 3 Not Detected     Parainfluenza Virus 4 Not Detected     RSV, PCR Not Detected     Bordetella pertussis pcr Not Detected     Bordetella parapertussis PCR Not Detected     Chlamydophila  pneumoniae PCR Not Detected     Mycoplasma pneumo by PCR Not Detected    Narrative:      In the setting of a positive respiratory panel with a viral infection PLUS a negative procalcitonin without other underlying concern for bacterial infection, consider observing off antibiotics or discontinuation of antibiotics and continue supportive care. If the respiratory panel is positive for atypical bacterial infection (Bordetella pertussis, Chlamydophila pneumoniae, or Mycoplasma pneumoniae), consider antibiotic de-escalation to target atypical bacterial infection.           Lab Results (last 24 hours)       Procedure Component Value Units Date/Time    High Sensitivity Troponin T 1Hr [[id number]]  (Abnormal) Collected: 07/18/25 1351    Specimen: Blood Updated: 07/18/25 1428     HS Troponin T 26 ng/L      Troponin T Numeric Delta -1 ng/L      Troponin T % Delta -4            Comprehensive Metabolic Panel [[id number]]  (Abnormal) Collected: 07/18/25 1213    Specimen: Blood Updated: 07/18/25 1300     Glucose 88 mg/dL      BUN 24.6 mg/dL      Creatinine 1.16 mg/dL      Sodium 137 mmol/L      Potassium 3.6 mmol/L      Chloride 99 mmol/L      CO2 27.0 mmol/L      Calcium 9.0 mg/dL      Total Protein 7.7 g/dL      Albumin 3.9 g/dL      ALT (SGPT) 24 U/L      AST (SGOT) 25 U/L      Alkaline Phosphatase 69 U/L      Total Bilirubin 0.7 mg/dL      Globulin 3.8 gm/dL      A/G Ratio 1.0 g/dL      BUN/Creatinine Ratio 21.2     Anion Gap 11.0 mmol/L      eGFR 65.7 mL/min/1.73             Magnesium [[id number]]  (Normal) Collected: 07/18/25 1213    Specimen: Blood Updated: 07/18/25 1300     Magnesium 2.2 mg/dL     High Sensitivity Troponin T [[id number]]  (Abnormal) Collected: 07/18/25 1213    Specimen: Blood Updated: 07/18/25 1254     HS Troponin T 27 ng/L             BNP [[id number]]  (Normal) Collected: 07/18/25 1213    Specimen: Blood Updated: 07/18/25 1254     proBNP 255.8 pg/mL             CBC & Differential [[id number]]  (Abnormal)  Collected: 07/18/25 1213    Specimen: Blood Updated: 07/18/25 1232            CBC Auto Differential [[id number]]  (Abnormal) Collected: 07/18/25 1213    Specimen: Blood Updated: 07/18/25 1232     WBC 7.45 10*3/mm3      RBC 4.78 10*6/mm3      Hemoglobin 15.1 g/dL      Hematocrit 45.0 %      MCV 94.1 fL      MCH 31.6 pg      MCHC 33.6 g/dL      RDW 14.1 %      RDW-SD 49.1 fl      MPV 9.2 fL      Platelets 196 10*3/mm3      Neutrophil % 76.7 %      Lymphocyte % 11.3 %      Monocyte % 11.5 %      Eosinophil % 0.1 %      Basophil % 0.1 %      Immature Grans % 0.3 %      Neutrophils, Absolute 5.71 10*3/mm3      Lymphocytes, Absolute 0.84 10*3/mm3      Monocytes, Absolute 0.86 10*3/mm3      Eosinophils, Absolute 0.01 10*3/mm3      Basophils, Absolute 0.01 10*3/mm3      Immature Grans, Absolute 0.02 10*3/mm3      nRBC 0.0 /100 WBC           Imaging Results (Last 24 Hours)       Procedure Component Value Units Date/Time    CT Angiogram Chest Pulmonary Embolism [[id number]] Collected: 07/18/25 1356     Updated: 07/18/25 1416    Narrative:      CT ANGIOGRAM CHEST PULMONARY EMBOLISM-       HISTORY: Pulmonary Embolism      COMPARISON: Chest CT dated 7/3/2024     DOSE LENGTH PRODUCT: 277.21 mGy.cm. Automated exposure control was also  utilized to decrease patient radiation dose.     TECHNIQUE: Helical tomographic images of the chest were obtained after  the administration of intravenous contrast following angiogram protocol.  Additionally, 3D and multiplanar reformatted images were provided.       FINDINGS:    Pulmonary arteries: There is adequate enhancement of the pulmonary  arteries to evaluate for central and segmental pulmonary emboli. There  are no filling defects within the main, lobar, segmental or visualized  subsegmental pulmonary arteries. The pulmonary arteries are enlarged,  consistent with pulmonary arterial hypertension.     AORTA AND GREAT VESSELS: The aorta is not well opacified but  demonstrates no aneurysmal  "dilation. No calcified plaque in the arch.     VISUALIZED NECK BASE: The imaged portion of the base of the neck appears  unremarkable.     LUNGS: Known underlying pulmonary fibrosis with basal predominant  reticulation, honeycombing and traction bronchiectasis. On today's exam  there are new superimposed groundglass and consolidative infiltrates. In  particular the dependent lung fields, previously demonstrating  honeycombing, appear consolidated on today's exam. I believe there is an  element of atelectasis as the lungs are poorly expanded, only down to  the sixth-seventh ribs. There is no pleural effusion. The airways are  clear.     No lung emphysema appreciated.     HEART:The heart is normal in size. There is no pericardial effusion.  Coronary calcifications present.     MEDIASTINUM AND LYMPH NODEs: Slightly prominent hilar and mediastinal  lymph nodes appear stable and are considered benign.     SKELETAL AND SOFT TISSUES: Chest wall soft tissues are unremarkable. No  acute bony abnormality.     UPPER ABDOMEN: The imaged portion of the upper abdomen demonstrates no  acute process.       Impression:      1.  No evidence of pulmonary embolus.  2.  Underlying pulmonary fibrosis with basal predominant reticulation,  honeycombing and traction bronchiectasis. Superimposed bilateral  dependent-predominant groundglass and consolidative \"airspace disease\"  appears to be mostly a dependent atelectasis given the poor chest  expansion/low lung volumes. Differential would include pulmonary edema  which would have a very similar CT appearance. No pleural effusion.     This report was signed and finalized on 7/18/2025 2:12 PM by Dr Usama Andrade.               Assessment / Plan   Assessment:   Active Hospital Problems    Diagnosis     **COVID-19     Acute on chronic respiratory failure with hypoxia     Chronic anticoagulation     Paroxysmal atrial fibrillation     Essential hypertension     Pulmonary fibrosis     CANNON " (nonalcoholic steatohepatitis)        Treatment Plan  The patient will be admitted to Dr. Pablo's service here at [facility].     COVID-19/acute on chronic respiratory failure with hypoxia/pulmonary fibrosis  - Initiate Paxlovid per protocol, greater than 2 years since last immunization, decrease Eliquis to 2.5 mg and hold Lipitor.  - Initiate Decadron 6 mg p.o. x 10 days.  - Continuous pulse oximetry, aggressive pulmonary hygiene, OPEP, incentive spirometry, continue home CPAP with 2 L nightly, continue supplemental oxygen, notify provider of any oxygenation needs greater than 4 L.    Paroxysmal atrial fibrillation/chronic anticoagulation  - Initiate cardiac telemetry.  - Continue home Cardizem.  - Continues Eliquis however due to Paxlovid use and recommendations for decreasing by half will continue at 2.5 mg.    Essential hypertension  - Initiate Q4 vital signs.  - Continue home Cardizem and Maxide.  Discontinue Norvasc and Cozaar (duplicate medications)    CANNON  - Currently stable, Lipitor on hold due to Paxlovid.  - Daily CMP    Reviewed and restarted home medications as appropriate.    VTE prophylaxis with home Eliquis  Labs in a.m.    Medical Decision Making  1 acute, high complexity, unchanged  1 acute on chronic, high complexity, unchanged  5 chronic, moderate complexity, unchanged    Number and Complexity of problems: 7  Differential Diagnosis: None    Conditions and Status        Condition is unchanged.     [facility] Data  External documents reviewed: [employer] EHR  Cardiac tracing (EKG, telemetry) interpretation: 7/18/2025 EKG per cardiology reviewed  Radiology interpretation: 7/18/2025 CT of the chest per radiology reviewed  Labs reviewed: 7/18/2025 reviewed  Any tests that were considered but not ordered: None     Decision rules/scores evaluated (example [id number], Wells, etc): None     Discussed with: Dr. Pablo, patient and his wife Jaimie     Care Planning  Shared decision making: Dr. Pablo,  patient and his wife Jaimie  Code status and discussions: Full code per patient    Disposition  Social Determinants of Health that impact treatment or disposition: Not determined at this time  Estimated length of stay is 2+ days.     I confirmed that the patient's advanced care plan is present, code status is documented, and a surrogate decision maker is listed in the patient's medical record.     The patient's surrogate decision maker is his wife Jaimie.     The patient was seen and examined by me on 7/18/2025 at 1502.    Electronically signed by ENEIDA Nicholas, 07/18/25, 17:28 CDT.               Electronically signed by Amanda Pearl APRN at 07/18/25 1729          Emergency Department Notes        Aguila James MD at 07/18/25 1156          Subjective   History of Present Illness    Patient states that he was seen at a walk-in clinic today.  Was told that he has fluid buildup in his lungs.  He states that since Wednesday has been feeling short of breath.  He has been having fevers at home daily.  He has a cough.  He has yellow sputum.  Patient states that he has no abdominal pain.  Patient denies any headache or dizziness.  Patient denies any other symptoms at this time.        Review of Systems   All other systems reviewed and are negative.      Past Medical History:   Diagnosis Date    Abnormal ECG     PVC    Anemia     Arthritis     Asthma     Atrial fibrillation     During two week heart monitor    Chronic idiopathic fibrosing alveolitis     Colon polyp     Erythematous condition     Hypertension     Interstitial lung disease     Liver disease     CANNON (nonalcoholic steatohepatitis)     Pneumonia     Pulmonary fibrosis     Shortness of breath     Sleep apnea        No Known Allergies    Past Surgical History:   Procedure Laterality Date    CARDIAC CATHETERIZATION N/A 09/13/2024    Procedure: Right Heart Cath;  Surgeon: Marlon Pichardo MD;  Location:  [facility] CATH INVASIVE LOCATION;  Service:  Cardiology;  Laterality: N/A;    CARDIAC CATHETERIZATION  9/13/2024    CATARACT EXTRACTION, BILATERAL      Summer of 2019    CHOLECYSTECTOMY      COLONOSCOPY  03/05/2013    COLONOSCOPY N/A 03/16/2018    Procedure: COLONOSCOPY WITH ANESTHESIA;  Surgeon: Jemal Batista MD;  Location:  [facility] ENDOSCOPY;  Service: Gastroenterology    COLONOSCOPY N/A 11/07/2023    Procedure: COLONOSCOPY WITH ANESTHESIA;  Surgeon: Jemal Batista MD;  Location: [facility] ENDOSCOPY;  Service: Gastroenterology;  Laterality: N/A;  preop hx of polyps  postop polyps   PCP  Rich Islas    ENDOSCOPY AND COLONOSCOPY  06/24/1995    TONSILLECTOMY         Family History   Problem Relation Age of Onset    Colon polyps Father     Hypertension Father     Heart disease Father     Heart failure Father     Heart attack Father     Diabetes Mother     Colon cancer Neg Hx        Social History     Socioeconomic History    Marital status:    Tobacco Use    Smoking status: Never     Passive exposure: Never    Smokeless tobacco: Never   Vaping Use    Vaping status: Never Used   Substance and Sexual Activity    Alcohol use: No    Drug use: No    Sexual activity: Yes     Partners: Female     Birth control/protection: None           Objective   Physical Exam  Vitals and nursing note reviewed.   Constitutional:       Appearance: He is well-developed.   HENT:      Head: Normocephalic and atraumatic.   Eyes:      Extraocular Movements: Extraocular movements intact.      Pupils: Pupils are equal, round, and reactive to light.   Cardiovascular:      Rate and Rhythm: Normal rate and regular rhythm.      Heart sounds: Normal heart sounds.   Pulmonary:      Effort: Pulmonary effort is normal.      Breath sounds: Rhonchi present.   Abdominal:      General: Bowel sounds are normal.      Palpations: Abdomen is soft.      Tenderness: There is no abdominal tenderness.   Musculoskeletal:      Right lower leg: No tenderness. No edema.      Left lower leg: No  tenderness. No edema.   Skin:     General: Skin is warm and dry.   Neurological:      General: No focal deficit present.      Mental Status: He is alert and oriented to person, place, and time.   Psychiatric:         Mood and Affect: Mood normal.         Behavior: Behavior normal.         Procedures          ED Course  ED Course as of 07/18/25 1506   Fri Jul 18, 2025   1213 EKG rate 62  Normal sinus rhythm  No STEMI [RP]      ED Course User Index  [RP] Aguila James MD                                                     Lab Results (last 24 hours)       Procedure Component Value Units Date/Time    CBC & Differential [[id number]]  (Abnormal) Collected: 07/18/25 1213    Specimen: Blood Updated: 07/18/25 1232    Narrative:      The following orders were created for panel order CBC & Differential.  Procedure                               Abnormality         Status                     ---------                               -----------         ------                     CBC Auto Differential[[id number]]        Abnormal            Final result                 Please view results for these tests on the individual orders.    Comprehensive Metabolic Panel [[id number]]  (Abnormal) Collected: 07/18/25 1213    Specimen: Blood Updated: 07/18/25 1300     Glucose 88 mg/dL      BUN 24.6 mg/dL      Creatinine 1.16 mg/dL      Sodium 137 mmol/L      Potassium 3.6 mmol/L      Chloride 99 mmol/L      CO2 27.0 mmol/L      Calcium 9.0 mg/dL      Total Protein 7.7 g/dL      Albumin 3.9 g/dL      ALT (SGPT) 24 U/L      AST (SGOT) 25 U/L      Alkaline Phosphatase 69 U/L      Total Bilirubin 0.7 mg/dL      Globulin 3.8 gm/dL      A/G Ratio 1.0 g/dL      BUN/Creatinine Ratio 21.2     Anion Gap 11.0 mmol/L      eGFR 65.7 mL/min/1.73     Narrative:      GFR Categories in Chronic Kidney Disease (CKD)              GFR Category          GFR (mL/min/1.73)    Interpretation  G1                    90 or greater        Normal or high (1)  G2                     60-89                Mild decrease (1)  G3a                   45-59                Mild to moderate decrease  G3b                   30-44                Moderate to severe decrease  G4                    15-29                Severe decrease  G5                    14 or less           Kidney failure    (1)In the absence of evidence of kidney disease, neither GFR category G1 or G2 fulfill the criteria for CKD.    eGFR calculation 2021 CKD-EPI creatinine equation, which does not include race as a factor    High Sensitivity Troponin T [[id number]]  (Abnormal) Collected: 07/18/25 1213    Specimen: Blood Updated: 07/18/25 1254     HS Troponin T 27 ng/L     Narrative:      High Sensitive Troponin T Reference Range:  <14.0 ng/L- Negative Female for AMI  <22.0 ng/L- Negative Male for AMI  >=14 - Abnormal Female indicating possible myocardial injury.  >=22 - Abnormal Male indicating possible myocardial injury.   Clinicians would have to utilize clinical acumen, EKG, Troponin, and serial changes to determine if it is an Acute Myocardial Infarction or myocardial injury due to an underlying chronic condition.         BNP [[id number]]  (Normal) Collected: 07/18/25 1213    Specimen: Blood Updated: 07/18/25 1254     proBNP 255.8 pg/mL     Narrative:      This assay is used as an aid in the diagnosis of individuals suspected of having heart failure. It can be used as an aid in the diagnosis of acute decompensated heart failure (ADHF) in patients presenting with signs and symptoms of ADHF to the emergency department (ED). In addition, NT-proBNP of <300 pg/mL indicates ADHF is not likely.    Age Range Result Interpretation  NT-proBNP Concentration (pg/mL:      <50             Positive            >450                   Gray                 300-450                    Negative             <300    50-75           Positive            >900                  Gray                300-900                  Negative            <300      >75              Positive            >1800                  Gray                300-1800                  Negative            <300    Magnesium [[id number]]  (Normal) Collected: 07/18/25 1213    Specimen: Blood Updated: 07/18/25 1300     Magnesium 2.2 mg/dL     CBC Auto Differential [[id number]]  (Abnormal) Collected: 07/18/25 1213    Specimen: Blood Updated: 07/18/25 1232     WBC 7.45 10*3/mm3      RBC 4.78 10*6/mm3      Hemoglobin 15.1 g/dL      Hematocrit 45.0 %      MCV 94.1 fL      MCH 31.6 pg      MCHC 33.6 g/dL      RDW 14.1 %      RDW-SD 49.1 fl      MPV 9.2 fL      Platelets 196 10*3/mm3      Neutrophil % 76.7 %      Lymphocyte % 11.3 %      Monocyte % 11.5 %      Eosinophil % 0.1 %      Basophil % 0.1 %      Immature Grans % 0.3 %      Neutrophils, Absolute 5.71 10*3/mm3      Lymphocytes, Absolute 0.84 10*3/mm3      Monocytes, Absolute 0.86 10*3/mm3      Eosinophils, Absolute 0.01 10*3/mm3      Basophils, Absolute 0.01 10*3/mm3      Immature Grans, Absolute 0.02 10*3/mm3      nRBC 0.0 /100 WBC     High Sensitivity Troponin T 1Hr [[id number]]  (Abnormal) Collected: 07/18/25 1351    Specimen: Blood Updated: 07/18/25 1428     HS Troponin T 26 ng/L      Troponin T Numeric Delta -1 ng/L      Troponin T % Delta -4    Narrative:      High Sensitive Troponin T Reference Range:  <14.0 ng/L- Negative Female for AMI  <22.0 ng/L- Negative Male for AMI  >=14 - Abnormal Female indicating possible myocardial injury.  >=22 - Abnormal Male indicating possible myocardial injury.   Clinicians would have to utilize clinical acumen, EKG, Troponin, and serial changes to determine if it is an Acute Myocardial Infarction or myocardial injury due to an underlying chronic condition.               CT Angiogram Chest Pulmonary Embolism   Final Result   1.  No evidence of pulmonary embolus.   2.  Underlying pulmonary fibrosis with basal predominant reticulation,   honeycombing and traction bronchiectasis. Superimposed bilateral  "  dependent-predominant groundglass and consolidative \"airspace disease\"   appears to be mostly a dependent atelectasis given the poor chest   expansion/low lung volumes. Differential would include pulmonary edema   which would have a very similar CT appearance. No pleural effusion.       This report was signed and finalized on 7/18/2025 2:12 PM by Dr Usama Andrade.            Medications   sodium chloride 0.9 % flush 10 mL (has no administration in time range)   furosemide (LASIX) injection 40 mg (has no administration in time range)   iopamidol (ISOVUE-370) 76 % injection 100 mL (100 mL Intravenous Given 7/18/25 1335)       Medical Decision Making  74-year-old male found to be short of breath.  Requiring more oxygen than he normally is on.  Normally on 2 L.  Requiring 3 L.  Shortness of breath with exertion.  Possible pulmonary edema seen on the CT scan of his chest.  Patient was given IV Lasix here in the emergency room.  Case was discussed with Opal FALK with the hospitalist group.  She has accepted the patient under their group service.    Problems Addressed:  Acute pulmonary edema: complicated acute illness or injury  Pulmonary fibrosis: complicated acute illness or injury  Shortness of breath: complicated acute illness or injury    Amount and/or Complexity of Data Reviewed  Labs: ordered. Decision-making details documented in ED Course.  Radiology: ordered. Decision-making details documented in ED Course.  ECG/medicine tests: ordered. Decision-making details documented in ED Course.    Risk  Prescription drug management.  Decision regarding hospitalization.        Final diagnoses:   Shortness of breath   Acute pulmonary edema   Pulmonary fibrosis       ED Disposition  ED Disposition       ED Disposition   Decision to Admit    Condition   --    Comment   Level of Care: Remote Telemetry [26]   Diagnosis: Shortness of breath [786.05.ICD-9-CM]   Admitting Physician: TRUONG VELÁSQUEZ [[id number]]   Attending " Physician: TRUONG VELÁSQUEZ [[id number]]   Certification: I Certify That Inpatient Hospital Services Are Medically Necessary For Greater Than 2 Midnights                 No follow-up provider specified.       Medication List      No changes were made to your prescriptions during this visit.            Aguila James MD  07/18/25 1506      Electronically signed by Aguila James MD at 07/18/25 1506       Vital Signs (last 4 days) before discharge       Date/Time Temp Temp src Pulse Resp BP Patient Position SpO2    07/20/25 1050 -- -- 50 14 -- -- 96    07/20/25 0802 97.6 (36.4) Oral 52 14 112/65 Sitting 98    07/20/25 0618 -- -- 60 -- -- -- 98    07/20/25 0615 -- -- 58 14 -- -- 97    07/20/25 0358 98.9 (37.2) Oral 59 16 106/60 Lying 98    07/19/25 2343 97.8 (36.6) Oral 60 16 115/66 Lying 93    07/19/25 2016 97.7 (36.5) Oral 54 16 107/62 Sitting 92    07/19/25 1838 -- -- 54 -- -- -- 96    07/19/25 1834 -- -- 50 16 -- -- 96    07/19/25 1552 97.5 (36.4) Oral 51 16 102/61 Lying 96    07/19/25 1440 -- -- 60 16 -- -- 96    07/19/25 1202 97.3 (36.3) -- 60 18 108/66 -- 98    07/19/25 1043 -- -- 66 16 -- -- 97    07/19/25 0833 97.8 (36.6) Oral 60 16 112/68 Lying 96    07/19/25 0639 -- -- 64 16 -- -- 96    07/19/25 0525 98.6 (37) Oral 68 16 116/62 Lying 94    07/19/25 0025 98.8 (37.1) Oral 69 16 111/67 Lying 95    07/18/25 2038 -- -- 65 16 -- -- 94    07/18/25 2009 98.7 (37.1) Oral 67 16 109/64 Sitting 94    07/18/25 1840 101 (38.3) -- -- -- -- -- --    07/18/25 1643 102.7 (39.3) Oral 76 16 125/69 Lying 94    07/18/25 1550 101.8 (38.8) Oral -- -- -- -- --    07/18/25 1531 -- -- 67 -- 128/80 -- 95    07/18/25 1446 -- -- 65 -- 126/78 -- 94    07/18/25 1431 -- -- 67 -- 132/80 -- 93    07/18/25 1416 -- -- 62 -- 121/80 -- 97    07/18/25 1316 -- -- 59 -- 129/71 -- 95    07/18/25 1301 -- -- 60 -- 131/68 -- 90    07/18/25 1246 -- -- 62 -- 130/72 -- 94    07/18/25 1231 -- -- 60 -- 112/68 -- 94    07/18/25 1216 -- -- 63 -- 112/65 -- 96    07/18/25  1212 -- -- 67 -- 120/70 -- 96    07/18/25 1210 -- -- 63 -- 115/67 -- 96    07/18/25 1154 99.8 (37.7) Oral 62 -- 128/75 Lying 96    07/18/25 1153 98.2 (36.8) Oral 60 19 123/74 Sitting 92     SpO2: 2 L at 07/18/25 1153    07/18/25 1152 -- -- 68 -- -- -- 95    07/18/25 1150 -- -- -- -- 128/75 -- --          No current facility-administered medications for this encounter.     Current Outpatient Medications   Medication Sig Dispense Refill    albuterol (PROVENTIL) (2.5 MG/3ML) 0.083% nebulizer solution Take 2.5 mg by nebulization Every 4 (Four) Hours As Needed for Wheezing or Shortness of Air.      apixaban (ELIQUIS) 2.5 MG tablet tablet Take 1 tablet by mouth 2 (Two) Times a Day for 7 days. Then resume full dose Eliquis on 7/26/2025  Indications: Atrial Fibrillation      [Paused] apixaban (ELIQUIS) 5 MG tablet tablet Take 1 tablet by mouth 2 (Two) Times a Day. 60 tablet 11    [Paused] atorvastatin (LIPITOR) 10 MG tablet Take 1 tablet by mouth Daily.      colestipol (COLESTID) 1 g tablet Take 1 tablet by mouth 2 (Two) Times a Day.      dexAMETHasone (DECADRON) 6 MG tablet Take 1 tablet by mouth Daily for 7 doses. 7 tablet 0    dilTIAZem XR (DILACOR XR) 120 MG 24 hr capsule Take 1 capsule by mouth Daily. 90 capsule 3    famotidine (PEPCID) 20 MG tablet Take 1 tablet by mouth 2 (Two) Times a Day.      guaiFENesin (MUCINEX) 600 MG 12 hr tablet Take 2 tablets by mouth Every 12 (Twelve) Hours. 20 tablet 0    meloxicam (MOBIC) 15 MG tablet Take 1 tablet by mouth Daily. (Patient taking differently: Take 1 tablet by mouth Every Other Day.)      Nirmatrelvir & Ritonavir, 300mg/100mg, (PAXLOVID) Take 3 tablets by mouth 2 (Two) Times a Day for 7 doses. 21 tablet 0    triamterene-hydrochlorothiazide (MAXZIDE-25) 37.5-25 MG per tablet Take 1 tablet by mouth Daily. 90 tablet 3    amoxicillin-clavulanate (AUGMENTIN) 875-125 MG per tablet Take 1 tablet by mouth 2 (Two) Times a Day for 5 days. 10 tablet 0    Nintedanib Esylate 100 MG  capsule Take 1 capsule by mouth Every 12 (Twelve) Hours.      Ventolin  (90 Base) MCG/ACT inhaler Inhale 2 puffs Every 4 (Four) Hours As Needed for Wheezing or Shortness of Air.          Physician Progress Notes (last 4 days)        Amanda APRN at 07/19/25 6841       Attestation signed by Arnie Myrick DO at 07/19/25 7294      I performed a substantive part of the MDM during the patient’s E/M visit. I personally evaluated   and examined the patient. I personally made or approved the documented management plan and acknowledge its risk   of complications.   (Independent Interpretation) My (EKG/X-Ray/US/CT) interpretation - reports reviewed  (Discussion) Management/test interpretation discussed with CANDACE Nicholas.       Electronically signed by Arnie Myrick DO, 7/19/2025, 18:37 CDT.                      Patient Name: Reid Morrell  Date of Admission: 7/18/2025  Today's Date: 07/19/25  Length of Stay: 1  Primary Care Physician: Reid Islas MD    Subjective   Chief Complaint: Shortness of breath and fevers    Today:   This morning patient is resting in bed on his home dose of 2 L of oxygen with no family at bedside.  Patient is alert and oriented and able to participate in assessment.  Patient states since his fever has broken and he feels significantly improved.  His congestion and rhinorrhea has additionally improved overnight.  Continues to have no complaints of chest pain, nausea, vomiting or diarrhea.  Patient was made aware of new developing bandemia and plan of care regarding changing of antibiotics.  Patient will remain on Paxlovid, Decadron and will continue to follow closely for any worsening signs or symptoms of sepsis    New developing bandemia, initiated Zosyn for broader coverage given patient's history of pulmonary fibrosis, COVID-19 and increasing the risk of superimposed bacterial infection and greater than 23% bandemia suggest possibility of developing  serious infection.      Documented weights    07/18/25 1153   Weight: 94.3 kg (208 lb)          Intake/Output Summary (Last 24 hours) at 7/19/2025 0631  Last data filed at 7/18/2025 2122  Gross per 24 hour   Intake --   Output 100 ml   Net -100 ml       Results for orders placed during the hospital encounter of 12/02/24    Adult Stress Echo W/ Cont or Stress Agent if Necessary Per Protocol 12/03/2024  1:17 PM    Interpretation Summary    There is no significant echocardiographic or ECG evidence of ischemia.    NSVT was noted at peak stress and in early recovery.    Several episodes of SVT were noted during stress and recovery including an episode requiring IV metoprolol.    Rhythm at the end of the study appears to be atrial fibrillation.    Left ventricular systolic function is borderline at rest with calculated EF 50%.       Review of Systems   Constitutional:  Positive for fatigue. Negative for chills and fever.   HENT:  Positive for congestion and rhinorrhea.    Respiratory:  Positive for shortness of breath.    Neurological:  Positive for weakness.      All pertinent negatives and positives are as above. All other systems have been reviewed and are negative unless otherwise stated.     Objective    Temp:  [98.2 °F (36.8 °C)-102.7 °F (39.3 °C)] 98.8 °F (37.1 °C)  Heart Rate:  [59-76] 69  Resp:  [16-19] 16  BP: (109-132)/(64-80) 111/67  Physical Exam  Vitals and nursing note reviewed.   Constitutional:       General: He is not in acute distress.     Appearance: He is ill-appearing      Interventions: Nasal cannula in place.      Comments: 2 L   HENT:      Head: Normocephalic and atraumatic.      Nose: Improved congestion and rhinorrhea present.      Right Turbinates: Swollen.      Left Turbinates: Swollen.   Eyes:      Pupils: Pupils are equal, round, and reactive to light.   Cardiovascular:      Rate and Rhythm: Normal rate and regular rhythm.      Pulses: Normal pulses.      Heart sounds: Normal heart sounds.    Pulmonary:      Effort: No tachypnea, accessory muscle usage or respiratory distress.      Breath sounds: Examination of the right-middle field reveals rhonchi. Examination of the left-middle field reveals rhonchi. Examination of the right-lower field reveals rhonchi and rales. Examination of the left-lower field reveals rhonchi. Rhonchi and rales present.   Abdominal:      General: Bowel sounds are normal. There is no distension.      Tenderness: There is no abdominal tenderness.   Musculoskeletal:         General: Normal range of motion.      Cervical back: Normal range of motion.   Skin:     General: Skin is warm.      Capillary Refill: Capillary refill takes less than 2 seconds.      Coloration: Skin is pale.   Neurological:      General: No focal deficit present.      Mental Status: He is oriented to person, place, and time.   Psychiatric:         Mood and Affect: Mood normal.         Behavior: Behavior normal. Behavior is cooperative.         Thought Content: Thought content normal.         Judgment: Judgment normal.       Results Review:  I have reviewed the labs, radiology results, and diagnostic studies.    Laboratory Data:   Results from last 7 days   Lab Units 07/19/25  0306 07/18/25  1213   WBC 10*3/mm3 4.65 7.45   HEMOGLOBIN g/dL 14.7 15.1   HEMATOCRIT % 44.1 45.0   PLATELETS 10*3/mm3 195 196        Results from last 7 days   Lab Units 07/19/25  0306 07/18/25  1213   SODIUM mmol/L 134* 137   POTASSIUM mmol/L 4.1 3.6   CHLORIDE mmol/L 98 99   CO2 mmol/L 24.0 27.0   BUN mg/dL 25.5* 24.6*   CREATININE mg/dL 1.01 1.16   CALCIUM mg/dL 8.5* 9.0   BILIRUBIN mg/dL 0.6 0.7   ALK PHOS U/L 61 69   ALT (SGPT) U/L 23 24   AST (SGOT) U/L 21 25   GLUCOSE mg/dL 140* 88       Culture Data:     Microbiology Results (last 10 days)       Procedure Component Value - Date/Time    MRSA Screen, PCR (Inpatient) - Swab, Nares [[id number]]  (Normal) Collected: 07/18/25 0147    Lab Status: Final result Specimen: Swab from Nares  Updated: 07/19/25 0057     MRSA PCR No MRSA Detected    Narrative:      The negative predictive value of this diagnostic test is high and should only be used to consider de-escalating anti-MRSA therapy. A positive result may indicate colonization with MRSA and must be correlated clinically.    Legionella Antigen, Urine - Urine, Urine, Clean Catch [[id number]]  (Normal) Collected: 07/18/25 2320    Lab Status: Final result Specimen: Urine, Clean Catch Updated: 07/18/25 2350     LEGIONELLA ANTIGEN, URINE Negative    S. Pneumo Ag Urine or CSF - Urine, Urine, Clean Catch [[id number]]  (Normal) Collected: 07/18/25 2320    Lab Status: Final result Specimen: Urine, Clean Catch Updated: 07/18/25 2350     Strep Pneumo Ag Negative    Respiratory Panel PCR w/COVID-19(SARS-CoV-2) MEGHA/TOVA/TEREZA/PAD/COR/DONELL In-House, NP Swab in UTM/VTM, 2 HR TAT - Swab, Nasopharynx [[id number]]  (Abnormal) Collected: 07/18/25 1558    Lab Status: Final result Specimen: Swab from Nasopharynx Updated: 07/18/25 1655     ADENOVIRUS, PCR Not Detected     Coronavirus 229E Not Detected     Coronavirus HKU1 Not Detected     Coronavirus NL63 Not Detected     Coronavirus OC43 Not Detected     COVID19 Detected     Human Metapneumovirus Not Detected     Human Rhinovirus/Enterovirus Not Detected     Influenza A PCR Not Detected     Influenza B PCR Not Detected     Parainfluenza Virus 1 Not Detected     Parainfluenza Virus 2 Not Detected     Parainfluenza Virus 3 Not Detected     Parainfluenza Virus 4 Not Detected     RSV, PCR Not Detected     Bordetella pertussis pcr Not Detected     Bordetella parapertussis PCR Not Detected     Chlamydophila pneumoniae PCR Not Detected     Mycoplasma pneumo by PCR Not Detected    Narrative:      In the setting of a positive respiratory panel with a viral infection PLUS a negative procalcitonin without other underlying concern for bacterial infection, consider observing off antibiotics or discontinuation of antibiotics and continue  supportive care. If the respiratory panel is positive for atypical bacterial infection (Bordetella pertussis, Chlamydophila pneumoniae, or Mycoplasma pneumoniae), consider antibiotic de-escalation to target atypical bacterial infection.             Radiology Data:   Imaging Results (Last 7 Days)       Procedure Component Value Units Date/Time    CT Angiogram Chest Pulmonary Embolism [[id number]] Collected: 07/18/25 1356     Updated: 07/18/25 1416    Narrative:      CT ANGIOGRAM CHEST PULMONARY EMBOLISM-       HISTORY: Pulmonary Embolism      COMPARISON: Chest CT dated 7/3/2024     DOSE LENGTH PRODUCT: 277.21 mGy.cm. Automated exposure control was also  utilized to decrease patient radiation dose.     TECHNIQUE: Helical tomographic images of the chest were obtained after  the administration of intravenous contrast following angiogram protocol.  Additionally, 3D and multiplanar reformatted images were provided.       FINDINGS:    Pulmonary arteries: There is adequate enhancement of the pulmonary  arteries to evaluate for central and segmental pulmonary emboli. There  are no filling defects within the main, lobar, segmental or visualized  subsegmental pulmonary arteries. The pulmonary arteries are enlarged,  consistent with pulmonary arterial hypertension.     AORTA AND GREAT VESSELS: The aorta is not well opacified but  demonstrates no aneurysmal dilation. No calcified plaque in the arch.     VISUALIZED NECK BASE: The imaged portion of the base of the neck appears  unremarkable.     LUNGS: Known underlying pulmonary fibrosis with basal predominant  reticulation, honeycombing and traction bronchiectasis. On today's exam  there are new superimposed groundglass and consolidative infiltrates. In  particular the dependent lung fields, previously demonstrating  honeycombing, appear consolidated on today's exam. I believe there is an  element of atelectasis as the lungs are poorly expanded, only down to  the sixth-seventh  "ribs. There is no pleural effusion. The airways are  clear.     No lung emphysema appreciated.     HEART:The heart is normal in size. There is no pericardial effusion.  Coronary calcifications present.     MEDIASTINUM AND LYMPH NODEs: Slightly prominent hilar and mediastinal  lymph nodes appear stable and are considered benign.     SKELETAL AND SOFT TISSUES: Chest wall soft tissues are unremarkable. No  acute bony abnormality.     UPPER ABDOMEN: The imaged portion of the upper abdomen demonstrates no  acute process.       Impression:      1.  No evidence of pulmonary embolus.  2.  Underlying pulmonary fibrosis with basal predominant reticulation,  honeycombing and traction bronchiectasis. Superimposed bilateral  dependent-predominant groundglass and consolidative \"airspace disease\"  appears to be mostly a dependent atelectasis given the poor chest  expansion/low lung volumes. Differential would include pulmonary edema  which would have a very similar CT appearance. No pleural effusion.     This report was signed and finalized on 7/18/2025 2:12 PM by Dr Usama Andrade.                I have reviewed the patient's current medications.     apixaban, 2.5 mg, Oral, BID  [Held by provider] atorvastatin, 10 mg, Oral, Daily  azithromycin, 500 mg, Intravenous, Q24H  dexAMETHasone, 6 mg, Oral, Daily  dilTIAZem CD, 120 mg, Oral, Q24H  famotidine, 20 mg, Oral, BID  guaiFENesin, 600 mg, Oral, Q12H  ipratropium, 2 puff, Inhalation, 4x Daily - RT  Nirmatrelvir & Ritonavir (300mg/100mg), 3 tablet, Oral, BID  sodium chloride, 10 mL, Intravenous, Q12H  triamterene-hydrochlorothiazide, 1 tablet, Oral, Daily            Assessment/Plan   Assessment  Active Hospital Problems    Diagnosis     **COVID-19     Bandemia     Acute on chronic respiratory failure with hypoxia     Chronic anticoagulation     Paroxysmal atrial fibrillation     Essential hypertension     Pulmonary fibrosis     CANNON (nonalcoholic steatohepatitis)        Treatment " Plan  COVID-19/acute on chronic respiratory failure with hypoxia/pulmonary fibrosis/bandemia  - Continue Paxlovid per protocol, greater than 2 years since last immunization, decrease Eliquis to 2.5 mg and hold Lipitor.  - Continue Decadron 6 mg p.o. x 10 days.  - Due to developing bandemia will initiate Zosyn due to patient's pseudomonal risk with pulmonary fibrosis, continue to monitor with daily CBC  - Continuous pulse oximetry, aggressive pulmonary hygiene, OPEP, incentive spirometry, continue home CPAP with 2 L nightly, continue supplemental oxygen, notify provider of any oxygenation needs greater than 4 L.     Paroxysmal atrial fibrillation/chronic anticoagulation  - Continue cardiac telemetry.  - Continue home Cardizem.  - Continues Eliquis however due to Paxlovid use and recommendations for decreasing by half will continue at 2.5 mg.     Essential hypertension  - Continue Q4 vital signs.  - Continue home Cardizem and Maxide.  Discontinue Norvasc and Cozaar (duplicate medications)     CANNON  - Currently stable, Lipitor on hold due to Paxlovid.  - Daily CMP     VTE prophylaxis with home Eliquis  Labs in a.m.     Medical Decision Making  1 acute, high complexity, improving  1 acute on chronic, high complexity, unchanged  5 chronic, moderate complexity, unchanged     Number and Complexity of problems: 7  Differential Diagnosis: None     Conditions and Status        Condition is unchanged.     [facility] Data  External documents reviewed: [employer] EHR  Cardiac tracing (EKG, telemetry) interpretation: 7/18/2025 EKG per cardiology reviewed  Radiology interpretation: 7/18/2025 CT of the chest per radiology reviewed  Labs reviewed: 7/19/2025 reviewed  Any tests that were considered but not ordered: None     Decision rules/scores evaluated (example [id number], Wells, etc): None     Discussed with: Dr. Myrick, patient and his wife Jaimie     Care Planning  Shared decision making: Dr. Myrick, patient and his wife Jaimie  Code status  and discussions: Full code per patient  Surrogate Decision Maker his wife Jaimie Melendez  Social Determinants of Health that impact treatment or disposition: None determined at this time  I expect the patient to be discharged to home in 1-2 days.     Electronically signed by ENEIDA Nicholas, 07/19/25, 06:31 CDT.     Electronically signed by Arnie Myrick DO at 07/19/25 1837          Discharge Summary        , CANDACE Patel at 07/20/25 0921       Attestation signed by Arnie Myrick DO at 07/20/25 1545      I performed a substantive part of the MDM during the patient’s E/M visit. I personally evaluated   and examined the patient. I personally made or approved the documented management plan and acknowledge its risk   of complications.   (Independent Interpretation) My (EKG/X-Ray/US/CT) interpretation - reports reviewed.   (Discussion) Management/test interpretation discussed with CANDACE Nicholas.     Patient was seen earlier sitting in chair at bedside prior to discharge.  Nursing also at bedside.  Patient is back on home 2 L O2.  Awake and interactive.  Fairly bright disposition.  In a good mood.  Says he is feeling a lot better.  Overall has improved significantly since arrival.  Okay discharge home today for outpatient follow-up.  Not 100% convinced that he has secondary infection but given overall course would recommend completing antibiotics with Augmentin as chest imaging can be difficult to review in this case given his underlying pulmonary disease.  Seek medical evaluation if any worsening of symptoms.      Electronically signed by Arnie Myrick DO, 7/20/2025, 15:44 CDT.                            [facility]  DISCHARGE SUMMARY       Date of Admission: 7/18/2025  Date of Discharge:  7/20/2025  Primary Care Physician: Reid Islas MD    Presenting Problem/History of Present Illness:  Shortness of breath and  fevers    Final Discharge Diagnoses:  Active Hospital Problems    Diagnosis     **COVID-19     Bandemia     Acute on chronic respiratory failure with hypoxia     Chronic anticoagulation     Paroxysmal atrial fibrillation     Essential hypertension     Pulmonary fibrosis     CANNON (nonalcoholic steatohepatitis)        Consults: None    Procedures Performed: None    Pertinent Test Results:   Results for orders placed during the hospital encounter of 09/04/24    Adult Transthoracic Echo Complete W/ Cont if Necessary Per Protocol    Interpretation Summary    Left ventricular systolic function is normal. Left ventricular ejection fraction appears to be 56 - 60%.    Left ventricular diastolic function was normal.    The right ventricular cavity is mildly dilated with normal systolic function.    The left atrial cavity is mildly dilated.    There is no significant (more than mild) valvular stenosis or regurgitation.      Imaging Results (All)       Procedure Component Value Units Date/Time    CT Angiogram Chest Pulmonary Embolism [[id number]] Collected: 07/18/25 1356     Updated: 07/18/25 1416    Narrative:      CT ANGIOGRAM CHEST PULMONARY EMBOLISM-       HISTORY: Pulmonary Embolism      COMPARISON: Chest CT dated 7/3/2024     DOSE LENGTH PRODUCT: 277.21 mGy.cm. Automated exposure control was also  utilized to decrease patient radiation dose.     TECHNIQUE: Helical tomographic images of the chest were obtained after  the administration of intravenous contrast following angiogram protocol.  Additionally, 3D and multiplanar reformatted images were provided.       FINDINGS:    Pulmonary arteries: There is adequate enhancement of the pulmonary  arteries to evaluate for central and segmental pulmonary emboli. There  are no filling defects within the main, lobar, segmental or visualized  subsegmental pulmonary arteries. The pulmonary arteries are enlarged,  consistent with pulmonary arterial hypertension.     AORTA AND GREAT  "VESSELS: The aorta is not well opacified but  demonstrates no aneurysmal dilation. No calcified plaque in the arch.     VISUALIZED NECK BASE: The imaged portion of the base of the neck appears  unremarkable.     LUNGS: Known underlying pulmonary fibrosis with basal predominant  reticulation, honeycombing and traction bronchiectasis. On today's exam  there are new superimposed groundglass and consolidative infiltrates. In  particular the dependent lung fields, previously demonstrating  honeycombing, appear consolidated on today's exam. I believe there is an  element of atelectasis as the lungs are poorly expanded, only down to  the sixth-seventh ribs. There is no pleural effusion. The airways are  clear.     No lung emphysema appreciated.     HEART:The heart is normal in size. There is no pericardial effusion.  Coronary calcifications present.     MEDIASTINUM AND LYMPH NODEs: Slightly prominent hilar and mediastinal  lymph nodes appear stable and are considered benign.     SKELETAL AND SOFT TISSUES: Chest wall soft tissues are unremarkable. No  acute bony abnormality.     UPPER ABDOMEN: The imaged portion of the upper abdomen demonstrates no  acute process.       Impression:      1.  No evidence of pulmonary embolus.  2.  Underlying pulmonary fibrosis with basal predominant reticulation,  honeycombing and traction bronchiectasis. Superimposed bilateral  dependent-predominant groundglass and consolidative \"airspace disease\"  appears to be mostly a dependent atelectasis given the poor chest  expansion/low lung volumes. Differential would include pulmonary edema  which would have a very similar CT appearance. No pleural effusion.     This report was signed and finalized on 7/18/2025 2:12 PM by Dr Usama Andrade.             LAB RESULTS:      Lab 07/20/25  0330 07/19/25  0306 07/18/25  1616 07/18/25  1351 07/18/25  1213   WBC 5.08 4.65  --   --  7.45   HEMOGLOBIN 14.0 14.7  --   --  15.1   HEMATOCRIT 40.9 44.1  --   --  " 45.0   PLATELETS 208 195  --   --  196   NEUTROS ABS 4.12 4.23  --   --  5.71   IMMATURE GRANS (ABS) 0.01  --   --   --  0.02   LYMPHS ABS 0.70  --   --   --  0.84   MONOS ABS 0.24  --   --   --  0.86   EOS ABS 0.00 0.00  --   --  0.01   MCV 91.1 92.3  --   --  94.1   CRP  --   --   --  6.04*  --    PROCALCITONIN  --   --   --  0.06  --    LACTATE  --   --  1.2  --   --          Lab 07/20/25  0330 07/19/25  0306 07/18/25  1213   SODIUM 136 134* 137   POTASSIUM 4.3 4.1 3.6   CHLORIDE 99 98 99   CO2 26.0 24.0 27.0   ANION GAP 11.0 12.0 11.0   BUN 30.7* 25.5* 24.6*   CREATININE 1.18 1.01 1.16   EGFR 64.3 77.6 65.7   GLUCOSE 138* 140* 88   CALCIUM 8.6 8.5* 9.0   MAGNESIUM  --  2.4 2.2   HEMOGLOBIN A1C  --  5.30  --          Lab 07/20/25  0330 07/19/25  0306 07/18/25  1213   TOTAL PROTEIN 7.0 7.2 7.7   ALBUMIN 3.5 3.5 3.9   GLOBULIN 3.5 3.7 3.8   ALT (SGPT) 20 23 24   AST (SGOT) 17 21 25   BILIRUBIN 0.5 0.6 0.7   ALK PHOS 55 61 69         Lab 07/18/25  1351 07/18/25  1213   PROBNP  --  255.8   HSTROP T 26* 27*         Lab 07/19/25  0306   CHOLESTEROL 83   LDL CHOL 29   HDL CHOL 41   TRIGLYCERIDES 49             Brief Urine Lab Results       None          Microbiology Results (last 10 days)       Procedure Component Value - Date/Time    MRSA Screen, PCR (Inpatient) - Swab, Nares [[id number]]  (Normal) Collected: 07/18/25 2326    Lab Status: Final result Specimen: Swab from Nares Updated: 07/19/25 0057     MRSA PCR No MRSA Detected    Narrative:      The negative predictive value of this diagnostic test is high and should only be used to consider de-escalating anti-MRSA therapy. A positive result may indicate colonization with MRSA and must be correlated clinically.    Legionella Antigen, Urine - Urine, Urine, Clean Catch [[id number]]  (Normal) Collected: 07/18/25 2320    Lab Status: Final result Specimen: Urine, Clean Catch Updated: 07/18/25 3750     LEGIONELLA ANTIGEN, URINE Negative    S. Pneumo Ag Urine or CSF - Urine,  Urine, Clean Catch [[id number]]  (Normal) Collected: 07/18/25 2320    Lab Status: Final result Specimen: Urine, Clean Catch Updated: 07/18/25 2350     Strep Pneumo Ag Negative    Respiratory Culture - Sputum, Cough [[id number]] Collected: 07/18/25 1837    Lab Status: Preliminary result Specimen: Sputum from Cough Updated: 07/19/25 1441     Respiratory Culture Rejected     Gram Stain Rare (1+) WBCs per low power field      No Epithelial cells per low power field      Rare (1+) Gram positive cocci      Rare (1+) Gram negative bacilli    Narrative:      Specimen rejected due to oropharyngeal contamination. Please reorder and recollect specimen if clinically necessary.    Blood Culture - Blood, Hand, Left [[id number]]  (Normal) Collected: 07/18/25 1616    Lab Status: Preliminary result Specimen: Blood from Hand, Left Updated: 07/19/25 1631     Blood Culture No growth at 24 hours    Blood Culture - Blood, Arm, Right [[id number]]  (Normal) Collected: 07/18/25 1607    Lab Status: Preliminary result Specimen: Blood from Arm, Right Updated: 07/19/25 1631     Blood Culture No growth at 24 hours    Respiratory Panel PCR w/COVID-19(SARS-CoV-2) MEGHA/TOVA/TEREZA/PAD/COR/DONELL In-House, NP Swab in UTM/VTM, 2 HR TAT - Swab, Nasopharynx [[id number]]  (Abnormal) Collected: 07/18/25 1558    Lab Status: Final result Specimen: Swab from Nasopharynx Updated: 07/18/25 1655     ADENOVIRUS, PCR Not Detected     Coronavirus 229E Not Detected     Coronavirus HKU1 Not Detected     Coronavirus NL63 Not Detected     Coronavirus OC43 Not Detected     COVID19 Detected     Human Metapneumovirus Not Detected     Human Rhinovirus/Enterovirus Not Detected     Influenza A PCR Not Detected     Influenza B PCR Not Detected     Parainfluenza Virus 1 Not Detected     Parainfluenza Virus 2 Not Detected     Parainfluenza Virus 3 Not Detected     Parainfluenza Virus 4 Not Detected     RSV, PCR Not Detected     Bordetella pertussis pcr Not Detected     Bordetella  parapertussis PCR Not Detected     Chlamydophila pneumoniae PCR Not Detected     Mycoplasma pneumo by PCR Not Detected    Narrative:      In the setting of a positive respiratory panel with a viral infection PLUS a negative procalcitonin without other underlying concern for bacterial infection, consider observing off antibiotics or discontinuation of antibiotics and continue supportive care. If the respiratory panel is positive for atypical bacterial infection (Bordetella pertussis, Chlamydophila pneumoniae, or Mycoplasma pneumoniae), consider antibiotic de-escalation to target atypical bacterial infection.          Microbiology Results (last 10 days)       Procedure Component Value - Date/Time    MRSA Screen, PCR (Inpatient) - Swab, Nares [[id number]]  (Normal) Collected: 07/18/25 2326    Lab Status: Final result Specimen: Swab from Nares Updated: 07/19/25 0057     MRSA PCR No MRSA Detected    Narrative:      The negative predictive value of this diagnostic test is high and should only be used to consider de-escalating anti-MRSA therapy. A positive result may indicate colonization with MRSA and must be correlated clinically.    Legionella Antigen, Urine - Urine, Urine, Clean Catch [[id number]]  (Normal) Collected: 07/18/25 2320    Lab Status: Final result Specimen: Urine, Clean Catch Updated: 07/18/25 2350     LEGIONELLA ANTIGEN, URINE Negative    S. Pneumo Ag Urine or CSF - Urine, Urine, Clean Catch [[id number]]  (Normal) Collected: 07/18/25 2320    Lab Status: Final result Specimen: Urine, Clean Catch Updated: 07/18/25 2350     Strep Pneumo Ag Negative    Respiratory Culture - Sputum, Cough [[id number]] Collected: 07/18/25 1837    Lab Status: Preliminary result Specimen: Sputum from Cough Updated: 07/19/25 1441     Respiratory Culture Rejected     Gram Stain Rare (1+) WBCs per low power field      No Epithelial cells per low power field      Rare (1+) Gram positive cocci      Rare (1+) Gram negative bacilli     Narrative:      Specimen rejected due to oropharyngeal contamination. Please reorder and recollect specimen if clinically necessary.    Blood Culture - Blood, Hand, Left [[id number]]  (Normal) Collected: 07/18/25 1616    Lab Status: Preliminary result Specimen: Blood from Hand, Left Updated: 07/19/25 1631     Blood Culture No growth at 24 hours    Blood Culture - Blood, Arm, Right [[id number]]  (Normal) Collected: 07/18/25 1607    Lab Status: Preliminary result Specimen: Blood from Arm, Right Updated: 07/19/25 1631     Blood Culture No growth at 24 hours    Respiratory Panel PCR w/COVID-19(SARS-CoV-2) MEGHA/TOVA/TEREZA/PAD/COR/DONELL In-House, NP Swab in UTM/VTM, 2 HR TAT - Swab, Nasopharynx [[id number]]  (Abnormal) Collected: 07/18/25 1558    Lab Status: Final result Specimen: Swab from Nasopharynx Updated: 07/18/25 1655     ADENOVIRUS, PCR Not Detected     Coronavirus 229E Not Detected     Coronavirus HKU1 Not Detected     Coronavirus NL63 Not Detected     Coronavirus OC43 Not Detected     COVID19 Detected     Human Metapneumovirus Not Detected     Human Rhinovirus/Enterovirus Not Detected     Influenza A PCR Not Detected     Influenza B PCR Not Detected     Parainfluenza Virus 1 Not Detected     Parainfluenza Virus 2 Not Detected     Parainfluenza Virus 3 Not Detected     Parainfluenza Virus 4 Not Detected     RSV, PCR Not Detected     Bordetella pertussis pcr Not Detected     Bordetella parapertussis PCR Not Detected     Chlamydophila pneumoniae PCR Not Detected     Mycoplasma pneumo by PCR Not Detected    Narrative:      In the setting of a positive respiratory panel with a viral infection PLUS a negative procalcitonin without other underlying concern for bacterial infection, consider observing off antibiotics or discontinuation of antibiotics and continue supportive care. If the respiratory panel is positive for atypical bacterial infection (Bordetella pertussis, Chlamydophila pneumoniae, or Mycoplasma pneumoniae),  consider antibiotic de-escalation to target atypical bacterial infection.             Documented weights    07/18/25 1153   Weight: 94.3 kg (208 lb)        Hospital Course: Reid Morrell is a 74-year-old male with a past medical history of anemia, arthritis, asthma, atrial fibrillation on chronic Eliquis, chronic idiopathic fibrosing alveolitis, hypertension, CANNON, obstructive sleep apnea on home CPAP, home oxygen at 2 L, hypertension and haemophilus influenza.  Patient presented to [facility] emergency department per request of urgent care after presenting with shortness of breath and febrile episodes x 2 days.  Tmax 102.1.  Patient has complaints of fevers, chills, productive sputum, shortness of breath with exertion and at rest, rhinorrhea and congestion.  No complaints of nausea, vomiting or diarrhea.  No complaints of chest pain or palpitations.  Initial ED workup for COPD exacerbation, elevated D-dimer, CTA revealed no evidence of pulmonary embolism, underlying pulmonary fibrosis with possible differential for pulmonary edema patient was given a one-time dose of 40 mg IV Lasix and hospitalist service was requested for admission.     Upon my assessment patient immediately appeared to be significantly more ill than an average COPD exacerbation, patient was diaphoretic, significant congestion, cough and a fever of 101 suggesting possible viral illness.  Patient was requiring 3 L of oxygen increased from baseline of 2 L due to hypoxia with oxygen saturations of 88%.  Stat respiratory PCR, lactic, CRP, procalcitonin, blood cultures were obtained revealing positive for COVID-19.  Patient will be placed on Paxlovid, Decadron, aggressive pulmonary hygiene and continue to monitor very closely.  Patient and family made aware of diagnosis all questions were answered to the best my ability and they are in agreement for admission and treatment.    COVID-19/acute on chronic respiratory failure with hypoxia/pulmonary  fibrosis/bandemia, patient was admitted intermediately placed on Paxlovid per protocol, Lipitor was placed on hold and Eliquis decreased to 2.5 mg.  Decadron 6 mg p.o. x 10 days.  After developing a bandemia in the first 24 hours initiated Zosyn due to patient's pseudomonal risk with pulmonary fibrosis.  This morning patient's symptoms have continued to completely resolve other than mild congestion with rhinorrhea, no febrile episodes x 48 hours patient is back on his home dose of 2 L of oxygen.  Bandemia has resolved and WBC remains within normal limits.  Patient was instructed to continue Paxlovid and Decadron per protocol.  Extensive discussion regarding patient's decreased Eliquis until 7/26/2025 when he may resume full 5 mg dose and resumption of Lipitor.  He was instructed to return to the hospital for any new or worsening fevers, shortness of breath or decreased level of consciousness.    Essential hypertension, upon initially assessing patient's home medication list he has been taking Norvasc, Cardizem, Maxide, and Cozaar all prescribed by different physicians.  We had a lengthy discussion and patient was placed on Cardizem and Maxide on admission due to EP/cardiology team.  We had an additional extensive discussion today regarding his need to continue only those 2 medications and to make sure that his PCP is aware who is prescribed the other 2.  Vital signs have remained stable throughout hospitalization on cardiology directed hypertensive regimen.    This morning patient is resting comfortably in a chair on his home dose of 2 L of oxygen with no family at bedside.  Patient is alert and oriented able to participate in assessment and discharge planning.  Patient states he is ready to go home he feels back to his old self again, he states he still has mild rhinorrhea with congestion however significantly improved.  Patient has continued to have no episodes of nausea, vomiting or diarrhea.  We discussed  "patient's medication regimen, changes to his Eliquis and Lipitor, discontinuation of Norvasc and Cozaar as well as close follow-up with PCP.  All questions were answered to the best my ability and he is in agreement for discharge home today.    Patient has reached the maximum benefit of hospitalization and is stable for discharge  Patient has been evaluated today 07/20/25 and is stable for discharge.       Physical Exam on Discharge:  /65 (BP Location: Left arm, Patient Position: Sitting)   Pulse 52   Temp 97.6 °F (36.4 °C) (Oral)   Resp 14   Ht 180.3 cm (71\")   Wt 94.3 kg (208 lb)   SpO2 98%   BMI 29.01 kg/m²   Physical Exam  Vitals and nursing note reviewed.   Constitutional:       General: He is not in acute distress.     Appearance: Normal appearance, nontoxic.     Interventions: Nasal cannula in place.      Comments: 2 L-Home dose  HENT:      Head: Normocephalic and atraumatic.      Nose: Improved congestion and rhinorrhea present.      Eyes:      Pupils: Pupils are equal, round, and reactive to light.   Cardiovascular:      Rate and Rhythm: Normal rate and regular rhythm.      Pulses: Normal pulses.      Heart sounds: Normal heart sounds.   Pulmonary:      Effort: No tachypnea, accessory muscle usage or respiratory distress.      Breath sounds: Examination of the right-middle field reveals rhonchi. Examination of the left-middle field reveals rhonchi. Examination of the right-lower field reveals rhonchi and rales. Examination of the left-lower field reveals rhonchi.   Abdominal:      General: Bowel sounds are normal. There is no distension.      Tenderness: There is no abdominal tenderness.   Musculoskeletal:         General: Normal range of motion.      Cervical back: Normal range of motion.   Skin:     General: Skin is warm.      Capillary Refill: Capillary refill takes less than 2 seconds.      Coloration: Skin is pale.   Neurological:      General: No focal deficit present.      Mental Status: " He is oriented to person, place, and time.   Psychiatric:         Mood and Affect: Mood normal.         Behavior: Behavior normal. Behavior is cooperative.         Thought Content: Thought content normal.         Judgment: Judgment normal.        Condition on Discharge: Stable for discharge home    Discharge Disposition:  Home or Self Care    Discharge Medications:     Discharge Medications        PAUSE taking these medications        Instructions Start Date   apixaban 5 MG tablet tablet  Wait to take this until: July 26, 2025  Continue Eliquis at half tablet until 7/26/2025  Commonly known as: ELIQUIS   5 mg, Oral, 2 Times Daily  You also have another medication with the same name that you may need to continue taking.      atorvastatin 10 MG tablet  Wait to take this until: July 29, 2025  Commonly known as: LIPITOR   10 mg, Daily             New Medications        Instructions Start Date   amoxicillin-clavulanate 875-125 MG per tablet  Commonly known as: AUGMENTIN   1 tablet, Oral, 2 Times Daily   Start Date: July 21, 2025     dexAMETHasone 6 MG tablet  Commonly known as: DECADRON   6 mg, Oral, Daily   Start Date: July 21, 2025     Nirmatrelvir & Ritonavir (300mg/100mg)  Commonly known as: PAXLOVID   3 tablets, Oral, 2 Times Daily             Changes to Medications        Instructions Start Date   apixaban 2.5 MG tablet tablet  Commonly known as: ELIQUIS  What changed: Another medication with the same name was paused. Ask your nurse or doctor if you should take this medication.   2.5 mg, Oral, 2 Times Daily, Then resume full dose Eliquis on 7/26/2025             Continue These Medications        Instructions Start Date   colestipol 1 g tablet  Commonly known as: COLESTID   1 g, 2 Times Daily      dilTIAZem  MG 24 hr capsule  Commonly known as: DILACOR XR   120 mg, Oral, Daily      famotidine 20 MG tablet  Commonly known as: PEPCID   20 mg, Oral, 2 Times Daily      meloxicam 15 MG tablet  Commonly known as:  MOBIC   15 mg, Daily      Mucus Relief  MG 12 hr tablet  Generic drug: guaiFENesin   1,200 mg, Oral, Every 12 Hours Scheduled      Nintedanib Esylate 100 MG capsule   1 capsule, Oral, Every 12 Hours      triamterene-hydrochlorothiazide 37.5-25 MG per tablet  Commonly known as: MAXZIDE-25   1 tablet, Oral, Daily      Ventolin  (90 Base) MCG/ACT inhaler  Generic drug: albuterol sulfate HFA   2 puffs, Inhalation, Every 4 Hours PRN      albuterol (2.5 MG/3ML) 0.083% nebulizer solution  Commonly known as: PROVENTIL   Take 2.5 mg by nebulization Every 4 (Four) Hours As Needed for Wheezing or Shortness of Air.             Stop These Medications      amLODIPine 10 MG tablet  Commonly known as: NORVASC     losartan 100 MG tablet  Commonly known as: COZAAR              Discharge Diet: Advance diet as tolerated    Activity at Discharge:   Activity Instructions       Activity as Tolerated              Discharge Instructions:   1.  Patient was instructed to return to medical attention for any new or worsening chest pain, shortness of breath, fevers or decreased level of consciousness.  2.  Patient was instructed to follow-up with PCP in 1 week.  3.  Patient was instructed verbally and with discharge instructions regarding all new medications and medication changes.  To include proper antihypertensive regimen, Norvasc and Cozaar discontinued and patient was placed on cardiology regimen only.    Follow-up Appointments:   Future Appointments   Date Time Provider Department Center   8/25/2025  9:30 AM Alexis Lewis APRN [facility]   9/15/2025 12:45 PM Marlon Pichardo MD [facility]   9/16/2025 10:15 AM Romy Valera APRN [facility]   12/16/2025 10:45 AM Nash Pisano MD [facility]       Test Results Pending at Discharge: None    Electronically signed by ENEIDA Nicholas, 07/20/25, 09:22 CDT.    Time: 45 minutes.          Electronically signed by Arnie Myrick DO at 07/20/25 [id number]

## 2025-07-21 NOTE — TELEPHONE ENCOUNTER
Care Transitions Initial Follow Up Call    Outreach made within 2 business days of discharge: Yes    Patient: Cachorro Thompson   Patient : 1950   MRN: [id number]    Reason for Admission: Shortness of Breath and fevers  Discharge Date:         Spoke with: Cachorro    Discharge department/facility: [facility] Interactive Patient Contact:  Was patient able to fill all prescriptions: Yes  Was patient instructed to bring all medications to the follow-up visit: Yes  Is patient taking all medications as directed in the discharge summary? Yes  Does patient understand their discharge instructions: Yes  Does patient have questions or concerns that need addressed prior to 7-14 day follow up office visit: no    Additional needs identified to be addressed with provider  No needs identified             Scheduled appointment with PCP within 7-14 days    Spoke with patient. He states he is feeling much better. Has not had a fever since Saturday. He is taking medications as prescribed at discharge. His Norvasc and Cozaar were discontinued, he was on multiple hypertension agents being prescribed by different doctors. Lipitor and Eliquis can resume normal dosing on . His appetite is good. He reports normal bladder and bowel function. He voices no needs or concerns at this time. He will see Renata on 25    Follow Up  Future Appointments   Date Time Provider Department Center   10/20/2025  2:20 PM Cachorro Fang MD [facility]   10/28/2025  9:45 AM Milla Quezada MD [facility]       Luana Dang MA

## 2025-07-23 LAB
BACTERIA SPEC AEROBE CULT: NORMAL
BACTERIA SPEC AEROBE CULT: NORMAL

## 2025-07-24 NOTE — PAYOR COMM NOTE
"Reid Hearn (74 y.o. Male)   [id number]   d/c summary     [facility] phone   fax          Date of Birth   1950    Social Security Number       Address   [address] [address]    Home Phone   [phone]    MRN   [id number]       Oriental orthodox   Restorationist    Marital Status                               Admission Date   7/18/2025    Admission Type   Emergency    Admitting Provider   Arnie Myrick DO    Attending Provider       Department, Room/Bed   [facility] 3C, 370/1       Discharge Date   7/20/2025    Discharge Disposition   Home or Self Care    Discharge Destination                                 Attending Provider: (none)   Allergies: No Known Allergies    Isolation: None   Infection: COVID (confirmed) (07/18/25)   Code Status: Prior    Ht: 180.3 cm (71\")   Wt: 94.3 kg (208 lb)    Admission Cmt: None   Principal Problem: COVID-19 [U07.1]                   Active Insurance as of 7/18/2025       Primary Coverage       Payor Plan Insurance Group Employer/Plan Group    ANTHEM MEDICARE REPLACEMENT ANTHEM MEDICARE ADVANTAGE PPO [id number]       Payor Plan Address Payor Plan Phone Number Payor Plan Fax Number Effective Dates    [address] [phone]  1/1/2015 - None Entered    [facility] [id number]         Subscriber Name Subscriber Birth Date Member ID       REID HEARN 1950 [id number]                     Emergency Contacts        (Rel.) Home Phone Work Phone Mobile Phone    PorscheJaimie galarza (Spouse) -- -- [phone]                 Discharge Summary        Aamnda APRN at 07/20/25 0921       Attestation signed by Arnie Myrick DO at 07/20/25 0953      I performed a substantive part of the MDM during the patient’s E/M visit. I personally evaluated   and examined the patient. I personally made or approved the documented management plan and acknowledge its risk   of " 11/23/2022    RSV Vaccine (Age 60+ and Pregnant patients) Not Due        Past Medical History:  Past Medical History:   Diagnosis Date    ADHD (attention deficit hyperactivity disorder)     Anxiety     Diabetes mellitus     Hx of psychiatric care     Psychiatric problem     Therapy      Past Surgical History:   Procedure Laterality Date    TONSILLECTOMY      WISDOM TOOTH EXTRACTION       Review of patient's allergies indicates:   Allergen Reactions    Aloe vera extract Rash and Swelling     Swelling to site    Pesticide  complications.   (Independent Interpretation) My (EKG/X-Ray/US/CT) interpretation - reports reviewed.   (Discussion) Management/test interpretation discussed with CANDACE Nicholas.     Patient was seen earlier sitting in chair at bedside prior to discharge.  Nursing also at bedside.  Patient is back on home 2 L O2.  Awake and interactive.  Fairly bright disposition.  In a good mood.  Says he is feeling a lot better.  Overall has improved significantly since arrival.  Okay discharge home today for outpatient follow-up.  Not 100% convinced that he has secondary infection but given overall course would recommend completing antibiotics with Augmentin as chest imaging can be difficult to review in this case given his underlying pulmonary disease.  Seek medical evaluation if any worsening of symptoms.      Electronically signed by Arnie Myrick DO, 7/20/2025, 15:44 CDT.                            [facility] Medicine Services  DISCHARGE SUMMARY       Date of Admission: 7/18/2025  Date of Discharge:  7/20/2025  Primary Care Physician: Reid Islas MD    Presenting Problem/History of Present Illness:  Shortness of breath and fevers    Final Discharge Diagnoses:  Active Hospital Problems    Diagnosis     **COVID-19     Bandemia     Acute on chronic respiratory failure with hypoxia     Chronic anticoagulation     Paroxysmal atrial fibrillation     Essential hypertension     Pulmonary fibrosis     CANNON (nonalcoholic steatohepatitis)        Consults: None    Procedures Performed: None    Pertinent Test Results:   Results for orders placed during the hospital encounter of 09/04/24    Adult Transthoracic Echo Complete W/ Cont if Necessary Per Protocol    Interpretation Summary    Left ventricular systolic function is normal. Left ventricular ejection fraction appears to be 56 - 60%.    Left ventricular diastolic function was normal.    The right ventricular cavity is mildly  Anaphylaxis, Blisters, Itching, Rash and Swelling    [place] Rash     Developed lip swelling and facial rash     Medications Ordered Prior to Encounter[1]  Social History[2]  Family History   Problem Relation Name Age of Onset    Asthma Mother Ignacia     Depression Mother Ignacia     Miscarriages / Stillbirths Mother Ignacia     Asthma Sister Kamini     ADD / ADHD Sister Kamini     Normal rate and regular rhythm.      Pulses: Normal pulses.      Heart sounds: Normal heart sounds. No murmur heard.  Pulmonary:      Effort: Pulmonary effort is normal. No respiratory distress.      Breath sounds: Normal breath sounds.   Abdominal:      General: Bowel sounds are normal. There is no distension.      Palpations: Abdomen is soft.       dilated with normal systolic function.    The left atrial cavity is mildly dilated.    There is no significant (more than mild) valvular stenosis or regurgitation.      Imaging Results (All)       Procedure Component Value Units Date/Time    CT Angiogram Chest Pulmonary Embolism [[id number]] Collected: 07/18/25 1356     Updated: 07/18/25 1416    Narrative:      CT ANGIOGRAM CHEST PULMONARY EMBOLISM-       HISTORY: Pulmonary Embolism      COMPARISON: Chest CT dated 7/3/2024     DOSE LENGTH PRODUCT: 277.21 mGy.cm. Automated exposure control was also  utilized to decrease patient radiation dose.     TECHNIQUE: Helical tomographic images of the chest were obtained after  the administration of intravenous contrast following angiogram protocol.  Additionally, 3D and multiplanar reformatted images were provided.       FINDINGS:    Pulmonary arteries: There is adequate enhancement of the pulmonary  arteries to evaluate for central and segmental pulmonary emboli. There  are no filling defects within the main, lobar, segmental or visualized  subsegmental pulmonary arteries. The pulmonary arteries are enlarged,  consistent with pulmonary arterial hypertension.     AORTA AND GREAT VESSELS: The aorta is not well opacified but  demonstrates no aneurysmal dilation. No calcified plaque in the arch.     VISUALIZED NECK BASE: The imaged portion of the base of the neck appears  unremarkable.     LUNGS: Known underlying pulmonary fibrosis with basal predominant  reticulation, honeycombing and traction bronchiectasis. On today's exam  there are new superimposed groundglass and consolidative infiltrates. In  particular the dependent lung fields, previously demonstrating  honeycombing, appear consolidated on today's exam. I believe there is an  element of atelectasis as the lungs are poorly expanded, only down to  the sixth-seventh ribs. There is no pleural effusion. The airways are  clear.     No lung emphysema appreciated.     HEART:The  "heart is normal in size. There is no pericardial effusion.  Coronary calcifications present.     MEDIASTINUM AND LYMPH NODEs: Slightly prominent hilar and mediastinal  lymph nodes appear stable and are considered benign.     SKELETAL AND SOFT TISSUES: Chest wall soft tissues are unremarkable. No  acute bony abnormality.     UPPER ABDOMEN: The imaged portion of the upper abdomen demonstrates no  acute process.       Impression:      1.  No evidence of pulmonary embolus.  2.  Underlying pulmonary fibrosis with basal predominant reticulation,  honeycombing and traction bronchiectasis. Superimposed bilateral  dependent-predominant groundglass and consolidative \"airspace disease\"  appears to be mostly a dependent atelectasis given the poor chest  expansion/low lung volumes. Differential would include pulmonary edema  which would have a very similar CT appearance. No pleural effusion.     This report was signed and finalized on 7/18/2025 2:12 PM by Dr Usama Andrade.             LAB RESULTS:      Lab 07/20/25  0330 07/19/25  0306 07/18/25  1616 07/18/25  1351 07/18/25  1213   WBC 5.08 4.65  --   --  7.45   HEMOGLOBIN 14.0 14.7  --   --  15.1   HEMATOCRIT 40.9 44.1  --   --  45.0   PLATELETS 208 195  --   --  196   NEUTROS ABS 4.12 4.23  --   --  5.71   IMMATURE GRANS (ABS) 0.01  --   --   --  0.02   LYMPHS ABS 0.70  --   --   --  0.84   MONOS ABS 0.24  --   --   --  0.86   EOS ABS 0.00 0.00  --   --  0.01   MCV 91.1 92.3  --   --  94.1   CRP  --   --   --  6.04*  --    PROCALCITONIN  --   --   --  0.06  --    LACTATE  --   --  1.2  --   --          Lab 07/20/25  0330 07/19/25  0306 07/18/25  1213   SODIUM 136 134* 137   POTASSIUM 4.3 4.1 3.6   CHLORIDE 99 98 99   CO2 26.0 24.0 27.0   ANION GAP 11.0 12.0 11.0   BUN 30.7* 25.5* 24.6*   CREATININE 1.18 1.01 1.16   EGFR 64.3 77.6 65.7   GLUCOSE 138* 140* 88   CALCIUM 8.6 8.5* 9.0   MAGNESIUM  --  2.4 2.2   HEMOGLOBIN A1C  --  5.30  --          Lab 07/20/25  0330 " Tenderness: There is no abdominal tenderness.   Musculoskeletal:         General: Normal range of motion.      Cervical back: Normal range of motion and neck supple.   Skin:     General: Skin is warm and dry.      Findings: No rash.   Neurological:      General: No focal deficit present.      Mental Status: She is alert and oriented to person, place, and time.   Psychiatric:         Mood and Affect: Mood [place]. Benny student at RADSONE.      07/19/25  0306 07/18/25  1213   TOTAL PROTEIN 7.0 7.2 7.7   ALBUMIN 3.5 3.5 3.9   GLOBULIN 3.5 3.7 3.8   ALT (SGPT) 20 23 24   AST (SGOT) 17 21 25   BILIRUBIN 0.5 0.6 0.7   ALK PHOS 55 61 69         Lab 07/18/25  1351 07/18/25  1213   PROBNP  --  255.8   HSTROP T 26* 27*         Lab 07/19/25  0306   CHOLESTEROL 83   LDL CHOL 29   HDL CHOL 41   TRIGLYCERIDES 49             Brief Urine Lab Results       None          Microbiology Results (last 10 days)       Procedure Component Value - Date/Time    MRSA Screen, PCR (Inpatient) - Swab, Nares [[id number]]  (Normal) Collected: 07/18/25 2326    Lab Status: Final result Specimen: Swab from Nares Updated: 07/19/25 0057     MRSA PCR No MRSA Detected    Narrative:      The negative predictive value of this diagnostic test is high and should only be used to consider de-escalating anti-MRSA therapy. A positive result may indicate colonization with MRSA and must be correlated clinically.    Legionella Antigen, Urine - Urine, Urine, Clean Catch [[id number]]  (Normal) Collected: 07/18/25 2320    Lab Status: Final result Specimen: Urine, Clean Catch Updated: 07/18/25 2350     LEGIONELLA ANTIGEN, URINE Negative    S. Pneumo Ag Urine or CSF - Urine, Urine, Clean Catch [[id number]]  (Normal) Collected: 07/18/25 2320    Lab Status: Final result Specimen: Urine, Clean Catch Updated: 07/18/25 2350     Strep Pneumo Ag Negative    Respiratory Culture - Sputum, Cough [[id number]] Collected: 07/18/25 1837    Lab Status: Preliminary result Specimen: Sputum from Cough Updated: 07/19/25 1441     Respiratory Culture Rejected     Gram Stain Rare (1+) WBCs per low power field      No Epithelial cells per low power field      Rare (1+) Gram positive cocci      Rare (1+) Gram negative bacilli    Narrative:      Specimen rejected due to oropharyngeal contamination. Please reorder and recollect specimen if clinically necessary.    Blood Culture - Blood, Hand, Left [[id number]]  (Normal) Collected: 07/18/25  1616    Lab Status: Preliminary result Specimen: Blood from Hand, Left Updated: 07/19/25 1631     Blood Culture No growth at 24 hours    Blood Culture - Blood, Arm, Right [[id number]]  (Normal) Collected: 07/18/25 1607    Lab Status: Preliminary result Specimen: Blood from Arm, Right Updated: 07/19/25 1631     Blood Culture No growth at 24 hours    Respiratory Panel PCR w/COVID-19(SARS-CoV-2) MEGHA/TOVA/TEREZA/PAD/COR/DONELL In-House, NP Swab in UTM/VTM, 2 HR TAT - Swab, Nasopharynx [[id number]]  (Abnormal) Collected: 07/18/25 1558    Lab Status: Final result Specimen: Swab from Nasopharynx Updated: 07/18/25 1655     ADENOVIRUS, PCR Not Detected     Coronavirus 229E Not Detected     Coronavirus HKU1 Not Detected     Coronavirus NL63 Not Detected     Coronavirus OC43 Not Detected     COVID19 Detected     Human Metapneumovirus Not Detected     Human Rhinovirus/Enterovirus Not Detected     Influenza A PCR Not Detected     Influenza B PCR Not Detected     Parainfluenza Virus 1 Not Detected     Parainfluenza Virus 2 Not Detected     Parainfluenza Virus 3 Not Detected     Parainfluenza Virus 4 Not Detected     RSV, PCR Not Detected     Bordetella pertussis pcr Not Detected     Bordetella parapertussis PCR Not Detected     Chlamydophila pneumoniae PCR Not Detected     Mycoplasma pneumo by PCR Not Detected    Narrative:      In the setting of a positive respiratory panel with a viral infection PLUS a negative procalcitonin without other underlying concern for bacterial infection, consider observing off antibiotics or discontinuation of antibiotics and continue supportive care. If the respiratory panel is positive for atypical bacterial infection (Bordetella pertussis, Chlamydophila pneumoniae, or Mycoplasma pneumoniae), consider antibiotic de-escalation to target atypical bacterial infection.          Microbiology Results (last 10 days)       Procedure Component Value - Date/Time    MRSA Screen, PCR (Inpatient) - Swab, Nares  [[id number]]  (Normal) Collected: 07/18/25 2326    Lab Status: Final result Specimen: Swab from Nares Updated: 07/19/25 0057     MRSA PCR No MRSA Detected    Narrative:      The negative predictive value of this diagnostic test is high and should only be used to consider de-escalating anti-MRSA therapy. A positive result may indicate colonization with MRSA and must be correlated clinically.    Legionella Antigen, Urine - Urine, Urine, Clean Catch [[id number]]  (Normal) Collected: 07/18/25 2320    Lab Status: Final result Specimen: Urine, Clean Catch Updated: 07/18/25 2350     LEGIONELLA ANTIGEN, URINE Negative    S. Pneumo Ag Urine or CSF - Urine, Urine, Clean Catch [[id number]]  (Normal) Collected: 07/18/25 2320    Lab Status: Final result Specimen: Urine, Clean Catch Updated: 07/18/25 2350     Strep Pneumo Ag Negative    Respiratory Culture - Sputum, Cough [[id number]] Collected: 07/18/25 1837    Lab Status: Preliminary result Specimen: Sputum from Cough Updated: 07/19/25 1441     Respiratory Culture Rejected     Gram Stain Rare (1+) WBCs per low power field      No Epithelial cells per low power field      Rare (1+) Gram positive cocci      Rare (1+) Gram negative bacilli    Narrative:      Specimen rejected due to oropharyngeal contamination. Please reorder and recollect specimen if clinically necessary.    Blood Culture - Blood, Hand, Left [[id number]]  (Normal) Collected: 07/18/25 1616    Lab Status: Preliminary result Specimen: Blood from Hand, Left Updated: 07/19/25 1631     Blood Culture No growth at 24 hours    Blood Culture - Blood, Arm, Right [[id number]]  (Normal) Collected: 07/18/25 1607    Lab Status: Preliminary result Specimen: Blood from Arm, Right Updated: 07/19/25 1631     Blood Culture No growth at 24 hours    Respiratory Panel PCR w/COVID-19(SARS-CoV-2) MEGHA/TOVA/TEREZA/PAD/COR/DONELL In-House, NP Swab in UTM/VTM, 2 HR TAT - Swab, Nasopharynx [[id number]]  (Abnormal) Collected: 07/18/25 1558    Lab Status:  Final result Specimen: Swab from Nasopharynx Updated: 07/18/25 7152     ADENOVIRUS, PCR Not Detected     Coronavirus 229E Not Detected     Coronavirus HKU1 Not Detected     Coronavirus NL63 Not Detected     Coronavirus OC43 Not Detected     COVID19 Detected     Human Metapneumovirus Not Detected     Human Rhinovirus/Enterovirus Not Detected     Influenza A PCR Not Detected     Influenza B PCR Not Detected     Parainfluenza Virus 1 Not Detected     Parainfluenza Virus 2 Not Detected     Parainfluenza Virus 3 Not Detected     Parainfluenza Virus 4 Not Detected     RSV, PCR Not Detected     Bordetella pertussis pcr Not Detected     Bordetella parapertussis PCR Not Detected     Chlamydophila pneumoniae PCR Not Detected     Mycoplasma pneumo by PCR Not Detected    Narrative:      In the setting of a positive respiratory panel with a viral infection PLUS a negative procalcitonin without other underlying concern for bacterial infection, consider observing off antibiotics or discontinuation of antibiotics and continue supportive care. If the respiratory panel is positive for atypical bacterial infection (Bordetella pertussis, Chlamydophila pneumoniae, or Mycoplasma pneumoniae), consider antibiotic de-escalation to target atypical bacterial infection.             Documented weights    07/18/25 1153   Weight: 94.3 kg (208 lb)        Hospital Course: Reid Morrell is a 74-year-old male with a past medical history of anemia, arthritis, asthma, atrial fibrillation on chronic Eliquis, chronic idiopathic fibrosing alveolitis, hypertension, CANNON, obstructive sleep apnea on home CPAP, home oxygen at 2 L, hypertension and haemophilus influenza.  Patient presented to [facility] emergency department per request of urgent care after presenting with shortness of breath and febrile episodes x 2 days.  Tmax 102.1.  Patient has complaints of fevers, chills, productive sputum, shortness of breath with exertion and at rest, rhinorrhea and  congestion.  No complaints of nausea, vomiting or diarrhea.  No complaints of chest pain or palpitations.  Initial ED workup for COPD exacerbation, elevated D-dimer, CTA revealed no evidence of pulmonary embolism, underlying pulmonary fibrosis with possible differential for pulmonary edema patient was given a one-time dose of 40 mg IV Lasix and hospitalist service was requested for admission.     Upon my assessment patient immediately appeared to be significantly more ill than an average COPD exacerbation, patient was diaphoretic, significant congestion, cough and a fever of 101 suggesting possible viral illness.  Patient was requiring 3 L of oxygen increased from baseline of 2 L due to hypoxia with oxygen saturations of 88%.  Stat respiratory PCR, lactic, CRP, procalcitonin, blood cultures were obtained revealing positive for COVID-19.  Patient will be placed on Paxlovid, Decadron, aggressive pulmonary hygiene and continue to monitor very closely.  Patient and family made aware of diagnosis all questions were answered to the best my ability and they are in agreement for admission and treatment.    COVID-19/acute on chronic respiratory failure with hypoxia/pulmonary fibrosis/bandemia, patient was admitted intermediately placed on Paxlovid per protocol, Lipitor was placed on hold and Eliquis decreased to 2.5 mg.  Decadron 6 mg p.o. x 10 days.  After developing a bandemia in the first 24 hours initiated Zosyn due to patient's pseudomonal risk with pulmonary fibrosis.  This morning patient's symptoms have continued to completely resolve other than mild congestion with rhinorrhea, no febrile episodes x 48 hours patient is back on his home dose of 2 L of oxygen.  Bandemia has resolved and WBC remains within normal limits.  Patient was instructed to continue Paxlovid and Decadron per protocol.  Extensive discussion regarding patient's decreased Eliquis until 7/26/2025 when he may resume full 5 mg dose and resumption of  "Lipitor.  He was instructed to return to the hospital for any new or worsening fevers, shortness of breath or decreased level of consciousness.    Essential hypertension, upon initially assessing patient's home medication list he has been taking Norvasc, Cardizem, Maxide, and Cozaar all prescribed by different physicians.  We had a lengthy discussion and patient was placed on Cardizem and Maxide on admission due to EP/cardiology team.  We had an additional extensive discussion today regarding his need to continue only those 2 medications and to make sure that his PCP is aware who is prescribed the other 2.  Vital signs have remained stable throughout hospitalization on cardiology directed hypertensive regimen.    This morning patient is resting comfortably in a chair on his home dose of 2 L of oxygen with no family at bedside.  Patient is alert and oriented able to participate in assessment and discharge planning.  Patient states he is ready to go home he feels back to his old self again, he states he still has mild rhinorrhea with congestion however significantly improved.  Patient has continued to have no episodes of nausea, vomiting or diarrhea.  We discussed patient's medication regimen, changes to his Eliquis and Lipitor, discontinuation of Norvasc and Cozaar as well as close follow-up with PCP.  All questions were answered to the best my ability and he is in agreement for discharge home today.    Patient has reached the maximum benefit of hospitalization and is stable for discharge  Patient has been evaluated today 07/20/25 and is stable for discharge.       Physical Exam on Discharge:  /65 (BP Location: Left arm, Patient Position: Sitting)   Pulse 52   Temp 97.6 °F (36.4 °C) (Oral)   Resp 14   Ht 180.3 cm (71\")   Wt 94.3 kg (208 lb)   SpO2 98%   BMI 29.01 kg/m²   Physical Exam  Vitals and nursing note reviewed.   Constitutional:       General: He is not in acute distress.     Appearance: Normal " appearance, nontoxic.     Interventions: Nasal cannula in place.      Comments: 2 L-Home dose  HENT:      Head: Normocephalic and atraumatic.      Nose: Improved congestion and rhinorrhea present.      Eyes:      Pupils: Pupils are equal, round, and reactive to light.   Cardiovascular:      Rate and Rhythm: Normal rate and regular rhythm.      Pulses: Normal pulses.      Heart sounds: Normal heart sounds.   Pulmonary:      Effort: No tachypnea, accessory muscle usage or respiratory distress.      Breath sounds: Examination of the right-middle field reveals rhonchi. Examination of the left-middle field reveals rhonchi. Examination of the right-lower field reveals rhonchi and rales. Examination of the left-lower field reveals rhonchi.   Abdominal:      General: Bowel sounds are normal. There is no distension.      Tenderness: There is no abdominal tenderness.   Musculoskeletal:         General: Normal range of motion.      Cervical back: Normal range of motion.   Skin:     General: Skin is warm.      Capillary Refill: Capillary refill takes less than 2 seconds.      Coloration: Skin is pale.   Neurological:      General: No focal deficit present.      Mental Status: He is oriented to person, place, and time.   Psychiatric:         Mood and Affect: Mood normal.         Behavior: Behavior normal. Behavior is cooperative.         Thought Content: Thought content normal.         Judgment: Judgment normal.        Condition on Discharge: Stable for discharge home    Discharge Disposition:  Home or Self Care    Discharge Medications:     Discharge Medications        PAUSE taking these medications        Instructions Start Date   apixaban 5 MG tablet tablet  Wait to take this until: July 26, 2025  Continue Eliquis at half tablet until 7/26/2025  Commonly known as: ELIQUIS   5 mg, Oral, 2 Times Daily  You also have another medication with the same name that you may need to continue taking.      atorvastatin 10 MG tablet  Wait  to take this until: July 29, 2025  Commonly known as: LIPITOR   10 mg, Daily             New Medications        Instructions Start Date   amoxicillin-clavulanate 875-125 MG per tablet  Commonly known as: AUGMENTIN   1 tablet, Oral, 2 Times Daily   Start Date: July 21, 2025     dexAMETHasone 6 MG tablet  Commonly known as: DECADRON   6 mg, Oral, Daily   Start Date: July 21, 2025     Nirmatrelvir & Ritonavir (300mg/100mg)  Commonly known as: PAXLOVID   3 tablets, Oral, 2 Times Daily             Changes to Medications        Instructions Start Date   apixaban 2.5 MG tablet tablet  Commonly known as: ELIQUIS  What changed: Another medication with the same name was paused. Ask your nurse or doctor if you should take this medication.   2.5 mg, Oral, 2 Times Daily, Then resume full dose Eliquis on 7/26/2025             Continue These Medications        Instructions Start Date   colestipol 1 g tablet  Commonly known as: COLESTID   1 g, 2 Times Daily      dilTIAZem  MG 24 hr capsule  Commonly known as: DILACOR XR   120 mg, Oral, Daily      famotidine 20 MG tablet  Commonly known as: PEPCID   20 mg, Oral, 2 Times Daily      meloxicam 15 MG tablet  Commonly known as: MOBIC   15 mg, Daily      Mucus Relief  MG 12 hr tablet  Generic drug: guaiFENesin   1,200 mg, Oral, Every 12 Hours Scheduled      Nintedanib Esylate 100 MG capsule   1 capsule, Oral, Every 12 Hours      triamterene-hydrochlorothiazide 37.5-25 MG per tablet  Commonly known as: MAXZIDE-25   1 tablet, Oral, Daily      Ventolin  (90 Base) MCG/ACT inhaler  Generic drug: albuterol sulfate HFA   2 puffs, Inhalation, Every 4 Hours PRN      albuterol (2.5 MG/3ML) 0.083% nebulizer solution  Commonly known as: PROVENTIL   Take 2.5 mg by nebulization Every 4 (Four) Hours As Needed for Wheezing or Shortness of Air.             Stop These Medications      amLODIPine 10 MG tablet  Commonly known as: NORVASC     losartan 100 MG tablet  Commonly known as:  COZAAR              Discharge Diet: Advance diet as tolerated    Activity at Discharge:   Activity Instructions       Activity as Tolerated              Discharge Instructions:   1.  Patient was instructed to return to medical attention for any new or worsening chest pain, shortness of breath, fevers or decreased level of consciousness.  2.  Patient was instructed to follow-up with PCP in 1 week.  3.  Patient was instructed verbally and with discharge instructions regarding all new medications and medication changes.  To include proper antihypertensive regimen, Norvasc and Cozaar discontinued and patient was placed on cardiology regimen only.    Follow-up Appointments:   Future Appointments   Date Time Provider Department Center   8/25/2025  9:30 AM Alexis Lewis APRN [facility]   9/15/2025 12:45 PM Marlon Pichardo MD [facility]   9/16/2025 10:15 AM Romy Valera APRN [facility]   12/16/2025 10:45 AM Nash Pisano MD [facility]       Test Results Pending at Discharge: None    Electronically signed by ENEIDA Nicholas, 07/20/25, 09:22 CDT.    Time: 45 minutes.          Electronically signed by Arnie Myrick DO at 07/20/25 1547

## 2025-07-30 ENCOUNTER — OFFICE VISIT (OUTPATIENT)
Dept: PRIMARY CARE CLINIC | Age: 75
End: 2025-07-30

## 2025-07-30 VITALS
DIASTOLIC BLOOD PRESSURE: 70 MMHG | HEART RATE: 49 BPM | WEIGHT: 208.2 LBS | OXYGEN SATURATION: 98 % | SYSTOLIC BLOOD PRESSURE: 110 MMHG | HEIGHT: 71 IN | BODY MASS INDEX: 29.15 KG/M2 | TEMPERATURE: 96.9 F

## 2025-07-30 DIAGNOSIS — Z09 HOSPITAL DISCHARGE FOLLOW-UP: Primary | ICD-10-CM

## 2025-07-30 RX ORDER — DEXAMETHASONE 6 MG/1
6 TABLET ORAL
COMMUNITY
Start: 2025-07-20

## 2025-07-30 RX ORDER — DILTIAZEM HYDROCHLORIDE 120 MG/1
120 CAPSULE, EXTENDED RELEASE ORAL DAILY
COMMUNITY
Start: 2025-05-23

## 2025-07-30 RX ORDER — TRIAMTERENE AND HYDROCHLOROTHIAZIDE 37.5; 25 MG/1; MG/1
1 TABLET ORAL DAILY
COMMUNITY
Start: 2025-05-13

## 2025-07-30 RX ORDER — NIRMATRELVIR AND RITONAVIR 300-100 MG
3 KIT ORAL EVERY 12 HOURS
COMMUNITY
Start: 2025-07-20

## 2025-07-30 NOTE — PROGRESS NOTES
Post-Discharge Transitional Care Management Progress Note      Cachoror Thompson   YOB: 1950    Date of Office Visit:  7/30/2025  Date of Hospital Admission: 07/18/2025  Date of Hospital Discharge: 07/20/2025    Care management risk score Rising risk (score 2-5) and Complex Care (Scores >=6): No Risk Score On File     Non face to face  following discharge, date last encounter closed (first attempt may have been earlier): 07/21/2025 07/21/2025    Call initiated 2 business days of discharge: Yes    ASSESSMENT/PLAN:   Hospital discharge follow-up  -     VT DISCHARGE MEDS RECONCILED W/ CURRENT OUTPATIENT MED LIST    Medical Decision Making: moderate complexity  Return if symptoms worsen or fail to improve.         Subjective:   HPI:  Follow up of Hospital problems/diagnosis(es):   \"Cachorro Thompson is a 74-year-old male with a past medical history of anemia, arthritis, asthma, atrial fibrillation on chronic Eliquis, chronic idiopathic fibrosing alveolitis, hypertension, LOMBARDI, obstructive sleep apnea on home CPAP, home oxygen at 2 L, hypertension and haemophilus influenza.  Patient presented to [facility] emergency department per request of urgent care after presenting with shortness of breath and febrile episodes x 2 days.  Tmax 102.1.  Patient has complaints of fevers, chills, productive sputum, shortness of breath with exertion and at rest, rhinorrhea and congestion.  No complaints of nausea, vomiting or diarrhea.  No complaints of chest pain or palpitations.  Initial ED workup for COPD exacerbation, elevated D-dimer, CTA revealed no evidence of pulmonary embolism, underlying pulmonary fibrosis with possible differential for pulmonary edema patient was given a one-time dose of 40 mg IV Lasix and hospitalist service was requested for admission.     Upon my assessment patient immediately appeared to be significantly more ill than an average COPD exacerbation, patient was diaphoretic, significant

## 2025-07-31 ENCOUNTER — TELEPHONE (OUTPATIENT)
Dept: CARDIOLOGY | Facility: CLINIC | Age: 75
End: 2025-07-31
Payer: MEDICARE

## 2025-07-31 ENCOUNTER — TELEPHONE (OUTPATIENT)
Dept: PULMONOLOGY | Facility: CLINIC | Age: 75
End: 2025-07-31
Payer: MEDICARE

## 2025-07-31 NOTE — TELEPHONE ENCOUNTER
Caller: Reid Morrell    Relationship to patient: Self    Best call back number: [phone] (Mobile)     Patient is needing: PT IS HAVING RECTAL BLEEDING AFTER TAKING OFEV, HED LIKE TO SPEAK WITH A NURSE ABOUT THIS MEDICATION

## 2025-07-31 NOTE — TELEPHONE ENCOUNTER
When did your symptoms start? 2 -3 Days ago     What are your symptoms? Patient has hemorrhoids and rectal bleeding bright red with loose stool. Gassy, no pain yesterday 7/30/25. Patient has not taken OFEV nor Eliquis today. No bleeding /stool .      Have you been treated recently by another provider for this problem? Patient had follow up with primary on 7/30/25, but did not mention rectal bleeding, the  rectal bleeding and stool started after the office visit with provider. Patient has reached out to the Cardiologist and Gastrologist to see about stopping Eliquis  medication, but has not heard anything back. First time having this issue since being OFEV medication since Last August.     Allergies reviewed and are up to date.  yes    Pharmacy confirmed. yes

## 2025-07-31 NOTE — TELEPHONE ENCOUNTER
Marlon Pichardo MD to Me (Selected Message)        7/31/25  3:50 PM  If he has not had any recurrent bleeding and is otherwise feeling ok I think he can just follow-up with PCP. Thank you!    Pt informed with my chart.  Andrade Gallegos, CMA

## 2025-07-31 NOTE — TELEPHONE ENCOUNTER
I would hold the Ofev today and I will have CANDACE Molina follow up with him tomorrow. I would definitely follow up with cardiology in regards to the Eliquis. Looks like he was also just in the hospital earlier this month due to Covid-19. The Ofev could be increasing the diarrhea events which could be inflaming the hemorrhoids which could be bleeding easier due to the Eliquis.  I would also follow up with GI as to recommendations to treat his hemorrhoids. He has follow up with them on 8/6 and cardiology 8/25. I would also have him reach out to the [facility] nurse liaison that can help him with dietary changes to help with the diarrhea.     If the bleeding returns and is significant I would go to the ER.     CANDACE Doherty

## 2025-07-31 NOTE — TELEPHONE ENCOUNTER
Pt called and left a  msg on Ashwini's phone stating that he had some really bad rectal bleeding yesterday.  He is on Eliquis and needs to know what to do.  Please advise.  Andrade Gallegos, CMA

## 2025-08-01 NOTE — TELEPHONE ENCOUNTER
Called and spoke to patient, let him know that he needs to hold his Ofev, and also if his condition worsens or has any other issues to present to the ER. He was agreeable to scheduling a follow up with Romy on Friday to discuss the Ofev, after his GI appointment on 8/6. Patient requested we schedule his follow up for him and [facility]

## 2025-08-01 NOTE — TELEPHONE ENCOUNTER
Called to check on patient, he states that he went to have BM this morning and it was still bleeding. He says he does have an appointment with Dr. Batista's PA 8/6/25. Patient states he has already been avoiding dairy, and has been increasing his protein. He says that he thinks he would rather discontinue the Ofev due to the side effects, but still wants to discuss with Romy. Advised patient I would send a message to [facility] to see if she wants to see him in office some time soon to discuss and I would call him back with our next steps.

## 2025-08-01 NOTE — TELEPHONE ENCOUNTER
I would hold ofev. He can come to see us next week and discuss or wait until after GI appt and come discuss but I would recommend holding ofev until then.

## 2025-08-05 ENCOUNTER — HOSPITAL ENCOUNTER (OUTPATIENT)
Facility: HOSPITAL | Age: 75
LOS: 1 days | Discharge: HOME | End: 2025-08-06
Attending: EMERGENCY MEDICINE | Admitting: FAMILY MEDICINE
Payer: MEDICARE

## 2025-08-05 DIAGNOSIS — Z79.01 CHRONIC ANTICOAGULATION: ICD-10-CM

## 2025-08-05 DIAGNOSIS — D64.9 ANEMIA, UNSPECIFIED TYPE: ICD-10-CM

## 2025-08-05 DIAGNOSIS — K92.2 LOWER GI BLEEDING: Primary | ICD-10-CM

## 2025-08-05 DIAGNOSIS — K64.8 INTERNAL HEMORRHOIDS: ICD-10-CM

## 2025-08-05 PROCEDURE — 99285 EMERGENCY DEPT VISIT HI MDM: CPT | Performed by: EMERGENCY MEDICINE

## 2025-08-05 PROCEDURE — 99285 EMERGENCY DEPT VISIT HI MDM: CPT

## 2025-08-05 RX ORDER — SODIUM CHLORIDE 0.9 % (FLUSH) 0.9 %
10 SYRINGE (ML) INJECTION AS NEEDED
Status: DISCONTINUED | OUTPATIENT
Start: 2025-08-05 | End: 2025-08-06

## 2025-08-06 ENCOUNTER — APPOINTMENT (OUTPATIENT)
Dept: CT IMAGING | Facility: HOSPITAL | Age: 75
End: 2025-08-06
Payer: MEDICARE

## 2025-08-06 VITALS
SYSTOLIC BLOOD PRESSURE: 125 MMHG | RESPIRATION RATE: 18 BRPM | HEIGHT: 71 IN | BODY MASS INDEX: 29.18 KG/M2 | WEIGHT: 208.4 LBS | HEART RATE: 68 BPM | OXYGEN SATURATION: 94 % | TEMPERATURE: 98.2 F | DIASTOLIC BLOOD PRESSURE: 82 MMHG

## 2025-08-06 PROBLEM — K92.2 GI BLEED: Status: ACTIVE | Noted: 2025-08-06

## 2025-08-06 PROBLEM — K64.8 INTERNAL HEMORRHOIDS: Status: ACTIVE | Noted: 2025-08-06

## 2025-08-06 PROBLEM — K92.2 GI BLEED: Status: RESOLVED | Noted: 2025-08-06 | Resolved: 2025-08-06

## 2025-08-06 LAB
ABO GROUP BLD: NORMAL
ALBUMIN SERPL-MCNC: 3 G/DL (ref 3.5–5.2)
ALBUMIN SERPL-MCNC: 3.1 G/DL (ref 3.5–5.2)
ALBUMIN/GLOB SERPL: 1.1 G/DL
ALBUMIN/GLOB SERPL: 1.3 G/DL
ALP SERPL-CCNC: 58 U/L (ref 39–117)
ALP SERPL-CCNC: 65 U/L (ref 39–117)
ALT SERPL W P-5'-P-CCNC: 37 U/L (ref 1–41)
ALT SERPL W P-5'-P-CCNC: 45 U/L (ref 1–41)
ANION GAP SERPL CALCULATED.3IONS-SCNC: 10 MMOL/L (ref 5–15)
ANION GAP SERPL CALCULATED.3IONS-SCNC: 9 MMOL/L (ref 5–15)
APTT PPP: 31.8 SECONDS (ref 24.5–36)
AST SERPL-CCNC: 24 U/L (ref 1–40)
AST SERPL-CCNC: 29 U/L (ref 1–40)
BASOPHILS # BLD AUTO: 0.01 10*3/MM3 (ref 0–0.2)
BASOPHILS NFR BLD AUTO: 0.2 % (ref 0–1.5)
BILIRUB SERPL-MCNC: 0.4 MG/DL (ref 0–1.2)
BILIRUB SERPL-MCNC: 0.5 MG/DL (ref 0–1.2)
BLD GP AB SCN SERPL QL: NEGATIVE
BUN SERPL-MCNC: 17.2 MG/DL (ref 8–23)
BUN SERPL-MCNC: 20.3 MG/DL (ref 8–23)
BUN/CREAT SERPL: 12.1 (ref 7–25)
BUN/CREAT SERPL: 12.9 (ref 7–25)
CALCIUM SPEC-SCNC: 8 MG/DL (ref 8.6–10.5)
CALCIUM SPEC-SCNC: 8.4 MG/DL (ref 8.6–10.5)
CHLORIDE SERPL-SCNC: 101 MMOL/L (ref 98–107)
CHLORIDE SERPL-SCNC: 101 MMOL/L (ref 98–107)
CO2 SERPL-SCNC: 28 MMOL/L (ref 22–29)
CO2 SERPL-SCNC: 29 MMOL/L (ref 22–29)
CREAT SERPL-MCNC: 1.42 MG/DL (ref 0.76–1.27)
CREAT SERPL-MCNC: 1.57 MG/DL (ref 0.76–1.27)
D-LACTATE SERPL-SCNC: 1.8 MMOL/L (ref 0.5–2)
DEPRECATED RDW RBC AUTO: 46.9 FL (ref 37–54)
DEPRECATED RDW RBC AUTO: 47 FL (ref 37–54)
EGFRCR SERPLBLD CKD-EPI 2021: 45.7 ML/MIN/1.73
EGFRCR SERPLBLD CKD-EPI 2021: 51.5 ML/MIN/1.73
EOSINOPHIL # BLD AUTO: 0.11 10*3/MM3 (ref 0–0.4)
EOSINOPHIL NFR BLD AUTO: 2.3 % (ref 0.3–6.2)
ERYTHROCYTE [DISTWIDTH] IN BLOOD BY AUTOMATED COUNT: 13.8 % (ref 12.3–15.4)
ERYTHROCYTE [DISTWIDTH] IN BLOOD BY AUTOMATED COUNT: 14 % (ref 12.3–15.4)
GLOBULIN UR ELPH-MCNC: 2.3 GM/DL
GLOBULIN UR ELPH-MCNC: 2.7 GM/DL
GLUCOSE SERPL-MCNC: 104 MG/DL (ref 65–99)
GLUCOSE SERPL-MCNC: 119 MG/DL (ref 65–99)
HCT VFR BLD AUTO: 34.3 % (ref 37.5–51)
HCT VFR BLD AUTO: 34.8 % (ref 37.5–51)
HCT VFR BLD AUTO: 35.4 % (ref 37.5–51)
HCT VFR BLD AUTO: 35.5 % (ref 37.5–51)
HEMOCCULT STL QL: NEGATIVE
HGB BLD-MCNC: 11.5 G/DL (ref 13–17.7)
HGB BLD-MCNC: 11.7 G/DL (ref 13–17.7)
HGB BLD-MCNC: 11.8 G/DL (ref 13–17.7)
HGB BLD-MCNC: 11.8 G/DL (ref 13–17.7)
HOLD SPECIMEN: NORMAL
HOLD SPECIMEN: NORMAL
IMM GRANULOCYTES # BLD AUTO: 0.01 10*3/MM3 (ref 0–0.05)
IMM GRANULOCYTES NFR BLD AUTO: 0.2 % (ref 0–0.5)
INR PPP: 1.19 (ref 0.91–1.09)
INR PPP: 1.2 (ref 0.91–1.09)
LYMPHOCYTES # BLD AUTO: 1.13 10*3/MM3 (ref 0.7–3.1)
LYMPHOCYTES NFR BLD AUTO: 23.2 % (ref 19.6–45.3)
MCH RBC QN AUTO: 30.9 PG (ref 26.6–33)
MCH RBC QN AUTO: 31.5 PG (ref 26.6–33)
MCHC RBC AUTO-ENTMCNC: 33.3 G/DL (ref 31.5–35.7)
MCHC RBC AUTO-ENTMCNC: 34.4 G/DL (ref 31.5–35.7)
MCV RBC AUTO: 91.5 FL (ref 79–97)
MCV RBC AUTO: 92.7 FL (ref 79–97)
MONOCYTES # BLD AUTO: 0.35 10*3/MM3 (ref 0.1–0.9)
MONOCYTES NFR BLD AUTO: 7.2 % (ref 5–12)
NEUTROPHILS NFR BLD AUTO: 3.27 10*3/MM3 (ref 1.7–7)
NEUTROPHILS NFR BLD AUTO: 66.9 % (ref 42.7–76)
NRBC BLD AUTO-RTO: 0 /100 WBC (ref 0–0.2)
PLATELET # BLD AUTO: 181 10*3/MM3 (ref 140–450)
PLATELET # BLD AUTO: 189 10*3/MM3 (ref 140–450)
PMV BLD AUTO: 8.5 FL (ref 6–12)
PMV BLD AUTO: 8.6 FL (ref 6–12)
POTASSIUM SERPL-SCNC: 3.1 MMOL/L (ref 3.5–5.2)
POTASSIUM SERPL-SCNC: 3.4 MMOL/L (ref 3.5–5.2)
POTASSIUM SERPL-SCNC: 3.4 MMOL/L (ref 3.5–5.2)
PROT SERPL-MCNC: 5.3 G/DL (ref 6–8.5)
PROT SERPL-MCNC: 5.8 G/DL (ref 6–8.5)
PROTHROMBIN TIME: 15.7 SECONDS (ref 11.8–14.8)
PROTHROMBIN TIME: 15.9 SECONDS (ref 11.8–14.8)
RBC # BLD AUTO: 3.75 10*6/MM3 (ref 4.14–5.8)
RBC # BLD AUTO: 3.82 10*6/MM3 (ref 4.14–5.8)
RH BLD: NEGATIVE
SODIUM SERPL-SCNC: 138 MMOL/L (ref 136–145)
SODIUM SERPL-SCNC: 140 MMOL/L (ref 136–145)
T&S EXPIRATION DATE: NORMAL
WBC NRBC COR # BLD AUTO: 4.27 10*3/MM3 (ref 3.4–10.8)
WBC NRBC COR # BLD AUTO: 4.88 10*3/MM3 (ref 3.4–10.8)
WHOLE BLOOD HOLD COAG: NORMAL
WHOLE BLOOD HOLD SPECIMEN: NORMAL

## 2025-08-06 PROCEDURE — G0378 HOSPITAL OBSERVATION PER HR: HCPCS

## 2025-08-06 PROCEDURE — 25810000003 SODIUM CHLORIDE 0.9 % SOLUTION

## 2025-08-06 PROCEDURE — 86850 RBC ANTIBODY SCREEN: CPT | Performed by: EMERGENCY MEDICINE

## 2025-08-06 PROCEDURE — 63710000001 POTASSIUM CHLORIDE 20 MEQ TABLET CONTROLLED-RELEASE: Performed by: FAMILY MEDICINE

## 2025-08-06 PROCEDURE — 74174 CTA ABD&PLVS W/CONTRAST: CPT

## 2025-08-06 PROCEDURE — 85027 COMPLETE CBC AUTOMATED: CPT

## 2025-08-06 PROCEDURE — 85018 HEMOGLOBIN: CPT | Performed by: FAMILY MEDICINE

## 2025-08-06 PROCEDURE — 86901 BLOOD TYPING SEROLOGIC RH(D): CPT | Performed by: EMERGENCY MEDICINE

## 2025-08-06 PROCEDURE — 85025 COMPLETE CBC W/AUTO DIFF WBC: CPT | Performed by: EMERGENCY MEDICINE

## 2025-08-06 PROCEDURE — 96374 THER/PROPH/DIAG INJ IV PUSH: CPT

## 2025-08-06 PROCEDURE — 80053 COMPREHEN METABOLIC PANEL: CPT

## 2025-08-06 PROCEDURE — 85014 HEMATOCRIT: CPT

## 2025-08-06 PROCEDURE — 36415 COLL VENOUS BLD VENIPUNCTURE: CPT

## 2025-08-06 PROCEDURE — 82272 OCCULT BLD FECES 1-3 TESTS: CPT | Performed by: EMERGENCY MEDICINE

## 2025-08-06 PROCEDURE — 85730 THROMBOPLASTIN TIME PARTIAL: CPT

## 2025-08-06 PROCEDURE — 85018 HEMOGLOBIN: CPT

## 2025-08-06 PROCEDURE — 84132 ASSAY OF SERUM POTASSIUM: CPT | Performed by: FAMILY MEDICINE

## 2025-08-06 PROCEDURE — A9270 NON-COVERED ITEM OR SERVICE: HCPCS | Performed by: FAMILY MEDICINE

## 2025-08-06 PROCEDURE — 99213 OFFICE O/P EST LOW 20 MIN: CPT | Performed by: NURSE PRACTITIONER

## 2025-08-06 PROCEDURE — 86900 BLOOD TYPING SEROLOGIC ABO: CPT | Performed by: EMERGENCY MEDICINE

## 2025-08-06 PROCEDURE — 83605 ASSAY OF LACTIC ACID: CPT | Performed by: EMERGENCY MEDICINE

## 2025-08-06 PROCEDURE — 80053 COMPREHEN METABOLIC PANEL: CPT | Performed by: EMERGENCY MEDICINE

## 2025-08-06 PROCEDURE — 25010000002 POTASSIUM CHLORIDE 10 MEQ/100ML SOLUTION

## 2025-08-06 PROCEDURE — 85610 PROTHROMBIN TIME: CPT | Performed by: EMERGENCY MEDICINE

## 2025-08-06 PROCEDURE — 63710000001 DILTIAZEM CD 120 MG CAPSULE SUSTAINED-RELEASE 24 HR: Performed by: FAMILY MEDICINE

## 2025-08-06 PROCEDURE — 25510000001 IOPAMIDOL PER 1 ML: Performed by: EMERGENCY MEDICINE

## 2025-08-06 PROCEDURE — 85014 HEMATOCRIT: CPT | Performed by: FAMILY MEDICINE

## 2025-08-06 PROCEDURE — 99214 OFFICE O/P EST MOD 30 MIN: CPT | Performed by: INTERNAL MEDICINE

## 2025-08-06 PROCEDURE — 85610 PROTHROMBIN TIME: CPT

## 2025-08-06 RX ORDER — POTASSIUM CHLORIDE 7.45 MG/ML
10 INJECTION INTRAVENOUS ONCE
Status: COMPLETED | OUTPATIENT
Start: 2025-08-06 | End: 2025-08-06

## 2025-08-06 RX ORDER — IOPAMIDOL 755 MG/ML
100 INJECTION, SOLUTION INTRAVASCULAR
Status: COMPLETED | OUTPATIENT
Start: 2025-08-06 | End: 2025-08-06

## 2025-08-06 RX ORDER — PANTOPRAZOLE SODIUM 40 MG/10ML
40 INJECTION, POWDER, LYOPHILIZED, FOR SOLUTION INTRAVENOUS EVERY 12 HOURS SCHEDULED
Status: DISCONTINUED | OUTPATIENT
Start: 2025-08-06 | End: 2025-08-06 | Stop reason: HOSPADM

## 2025-08-06 RX ORDER — DILTIAZEM HYDROCHLORIDE 120 MG/1
120 CAPSULE, COATED, EXTENDED RELEASE ORAL
Status: DISCONTINUED | OUTPATIENT
Start: 2025-08-06 | End: 2025-08-06 | Stop reason: HOSPADM

## 2025-08-06 RX ORDER — POTASSIUM CHLORIDE 1500 MG/1
40 TABLET, EXTENDED RELEASE ORAL ONCE
Status: COMPLETED | OUTPATIENT
Start: 2025-08-06 | End: 2025-08-06

## 2025-08-06 RX ORDER — SODIUM CHLORIDE 9 MG/ML
75 INJECTION, SOLUTION INTRAVENOUS CONTINUOUS
Status: DISCONTINUED | OUTPATIENT
Start: 2025-08-06 | End: 2025-08-06 | Stop reason: HOSPADM

## 2025-08-06 RX ORDER — PANTOPRAZOLE SODIUM 40 MG/10ML
80 INJECTION, POWDER, LYOPHILIZED, FOR SOLUTION INTRAVENOUS ONCE
Status: COMPLETED | OUTPATIENT
Start: 2025-08-06 | End: 2025-08-06

## 2025-08-06 RX ORDER — SODIUM CHLORIDE 0.9 % (FLUSH) 0.9 %
10 SYRINGE (ML) INJECTION AS NEEDED
Status: DISCONTINUED | OUTPATIENT
Start: 2025-08-06 | End: 2025-08-06 | Stop reason: HOSPADM

## 2025-08-06 RX ORDER — NITROGLYCERIN 0.4 MG/1
0.4 TABLET SUBLINGUAL
Status: DISCONTINUED | OUTPATIENT
Start: 2025-08-06 | End: 2025-08-06 | Stop reason: HOSPADM

## 2025-08-06 RX ORDER — SODIUM CHLORIDE 9 MG/ML
40 INJECTION, SOLUTION INTRAVENOUS AS NEEDED
Status: DISCONTINUED | OUTPATIENT
Start: 2025-08-06 | End: 2025-08-06 | Stop reason: HOSPADM

## 2025-08-06 RX ORDER — SODIUM CHLORIDE 0.9 % (FLUSH) 0.9 %
10 SYRINGE (ML) INJECTION EVERY 12 HOURS SCHEDULED
Status: DISCONTINUED | OUTPATIENT
Start: 2025-08-06 | End: 2025-08-06 | Stop reason: HOSPADM

## 2025-08-06 RX ORDER — IPRATROPIUM BROMIDE AND ALBUTEROL SULFATE 2.5; .5 MG/3ML; MG/3ML
3 SOLUTION RESPIRATORY (INHALATION) EVERY 4 HOURS PRN
Status: DISCONTINUED | OUTPATIENT
Start: 2025-08-06 | End: 2025-08-06 | Stop reason: HOSPADM

## 2025-08-06 RX ORDER — ATORVASTATIN CALCIUM 10 MG/1
10 TABLET, FILM COATED ORAL NIGHTLY
Status: DISCONTINUED | OUTPATIENT
Start: 2025-08-06 | End: 2025-08-06 | Stop reason: HOSPADM

## 2025-08-06 RX ORDER — TRIAMTERENE AND HYDROCHLOROTHIAZIDE 37.5; 25 MG/1; MG/1
1 TABLET ORAL DAILY
Status: DISCONTINUED | OUTPATIENT
Start: 2025-08-06 | End: 2025-08-06 | Stop reason: HOSPADM

## 2025-08-06 RX ORDER — MELOXICAM 15 MG/1
15 TABLET ORAL DAILY
COMMUNITY

## 2025-08-06 RX ADMIN — POTASSIUM CHLORIDE 10 MEQ: 7.46 INJECTION, SOLUTION INTRAVENOUS at 06:41

## 2025-08-06 RX ADMIN — IOPAMIDOL 100 ML: 755 INJECTION, SOLUTION INTRAVENOUS at 01:33

## 2025-08-06 RX ADMIN — PANTOPRAZOLE SODIUM 80 MG: 40 INJECTION, POWDER, FOR SOLUTION INTRAVENOUS at 04:36

## 2025-08-06 RX ADMIN — DILTIAZEM HYDROCHLORIDE 120 MG: 120 CAPSULE, EXTENDED RELEASE ORAL at 09:58

## 2025-08-06 RX ADMIN — Medication 10 ML: at 09:58

## 2025-08-06 RX ADMIN — SODIUM CHLORIDE 75 ML/HR: 9 INJECTION, SOLUTION INTRAVENOUS at 06:40

## 2025-08-06 RX ADMIN — POTASSIUM CHLORIDE 40 MEQ: 1500 TABLET, EXTENDED RELEASE ORAL at 13:44

## 2025-08-07 ENCOUNTER — READMISSION MANAGEMENT (OUTPATIENT)
Dept: CALL CENTER | Facility: HOSPITAL | Age: 75
End: 2025-08-07
Payer: MEDICARE

## 2025-08-08 ENCOUNTER — OFFICE VISIT (OUTPATIENT)
Dept: PULMONOLOGY | Facility: CLINIC | Age: 75
End: 2025-08-08
Payer: MEDICARE

## 2025-08-08 ENCOUNTER — TELEPHONE (OUTPATIENT)
Dept: PULMONOLOGY | Facility: CLINIC | Age: 75
End: 2025-08-08

## 2025-08-08 VITALS
HEART RATE: 69 BPM | DIASTOLIC BLOOD PRESSURE: 65 MMHG | HEIGHT: 71 IN | WEIGHT: 210 LBS | SYSTOLIC BLOOD PRESSURE: 120 MMHG | BODY MASS INDEX: 29.4 KG/M2 | OXYGEN SATURATION: 94 %

## 2025-08-08 DIAGNOSIS — J84.10 PULMONARY FIBROSIS: Primary | Chronic | ICD-10-CM

## 2025-08-08 DIAGNOSIS — J96.11 CHRONIC RESPIRATORY FAILURE WITH HYPOXIA: Chronic | ICD-10-CM

## 2025-08-08 PROCEDURE — 99214 OFFICE O/P EST MOD 30 MIN: CPT | Performed by: NURSE PRACTITIONER

## 2025-08-08 PROCEDURE — 3074F SYST BP LT 130 MM HG: CPT | Performed by: NURSE PRACTITIONER

## 2025-08-08 PROCEDURE — 3078F DIAST BP <80 MM HG: CPT | Performed by: NURSE PRACTITIONER

## 2025-08-12 ENCOUNTER — OFFICE VISIT (OUTPATIENT)
Dept: PRIMARY CARE CLINIC | Age: 75
End: 2025-08-12
Payer: MEDICARE

## 2025-08-12 VITALS
HEART RATE: 63 BPM | WEIGHT: 211 LBS | BODY MASS INDEX: 29.43 KG/M2 | TEMPERATURE: 98.1 F | OXYGEN SATURATION: 98 % | DIASTOLIC BLOOD PRESSURE: 72 MMHG | SYSTOLIC BLOOD PRESSURE: 136 MMHG

## 2025-08-12 DIAGNOSIS — J84.10 PULMONARY FIBROSIS (HCC): ICD-10-CM

## 2025-08-12 DIAGNOSIS — D64.9 ANEMIA, UNSPECIFIED TYPE: Primary | ICD-10-CM

## 2025-08-12 DIAGNOSIS — I48.0 PAROXYSMAL ATRIAL FIBRILLATION (HCC): ICD-10-CM

## 2025-08-12 DIAGNOSIS — I10 ESSENTIAL (PRIMARY) HYPERTENSION: ICD-10-CM

## 2025-08-12 DIAGNOSIS — D64.9 ANEMIA, UNSPECIFIED TYPE: ICD-10-CM

## 2025-08-12 LAB
ALBUMIN SERPL-MCNC: 3.6 G/DL (ref 3.5–5.2)
ALP SERPL-CCNC: 71 U/L (ref 40–129)
ALT SERPL-CCNC: 27 U/L (ref 10–50)
ANION GAP SERPL CALCULATED.3IONS-SCNC: 8 MMOL/L (ref 8–16)
AST SERPL-CCNC: 20 U/L (ref 10–50)
BASOPHILS # BLD: 0 K/UL (ref 0–0.2)
BASOPHILS NFR BLD: 0.2 % (ref 0–1)
BILIRUB SERPL-MCNC: 0.4 MG/DL (ref 0.2–1.2)
BUN SERPL-MCNC: 14 MG/DL (ref 8–23)
CALCIUM SERPL-MCNC: 9 MG/DL (ref 8.8–10.2)
CHLORIDE SERPL-SCNC: 104 MMOL/L (ref 98–107)
CO2 SERPL-SCNC: 29 MMOL/L (ref 22–29)
CREAT SERPL-MCNC: 1.1 MG/DL (ref 0.7–1.2)
EOSINOPHIL # BLD: 0.1 K/UL (ref 0–0.6)
EOSINOPHIL NFR BLD: 1.7 % (ref 0–5)
ERYTHROCYTE [DISTWIDTH] IN BLOOD BY AUTOMATED COUNT: 14.3 % (ref 11.5–14.5)
FERRITIN SERPL-MCNC: 236 NG/ML (ref 30–400)
FOLATE SERPL-MCNC: 17.7 NG/ML (ref 4.5–32.2)
GLUCOSE SERPL-MCNC: 108 MG/DL (ref 70–99)
HCT VFR BLD AUTO: 38 % (ref 42–52)
HGB BLD-MCNC: 12.3 G/DL (ref 14–18)
IMM GRANULOCYTES # BLD: 0 K/UL
IRON SERPL-MCNC: 46 UG/DL (ref 59–158)
LYMPHOCYTES # BLD: 1.1 K/UL (ref 1.1–4.5)
LYMPHOCYTES NFR BLD: 24.9 % (ref 20–40)
MAGNESIUM SERPL-MCNC: 2.2 MG/DL (ref 1.6–2.4)
MCH RBC QN AUTO: 30.8 PG (ref 27–31)
MCHC RBC AUTO-ENTMCNC: 32.4 G/DL (ref 33–37)
MCV RBC AUTO: 95.2 FL (ref 80–94)
MONOCYTES # BLD: 0.5 K/UL (ref 0–0.9)
MONOCYTES NFR BLD: 11.4 % (ref 0–10)
NEUTROPHILS # BLD: 2.6 K/UL (ref 1.5–7.5)
NEUTS SEG NFR BLD: 61.6 % (ref 50–65)
PLATELET # BLD AUTO: 233 K/UL (ref 130–400)
PMV BLD AUTO: 8.7 FL (ref 9.4–12.4)
POTASSIUM SERPL-SCNC: 4.5 MMOL/L (ref 3.5–5.1)
PROT SERPL-MCNC: 6.3 G/DL (ref 6.4–8.3)
RBC # BLD AUTO: 3.99 M/UL (ref 4.7–6.1)
SODIUM SERPL-SCNC: 141 MMOL/L (ref 136–145)
VIT B12 SERPL-MCNC: 494 PG/ML (ref 232–1245)
WBC # BLD AUTO: 4.2 K/UL (ref 4.8–10.8)

## 2025-08-12 PROCEDURE — 99214 OFFICE O/P EST MOD 30 MIN: CPT | Performed by: FAMILY MEDICINE

## 2025-08-12 PROCEDURE — G2211 COMPLEX E/M VISIT ADD ON: HCPCS | Performed by: FAMILY MEDICINE

## 2025-08-12 PROCEDURE — 1123F ACP DISCUSS/DSCN MKR DOCD: CPT | Performed by: FAMILY MEDICINE

## 2025-08-12 PROCEDURE — 3075F SYST BP GE 130 - 139MM HG: CPT | Performed by: FAMILY MEDICINE

## 2025-08-12 PROCEDURE — 1159F MED LIST DOCD IN RCRD: CPT | Performed by: FAMILY MEDICINE

## 2025-08-12 PROCEDURE — 3078F DIAST BP <80 MM HG: CPT | Performed by: FAMILY MEDICINE

## 2025-08-12 RX ORDER — ALBUTEROL SULFATE 0.83 MG/ML
2.5 SOLUTION RESPIRATORY (INHALATION) 4 TIMES DAILY
Qty: 50 EACH | Refills: 11 | Status: SHIPPED | OUTPATIENT
Start: 2025-08-12

## 2025-08-12 RX ORDER — ALBUTEROL SULFATE 90 UG/1
2 INHALANT RESPIRATORY (INHALATION) 4 TIMES DAILY PRN
Qty: 18 G | Refills: 5 | Status: SHIPPED | OUTPATIENT
Start: 2025-08-12

## 2025-08-12 ASSESSMENT — ENCOUNTER SYMPTOMS
COUGH: 0
RECTAL PAIN: 0
CHEST TIGHTNESS: 0
DIARRHEA: 0
CONSTIPATION: 0
NAUSEA: 0
BLOOD IN STOOL: 0
ABDOMINAL PAIN: 0
VOMITING: 0
ANAL BLEEDING: 0
SHORTNESS OF BREATH: 1

## 2025-08-13 RX ORDER — ATORVASTATIN CALCIUM 10 MG/1
10 TABLET, FILM COATED ORAL DAILY
Qty: 90 TABLET | Refills: 3 | Status: SHIPPED | OUTPATIENT
Start: 2025-08-13

## 2025-08-15 ENCOUNTER — OFFICE VISIT (OUTPATIENT)
Dept: SURGERY | Facility: CLINIC | Age: 75
End: 2025-08-15
Payer: MEDICARE

## 2025-08-15 VITALS
WEIGHT: 211 LBS | BODY MASS INDEX: 29.54 KG/M2 | HEIGHT: 71 IN | DIASTOLIC BLOOD PRESSURE: 98 MMHG | SYSTOLIC BLOOD PRESSURE: 160 MMHG

## 2025-08-15 DIAGNOSIS — K64.8 INTERNAL HEMORRHOIDS: Primary | ICD-10-CM

## 2025-08-20 ENCOUNTER — TELEPHONE (OUTPATIENT)
Dept: CARDIOLOGY | Facility: CLINIC | Age: 75
End: 2025-08-20
Payer: MEDICARE

## 2025-08-25 ENCOUNTER — OFFICE VISIT (OUTPATIENT)
Dept: CARDIOLOGY | Facility: CLINIC | Age: 75
End: 2025-08-25
Payer: MEDICARE

## 2025-08-25 VITALS
HEART RATE: 64 BPM | BODY MASS INDEX: 29.23 KG/M2 | HEIGHT: 71 IN | WEIGHT: 208.8 LBS | DIASTOLIC BLOOD PRESSURE: 72 MMHG | SYSTOLIC BLOOD PRESSURE: 112 MMHG | OXYGEN SATURATION: 91 %

## 2025-08-25 DIAGNOSIS — I10 ESSENTIAL HYPERTENSION: ICD-10-CM

## 2025-08-25 DIAGNOSIS — I48.0 PAROXYSMAL ATRIAL FIBRILLATION: Primary | ICD-10-CM

## (undated) DEVICE — MSK O2 MD CONCENTR A/ LF 7FT 1P/U

## (undated) DEVICE — THE SINGLE USE ETRAP – POLYP TRAP IS USED FOR SUCTION RETRIEVAL OF ENDOSCOPICALLY REMOVED POLYPS.: Brand: ETRAP

## (undated) DEVICE — PK CATH CARD 30 CA/4

## (undated) DEVICE — SOL IRR NACL 0.9PCT BT 1000ML

## (undated) DEVICE — DRAPE,ANGIO,BRACH,STERILE,38X44: Brand: MEDLINE

## (undated) DEVICE — PAD, DEFIB, ADULT, RADIOTRANS, PHYSIO: Brand: MEDLINE

## (undated) DEVICE — YANKAUER,BULB TIP WITH VENT: Brand: ARGYLE

## (undated) DEVICE — THE CHANNEL CLEANING BRUSH IS A NYLON FLEXI BRUSH ATTACHED TO A FLEXIBLE PLASTIC SHEATH DESIGNED TO SAFELY REMOVE DEBRIS FROM FLEXIBLE ENDOSCOPES.

## (undated) DEVICE — KT NDL GUIDE STRL 18GA

## (undated) DEVICE — Device: Brand: DEFENDO AIR/WATER/SUCTION AND BIOPSY VALVE

## (undated) DEVICE — KT MINI ACC MAK401

## (undated) DEVICE — Device: Brand: MEDEX

## (undated) DEVICE — SWAN-GANZ THERMODILUTION CATHETER: Brand: SWAN-GANZ

## (undated) DEVICE — CANN NASL ETCO2 LO/FLO A/

## (undated) DEVICE — SOLIDIFIER LIQ LIQUILOC/PLUS W/TREAT 2000CC

## (undated) DEVICE — PINNACLE INTRODUCER SHEATH: Brand: PINNACLE

## (undated) DEVICE — TBG SMPL FLTR LINE NASL 02/C02 A/ BX/100

## (undated) DEVICE — SNAR POLYP SENSATION MICRO OVL 13 240X40

## (undated) DEVICE — STPCK 3/WY HP M/RA W/OFF/HNDL 1050PSI STRL

## (undated) DEVICE — CUFF,BP,DISP,1 TUBE,ADULT,HP: Brand: MEDLINE

## (undated) DEVICE — Device

## (undated) DEVICE — SENSR O2 OXIMAX FNGR A/ 18IN NONSTR

## (undated) DEVICE — MASK,OXYGEN,MED CONC,ADLT,7' TUB, UC: Brand: PENDING

## (undated) DEVICE — ENDOGATOR AUXILIARY WATER JET CONNECTOR: Brand: ENDOGATOR

## (undated) DEVICE — SNAR POLYP CAPTIVATOR RND STFF 2.4 240CM 10MM 1P/U